# Patient Record
Sex: FEMALE | Race: WHITE | NOT HISPANIC OR LATINO | Employment: OTHER | URBAN - METROPOLITAN AREA
[De-identification: names, ages, dates, MRNs, and addresses within clinical notes are randomized per-mention and may not be internally consistent; named-entity substitution may affect disease eponyms.]

---

## 2017-01-10 ENCOUNTER — DOCUMENTATION ONLY (OUTPATIENT)
Dept: FAMILY MEDICINE | Facility: CLINIC | Age: 72
End: 2017-01-10

## 2017-01-10 NOTE — PROGRESS NOTES
Pre-Visit Chart Review  For Appointment Scheduled on 1-11-17    [facility] Maintenance Due   Topic Date Due    Colonoscopy  06/19/1994    Zoster Vaccine  06/19/2004    Pneumococcal (65+) (1 of 2 - PCV13) 06/19/2009    Influenza Vaccine  08/01/2016

## 2017-01-11 ENCOUNTER — OFFICE VISIT (OUTPATIENT)
Dept: FAMILY MEDICINE | Facility: CLINIC | Age: 72
End: 2017-01-11
Payer: MEDICARE

## 2017-01-11 ENCOUNTER — HOSPITAL ENCOUNTER (OUTPATIENT)
Dept: RADIOLOGY | Facility: CLINIC | Age: 72
Discharge: HOME | End: 2017-01-11
Attending: FAMILY MEDICINE
Payer: MEDICARE

## 2017-01-11 VITALS
DIASTOLIC BLOOD PRESSURE: 70 MMHG | HEART RATE: 58 BPM | HEIGHT: 64 IN | BODY MASS INDEX: 38.12 KG/M2 | SYSTOLIC BLOOD PRESSURE: 152 MMHG | TEMPERATURE: 98 F | WEIGHT: 223.31 LBS

## 2017-01-11 DIAGNOSIS — Z12.31 ENCOUNTER FOR SCREENING MAMMOGRAM FOR MALIGNANT NEOPLASM OF BREAST: ICD-10-CM

## 2017-01-11 DIAGNOSIS — R26.9 ABNORMALITY OF GAIT AND MOBILITY: ICD-10-CM

## 2017-01-11 DIAGNOSIS — R53.83 FATIGUE, UNSPECIFIED TYPE: ICD-10-CM

## 2017-01-11 DIAGNOSIS — I10 ESSENTIAL HYPERTENSION: ICD-10-CM

## 2017-01-11 DIAGNOSIS — R42 DIZZINESS: Primary | ICD-10-CM

## 2017-01-11 DIAGNOSIS — E66.9 OBESITY (BMI 30-39.9): ICD-10-CM

## 2017-01-11 DIAGNOSIS — E03.9 HYPOTHYROIDISM, UNSPECIFIED TYPE: ICD-10-CM

## 2017-01-11 PROCEDURE — 77063 BREAST TOMOSYNTHESIS BI: CPT | Mod: 26,,, | Performed by: RADIOLOGY

## 2017-01-11 PROCEDURE — 1157F ADVNC CARE PLAN IN RCRD: CPT | Mod: S$GLB,,, | Performed by: FAMILY MEDICINE

## 2017-01-11 PROCEDURE — 1159F MED LIST DOCD IN RCRD: CPT | Mod: S$GLB,,, | Performed by: FAMILY MEDICINE

## 2017-01-11 PROCEDURE — 77067 SCR MAMMO BI INCL CAD: CPT | Mod: TC

## 2017-01-11 PROCEDURE — 77067 SCR MAMMO BI INCL CAD: CPT | Mod: 26,,, | Performed by: RADIOLOGY

## 2017-01-11 PROCEDURE — 99999 PR PBB SHADOW E&M-EST. PATIENT-LVL III: CPT | Mod: PBBFAC,,, | Performed by: FAMILY MEDICINE

## 2017-01-11 PROCEDURE — 3078F DIAST BP <80 MM HG: CPT | Mod: S$GLB,,, | Performed by: FAMILY MEDICINE

## 2017-01-11 PROCEDURE — 3077F SYST BP >= 140 MM HG: CPT | Mod: S$GLB,,, | Performed by: FAMILY MEDICINE

## 2017-01-11 PROCEDURE — 99214 OFFICE O/P EST MOD 30 MIN: CPT | Mod: S$GLB,,, | Performed by: FAMILY MEDICINE

## 2017-01-11 PROCEDURE — 82270 OCCULT BLOOD FECES: CPT | Mod: S$GLB,,, | Performed by: FAMILY MEDICINE

## 2017-01-11 PROCEDURE — 1160F RVW MEDS BY RX/DR IN RCRD: CPT | Mod: S$GLB,,, | Performed by: FAMILY MEDICINE

## 2017-01-11 PROCEDURE — 1125F AMNT PAIN NOTED PAIN PRSNT: CPT | Mod: S$GLB,,, | Performed by: FAMILY MEDICINE

## 2017-01-11 RX ORDER — FOSINOPRIL SODIUM AND HYDROCHLOROTHIAZIDE 20; 12.5 MG/1; MG/1
1 TABLET ORAL DAILY
Qty: 90 TABLET | Refills: 3 | Status: SHIPPED | OUTPATIENT
Start: 2017-01-11 | End: 2017-12-31 | Stop reason: SDUPTHER

## 2017-01-11 NOTE — MR AVS SNAPSHOT
[facility]  [address]  [place] [address]  Phone: [phone]  Fax: [phone]                  Tali Hopper   2017 3:00 PM   Office Visit    Description:  Female : 1945   Provider:  Elizabeth Rodríguez MD   Department:  [facility]           Reason for Visit     Follow-up           Diagnoses this Visit        Comments    Dizziness    -  Primary     Essential hypertension         Hypothyroidism, unspecified type         Abnormality of gait and mobility         Fatigue, unspecified type         Encounter for screening mammogram for malignant neoplasm of breast                To Do List           Future Appointments        Provider Department Dept Phone    2017 3:45 PM [facility] MAMMO1 [facility]- Mammography [phone]    2017 4:00 PM LAB, [place] [facility] - Lab [phone]    2017 2:40 PM Elizabeth Rodríguez MD [facility] [phone]      Goals (5 Years of Data)     None      Follow-Up and Disposition     Return in about 4 months (around 2017).       These Medications        Disp Refills Start End    fosinopril-hydrochlorothiazide (MONOPRIL-HCT) 20-12.5 mg per tablet 90 tablet 3 2017    Take 1 tablet by mouth once daily. - Oral    Pharmacy: [facility]/pharmacy #[id number] - [place]  [address]  Ph #: [phone]         [facility]     [facility] Nurse Care Line -  Assistance  Registered nurses in the [facility] provide clinical advisement, health education, appointment booking, and other advisory services.  Call for this free service at [phone].             Medications           Message regarding Medications     Verify the changes and/or additions to your medication regime listed below are the same as discussed with your clinician today.  If any of these changes or additions are incorrect, please notify your healthcare provider.        START taking these  "NEW medications        Refills    fosinopril-hydrochlorothiazide (MONOPRIL-HCT) 20-12.5 mg per tablet 3    Sig: Take 1 tablet by mouth once daily.    Class: Normal    Route: Oral      STOP taking these medications     fosinopril-hydrochlorothiazide (MONOPRIL-HCT) 10-12.5 mg per tablet TAKE 1 TABLET ONE TIME DAILY           Verify that the below list of medications is an accurate representation of the medications you are currently taking.  If none reported, the list may be blank. If incorrect, please contact your healthcare provider. Carry this list with you in case of emergency.           Current Medications     ascorbic acid (VITAMIN C) 100 MG tablet Take 100 mg by mouth once daily.    aspirin (ECOTRIN) 81 MG EC tablet Take 81 mg by mouth once daily.    b complex vitamins tablet Take 1 tablet by mouth once daily.    CALCIUM CARBONATE/VITAMIN D3 (CALTRATE 600 + D ORAL) Take by mouth once daily.     escitalopram oxalate (LEXAPRO) 20 MG tablet TAKE 1 TABLET ONE TIME DAILY    latanoprost 0.005 % ophthalmic solution 1 drop every evening.    levothyroxine (SYNTHROID) 112 MCG tablet TAKE 1 TABLET BY MOUTH ONCE DAILY.    kr-SI-U1-K-lycop-lut-herb#220 (ESTROBLEND) 500 mcg-1,000 unit-20 mcg Tab Take 1 tablet by mouth once daily.    raloxifene (EVISTA) 60 mg tablet TAKE 1 TABLET ONE TIME DAILY    timolol maleate 0.5% (TIMOPTIC-XE) 0.5 % SolG Place 1 drop into both eyes 2 (two) times daily.     fosinopril-hydrochlorothiazide (MONOPRIL-HCT) 20-12.5 mg per tablet Take 1 tablet by mouth once daily.           Clinical Reference Information           Vital Signs - Last Recorded  Most recent update: 1/11/2017  3:08 PM by Johnna Parsons MA    BP Pulse Temp Ht Wt BMI    (!) 152/70 (BP Location: Right arm, Patient Position: Sitting, BP Method: Manual) (!) 58 98 °F (36.7 °C) (Oral) 5' 3.5" (1.613 m) 101.3 kg (223 lb 5.2 oz) 38.94 kg/m2      Blood Pressure          Most Recent Value    BP  (!)  152/70      Allergies as of 1/11/2017     " Benadryl Decongestant    Penicillins      Immunizations Administered on Date of Encounter - 1/11/2017     None      Orders Placed During Today's Visit     Future Labs/Procedures Expected by Expires    CBC auto differential  1/11/2017 (Approximate) 4/11/2017    Comprehensive metabolic panel  1/11/2017 (Approximate) 4/11/2017    Mammo Digital Screening Bilateral With CAD  1/11/2017 3/11/2018    Occult blood x 1, stool  1/11/2017 1/11/2018    Occult blood x 1, stool  1/11/2017 1/11/2018    Occult blood x 1, stool  1/11/2017 1/11/2018    T4, free  1/11/2017 (Approximate) 4/11/2017    TSH  1/11/2017 (Approximate) 4/11/2017    Vitamin B12  1/11/2017 (Approximate) 4/11/2017      [facility] Sign-Up     Activating your [facility] account is as easy as 1-2-3!     1) Visit [email or web address], select Sign Up Now, enter this activation code and your date of birth, then select Next.  Activation code not generated  Current Patient Portal Status: Account disabled      2) Create a username and password to use when you visit [facility] in the future and select a security question in case you lose your password and select Next.    3) Enter your e-mail address and click Sign Up!    Additional Information  If you have questions, please e-mail [email or web address] or call [phone] to talk to our [facility] staff. Remember, [facility] is NOT to be used for urgent needs. For medical emergencies, dial 911.         Instructions      Walking for Fitness  Fitness walking has something for everyone, even people who are already fit. Walking is one of the safest ways to condition your body aerobically. It can boost energy, help you lose weight, and reduce stress.    Physical benefits  · Walking strengthens your heart and lungs, and tones your muscles.  · When walking, your feet land with less impact than in other sports. This reduces chances of muscle, bone, and joint injury.  · Regular walking improves your cholesterol levels and lowers your risk of  heart disease. And it helps you control your blood sugar if you have diabetes.  · Walking is a weight-bearing activity, which helps maintain bone density. This can help prevent osteoporosis.  Personal rewards  · Taking walks can help you relax and manage stress. And fitness walking may make you feel better about yourself.  · Walking can help you sleep better at night and make you less likely to be depressed.  · Regular walking may help maintain your memory as you get older.  · Walking is a great way to spend extra time with friends and family members. Be sure to invite your dog along!  Q&A about fitness walking  Q: Will walking keep me fit?  A: Yes. Regular walking at the right pace gives you all the benefits of other aerobic activities, such as jogging and swimming.  Q: Will walking help me lose weight and keep it off?  A: Yes. Per mile, walking can burn as many calories as jogging. Your health care provider can help work walking into your weight-loss plan.  Q: Is walking safe for my health?  A: Yes. Walking is safe if you have high blood pressure, diabetes, heart disease, or other conditions. Talk to your health care provider before you start.  © 4653-2760 [employer]. [address] [place] [address], [place] [address]. All rights reserved. This information is not intended as a substitute for professional medical care. Always follow your healthcare professional's instructions.        Weight Management: Getting Started  Healthy bodies come in all shapes and sizes. Not all bodies are made to be thin. For some people, a healthy weight is higher than the average weight listed on weight charts. Your healthcare provider can help you decide on a healthy weight for you.    Reasons to lose weight  Losing weight can help with some health problems, such as high blood pressure, heart disease, diabetes, sleep apnea, and arthritis. You may also feel more energy.  Set your long-term goal  Your goal doesn't even have to be a  specific weight. You may decide on a fitness goal (such as being able to walk 10 miles a week), or a health goal (such as lowering your blood pressure). Choose a goal that is measurable and reasonable, so you know when you've reached it. A goal of reaching a BMI of less than 25 is not always reasonable (or possible).   Make an action plan  Habits dont change overnight. Setting your goals too high can leave you feeling discouraged if you cant reach them. Be realistic. Choose one or two small changes you can make now. Set an action plan for how you are going to make these changes. When you can stick to this plan, keep making a few more small changes. Taking small steps will help you stay on the path to success.  Track your progress  Write down your goals. Then, keep a daily record of your progress. Write down what you eat and how active you are. This record lets you look back on how much youve done. It may also help when youre feeling frustrated. Reward yourself for success. Even if you dont reach every goal, give yourself credit for what you do get done.  Get support  Encouragement from others can help make losing weight easier. Ask your family members and friends for support. They may even want to join you. Also look to your healthcare provider, registered dietitian, and  for help. Your local hospital can give you more information about nutrition, exercise, and weight loss.  © 2268-7836 [facility], [employer]. [address] [place] [address], [place] [address]. All rights reserved. This information is not intended as a substitute for professional medical care. Always follow your healthcare professional's instructions.

## 2017-01-11 NOTE — PATIENT INSTRUCTIONS

## 2017-01-12 NOTE — PROGRESS NOTES
Subjective:       Patient ID: Tali Hopper is a 71 y.o. female.    Chief Complaint: Follow-up    HPIPatient is here to f/u chronic conditions  Patient Active Problem List   Diagnosis    Hypothyroid    Osteoporosis, post-menopausal    Depression    Hyperlipemia    Hypocalcemia    Loss of equilibrium    Obesity (BMI 30-39.9)    Decreased pulse    Faintness    Bilateral low back pain without sciatica    Cataract of left eye    Essential hypertension   C/o unsteady gait that causes her to fall gradually worsening > 1 year.  Has seen ENT Dr. Holt and eye specialist that say it has nothing to do with eyes or ears.  Sometimes gets lightheaded if she bends over or stands too quickly.  No headache    Review of Systems   Constitutional: Negative for fatigue and unexpected weight change.   Eyes: Negative for visual disturbance.   Respiratory: Negative for chest tightness and shortness of breath.    Cardiovascular: Negative for chest pain, palpitations and leg swelling.   Gastrointestinal: Negative for abdominal pain.   Endocrine: Negative for polydipsia, polyphagia and polyuria.   Musculoskeletal: Negative for arthralgias.   Neurological: Positive for dizziness and light-headedness. Negative for syncope, speech difficulty, weakness and headaches.       Objective:      Physical Exam   Constitutional: She is oriented to person, place, and time. She appears well-developed and well-nourished.   Cardiovascular: Normal rate, regular rhythm and normal heart sounds.    Pulmonary/Chest: Effort normal and breath sounds normal.   Musculoskeletal: She exhibits no edema.   Neurological: She is alert and oriented to person, place, and time. Gait abnormal.   Neg rhomberg, wide based gait and somewhat unsteady even with wide base.     Skin: Skin is warm and dry.   Psychiatric: She has a normal mood and affect.   Nursing note and vitals reviewed.      Assessment:       1. Dizziness    2. Essential hypertension    3.  "Hypothyroidism, unspecified type    4. Abnormality of gait and mobility     5. Fatigue, unspecified type    6. Encounter for screening mammogram for malignant neoplasm of breast    7. Obesity (BMI 30-39.9)        Plan:       1. Essential hypertension  Uncontrolled.  Increase to:  - fosinopril-hydrochlorothiazide (MONOPRIL-HCT) 20-12.5 mg per tablet; Take 1 tablet by mouth once daily.  Dispense: 90 tablet; Refill: 3  I counseled the patient on HTN education, management and recommendations.  I recommended weight loss toward a BMI < 25, avoidance of salt and the DASH diet, regular cardio exercise a minimum of 150 minutes per week and medications if indicated.  Printed materials were given.    2. Dizziness  Screen and treat as indicated:    - Vitamin B12; Future    3. Hypothyroidism, unspecified type  Stable condition.  Continue current medications.  Will adjust based on lab findings or if condition changes.    - Comprehensive metabolic panel; Future  - CBC auto differential; Future  - TSH; Future  - T4, free; Future    4. Abnormality of gait and mobility   Screen and treat as indicated:    - Vitamin B12; Future    5. Fatigue, unspecified type  Stable condition.  Continue current medications.  Will adjust based on lab findings or if condition changes.'  Refuses colonoscopy  - Occult blood x 1, stool; Future  - Occult blood x 1, stool; Future  - Occult blood x 1, stool; Future    6. Encounter for screening mammogram for malignant neoplasm of breast  Screen and treat as indicated:    7. Obesity (BMI 30-39.9)  Counseled patient on his ideal body weight, health consequences of being obese and current recommendations including weekly exercise and a heart healthy diet.  Current BMI is:Estimated body mass index is 38.94 kg/(m^2) as calculated from the following:    Height as of this encounter: 5' 3.5" (1.613 m).    Weight as of this encounter: 101.3 kg (223 lb 5.2 oz)..  Patient is aware that ideal BMI < 25 or Weight in (lb) " to have BMI = 25: 143.1.    [facility] goal documentation:  Patient readiness: acceptance and barriers:readiness  During the course of the visit the patient was educated and counseled about the following: Hypertension:   Dietary sodium restriction.  Regular aerobic exercise.  Obesity:   Informal exercise measures discussed, e.g. taking stairs instead of elevator.  Regular aerobic exercise program discussed.  Goals: Hypertension: Reduce Blood Pressure and Obesity: Reduce calorie intake and BMI  Goal/Outcomes met:none  The following self management tools provided:none  Patient Instructions (the written plan) was given to the patient/family: Yes  Time spent with patient: 20 minutes    Patient with be reevaluated in 4 months or sooner prn.  Nurse BP check 2 weeks    Greater than 50% of this visit was spent counseling as described in above documentation:Yes

## 2017-01-18 LAB
CTP QC/QA: YES
FECAL OCCULT BLOOD, POC: NEGATIVE

## 2017-05-14 ENCOUNTER — DOCUMENTATION ONLY (OUTPATIENT)
Dept: FAMILY MEDICINE | Facility: CLINIC | Age: 72
End: 2017-05-14

## 2017-05-14 NOTE — PATIENT INSTRUCTIONS
Controlling High Blood Pressure  High blood pressure (hypertension) is often called the silent killer. This is because many people who have it dont know it. High blood pressure is defined as 140/90 mm Hg or higher. Know your blood pressure and remember to check it regularly. Doing so can save your life. Here are some things you can do to help control your blood pressure.    Choose heart-healthy foods  · Select low-salt, low-fat foods. Limit sodium intake to 2,400 mg per day or the amount suggested by your healthcare provider.  · Limit canned, dried, cured, packaged, and fast foods. These can contain a lot of salt.  · Eat 8 to 10 servings of fruits and vegetables every day.  · Choose lean meats, fish, or chicken.  · Eat whole-grain pasta, brown rice, and beans.  · Eat 2 to 3 servings of low-fat or fat-free dairy products.  · Ask your doctor about the DASH eating plan. This plan helps reduce blood pressure.  · When you go to a restaurant, ask that your meal be prepared with no added salt.  Maintain a healthy weight  · Ask your healthcare provider how many calories to eat a day. Then stick to that number.  · Ask your healthcare provider what weight range is healthiest for you. If you are overweight, a weight loss of only 3% to 5% of your body weight can help lower blood pressure. Generally, a good weight loss goal is to lose 10% of your body weight in a year.  · Limit snacks and sweets.  · Get regular exercise.  Get up and get active  · Choose activities you enjoy. Find ones you can do with friends or family. This includes bicycling, dancing, walking, and jogging.  · Park farther away from building entrances.  · Use stairs instead of the elevator.  · When you can, walk or bike instead of driving.  · [place] leaves, garden, or do household repairs.  · Be active at a moderate to vigorous level of physical activity for at least 40 minutes for a minimum of 3 to 4 days a week.   Manage stress  · Make time to relax and enjoy  life. Find time to laugh.  · Communicate your concerns with your loved ones and your healthcare provider.  · Visit with family and friends, and keep up with hobbies.  Limit alcohol and quit smoking  · Men should have no more than 2 drinks per day.  · Women should have no more than 1 drink per day.  · Talk with your healthcare provider about quitting smoking. Smoking significantly increases your risk for heart disease and stroke. Ask your healthcare provider about community smoking cessation programs and other options.  Medicines  If lifestyle changes arent enough, your healthcare provider may prescribe high blood pressure medicine. Take all medicines as prescribed. If you have any questions about your medicines, ask your healthcare provider before stopping or changing them.   Date Last Reviewed: 4/27/2016 © 2000-2016 [employer]. [address] [place] [address], [place] [address]. All rights reserved. This information is not intended as a substitute for professional medical care. Always follow your healthcare professional's instructions.        Weight Management: Getting Started  Healthy bodies come in all shapes and sizes. Not all bodies are made to be thin. For some people, a healthy weight is higher than the average weight listed on weight charts. Your healthcare provider can help you decide on a healthy weight for you.    Reasons to lose weight  Losing weight can help with some health problems, such as high blood pressure, heart disease, diabetes, sleep apnea, and arthritis. You may also feel more energy.  Set your long-term goal  Your goal doesn't even have to be a specific weight. You may decide on a fitness goal (such as being able to walk 10 miles a week), or a health goal (such as lowering your blood pressure). Choose a goal that is measurable and reasonable, so you know when you've reached it. A goal of reaching a BMI of less than 25 is not always reasonable (or possible).   Make an action  plan  Habits dont change overnight. Setting your goals too high can leave you feeling discouraged if you cant reach them. Be realistic. Choose one or two small changes you can make now. Set an action plan for how you are going to make these changes. When you can stick to this plan, keep making a few more small changes. Taking small steps will help you stay on the path to success.  Track your progress  Write down your goals. Then, keep a daily record of your progress. Write down what you eat and how active you are. This record lets you look back on how much youve done. It may also help when youre feeling frustrated. Reward yourself for success. Even if you dont reach every goal, give yourself credit for what you do get done.  Get support  Encouragement from others can help make losing weight easier. Ask your family members and friends for support. They may even want to join you. Also look to your healthcare provider, registered dietitian, and  for help. Your local hospital can give you more information about nutrition, exercise, and weight loss.  Date Last Reviewed: 1/31/2016 © 2000-2016 [employer]. [address] [place] [address]. All rights reserved. This information is not intended as a substitute for professional medical care. Always follow your healthcare professional's instructions.        Walking for Fitness  Fitness walking has something for everyone, even people who are already fit. Walking is one of the safest ways to condition your body aerobically. It can boost energy, help you lose weight, and reduce stress.    Physical benefits  · Walking strengthens your heart and lungs, and tones your muscles.  · When walking, your feet land with less impact than in other sports. This reduces chances of muscle, bone, and joint injury.  · Regular walking improves your cholesterol levels and lowers your risk of heart disease. And it helps you control your blood sugar if  you have diabetes.  · Walking is a weight-bearing activity, which helps maintain bone density. This can help prevent osteoporosis.  Personal rewards  · Taking walks can help you relax and manage stress. And fitness walking may make you feel better about yourself.  · Walking can help you sleep better at night and make you less likely to be depressed.  · Regular walking may help maintain your memory as you get older.  · Walking is a great way to spend extra time with friends and family members. Be sure to invite your dog along!  Q&A about fitness walking  Q: Will walking keep me fit?  A: Yes. Regular walking at the right pace gives you all the benefits of other aerobic activities, such as jogging and swimming.  Q: Will walking help me lose weight and keep it off?  A: Yes. Per mile, walking can burn as many calories as jogging. Your health care provider can help work walking into your weight-loss plan.  Q: Is walking safe for my health?  A: Yes. Walking is safe if you have high blood pressure, diabetes, heart disease, or other conditions. Talk to your health care provider before you start.  Date Last Reviewed: 5/9/2015  © 7327-4294 [employer]. [address] [place] [address], [place] [address]. All rights reserved. This information is not intended as a substitute for professional medical care. Always follow your healthcare professional's instructions.

## 2017-05-14 NOTE — PROGRESS NOTES
Pre-Visit Chart Review  For Appointment Scheduled on 05/17/2017    Health Maintenance Due   Topic Date Due    DEXA SCAN  04/21/2017

## 2017-05-17 ENCOUNTER — LAB VISIT (OUTPATIENT)
Dept: LAB | Facility: HOSPITAL | Age: 72
End: 2017-05-17
Attending: FAMILY MEDICINE
Payer: MEDICARE

## 2017-05-17 ENCOUNTER — OFFICE VISIT (OUTPATIENT)
Dept: FAMILY MEDICINE | Facility: CLINIC | Age: 72
End: 2017-05-17
Payer: MEDICARE

## 2017-05-17 VITALS
SYSTOLIC BLOOD PRESSURE: 140 MMHG | HEART RATE: 64 BPM | BODY MASS INDEX: 38.27 KG/M2 | TEMPERATURE: 98 F | DIASTOLIC BLOOD PRESSURE: 72 MMHG | WEIGHT: 224.19 LBS | HEIGHT: 64 IN

## 2017-05-17 DIAGNOSIS — R90.89 ABNORMAL BRAIN MRI: ICD-10-CM

## 2017-05-17 DIAGNOSIS — E66.9 OBESITY (BMI 30-39.9): ICD-10-CM

## 2017-05-17 DIAGNOSIS — F41.8 DEPRESSION WITH ANXIETY: ICD-10-CM

## 2017-05-17 DIAGNOSIS — E78.5 HYPERLIPIDEMIA, UNSPECIFIED HYPERLIPIDEMIA TYPE: ICD-10-CM

## 2017-05-17 DIAGNOSIS — E03.9 HYPOTHYROIDISM, UNSPECIFIED TYPE: ICD-10-CM

## 2017-05-17 DIAGNOSIS — G31.9 CEREBRAL ATROPHY: Primary | ICD-10-CM

## 2017-05-17 DIAGNOSIS — R29.3 POSTURAL IMBALANCE: ICD-10-CM

## 2017-05-17 DIAGNOSIS — I10 ESSENTIAL HYPERTENSION: ICD-10-CM

## 2017-05-17 DIAGNOSIS — F32.A DEPRESSION, UNSPECIFIED DEPRESSION TYPE: ICD-10-CM

## 2017-05-17 DIAGNOSIS — M81.0 OSTEOPOROSIS, POST-MENOPAUSAL: ICD-10-CM

## 2017-05-17 LAB
ALBUMIN SERPL BCP-MCNC: 3.6 G/DL
ALP SERPL-CCNC: 62 U/L
ALT SERPL W/O P-5'-P-CCNC: 16 U/L
ANION GAP SERPL CALC-SCNC: 9 MMOL/L
AST SERPL-CCNC: 20 U/L
BASOPHILS # BLD AUTO: 0.04 K/UL
BASOPHILS NFR BLD: 0.4 %
BILIRUB SERPL-MCNC: 0.6 MG/DL
BUN SERPL-MCNC: 19 MG/DL
CALCIUM SERPL-MCNC: 9.9 MG/DL
CHLORIDE SERPL-SCNC: 102 MMOL/L
CO2 SERPL-SCNC: 28 MMOL/L
CREAT SERPL-MCNC: 0.9 MG/DL
DIFFERENTIAL METHOD: ABNORMAL
EOSINOPHIL # BLD AUTO: 0.3 K/UL
EOSINOPHIL NFR BLD: 3.4 %
ERYTHROCYTE [DISTWIDTH] IN BLOOD BY AUTOMATED COUNT: 13.3 %
EST. GFR  (AFRICAN AMERICAN): >60 ML/MIN/1.73 M^2
EST. GFR  (NON AFRICAN AMERICAN): >60 ML/MIN/1.73 M^2
GLUCOSE SERPL-MCNC: 105 MG/DL
HCT VFR BLD AUTO: 44.5 %
HGB BLD-MCNC: 14.2 G/DL
LYMPHOCYTES # BLD AUTO: 2.5 K/UL
LYMPHOCYTES NFR BLD: 28.5 %
MCH RBC QN AUTO: 28.7 PG
MCHC RBC AUTO-ENTMCNC: 31.9 %
MCV RBC AUTO: 90 FL
MONOCYTES # BLD AUTO: 0.8 K/UL
MONOCYTES NFR BLD: 8.8 %
NEUTROPHILS # BLD AUTO: 5.2 K/UL
NEUTROPHILS NFR BLD: 58.6 %
PLATELET # BLD AUTO: 287 K/UL
PMV BLD AUTO: 11.6 FL
POTASSIUM SERPL-SCNC: 4.4 MMOL/L
PROT SERPL-MCNC: 7.3 G/DL
RBC # BLD AUTO: 4.95 M/UL
SODIUM SERPL-SCNC: 139 MMOL/L
T4 FREE SERPL-MCNC: 1.3 NG/DL
TSH SERPL DL<=0.005 MIU/L-ACNC: 1.11 UIU/ML
WBC # BLD AUTO: 8.89 K/UL

## 2017-05-17 PROCEDURE — 36415 COLL VENOUS BLD VENIPUNCTURE: CPT | Mod: PO

## 2017-05-17 PROCEDURE — 99214 OFFICE O/P EST MOD 30 MIN: CPT | Mod: S$GLB,,, | Performed by: FAMILY MEDICINE

## 2017-05-17 PROCEDURE — 80053 COMPREHEN METABOLIC PANEL: CPT

## 2017-05-17 PROCEDURE — 1159F MED LIST DOCD IN RCRD: CPT | Mod: S$GLB,,, | Performed by: FAMILY MEDICINE

## 2017-05-17 PROCEDURE — 85025 COMPLETE CBC W/AUTO DIFF WBC: CPT

## 2017-05-17 PROCEDURE — 1126F AMNT PAIN NOTED NONE PRSNT: CPT | Mod: S$GLB,,, | Performed by: FAMILY MEDICINE

## 2017-05-17 PROCEDURE — 84439 ASSAY OF FREE THYROXINE: CPT

## 2017-05-17 PROCEDURE — 84443 ASSAY THYROID STIM HORMONE: CPT

## 2017-05-17 PROCEDURE — 99999 PR PBB SHADOW E&M-EST. PATIENT-LVL III: CPT | Mod: PBBFAC,,, | Performed by: FAMILY MEDICINE

## 2017-05-17 RX ORDER — BUPROPION HYDROCHLORIDE 150 MG/1
150 TABLET ORAL DAILY
Qty: 30 TABLET | Refills: 5 | Status: SHIPPED | OUTPATIENT
Start: 2017-05-17 | End: 2017-09-08 | Stop reason: SDUPTHER

## 2017-05-17 RX ORDER — LEVOMEFOLATE CALCIUM 7.5 MG
7.5 TABLET ORAL DAILY
Qty: 30 TABLET | Refills: 5 | Status: SHIPPED | OUTPATIENT
Start: 2017-05-17 | End: 2019-07-10

## 2017-05-17 RX ORDER — AMLODIPINE BESYLATE 5 MG/1
5 TABLET ORAL DAILY
Qty: 30 TABLET | Refills: 5 | Status: SHIPPED | OUTPATIENT
Start: 2017-05-17 | End: 2017-09-08 | Stop reason: SDUPTHER

## 2017-05-17 RX ORDER — RALOXIFENE HYDROCHLORIDE 60 MG/1
TABLET, FILM COATED ORAL
Qty: 90 TABLET | Refills: 3 | Status: SHIPPED | OUTPATIENT
Start: 2017-05-17 | End: 2018-07-30 | Stop reason: SDUPTHER

## 2017-05-17 RX ORDER — ESCITALOPRAM OXALATE 20 MG/1
TABLET ORAL
Qty: 90 TABLET | Refills: 3 | Status: SHIPPED | OUTPATIENT
Start: 2017-05-17 | End: 2017-08-10 | Stop reason: SDUPTHER

## 2017-05-17 NOTE — MR AVS SNAPSHOT
[facility]  [address]  [place] [address]  Phone: [phone]  Fax: [phone]                  Tali Hopper   2017 2:40 PM   Office Visit    Description:  Female : 1945   Provider:  Elizabeth Rodríguez MD   Department:  [facility]           Reason for Visit     Follow-up           Diagnoses this Visit        Comments    Cerebral atrophy    -  Primary     Abnormal brain MRI         Postural imbalance         Hypothyroidism, unspecified type         Hyperlipidemia, unspecified hyperlipidemia type         Obesity (BMI 30-39.9)         Depression with anxiety         Depression, unspecified depression type         Osteoporosis, post-menopausal         Essential hypertension                To Do List           Future Appointments        Provider Department Dept Phone    2017 3:30 PM [facility] MRI1 300 LB LIMIT [facility] [phone]    2017 2:40 PM RAUL Samson-YONI [facility] [phone]    2017 3:40 PM Elizabeth Rodríguez MD [facility] [phone]      Goals (5 Years of Data)     None      Follow-Up and Disposition     Return in about 3 months (around 2017).       These Medications        Disp Refills Start End    buPROPion (WELLBUTRIN XL) 150 MG TB24 tablet 30 tablet 5 2017    Take 1 tablet (150 mg total) by mouth once daily. - Oral    Pharmacy: [facility]/pharmacy #[id number] - [place] - [address] Ph #: [phone]       levomefolate calcium (DEPLIN) 7.5 mg Tab tablet 30 tablet 5 2017    Take 1 tablet (7.5 mg total) by mouth once daily. - Oral    Pharmacy: [facility]/pharmacy #[id number] - [place] - [address] Ph #: [phone]       escitalopram oxalate (LEXAPRO) 20 MG tablet 90 tablet 3 2017     TAKE 1 TABLET ONE TIME DAILY    Pharmacy: [facility] - [place] [address] Ph #: [phone]       raloxifene  (EVISTA) 60 mg tablet 90 tablet 3 5/17/2017     TAKE 1 TABLET ONE TIME DAILY    Pharmacy: [facility] - [facility] [address] Ph #: [phone]       amlodipine (NORVASC) 5 MG tablet 30 tablet 5 5/17/2017 5/17/2018    Take 1 tablet (5 mg total) by mouth once daily. - Oral    Pharmacy: [facility]/pharmacy #[id number] - [place] - [address] Ph #: [phone]         [facility]     [facility] On Call Nurse Care Line - 24/7 Assistance  Unless otherwise directed by your provider, please contact [facility], our nurse care line that is available for 24/7 assistance.     Registered nurses in the [facility] provide: appointment scheduling, clinical advisement, health education, and other advisory services.  Call: [phone] (toll free)               Medications           Message regarding Medications     Verify the changes and/or additions to your medication regime listed below are the same as discussed with your clinician today.  If any of these changes or additions are incorrect, please notify your healthcare provider.        START taking these NEW medications        Refills    buPROPion (WELLBUTRIN XL) 150 MG TB24 tablet 5    Sig: Take 1 tablet (150 mg total) by mouth once daily.    Class: Normal    Route: Oral    levomefolate calcium (DEPLIN) 7.5 mg Tab tablet 5    Sig: Take 1 tablet (7.5 mg total) by mouth once daily.    Class: Normal    Route: Oral    amlodipine (NORVASC) 5 MG tablet 5    Sig: Take 1 tablet (5 mg total) by mouth once daily.    Class: Normal    Route: Oral           Verify that the below list of medications is an accurate representation of the medications you are currently taking.  If none reported, the list may be blank. If incorrect, please contact your healthcare provider. Carry this list with you in case of emergency.           Current Medications     ascorbic acid (VITAMIN C) 100 MG tablet Take 100 mg by mouth once daily.    aspirin (ECOTRIN)  "81 MG EC tablet Take 81 mg by mouth once daily.    b complex vitamins tablet Take 1 tablet by mouth once daily.    CALCIUM CARBONATE/VITAMIN D3 (CALTRATE 600 + D ORAL) Take by mouth once daily.     escitalopram oxalate (LEXAPRO) 20 MG tablet TAKE 1 TABLET ONE TIME DAILY    fosinopril-hydrochlorothiazide (MONOPRIL-HCT) 20-12.5 mg per tablet Take 1 tablet by mouth once daily.    latanoprost 0.005 % ophthalmic solution 1 drop every evening.    levothyroxine (SYNTHROID) 112 MCG tablet TAKE 1 TABLET BY MOUTH ONCE DAILY.    dv-[id number]-K-lycop-lut-herb#220 (ESTROBLEND) 500 mcg-1,000 unit-20 mcg Tab Take 1 tablet by mouth once daily.    raloxifene (EVISTA) 60 mg tablet TAKE 1 TABLET ONE TIME DAILY    timolol maleate 0.5% (TIMOPTIC-XE) 0.5 % SolG Place 1 drop into both eyes 2 (two) times daily.     amlodipine (NORVASC) 5 MG tablet Take 1 tablet (5 mg total) by mouth once daily.    buPROPion (WELLBUTRIN XL) 150 MG TB24 tablet Take 1 tablet (150 mg total) by mouth once daily.    levomefolate calcium (DEPLIN) 7.5 mg Tab tablet Take 1 tablet (7.5 mg total) by mouth once daily.           Clinical Reference Information           Your Vitals Were     BP Pulse Temp Height Weight BMI    140/72 (BP Location: Right arm, Patient Position: Sitting, BP Method: Manual) 64 98 °F (36.7 °C) (Oral) 5' 3.5" (1.613 m) 101.7 kg (224 lb 3.3 oz) 39.09 kg/m2      Blood Pressure          Most Recent Value    BP  (!)  140/72      Allergies as of 5/17/2017     Benadryl Decongestant    Penicillins      Immunizations Administered on Date of Encounter - 5/17/2017     None      Orders Placed During Today's Visit     Future Labs/Procedures Expected by Expires    CBC auto differential  5/17/2017 7/16/2018    Comprehensive metabolic panel  5/17/2017 7/16/2018    MRI Brain W WO Contrast  5/17/2017 5/17/2018    T4, free  5/17/2017 7/16/2018    TSH  5/17/2017 7/16/2018      [facility] Sign-Up     Activating your [facility] account is as easy as 1-2-3!     1) Visit " [email or web address], select Sign Up Now, enter this activation code and your date of birth, then select Next.  Activation code not generated  Current Patient Portal Status: Account disabled      2) Create a username and password to use when you visit [facility] in the future and select a security question in case you lose your password and select Next.    3) Enter your e-mail address and click Sign Up!    Additional Information  If you have questions, please e-mail [email or web address] or call [phone] to talk to our [facility] staff. Remember, [facility] is NOT to be used for urgent needs. For medical emergencies, dial 911.         Instructions      Controlling High Blood Pressure  High blood pressure (hypertension) is often called the silent killer. This is because many people who have it dont know it. High blood pressure is defined as 140/90 mm Hg or higher. Know your blood pressure and remember to check it regularly. Doing so can save your life. Here are some things you can do to help control your blood pressure.    Choose heart-healthy foods  · Select low-salt, low-fat foods. Limit sodium intake to 2,400 mg per day or the amount suggested by your healthcare provider.  · Limit canned, dried, cured, packaged, and fast foods. These can contain a lot of salt.  · Eat 8 to 10 servings of fruits and vegetables every day.  · Choose lean meats, fish, or chicken.  · Eat whole-grain pasta, brown rice, and beans.  · Eat 2 to 3 servings of low-fat or fat-free dairy products.  · Ask your doctor about the DASH eating plan. This plan helps reduce blood pressure.  · When you go to a restaurant, ask that your meal be prepared with no added salt.  Maintain a healthy weight  · Ask your healthcare provider how many calories to eat a day. Then stick to that number.  · Ask your healthcare provider what weight range is healthiest for you. If you are overweight, a weight loss of only 3% to 5% of your body weight can help lower blood  pressure. Generally, a good weight loss goal is to lose 10% of your body weight in a year.  · Limit snacks and sweets.  · Get regular exercise.  Get up and get active  · Choose activities you enjoy. Find ones you can do with friends or family. This includes bicycling, dancing, walking, and jogging.  · Park farther away from building entrances.  · Use stairs instead of the elevator.  · When you can, walk or bike instead of driving.  · Remlap leaves, garden, or do household repairs.  · Be active at a moderate to vigorous level of physical activity for at least 40 minutes for a minimum of 3 to 4 days a week.   Manage stress  · Make time to relax and enjoy life. Find time to laugh.  · Communicate your concerns with your loved ones and your healthcare provider.  · Visit with family and friends, and keep up with hobbies.  Limit alcohol and quit smoking  · Men should have no more than 2 drinks per day.  · Women should have no more than 1 drink per day.  · Talk with your healthcare provider about quitting smoking. Smoking significantly increases your risk for heart disease and stroke. Ask your healthcare provider about community smoking cessation programs and other options.  Medicines  If lifestyle changes arent enough, your healthcare provider may prescribe high blood pressure medicine. Take all medicines as prescribed. If you have any questions about your medicines, ask your healthcare provider before stopping or changing them.   Date Last Reviewed: 4/27/2016  © 9325-1383 [employer]. [address] [place] [address], [place] [address]. All rights reserved. This information is not intended as a substitute for professional medical care. Always follow your healthcare professional's instructions.        Weight Management: Getting Started  Healthy bodies come in all shapes and sizes. Not all bodies are made to be thin. For some people, a healthy weight is higher than the average weight listed on weight charts. Your  healthcare provider can help you decide on a healthy weight for you.    Reasons to lose weight  Losing weight can help with some health problems, such as high blood pressure, heart disease, diabetes, sleep apnea, and arthritis. You may also feel more energy.  Set your long-term goal  Your goal doesn't even have to be a specific weight. You may decide on a fitness goal (such as being able to walk 10 miles a week), or a health goal (such as lowering your blood pressure). Choose a goal that is measurable and reasonable, so you know when you've reached it. A goal of reaching a BMI of less than 25 is not always reasonable (or possible).   Make an action plan  Habits dont change overnight. Setting your goals too high can leave you feeling discouraged if you cant reach them. Be realistic. Choose one or two small changes you can make now. Set an action plan for how you are going to make these changes. When you can stick to this plan, keep making a few more small changes. Taking small steps will help you stay on the path to success.  Track your progress  Write down your goals. Then, keep a daily record of your progress. Write down what you eat and how active you are. This record lets you look back on how much youve done. It may also help when youre feeling frustrated. Reward yourself for success. Even if you dont reach every goal, give yourself credit for what you do get done.  Get support  Encouragement from others can help make losing weight easier. Ask your family members and friends for support. They may even want to join you. Also look to your healthcare provider, registered dietitian, and  for help. Your local hospital can give you more information about nutrition, exercise, and weight loss.  Date Last Reviewed: 1/31/2016  © 9760-8117 [employer]. [address] [place] [address], [place] [address]. All rights reserved. This information is not intended as a substitute for professional  medical care. Always follow your healthcare professional's instructions.        Walking for Fitness  Fitness walking has something for everyone, even people who are already fit. Walking is one of the safest ways to condition your body aerobically. It can boost energy, help you lose weight, and reduce stress.    Physical benefits  · Walking strengthens your heart and lungs, and tones your muscles.  · When walking, your feet land with less impact than in other sports. This reduces chances of muscle, bone, and joint injury.  · Regular walking improves your cholesterol levels and lowers your risk of heart disease. And it helps you control your blood sugar if you have diabetes.  · Walking is a weight-bearing activity, which helps maintain bone density. This can help prevent osteoporosis.  Personal rewards  · Taking walks can help you relax and manage stress. And fitness walking may make you feel better about yourself.  · Walking can help you sleep better at night and make you less likely to be depressed.  · Regular walking may help maintain your memory as you get older.  · Walking is a great way to spend extra time with friends and family members. Be sure to invite your dog along!  Q&A about fitness walking  Q: Will walking keep me fit?  A: Yes. Regular walking at the right pace gives you all the benefits of other aerobic activities, such as jogging and swimming.  Q: Will walking help me lose weight and keep it off?  A: Yes. Per mile, walking can burn as many calories as jogging. Your health care provider can help work walking into your weight-loss plan.  Q: Is walking safe for my health?  A: Yes. Walking is safe if you have high blood pressure, diabetes, heart disease, or other conditions. Talk to your health care provider before you start.  Date Last Reviewed: 5/9/2015  © 5484-4702 [employer]. [address] [place] [address], [place] [address]. All rights reserved. This information is not intended as a substitute  for professional medical care. Always follow your healthcare professional's instructions.             Language Assistance Services     ATTENTION: Language assistance services are available, free of charge. Please call [phone].      ATENCIÓN: Si habjona spencer, tiene a garcia disposición servicios gratuitos de asistencia lingüística. Llame al [phone].     CHÚ Ý: N?u b?n nói Ti?ng Vi?t, có các d?ch v? h? tr? ngôn ng? mi?n phí dành cho b?n. G?i s? [phone].         [facility] complies with applicable Federal civil rights laws and does not discriminate on the basis of race, color, national origin, age, disability, or sex.

## 2017-05-22 ENCOUNTER — HOSPITAL ENCOUNTER (OUTPATIENT)
Dept: RADIOLOGY | Facility: HOSPITAL | Age: 72
Discharge: HOME | End: 2017-05-22
Attending: FAMILY MEDICINE
Payer: MEDICARE

## 2017-05-22 DIAGNOSIS — R29.3 POSTURAL IMBALANCE: ICD-10-CM

## 2017-05-22 DIAGNOSIS — G31.9 CEREBRAL ATROPHY: ICD-10-CM

## 2017-05-22 DIAGNOSIS — R90.89 ABNORMAL BRAIN MRI: ICD-10-CM

## 2017-05-22 PROCEDURE — A9585 GADOBUTROL INJECTION: HCPCS | Performed by: FAMILY MEDICINE

## 2017-05-22 PROCEDURE — 25500020 PHARM REV CODE 255: Performed by: FAMILY MEDICINE

## 2017-05-22 PROCEDURE — 70553 MRI BRAIN STEM W/O & W/DYE: CPT | Mod: TC

## 2017-05-22 PROCEDURE — 70553 MRI BRAIN STEM W/O & W/DYE: CPT | Mod: 26,,, | Performed by: RADIOLOGY

## 2017-05-22 RX ORDER — GADOBUTROL 604.72 MG/ML
INJECTION INTRAVENOUS
Status: DISPENSED
Start: 2017-05-22 | End: 2017-05-23

## 2017-05-22 RX ORDER — GADOBUTROL 604.72 MG/ML
10 INJECTION INTRAVENOUS
Status: COMPLETED | OUTPATIENT
Start: 2017-05-22 | End: 2017-05-22

## 2017-05-22 RX ADMIN — GADOBUTROL 10 ML: 604.72 INJECTION INTRAVENOUS at 03:05

## 2017-05-29 NOTE — PROGRESS NOTES
Subjective:       Patient ID: Tali Hopper is a 71 y.o. female.    Chief Complaint: Follow-up    Patient Active Problem List   Diagnosis    Hypothyroid    Osteoporosis, post-menopausal    Depression    Hyperlipemia    Hypocalcemia    Loss of equilibrium    Obesity (BMI 30-39.9)    Decreased pulse    Faintness    Bilateral low back pain without sciatica    Cataract of left eye    Essential hypertension   8/15 mri brain-Probable mild central brain atrophy with deep white matter ischemic changes.  Hydrocephalus however cannot be ruled out on the basis of this scan and further evaluation may be indicated.    C/o worsening depression, no motivation, no energy, anhedonia.  lexapro 20mg no longer working.  No SI/HI    HPI  Review of Systems   Constitutional: Negative for fatigue and unexpected weight change.   Respiratory: Negative for chest tightness and shortness of breath.    Cardiovascular: Negative for chest pain, palpitations and leg swelling.   Gastrointestinal: Negative for abdominal pain.   Musculoskeletal: Negative for arthralgias.   Neurological: Negative for dizziness, syncope, light-headedness and headaches.       Objective:      Physical Exam   Constitutional: She is oriented to person, place, and time. She appears well-developed and well-nourished.   Cardiovascular: Normal rate, regular rhythm and normal heart sounds.    Pulmonary/Chest: Effort normal and breath sounds normal.   Musculoskeletal: She exhibits no edema.   Neurological: She is alert and oriented to person, place, and time.   Skin: Skin is warm and dry.   Psychiatric: She has a normal mood and affect.   Nursing note and vitals reviewed.      Assessment:       1. Cerebral atrophy    2. Abnormal brain MRI    3. Postural imbalance    4. Hypothyroidism, unspecified type    5. Hyperlipidemia, unspecified hyperlipidemia type    6. Obesity (BMI 30-39.9)    7. Depression with anxiety    8. Depression, unspecified depression type    9.  "Osteoporosis, post-menopausal    10. Essential hypertension        Plan:       1. Cerebral atrophy  F/u abnl mri  - MRI Brain W WO Contrast; Future    2. Abnormal brain MRI  F/u abnl mri  - MRI Brain W WO Contrast; Future    3. Postural imbalance  F/u abnl mri  - MRI Brain W WO Contrast; Future    4. Hypothyroidism, unspecified type  Stable condition.  Continue current medications.  Will adjust based on lab findings or if condition changes.'  - CBC auto differential; Future  - Comprehensive metabolic panel; Future  - TSH; Future  - T4, free; Future    5. Hyperlipidemia, unspecified hyperlipidemia type  Stable condition.  Continue current medications.  Will adjust based on lab findings or if condition changes.      6. Obesity (BMI 30-39.9)  Counseled patient on his ideal body weight, health consequences of being obese and current recommendations including weekly exercise and a heart healthy diet.  Current BMI is:Estimated body mass index is 39.09 kg/m² as calculated from the following:    Height as of this encounter: 5' 3.5" (1.613 m).    Weight as of this encounter: 101.7 kg (224 lb 3.3 oz)..  Patient is aware that ideal BMI < 25 or Weight in (lb) to have BMI = 25: 143.1.        7. Depression with anxiety  uncontrolled on current medications.  Continue current medications.  Add  - buPROPion (WELLBUTRIN XL) 150 MG TB24 tablet; Take 1 tablet (150 mg total) by mouth once daily.  Dispense: 30 tablet; Refill: 5  - levomefolate calcium (DEPLIN) 7.5 mg Tab tablet; Take 1 tablet (7.5 mg total) by mouth once daily.  Dispense: 30 tablet; Refill: 5    8. Depression, unspecified depression type  Controlled on current medications.  Continue current medications.    - escitalopram oxalate (LEXAPRO) 20 MG tablet; TAKE 1 TABLET ONE TIME DAILY  Dispense: 90 tablet; Refill: 3    9. Osteoporosis, post-menopausal  Stable condition.  Continue current medications.  Will adjust based on lab findings or if condition changes.    - raloxifene " (EVISTA) 60 mg tablet; TAKE 1 TABLET ONE TIME DAILY  Dispense: 90 tablet; Refill: 3    10. Essential hypertension  uncontrolled on current medications.  Continue current medications.  Add  - amlodipine (NORVASC) 5 MG tablet; Take 1 tablet (5 mg total) by mouth once daily.  Dispense: 30 tablet; Refill: 5      [facility] goal documentation:  Patient readiness: acceptance and barriers:readiness  During the course of the visit the patient was educated and counseled about the following: Hypertension:   Dietary sodium restriction.  Regular aerobic exercise.  Obesity:   General weight loss/lifestyle modification strategies discussed (elicit support from others; identify saboteurs; non-food rewards, etc).  Goals: Hypertension: Reduce Blood Pressure and Obesity: Reduce calorie intake and BMI  Goal/Outcomes met:Hypertension  The following self management tools provided:none  Patient Instructions (the written plan) was given to the patient/family: Yes  Time spent with patient: 20 minutes    Patient with be reevaluated in 3 months or sooner prn. 4 weeks with JOSE to f/u htn and mood    Greater than 50% of this visit was spent counseling as described in above documentation:Yes

## 2017-05-30 ENCOUNTER — TELEPHONE (OUTPATIENT)
Dept: FAMILY MEDICINE | Facility: CLINIC | Age: 72
End: 2017-05-30

## 2017-05-30 DIAGNOSIS — R29.3 POSTURAL IMBALANCE: Primary | ICD-10-CM

## 2017-05-30 DIAGNOSIS — R42 DIZZINESS: ICD-10-CM

## 2017-05-30 NOTE — TELEPHONE ENCOUNTER
----- Message from Nina Grady sent at 5/30/2017 12:56 PM CDT -----  Patient requesting to speak with nurse (Johnna)concerning scheduling MRI/please call back at [phone] to advise.

## 2017-05-30 NOTE — TELEPHONE ENCOUNTER
Patient asking that since her MRI was unchanges since the last one she had done should she be on some sort of medication for the symptoms she is having, stumbling, feeling of pressure in her head when she bends over. Please advise.

## 2017-06-01 ENCOUNTER — TELEPHONE (OUTPATIENT)
Dept: FAMILY MEDICINE | Facility: CLINIC | Age: 72
End: 2017-06-01

## 2017-06-01 NOTE — TELEPHONE ENCOUNTER
Please call patient to schedule appointment for stumbling, feeling of pressure in her head when she bends over. Per . Thanks

## 2017-06-01 NOTE — TELEPHONE ENCOUNTER
Patient notified that  recommended that she see a neurologist. Advised patient that I'm not able to schedule this appointment and will send neurology staff a message. Patient verbalized understanding.

## 2017-06-14 ENCOUNTER — OFFICE VISIT (OUTPATIENT)
Dept: FAMILY MEDICINE | Facility: CLINIC | Age: 72
End: 2017-06-14
Payer: MEDICARE

## 2017-06-14 VITALS
DIASTOLIC BLOOD PRESSURE: 66 MMHG | SYSTOLIC BLOOD PRESSURE: 133 MMHG | WEIGHT: 224.19 LBS | HEIGHT: 64 IN | HEART RATE: 61 BPM | BODY MASS INDEX: 38.27 KG/M2 | TEMPERATURE: 98 F

## 2017-06-14 DIAGNOSIS — R42 DIZZINESS: Primary | ICD-10-CM

## 2017-06-14 DIAGNOSIS — F32.A DEPRESSION, UNSPECIFIED DEPRESSION TYPE: ICD-10-CM

## 2017-06-14 DIAGNOSIS — I10 ESSENTIAL HYPERTENSION: ICD-10-CM

## 2017-06-14 PROCEDURE — 99213 OFFICE O/P EST LOW 20 MIN: CPT | Mod: S$GLB,,, | Performed by: NURSE PRACTITIONER

## 2017-06-14 PROCEDURE — 99999 PR PBB SHADOW E&M-EST. PATIENT-LVL IV: CPT | Mod: PBBFAC,,, | Performed by: NURSE PRACTITIONER

## 2017-06-14 PROCEDURE — 1159F MED LIST DOCD IN RCRD: CPT | Mod: S$GLB,,, | Performed by: NURSE PRACTITIONER

## 2017-06-14 PROCEDURE — 1126F AMNT PAIN NOTED NONE PRSNT: CPT | Mod: S$GLB,,, | Performed by: NURSE PRACTITIONER

## 2017-06-14 NOTE — PATIENT INSTRUCTIONS

## 2017-06-15 NOTE — PROGRESS NOTES
Subjective:       Patient ID: Tali Hopper is a 71 y.o. female.    Chief Complaint: Hypertension and Mood    Ms. Hopper presents to the clinic today for follow up for hypertension and depression.  She saw Dr. Rodríguez on 5/17 and bupropion and Deplin were added which she feels are working well.  She states her mood is better.  Hypertension is well controlled on current medication.  She has a complaint of dizziness on and off for years and requests a Neurology consult outside of [facility] since she was unable to get Neurology appointment until September.      Hypertension   This is a chronic problem. The current episode started more than 1 year ago. The problem is controlled. Pertinent negatives include no anxiety, blurred vision, chest pain, headaches, palpitations, peripheral edema or shortness of breath. There are no associated agents to hypertension. Past treatments include diuretics, ACE inhibitors and calcium channel blockers. The current treatment provides significant improvement. Compliance problems include exercise.  There is no history of angina or kidney disease.     Review of Systems   Constitutional: Negative for chills and fever.   HENT: Negative for congestion, ear pain and sinus pressure.    Eyes: Negative for blurred vision and visual disturbance.   Respiratory: Negative for cough and shortness of breath.    Cardiovascular: Negative for chest pain, palpitations and leg swelling.   Gastrointestinal: Negative for abdominal pain, constipation and diarrhea.   Neurological: Negative for dizziness and headaches.   Psychiatric/Behavioral: Negative for dysphoric mood and suicidal ideas. The patient is not nervous/anxious.        Objective:      Physical Exam   Constitutional: She is oriented to person, place, and time. She appears well-nourished. No distress.   HENT:   Head: Normocephalic and atraumatic.   Right Ear: External ear normal.   Left Ear: External ear normal.   Mouth/Throat: Oropharynx is  clear and moist. No oropharyngeal exudate.   Eyes: Pupils are equal, round, and reactive to light. Right eye exhibits no discharge. Left eye exhibits no discharge.   Cardiovascular: Normal rate and regular rhythm.  Exam reveals no gallop and no friction rub.    No murmur heard.  Pulmonary/Chest: Effort normal and breath sounds normal. No respiratory distress. She has no wheezes. She has no rales.   Neurological: She is alert and oriented to person, place, and time. Coordination normal.   Skin: Skin is warm and dry.   Psychiatric: She has a normal mood and affect. Her behavior is normal. Thought content normal.   Vitals reviewed.          Current Outpatient Prescriptions:     amlodipine (NORVASC) 5 MG tablet, Take 1 tablet (5 mg total) by mouth once daily., Disp: 30 tablet, Rfl: 5    ascorbic acid (VITAMIN C) 100 MG tablet, Take 100 mg by mouth once daily., Disp: , Rfl:     aspirin (ECOTRIN) 81 MG EC tablet, Take 81 mg by mouth once daily., Disp: , Rfl:     b complex vitamins tablet, Take 1 tablet by mouth once daily., Disp: , Rfl:     buPROPion (WELLBUTRIN XL) 150 MG TB24 tablet, Take 1 tablet (150 mg total) by mouth once daily., Disp: 30 tablet, Rfl: 5    CALCIUM CARBONATE/VITAMIN D3 (CALTRATE 600 + D ORAL), Take by mouth once daily. , Disp: , Rfl:     escitalopram oxalate (LEXAPRO) 20 MG tablet, TAKE 1 TABLET ONE TIME DAILY, Disp: 90 tablet, Rfl: 3    fosinopril-hydrochlorothiazide (MONOPRIL-HCT) 20-12.5 mg per tablet, Take 1 tablet by mouth once daily., Disp: 90 tablet, Rfl: 3    latanoprost 0.005 % ophthalmic solution, 1 drop every evening., Disp: , Rfl:     levomefolate calcium (DEPLIN) 7.5 mg Tab tablet, Take 1 tablet (7.5 mg total) by mouth once daily., Disp: 30 tablet, Rfl: 5    levothyroxine (SYNTHROID) 112 MCG tablet, TAKE 1 TABLET BY MOUTH ONCE DAILY., Disp: 90 tablet, Rfl: 3    nf-UR-J0-K-lycop-lut-herb#220 (ESTROBLEND) 500 mcg-1,000 unit-20 mcg Tab, Take 1 tablet by mouth once daily., Disp:  , Rfl:     raloxifene (EVISTA) 60 mg tablet, TAKE 1 TABLET ONE TIME DAILY, Disp: 90 tablet, Rfl: 3    timolol maleate 0.5% (TIMOPTIC-XE) 0.5 % SolG, Place 1 drop into both eyes 2 (two) times daily. , Disp: , Rfl: 2  Assessment:       1. Dizziness    2. Essential hypertension    3. Depression, unspecified depression type        Plan:     Dizziness  -     Ambulatory referral to Neurology    Essential hypertension  Controlled on current medication.    Depression, unspecified depression type  Controlled, continue current medication.    Patient readiness: acceptance and barriers:none    During the course of the visit the patient was educated and counseled about the following:     Hypertension:   Medication: no change.    Goals: Hypertension: Reduce Blood Pressure    Did patient meet goals/outcomes: Yes    The following self management tools provided: declined    Patient Instructions (the written plan) was given to the patient/family.     Time spent with patient: 15 minutes

## 2017-06-20 ENCOUNTER — NURSE TRIAGE (OUTPATIENT)
Dept: ADMINISTRATIVE | Facility: CLINIC | Age: 72
End: 2017-06-20

## 2017-06-20 NOTE — TELEPHONE ENCOUNTER
Reason for Disposition   Age > 50 years    Protocols used: ST URINATION PAIN - FEMALE-A-AH    Pt states she is having frequent urination and when she does go it is only a few drops. Symptoms relieved by AZO. appt made. Care advice given.

## 2017-06-22 ENCOUNTER — OFFICE VISIT (OUTPATIENT)
Dept: FAMILY MEDICINE | Facility: CLINIC | Age: 72
End: 2017-06-22
Payer: MEDICARE

## 2017-06-22 ENCOUNTER — DOCUMENTATION ONLY (OUTPATIENT)
Dept: FAMILY MEDICINE | Facility: CLINIC | Age: 72
End: 2017-06-22

## 2017-06-22 VITALS
SYSTOLIC BLOOD PRESSURE: 112 MMHG | DIASTOLIC BLOOD PRESSURE: 60 MMHG | HEIGHT: 64 IN | TEMPERATURE: 99 F | HEART RATE: 65 BPM | BODY MASS INDEX: 38.12 KG/M2 | WEIGHT: 223.31 LBS | OXYGEN SATURATION: 94 %

## 2017-06-22 DIAGNOSIS — J32.0 CHRONIC MAXILLARY SINUSITIS: ICD-10-CM

## 2017-06-22 DIAGNOSIS — N30.00 ACUTE CYSTITIS WITHOUT HEMATURIA: Primary | ICD-10-CM

## 2017-06-22 LAB
BILIRUB SERPL-MCNC: NEGATIVE MG/DL
BLOOD URINE, POC: NEGATIVE
COLOR, POC UA: NORMAL
GLUCOSE UR QL STRIP: NORMAL
KETONES UR QL STRIP: NEGATIVE
LEUKOCYTE ESTERASE URINE, POC: NEGATIVE
NITRITE, POC UA: NEGATIVE
PH, POC UA: 5
PROTEIN, POC: NEGATIVE
SPECIFIC GRAVITY, POC UA: 1.02
UROBILINOGEN, POC UA: NORMAL

## 2017-06-22 PROCEDURE — 87086 URINE CULTURE/COLONY COUNT: CPT

## 2017-06-22 PROCEDURE — 81002 URINALYSIS NONAUTO W/O SCOPE: CPT | Mod: S$GLB,,, | Performed by: NURSE PRACTITIONER

## 2017-06-22 PROCEDURE — 99213 OFFICE O/P EST LOW 20 MIN: CPT | Mod: 25,S$GLB,, | Performed by: NURSE PRACTITIONER

## 2017-06-22 PROCEDURE — 1125F AMNT PAIN NOTED PAIN PRSNT: CPT | Mod: S$GLB,,, | Performed by: NURSE PRACTITIONER

## 2017-06-22 PROCEDURE — 1159F MED LIST DOCD IN RCRD: CPT | Mod: S$GLB,,, | Performed by: NURSE PRACTITIONER

## 2017-06-22 RX ORDER — SULFAMETHOXAZOLE AND TRIMETHOPRIM 800; 160 MG/1; MG/1
1 TABLET ORAL 2 TIMES DAILY
Qty: 10 TABLET | Refills: 0 | Status: SHIPPED | OUTPATIENT
Start: 2017-06-22 | End: 2017-08-17

## 2017-06-22 RX ORDER — FLUTICASONE PROPIONATE 50 MCG
2 SPRAY, SUSPENSION (ML) NASAL DAILY
Qty: 16 G | Refills: 11 | Status: SHIPPED | OUTPATIENT
Start: 2017-06-22 | End: 2022-02-11 | Stop reason: SDUPTHER

## 2017-06-22 NOTE — PROGRESS NOTES
Medical/JOSE Student  Encounter Date: 6/22/2017  Crys Augusto      []Hide copied text  Subjective:       Patient ID: Tali Hopper is a 72 y.o. female.     Chief Complaint: Dysuria  Dysuria    This is a new problem. The current episode started in the past 7 days. The problem occurs every urination. The problem has been gradually worsening. The quality of the pain is described as burning. The pain is at a severity of 9/10 (at worst). The pain is severe. There has been no fever. She is not sexually active. There is no history of pyelonephritis. Treatments tried: Azo. The treatment provided significant relief.   Sinusitis   This is a chronic problem. The problem is unchanged. There has been no fever. She is experiencing no pain. Associated symptoms include congestion and sneezing. Pertinent negatives include no hoarse voice or sinus pressure. Past treatments include nothing.      Review of Systems   Constitutional: Positive for fatigue.   HENT: Positive for congestion and sneezing. Negative for facial swelling, hoarse voice, sinus pressure and trouble swallowing.    Eyes: Negative.    Cardiovascular: Negative for palpitations and leg swelling.   Endocrine: Positive for polyuria.   Musculoskeletal: Negative.    Skin: Negative.    Neurological: Negative.    Psychiatric/Behavioral: Negative.        Objective:    U/A is unable to be read due to the presence of AZO.   Physical Exam   Constitutional: She is oriented to person, place, and time. She appears well-developed and well-nourished.   HENT:   Head: Normocephalic.   Right Ear: External ear normal.   Left Ear: External ear normal.   Nose: Nose normal.   Mouth/Throat: Oropharynx is clear and moist.   Eyes: Pupils are equal, round, and reactive to light.   Neck: Normal range of motion. Neck supple. No thyromegaly present.   Cardiovascular: Normal rate, regular rhythm, normal heart sounds and intact distal pulses.    Pulmonary/Chest: Effort normal and breath sounds  normal.   Abdominal: Soft. Bowel sounds are normal.   Musculoskeletal: Normal range of motion.   Neurological: She is alert and oriented to person, place, and time. She has normal reflexes.   Skin: Skin is warm and dry. Capillary refill takes less than 2 seconds.   Psychiatric: She has a normal mood and affect. Her behavior is normal. Judgment and thought content normal.       Assessment:    This office visit cannot be billed incident to as it does not meet the needed criteria.     Sinusitis  Dysuria  UTI  Plan:     Acute cystitis without hematuria  -     POCT urine dipstick without microscope  -     Urine culture  -     sulfamethoxazole-trimethoprim 800-160mg (BACTRIM DS) 800-160 mg Tab; Take 1 tablet by mouth 2 (two) times daily.  Dispense: 10 tablet; Refill: 0    Chronic maxillary sinusitis  -     fluticasone (FLONASE) 50 mcg/actuation nasal spray; 2 sprays by Each Nare route once daily.  Dispense: 16 g; Refill: 11      I also saw patient face to face and agree with medical student's H&P , I have diagnosed and written treatment plan.   Allergic sinusitis:    Bactrim (TMP-SMX) one [160/800 mg] twice daily for 3 days  Continue Azo for dysuria      Electronically signed by Crys Casas at 6/22/2017  4:14 PM

## 2017-06-22 NOTE — PROGRESS NOTES
Health Maintenance Due   Topic Date Due    Pneumococcal (65+) (1 of 2 - PCV13) 06/19/2009    DEXA SCAN  04/21/2017

## 2017-06-22 NOTE — MEDICAL/APP STUDENT
Subjective:       Patient ID: Tali Hopper is a 72 y.o. female.    Chief Complaint: Dysuria  Dysuria    This is a new problem. The current episode started in the past 7 days. The problem occurs every urination. The problem has been gradually worsening. The quality of the pain is described as burning. The pain is at a severity of 9/10 (at worst). The pain is severe. There has been no fever. She is not sexually active. There is no history of pyelonephritis. Treatments tried: Azo. The treatment provided significant relief.   Sinusitis   This is a chronic problem. The problem is unchanged. There has been no fever. She is experiencing no pain. Associated symptoms include congestion and sneezing. Pertinent negatives include no hoarse voice or sinus pressure. Past treatments include nothing.     Review of Systems   Constitutional: Positive for fatigue.   HENT: Positive for congestion and sneezing. Negative for facial swelling, hoarse voice, sinus pressure and trouble swallowing.    Eyes: Negative.    Cardiovascular: Negative for palpitations and leg swelling.   Endocrine: Positive for polyuria.   Musculoskeletal: Negative.    Skin: Negative.    Neurological: Negative.    Psychiatric/Behavioral: Negative.        Objective:      Physical Exam   Constitutional: She is oriented to person, place, and time. She appears well-developed and well-nourished.   HENT:   Head: Normocephalic.   Right Ear: External ear normal.   Left Ear: External ear normal.   Nose: Nose normal.   Mouth/Throat: Oropharynx is clear and moist.   Eyes: Pupils are equal, round, and reactive to light.   Neck: Normal range of motion. Neck supple. No thyromegaly present.   Cardiovascular: Normal rate, regular rhythm, normal heart sounds and intact distal pulses.    Pulmonary/Chest: Effort normal and breath sounds normal.   Abdominal: Soft. Bowel sounds are normal.   Musculoskeletal: Normal range of motion.   Neurological: She is alert and oriented to  person, place, and time. She has normal reflexes.   Skin: Skin is warm and dry. Capillary refill takes less than 2 seconds.   Psychiatric: She has a normal mood and affect. Her behavior is normal. Judgment and thought content normal.       Assessment:    Sinusitis  dysuria  Plan:       Allergic sinusitis: OTC claritan or allegra daily (pt instructed not to use medication that is compounded with a decongestant)   Bactrim (TMP-SMX) one [160/800 mg] twice daily for 3 days  Continue Azo for dysuria

## 2017-06-24 LAB
BACTERIA UR CULT: NORMAL
BACTERIA UR CULT: NORMAL

## 2017-06-27 ENCOUNTER — TELEPHONE (OUTPATIENT)
Dept: FAMILY MEDICINE | Facility: CLINIC | Age: 72
End: 2017-06-27

## 2017-06-27 NOTE — TELEPHONE ENCOUNTER
----- Message from Marie Handy sent at 6/26/2017  4:13 PM CDT -----  Contact: Patient  Patient is returning your call for her results. Please call her again at [phone].

## 2017-07-21 ENCOUNTER — TELEPHONE (OUTPATIENT)
Dept: FAMILY MEDICINE | Facility: CLINIC | Age: 72
End: 2017-07-21

## 2017-07-21 NOTE — TELEPHONE ENCOUNTER
PAtient needs a copy the MRI of the brain she had done in May.  Requested CD from radiology. Patient instructed to  this afternoon.

## 2017-07-21 NOTE — TELEPHONE ENCOUNTER
----- Message from Jackeline Welsh sent at 7/21/2017  8:11 AM CDT -----  Contact: patient   Patient calling in regards to requesting a copy of her last MRI results. She would like to come pick it up today by this afternoon, if possible. Please advise.  Call back .  Thanks!

## 2017-08-10 DIAGNOSIS — F32.A DEPRESSION, UNSPECIFIED DEPRESSION TYPE: ICD-10-CM

## 2017-08-11 RX ORDER — ESCITALOPRAM OXALATE 20 MG/1
TABLET ORAL
Qty: 90 TABLET | Refills: 3 | Status: SHIPPED | OUTPATIENT
Start: 2017-08-11 | End: 2018-11-06 | Stop reason: SDUPTHER

## 2017-08-17 ENCOUNTER — HOSPITAL ENCOUNTER (EMERGENCY)
Facility: HOSPITAL | Age: 72
Discharge: HOME | End: 2017-08-17
Attending: EMERGENCY MEDICINE
Payer: MEDICARE

## 2017-08-17 VITALS
OXYGEN SATURATION: 97 % | HEIGHT: 63 IN | WEIGHT: 210 LBS | TEMPERATURE: 98 F | HEART RATE: 68 BPM | SYSTOLIC BLOOD PRESSURE: 132 MMHG | RESPIRATION RATE: 18 BRPM | DIASTOLIC BLOOD PRESSURE: 67 MMHG | BODY MASS INDEX: 37.21 KG/M2

## 2017-08-17 DIAGNOSIS — S61.412A LACERATION OF LEFT HAND WITHOUT FOREIGN BODY, INITIAL ENCOUNTER: Primary | ICD-10-CM

## 2017-08-17 PROCEDURE — 90715 TDAP VACCINE 7 YRS/> IM: CPT | Performed by: PHYSICIAN ASSISTANT

## 2017-08-17 PROCEDURE — 99283 EMERGENCY DEPT VISIT LOW MDM: CPT | Mod: 25

## 2017-08-17 PROCEDURE — 12042 INTMD RPR N-HF/GENIT2.6-7.5: CPT | Mod: LT

## 2017-08-17 PROCEDURE — 25000003 PHARM REV CODE 250: Performed by: PHYSICIAN ASSISTANT

## 2017-08-17 PROCEDURE — 90471 IMMUNIZATION ADMIN: CPT | Performed by: PHYSICIAN ASSISTANT

## 2017-08-17 PROCEDURE — 63600175 PHARM REV CODE 636 W HCPCS: Performed by: PHYSICIAN ASSISTANT

## 2017-08-17 RX ORDER — LIDOCAINE HYDROCHLORIDE 10 MG/ML
10 INJECTION, SOLUTION EPIDURAL; INFILTRATION; INTRACAUDAL; PERINEURAL
Status: COMPLETED | OUTPATIENT
Start: 2017-08-17 | End: 2017-08-17

## 2017-08-17 RX ADMIN — LIDOCAINE HYDROCHLORIDE 100 MG: 10 INJECTION, SOLUTION EPIDURAL; INFILTRATION; INTRACAUDAL; PERINEURAL at 06:08

## 2017-08-17 RX ADMIN — CLOSTRIDIUM TETANI TOXOID ANTIGEN (FORMALDEHYDE INACTIVATED), CORYNEBACTERIUM DIPHTHERIAE TOXOID ANTIGEN (FORMALDEHYDE INACTIVATED), BORDETELLA PERTUSSIS TOXOID ANTIGEN (GLUTARALDEHYDE INACTIVATED), BORDETELLA PERTUSSIS FILAMENTOUS HEMAGGLUTININ ANTIGEN (FORMALDEHYDE INACTIVATED), BORDETELLA PERTUSSIS PERTACTIN ANTIGEN, AND BORDETELLA PERTUSSIS FIMBRIAE 2/3 ANTIGEN 0.5 ML: 5; 2; 2.5; 5; 3; 5 INJECTION, SUSPENSION INTRAMUSCULAR at 06:08

## 2017-08-18 NOTE — ED PROVIDER NOTES
Encounter Date: 8/17/2017       History     Chief Complaint   Patient presents with    Laceration     left hand  /  fall this am / pain swelling eccymosis    Hand Injury     Tali Hopper is a 72 y.o. Female presenting for evaluation of left hand laceration that occurred this morning, while taking out the trash.  She states that she tripped an fell in the street, subsequently cutting her hand.  She didn't see what she had cut the hand on.  She doesn't remember when her last tetanus shot was given.  She has noticed increased swelling and bruising to the top of the hand.  No numbness, tingling or weakness.  No history of diabetes.       The history is provided by the patient.     Review of patient's allergies indicates:   Allergen Reactions    Benadryl decongestant Shortness Of Breath     Respiration problems    Penicillins Hives     Past Medical History:   Diagnosis Date    Cataract of left eye     Depression     Glaucoma     Hypertension     Loss of equilibrium     Osteoporosis     Thyroid disease      Past Surgical History:   Procedure Laterality Date    CHOLECYSTECTOMY      EYE SURGERY      right cataract    HYSTERECTOMY      TONSILLECTOMY       Family History   Problem Relation Age of Onset    Heart disease Maternal Grandmother     Heart disease Paternal Grandmother      Social History   Substance Use Topics    Smoking status: Never Smoker    Smokeless tobacco: Never Used    Alcohol use No     Review of Systems   Constitutional: Negative for chills and fever.   Respiratory: Negative for cough, chest tightness, shortness of breath and wheezing.    Cardiovascular: Negative for chest pain and palpitations.   Musculoskeletal: Positive for arthralgias and myalgias. Negative for back pain, joint swelling, neck pain and neck stiffness.   Skin: Positive for wound. Negative for color change, pallor and rash.   Neurological: Negative for weakness and numbness.   Hematological: Does not  bruise/bleed easily.       Physical Exam     Initial Vitals [08/17/17 1742]   BP Pulse Resp Temp SpO2   (!) 145/68 75 18 98.1 °F (36.7 °C) 96 %      MAP       93.67         Physical Exam    Nursing note and vitals reviewed.  Constitutional: She appears well-developed and well-nourished. She is not diaphoretic. No distress.   HENT:   Head: Normocephalic and atraumatic.   Neck: Normal range of motion. Neck supple.   Cardiovascular: Normal rate, regular rhythm, normal heart sounds and intact distal pulses.   Pulmonary/Chest: Breath sounds normal. No respiratory distress. She has no wheezes. She has no rhonchi. She has no rales.   Musculoskeletal: Normal range of motion. She exhibits tenderness. She exhibits no edema.        Left hand: She exhibits tenderness, laceration and swelling. She exhibits normal range of motion and no bony tenderness. Normal sensation noted. Normal strength noted.        Hands:  Large, 4 cm laceration extending across the palmar aspect of left hand.  No visible foreign bodies noted.  No visible tendon involvement.  Swelling and ecchymosis noted to dorsal left hand.  No decreased ROM, decreased strength or loss of sensation to LUE.  Palpable 2+ radial pulse.    Neurological: She is alert and oriented to person, place, and time. She has normal strength. No sensory deficit.   Skin: Skin is warm and dry. No rash and no abscess noted. No erythema.         ED Course   Lac Repair  Date/Time: 8/17/2017 9:37 PM  Performed by: ROXANNE FIGUEREDO  Authorized by: NICOLE HYLTON   Body area: upper extremity  Location details: left hand  Laceration length: 4 cm  Foreign bodies: no foreign bodies  Tendon involvement: none  Nerve involvement: none  Vascular damage: no  Anesthesia: local infiltration    Anesthesia:  Local Anesthetic: lidocaine 1% without epinephrine  Anesthetic total: 8 mL  Patient sedated: no  Preparation: Patient was prepped and draped in the usual sterile fashion.  Irrigation solution:  saline  Irrigation method: syringe  Amount of cleaning: extensive  Debridement: none  Degree of undermining: none  Skin closure: 4-0 nylon (10)  Subcutaneous closure: 5-0 Vicryl (3)  Technique: simple  Approximation: close  Approximation difficulty: complex  Dressing: non-stick sterile dressing and antibiotic ointment  Patient tolerance: Patient tolerated the procedure well with no immediate complications        Labs Reviewed - No data to display       X-Rays:   Independently Interpreted Readings:   Other Readings:  Hand No visible fracture or dislocation. No soft tissue swelling.  No FB.    Medical Decision Making:   Differential Diagnosis:   Fracture  Dislocation  Sprain  Laceration    Clinical Tests:   Radiological Study: Ordered and Reviewed       APC / Resident Notes:   No evidence for acute abnormality, fracture or dislocation on xray.  She tolerated the laceration repair well.  No visible tendon involvement or foreign body.  We don't feel any further imaging or testing is indicated.  No antibiotics necessary.  She will be discharged home to follow-up with her PCP for re-evaluation early next week and suture removal in 7 days.  She voices understanding and is agreeable to the plan.  She is given specific return precautions.               ED Course   Comment By Time   Patient presents with a hand laceration after a fall, no foreign bodies visible in the wound no sensation of retained foreign body, x-rays unremarkable.  Laceration repaired in the emergency room, follow-up primary care for wound check early next week.  Return to the ER symptoms worsen or change.  Normal flexion and extension of the right hand normal sensation to light touch distal to the injury. Warner Bishop MD 08/17 2053     Clinical Impression:   The encounter diagnosis was Laceration of left hand without foreign body, initial encounter.                           Lucrecia Arceo PA-C  08/17/17 2141                   Warner Bishop  MD  08/17/17 2646

## 2017-08-25 ENCOUNTER — DOCUMENTATION ONLY (OUTPATIENT)
Dept: FAMILY MEDICINE | Facility: CLINIC | Age: 72
End: 2017-08-25

## 2017-08-25 NOTE — PROGRESS NOTES
Pre-Visit Chart Review  For Appointment Scheduled on 8-28-17    Health Maintenance Due   Topic Date Due    Zoster Vaccine  06/19/2004    Pneumococcal (65+) (1 of 2 - PCV13) 06/19/2009    DEXA SCAN  04/21/2017    Influenza Vaccine  08/01/2017

## 2017-08-28 ENCOUNTER — OFFICE VISIT (OUTPATIENT)
Dept: FAMILY MEDICINE | Facility: CLINIC | Age: 72
End: 2017-08-28
Payer: MEDICARE

## 2017-08-28 VITALS
HEART RATE: 75 BPM | HEIGHT: 63 IN | SYSTOLIC BLOOD PRESSURE: 132 MMHG | WEIGHT: 225.06 LBS | TEMPERATURE: 98 F | BODY MASS INDEX: 39.88 KG/M2 | DIASTOLIC BLOOD PRESSURE: 65 MMHG

## 2017-08-28 DIAGNOSIS — N95.9 MENOPAUSAL AND PERIMENOPAUSAL DISORDER: ICD-10-CM

## 2017-08-28 DIAGNOSIS — I10 ESSENTIAL HYPERTENSION: Primary | ICD-10-CM

## 2017-08-28 DIAGNOSIS — S61.412D LACERATION OF LEFT HAND WITHOUT FOREIGN BODY, SUBSEQUENT ENCOUNTER: ICD-10-CM

## 2017-08-28 DIAGNOSIS — E66.9 OBESITY (BMI 30-39.9): ICD-10-CM

## 2017-08-28 PROCEDURE — 1125F AMNT PAIN NOTED PAIN PRSNT: CPT | Mod: S$GLB,,, | Performed by: FAMILY MEDICINE

## 2017-08-28 PROCEDURE — 1159F MED LIST DOCD IN RCRD: CPT | Mod: S$GLB,,, | Performed by: FAMILY MEDICINE

## 2017-08-28 PROCEDURE — 99214 OFFICE O/P EST MOD 30 MIN: CPT | Mod: S$GLB,,, | Performed by: FAMILY MEDICINE

## 2017-08-28 PROCEDURE — 3075F SYST BP GE 130 - 139MM HG: CPT | Mod: S$GLB,,, | Performed by: FAMILY MEDICINE

## 2017-08-28 PROCEDURE — 3008F BODY MASS INDEX DOCD: CPT | Mod: S$GLB,,, | Performed by: FAMILY MEDICINE

## 2017-08-28 PROCEDURE — 3078F DIAST BP <80 MM HG: CPT | Mod: S$GLB,,, | Performed by: FAMILY MEDICINE

## 2017-08-28 PROCEDURE — 99999 PR PBB SHADOW E&M-EST. PATIENT-LVL III: CPT | Mod: PBBFAC,,, | Performed by: FAMILY MEDICINE

## 2017-08-28 NOTE — PROGRESS NOTES
Subjective:       Patient ID: Tali Hopper is a 72 y.o. female.    Chief Complaint: Suture / Staple Removal    Patient Active Problem List   Diagnosis    Hypothyroid    Osteoporosis, post-menopausal    Depression    Hyperlipemia    Hypocalcemia    Loss of equilibrium    Obesity (BMI 30-39.9)    Decreased pulse    Faintness    Bilateral low back pain without sciatica    Cataract of left eye    Essential hypertension   left hand laceration f/u  that occurred the morning 8/17/17, while taking out the trash.  She states that she tripped an fell in the street, subsequently cutting her hand.  She didn't see what she had cut the hand on.  She doesn't remember when her last tetanus shot was given.  She has noticed increased swelling and bruising to the top of the hand.  No numbness, tingling or weakness.  No history of diabetes.  tdap given in ed. X ray showed no foreign body but some bone loss  HPI  Review of Systems   Constitutional: Negative for fatigue and unexpected weight change.   Respiratory: Negative for chest tightness and shortness of breath.    Cardiovascular: Negative for chest pain, palpitations and leg swelling.   Gastrointestinal: Negative for abdominal pain.   Musculoskeletal: Negative for arthralgias.   Neurological: Negative for dizziness, syncope, light-headedness and headaches.       Objective:      Physical Exam   Constitutional: She is oriented to person, place, and time. She appears well-developed and well-nourished.   Cardiovascular: Normal rate, regular rhythm and normal heart sounds.    Pulmonary/Chest: Effort normal and breath sounds normal.   Musculoskeletal: She exhibits no edema.   Neurological: She is alert and oriented to person, place, and time.   Skin: Skin is warm and dry.        Psychiatric: She has a normal mood and affect.   Nursing note and vitals reviewed.      Assessment:       1. Essential hypertension    2. Menopausal and perimenopausal disorder    3. Obesity  "(BMI 30-39.9)    4. Laceration of left hand without foreign body, subsequent encounter        Plan:       1. Essential hypertension  Controlled on current medications.  Continue current medications.      2. Menopausal and perimenopausal disorder  Screen and treat as indicated:    - DXA Bone Density Spine And Hip; Future    3. Obesity (BMI 30-39.9)  Counseled patient on his ideal body weight, health consequences of being obese and current recommendations including weekly exercise and a heart healthy diet.  Current BMI is:Estimated body mass index is 39.87 kg/m² as calculated from the following:    Height as of this encounter: 5' 3" (1.6 m).    Weight as of this encounter: 102.1 kg (225 lb 1.4 oz)..  Patient is aware that ideal BMI < 25 or Weight in (lb) to have BMI = 25: 140.8.        4. Laceration of left hand without foreign body, subsequent encounter  Healing well.  Avoid gripping and strenuous use of hands or soaking until well healed in 1-2 weeks. Cleanse wound twice daily.  Apply antibiotic ointment and sterile bandage twice daily and as needed.  Monitor for increased pain, pus, redness and swelling.  If occurs then return to clinic or go to the emergency room if clinic is closed for a recheck.        [facility] goal documentation:  Patient readiness: acceptance and barriers:readiness  During the course of the visit the patient was educated and counseled about the following: Hypertension:   Dietary sodium restriction.  Regular aerobic exercise.  Obesity:   General weight loss/lifestyle modification strategies discussed (elicit support from others; identify saboteurs; non-food rewards, etc).  Goals: Hypertension: Reduce Blood Pressure and Obesity: Reduce calorie intake and BMI  Goal/Outcomes met:Hypertension, type 2 DM, obesity and low BMI  The following self management tools provided:none  Patient Instructions (the written plan) was given to the patient/family: Yes  Time spent with patient: 20 minutes    Patient with " be reevaluated in 3 months or sooner prn    Greater than 50% of this visit was spent counseling as described in above documentation:Yes

## 2017-08-28 NOTE — PATIENT INSTRUCTIONS

## 2017-09-08 DIAGNOSIS — F41.8 DEPRESSION WITH ANXIETY: ICD-10-CM

## 2017-09-08 DIAGNOSIS — I10 ESSENTIAL HYPERTENSION: ICD-10-CM

## 2017-09-08 RX ORDER — BUPROPION HYDROCHLORIDE 150 MG/1
150 TABLET ORAL DAILY
Qty: 30 TABLET | Refills: 5 | Status: SHIPPED | OUTPATIENT
Start: 2017-09-08 | End: 2017-09-13 | Stop reason: SDUPTHER

## 2017-09-08 RX ORDER — AMLODIPINE BESYLATE 5 MG/1
5 TABLET ORAL DAILY
Qty: 30 TABLET | Refills: 5 | Status: SHIPPED | OUTPATIENT
Start: 2017-09-08 | End: 2017-09-13 | Stop reason: SDUPTHER

## 2017-09-10 DIAGNOSIS — E03.4 HYPOTHYROIDISM DUE TO ACQUIRED ATROPHY OF THYROID: ICD-10-CM

## 2017-09-11 RX ORDER — LEVOTHYROXINE SODIUM 112 UG/1
TABLET ORAL
Qty: 90 TABLET | Refills: 3 | Status: SHIPPED | OUTPATIENT
Start: 2017-09-11 | End: 2018-09-14 | Stop reason: SDUPTHER

## 2017-09-13 DIAGNOSIS — F41.8 DEPRESSION WITH ANXIETY: ICD-10-CM

## 2017-09-13 DIAGNOSIS — I10 ESSENTIAL HYPERTENSION: ICD-10-CM

## 2017-09-14 RX ORDER — AMLODIPINE BESYLATE 5 MG/1
5 TABLET ORAL DAILY
Qty: 90 TABLET | Refills: 3 | Status: SHIPPED | OUTPATIENT
Start: 2017-09-14 | End: 2018-09-25 | Stop reason: SDUPTHER

## 2017-09-14 RX ORDER — BUPROPION HYDROCHLORIDE 150 MG/1
150 TABLET ORAL DAILY
Qty: 90 TABLET | Refills: 3 | Status: SHIPPED | OUTPATIENT
Start: 2017-09-14 | End: 2018-09-25 | Stop reason: SDUPTHER

## 2017-11-21 ENCOUNTER — DOCUMENTATION ONLY (OUTPATIENT)
Dept: FAMILY MEDICINE | Facility: CLINIC | Age: 72
End: 2017-11-21

## 2017-11-21 NOTE — PROGRESS NOTES
Pre-Visit Chart Review  For Appointment Scheduled on 11-22-17    Health Maintenance Due   Topic Date Due    Zoster Vaccine  06/19/2004    Pneumococcal (65+) (1 of 2 - PCV13) 06/19/2009    DEXA SCAN  04/21/2017    Influenza Vaccine  08/01/2017

## 2017-11-30 ENCOUNTER — OFFICE VISIT (OUTPATIENT)
Dept: RHEUMATOLOGY | Facility: CLINIC | Age: 72
End: 2017-11-30
Payer: MEDICARE

## 2017-11-30 ENCOUNTER — DOCUMENTATION ONLY (OUTPATIENT)
Dept: FAMILY MEDICINE | Facility: CLINIC | Age: 72
End: 2017-11-30

## 2017-11-30 VITALS
BODY MASS INDEX: 39.38 KG/M2 | SYSTOLIC BLOOD PRESSURE: 118 MMHG | WEIGHT: 222.31 LBS | DIASTOLIC BLOOD PRESSURE: 64 MMHG

## 2017-11-30 DIAGNOSIS — G89.29 CHRONIC BILATERAL LOW BACK PAIN WITHOUT SCIATICA: Primary | ICD-10-CM

## 2017-11-30 DIAGNOSIS — M54.50 CHRONIC BILATERAL LOW BACK PAIN WITHOUT SCIATICA: Primary | ICD-10-CM

## 2017-11-30 DIAGNOSIS — M19.90 OSTEOARTHRITIS, UNSPECIFIED OSTEOARTHRITIS TYPE, UNSPECIFIED SITE: ICD-10-CM

## 2017-11-30 PROCEDURE — 99203 OFFICE O/P NEW LOW 30 MIN: CPT | Mod: ,,, | Performed by: INTERNAL MEDICINE

## 2017-11-30 NOTE — PROGRESS NOTES
[facility] RHEUMATOLOGY           New patient visit      Subjective:       Patient ID:   NAME: Tali Hopper : 1945     72 y.o. female    Referring Doc: No ref. provider found  Other Physicians:    Chief Complaint:  Initial Visit (low back pain)        HPI:    This lady was referred by her neurologist for the evaluation of low back pain. The patient had a negative neurologic workup including MRI and electrodiagnostic studies. She states that she has had progressive stiffness and pain in the lower back, though it is intermittent, that keeps her from performing her ADLs. She notes that she has a degree of unsteadiness that several months ago led to a fall. She does not describe vertigo. She works around her house for at most 15 minutes before she is in pain and too fatigued to continue. She does take over-the-counter medications such as ibuprofen for her pain but states that she only requires 2 tablets or so per week. On closer questioning of pain versus fatigue--bearing in mind she feels that she needs arthritis medication only twice weekly-- she admits that she suffers from more fatigue than pain.   She is treated for hypertension, hypothyroidism and depression.    ROS:   GEN:      no fever, night sweats or weight loss  SKIN:     no rashes , erythema, bruising, or swelling, no Raynauds, no photosensitivity  HEENT: no HAs, no changes in vision, no mouth ulcers, no sicca symptoms, no scalp tenderness, jaw claudication.  CV:        no CP, PND, + GOODWIN, no orthopnea, no palpitations  PULM:   no cough, hemoptysis, sputum or pleuritic pain  GI:         no abdominal pain, nausea, vomiting, constipation, diarrhea, melanotic stools, BRBPR, or hematemesis.no dysphagia  :        no hematuria, dysuria  NEURO:no paresthesias, headaches, visual disturbances, + muscle weakness  MUSCULOSKELETAL:  Back pain and stiffness without complaint of red, hot, and/or swollen joints  PSYCH: + insomnia, ++ significant depression  & anxiety    Medications:    Current Outpatient Prescriptions:     amlodipine (NORVASC) 5 MG tablet, Take 1 tablet (5 mg total) by mouth once daily., Disp: 90 tablet, Rfl: 3    ascorbic acid (VITAMIN C) 100 MG tablet, Take 100 mg by mouth once daily., Disp: , Rfl:     aspirin (ECOTRIN) 81 MG EC tablet, Take 81 mg by mouth once daily., Disp: , Rfl:     b complex vitamins tablet, Take 1 tablet by mouth once daily., Disp: , Rfl:     buPROPion (WELLBUTRIN XL) 150 MG TB24 tablet, Take 1 tablet (150 mg total) by mouth once daily., Disp: 90 tablet, Rfl: 3    CALCIUM CARBONATE/VITAMIN D3 (CALTRATE 600 + D ORAL), Take by mouth once daily. , Disp: , Rfl:     escitalopram oxalate (LEXAPRO) 20 MG tablet, TAKE 1 TABLET ONE TIME DAILY, Disp: 90 tablet, Rfl: 3    fluticasone (FLONASE) 50 mcg/actuation nasal spray, 2 sprays by Each Nare route once daily., Disp: 16 g, Rfl: 11    fosinopril-hydrochlorothiazide (MONOPRIL-HCT) 20-12.5 mg per tablet, Take 1 tablet by mouth once daily., Disp: 90 tablet, Rfl: 3    latanoprost 0.005 % ophthalmic solution, 1 drop every evening., Disp: , Rfl:     levomefolate calcium (DEPLIN) 7.5 mg Tab tablet, Take 1 tablet (7.5 mg total) by mouth once daily., Disp: 30 tablet, Rfl: 5    levothyroxine (SYNTHROID) 112 MCG tablet, TAKE 1 TABLET BY MOUTH ONCE DAILY., Disp: 90 tablet, Rfl: 3    kt-UY-S6-K-lycop-lut-herb#220 (ESTROBLEND) 500 mcg-1,000 unit-20 mcg Tab, Take 1 tablet by mouth once daily., Disp: , Rfl:     raloxifene (EVISTA) 60 mg tablet, TAKE 1 TABLET ONE TIME DAILY, Disp: 90 tablet, Rfl: 3    timolol maleate 0.5% (TIMOPTIC-XE) 0.5 % SolG, Place 1 drop into both eyes 2 (two) times daily. , Disp: , Rfl: 2    FAMILY HISTORY: negative for Connective Tissue Disease        Review of patient's allergies indicates:   Allergen Reactions    Benadryl decongestant Shortness Of Breath     Respiration problems    Penicillins Hives             Objective:     Vitals:  Blood pressure 118/64,  weight 100.8 kg (222 lb 4.8 oz).    Physical Examination:   GEN:    wn/wd female in no apparent distress  SKIN:    no rashes, no lesions, no sclerodactyly, no Raynaud's, no periungual erythema  HEAD:   no alopecia, no scalp tenderness, no temporal artery tenderness or induration.  EYES:   no pallor, no icterus, PERRLA  ENT:     no thrush, no mucosal dryness or ulcerations, adequate oral hygiene & dentition.  NECK:  supple x 6, no masses, no thyromegaly, no lymphadenopathy.  CV:        S1 and S2 regular, no murmurs, gallop or rubs  CHEST: Normal respiratory effort;  normal breath sounds/no adventitious sounds. No signs of consolidation.  ABD:      Obese, non-tender; soft; normal bowel sounds; no rebound/guarding or tenderness. No hepatosplenomegaly.  Musculoskeletal:  No evidence of inflammatory arthritis. Significant degenerative change noted of the hips and knees.  Range of motion of the back could not be tested. Strength is 4+/5×2 in the upper extremities and 3/5×2 in the lowers. Quadriceps atrophy is noted.  The gait is broad-based, forward-pitched and unsteady  EXTREM: no clubbing, cyanosis or edema. normal pulses.  NEURO: grossly intact; no clear evidence of neurologic deficit  PSYCH:  normal mood, affect and behavior            Labs:   Lab Results   Component Value Date    WBC 8.89 05/17/2017    HGB 14.2 05/17/2017    HCT 44.5 05/17/2017    MCV 90 05/17/2017     05/17/2017   CMP@  Sodium   Date Value Ref Range Status   05/17/2017 139 136 - 145 mmol/L Final     Potassium   Date Value Ref Range Status   05/17/2017 4.4 3.5 - 5.1 mmol/L Final     Chloride   Date Value Ref Range Status   05/17/2017 102 95 - 110 mmol/L Final     CO2   Date Value Ref Range Status   05/17/2017 28 23 - 29 mmol/L Final     Glucose   Date Value Ref Range Status   05/17/2017 105 70 - 110 mg/dL Final     BUN, Bld   Date Value Ref Range Status   05/17/2017 19 8 - 23 mg/dL Final     Creatinine   Date Value Ref Range Status    05/17/2017 0.9 0.5 - 1.4 mg/dL Final     Calcium   Date Value Ref Range Status   05/17/2017 9.9 8.7 - 10.5 mg/dL Final     Total Protein   Date Value Ref Range Status   05/17/2017 7.3 6.0 - 8.4 g/dL Final     Albumin   Date Value Ref Range Status   05/17/2017 3.6 3.5 - 5.2 g/dL Final     Total Bilirubin   Date Value Ref Range Status   05/17/2017 0.6 0.1 - 1.0 mg/dL Final     Comment:     For infants and newborns, interpretation of results should be based  on gestational age, weight and in agreement with clinical  observations.  Premature Infant recommended reference ranges:  Up to 24 hours.............<8.0 mg/dL  Up to 48 hours............<12.0 mg/dL  3-5 days..................<15.0 mg/dL  6-29 days.................<15.0 mg/dL       Alkaline Phosphatase   Date Value Ref Range Status   05/17/2017 62 55 - 135 U/L Final     AST   Date Value Ref Range Status   05/17/2017 20 10 - 40 U/L Final     ALT   Date Value Ref Range Status   05/17/2017 16 10 - 44 U/L Final         Radiology/Diagnostic Studies:    none    Assessment/Discussion/Plan:   72 y.o. female with degenerative arthritis with lumbago, severely exacerbated by obesity and deconditioning      PLAN:  I explained to the patient in detail the mechanics of her ever-increasing chassis and her-ever shrinking engine.  She was able to clearly grasp the concept. I discussed sending her to physical therapy and instituting some calorie restriction so as to achieve a degree of weight loss.  I do not think we are at a point yet where calorie counting is possible. I am however going to start with physical therapy. I have ordered back strengthening and gait training exercises to be done twice weekly for the next 2 months. I have stressed the need for a home exercise program.  There are no blood tests that I will require. I am not going to prescribe any medications.  I do not want to distract from the simple, empiric formula of weight loss and muscle strengthening.      RTC:   I will see her back in 3 months and if she has made progress and worked properly toward these goals, I will continue to guide her.      Electronically signed by Alok Vega MD

## 2017-11-30 NOTE — PROGRESS NOTES
Pre-Visit Chart Review  For Appointment Scheduled on 12-1-17    Health Maintenance Due   Topic Date Due    Zoster Vaccine  06/19/2004    Pneumococcal (65+) (1 of 2 - PCV13) 06/19/2009    DEXA SCAN  04/21/2017    Influenza Vaccine  08/01/2017

## 2017-12-01 ENCOUNTER — HOSPITAL ENCOUNTER (OUTPATIENT)
Dept: RADIOLOGY | Facility: CLINIC | Age: 72
Discharge: HOME | End: 2017-12-01
Attending: FAMILY MEDICINE
Payer: MEDICARE

## 2017-12-01 ENCOUNTER — OFFICE VISIT (OUTPATIENT)
Dept: FAMILY MEDICINE | Facility: CLINIC | Age: 72
End: 2017-12-01
Payer: MEDICARE

## 2017-12-01 VITALS
BODY MASS INDEX: 39.54 KG/M2 | TEMPERATURE: 98 F | HEIGHT: 63 IN | DIASTOLIC BLOOD PRESSURE: 66 MMHG | WEIGHT: 223.13 LBS | SYSTOLIC BLOOD PRESSURE: 134 MMHG | HEART RATE: 77 BPM

## 2017-12-01 DIAGNOSIS — E03.9 HYPOTHYROIDISM, UNSPECIFIED TYPE: ICD-10-CM

## 2017-12-01 DIAGNOSIS — E66.9 OBESITY (BMI 30-39.9): ICD-10-CM

## 2017-12-01 DIAGNOSIS — I10 ESSENTIAL HYPERTENSION: ICD-10-CM

## 2017-12-01 DIAGNOSIS — N95.9 MENOPAUSAL AND PERIMENOPAUSAL DISORDER: ICD-10-CM

## 2017-12-01 DIAGNOSIS — E83.51 HYPOCALCEMIA: ICD-10-CM

## 2017-12-01 DIAGNOSIS — N95.9 MENOPAUSAL AND PERIMENOPAUSAL DISORDER: Primary | ICD-10-CM

## 2017-12-01 DIAGNOSIS — E78.5 HYPERLIPIDEMIA, UNSPECIFIED HYPERLIPIDEMIA TYPE: ICD-10-CM

## 2017-12-01 PROCEDURE — 77080 DXA BONE DENSITY AXIAL: CPT | Mod: TC,PO

## 2017-12-01 PROCEDURE — 99214 OFFICE O/P EST MOD 30 MIN: CPT | Mod: S$GLB,,, | Performed by: FAMILY MEDICINE

## 2017-12-01 PROCEDURE — 77080 DXA BONE DENSITY AXIAL: CPT | Mod: 26,,, | Performed by: RADIOLOGY

## 2017-12-01 PROCEDURE — 99999 PR PBB SHADOW E&M-EST. PATIENT-LVL III: CPT | Mod: PBBFAC,,, | Performed by: FAMILY MEDICINE

## 2017-12-01 NOTE — PROGRESS NOTES
Subjective:       Patient ID: Tali Hopper is a 72 y.o. female.    Chief Complaint: Follow-up    Patient Active Problem List   Diagnosis    Hypothyroid    Osteoporosis, post-menopausal    Depression    Hyperlipemia    Hypocalcemia    Loss of equilibrium    Obesity (BMI 30-39.9)    Decreased pulse    Faintness    Bilateral low back pain without sciatica    Cataract of left eye    Essential hypertension   chronic low back pain- going to do PT and start home exercise .    HPI  Review of Systems   Constitutional: Negative for fatigue and unexpected weight change.   Respiratory: Negative for chest tightness and shortness of breath.    Cardiovascular: Negative for chest pain, palpitations and leg swelling.   Gastrointestinal: Negative for abdominal pain.   Musculoskeletal: Negative for arthralgias.   Neurological: Negative for dizziness, syncope, light-headedness and headaches.       Objective:      Physical Exam   Constitutional: She is oriented to person, place, and time. She appears well-developed and well-nourished.   Cardiovascular: Normal rate, regular rhythm and normal heart sounds.    Pulmonary/Chest: Effort normal and breath sounds normal.   Musculoskeletal: She exhibits no edema.   Neurological: She is alert and oriented to person, place, and time.   Skin: Skin is warm and dry.   Psychiatric: She has a normal mood and affect.   Nursing note and vitals reviewed.      Assessment:       1. Menopausal and perimenopausal disorder    2. Hyperlipidemia, unspecified hyperlipidemia type    3. Essential hypertension    4. Hypocalcemia    5. Hypothyroidism, unspecified type    6. Obesity (BMI 30-39.9)        Plan:       1. Menopausal and perimenopausal disorder  Screen and treat as indicated:    - DXA Bone Density Spine And Hip; Future    2. Hyperlipidemia, unspecified hyperlipidemia type  Stable condition.  Continue current medications.  Will adjust based on lab findings or if condition changes.    -  "Comprehensive metabolic panel; Future  - Lipid panel; Future    3. Essential hypertension  Controlled on current medications.  Continue current medications.      4. Hypocalcemia  Screen and treat as indicated:    - Vitamin D; Future    5. Hypothyroidism, unspecified type  Stable condition.  Continue current medications.  Will adjust based on lab findings or if condition changes.    - CBC auto differential; Future  - TSH; Future  - T3, free; Future  - T4, free; Future    6. Obesity (BMI 30-39.9)  Counseled patient on his ideal body weight, health consequences of being obese and current recommendations including weekly exercise and a heart healthy diet.  Current BMI is:Estimated body mass index is 39.52 kg/m² as calculated from the following:    Height as of this encounter: 5' 3" (1.6 m).    Weight as of this encounter: 101.2 kg (223 lb 1.7 oz)..  Patient is aware that ideal BMI < 25 or Weight in (lb) to have BMI = 25: 140.8.        [facility] goal documentation:  Patient readiness: acceptance and barriers:readiness  During the course of the visit the patient was educated and counseled about the following: Hypertension:   Dietary sodium restriction.  Regular aerobic exercise.  Obesity:   General weight loss/lifestyle modification strategies discussed (elicit support from others; identify saboteurs; non-food rewards, etc).  Goals: Hypertension: Reduce Blood Pressure and Obesity: Reduce calorie intake and BMI  Goal/Outcomes met:Hypertension  The following self management tools provided:none  Patient Instructions (the written plan) was given to the patient/family: Yes  Time spent with patient: 20 minutes    Patient with be reevaluated in 6 months or sooner prn    Greater than 50% of this visit was spent counseling as described in above documentation:Yes    "

## 2017-12-05 ENCOUNTER — PATIENT MESSAGE (OUTPATIENT)
Dept: RHEUMATOLOGY | Facility: CLINIC | Age: 72
End: 2017-12-05

## 2017-12-31 DIAGNOSIS — I10 ESSENTIAL HYPERTENSION: ICD-10-CM

## 2018-01-01 RX ORDER — FOSINOPRIL SODIUM AND HYDROCHLOROTHIAZIDE 20; 12.5 MG/1; MG/1
1 TABLET ORAL DAILY
Qty: 90 TABLET | Refills: 3 | Status: SHIPPED | OUTPATIENT
Start: 2018-01-01 | End: 2019-01-01 | Stop reason: SDUPTHER

## 2018-02-08 ENCOUNTER — TELEPHONE (OUTPATIENT)
Dept: FAMILY MEDICINE | Facility: CLINIC | Age: 73
End: 2018-02-08

## 2018-02-08 NOTE — TELEPHONE ENCOUNTER
----- Message from Tali Salguero sent at 2/6/2018 11:58 AM CST -----  Contact: Patient  Tali. patient [phone], Calling to get information on a diet. please advise. thanks.

## 2018-06-04 ENCOUNTER — LAB VISIT (OUTPATIENT)
Dept: LAB | Facility: HOSPITAL | Age: 73
End: 2018-06-04
Attending: FAMILY MEDICINE
Payer: MEDICARE

## 2018-06-04 DIAGNOSIS — E03.9 HYPOTHYROIDISM, UNSPECIFIED TYPE: ICD-10-CM

## 2018-06-04 DIAGNOSIS — E78.5 HYPERLIPIDEMIA, UNSPECIFIED HYPERLIPIDEMIA TYPE: ICD-10-CM

## 2018-06-04 DIAGNOSIS — E83.51 HYPOCALCEMIA: ICD-10-CM

## 2018-06-04 LAB
25(OH)D3+25(OH)D2 SERPL-MCNC: 30 NG/ML
ALBUMIN SERPL BCP-MCNC: 3.4 G/DL
ALP SERPL-CCNC: 63 U/L
ALT SERPL W/O P-5'-P-CCNC: 17 U/L
ANION GAP SERPL CALC-SCNC: 8 MMOL/L
AST SERPL-CCNC: 21 U/L
BASOPHILS # BLD AUTO: 0.09 K/UL
BASOPHILS NFR BLD: 1 %
BILIRUB SERPL-MCNC: 0.4 MG/DL
BUN SERPL-MCNC: 15 MG/DL
CALCIUM SERPL-MCNC: 9.3 MG/DL
CHLORIDE SERPL-SCNC: 106 MMOL/L
CHOLEST SERPL-MCNC: 184 MG/DL
CHOLEST/HDLC SERPL: 4.5 {RATIO}
CO2 SERPL-SCNC: 28 MMOL/L
CREAT SERPL-MCNC: 0.8 MG/DL
DIFFERENTIAL METHOD: ABNORMAL
EOSINOPHIL # BLD AUTO: 0.2 K/UL
EOSINOPHIL NFR BLD: 2.2 %
ERYTHROCYTE [DISTWIDTH] IN BLOOD BY AUTOMATED COUNT: 13 %
EST. GFR  (AFRICAN AMERICAN): >60 ML/MIN/1.73 M^2
EST. GFR  (NON AFRICAN AMERICAN): >60 ML/MIN/1.73 M^2
GLUCOSE SERPL-MCNC: 106 MG/DL
HCT VFR BLD AUTO: 42.6 %
HDLC SERPL-MCNC: 41 MG/DL
HDLC SERPL: 22.3 %
HGB BLD-MCNC: 13.3 G/DL
IMM GRANULOCYTES # BLD AUTO: 0.03 K/UL
IMM GRANULOCYTES NFR BLD AUTO: 0.3 %
LDLC SERPL CALC-MCNC: 109 MG/DL
LYMPHOCYTES # BLD AUTO: 2.2 K/UL
LYMPHOCYTES NFR BLD: 24.6 %
MCH RBC QN AUTO: 28.2 PG
MCHC RBC AUTO-ENTMCNC: 31.2 G/DL
MCV RBC AUTO: 90 FL
MONOCYTES # BLD AUTO: 0.6 K/UL
MONOCYTES NFR BLD: 6.8 %
NEUTROPHILS # BLD AUTO: 5.8 K/UL
NEUTROPHILS NFR BLD: 65.1 %
NONHDLC SERPL-MCNC: 143 MG/DL
NRBC BLD-RTO: 0 /100 WBC
PLATELET # BLD AUTO: 273 K/UL
PMV BLD AUTO: 11.5 FL
POTASSIUM SERPL-SCNC: 4.8 MMOL/L
PROT SERPL-MCNC: 7 G/DL
RBC # BLD AUTO: 4.71 M/UL
SODIUM SERPL-SCNC: 142 MMOL/L
T3FREE SERPL-MCNC: 2.5 PG/ML
T4 FREE SERPL-MCNC: 1.26 NG/DL
TRIGL SERPL-MCNC: 170 MG/DL
TSH SERPL DL<=0.005 MIU/L-ACNC: 1.38 UIU/ML
WBC # BLD AUTO: 8.96 K/UL

## 2018-06-04 PROCEDURE — 80061 LIPID PANEL: CPT

## 2018-06-04 PROCEDURE — 80053 COMPREHEN METABOLIC PANEL: CPT

## 2018-06-04 PROCEDURE — 84443 ASSAY THYROID STIM HORMONE: CPT

## 2018-06-04 PROCEDURE — 82306 VITAMIN D 25 HYDROXY: CPT

## 2018-06-04 PROCEDURE — 36415 COLL VENOUS BLD VENIPUNCTURE: CPT | Mod: PO

## 2018-06-04 PROCEDURE — 84481 FREE ASSAY (FT-3): CPT

## 2018-06-04 PROCEDURE — 84439 ASSAY OF FREE THYROXINE: CPT

## 2018-06-04 PROCEDURE — 85025 COMPLETE CBC W/AUTO DIFF WBC: CPT

## 2018-06-08 ENCOUNTER — OFFICE VISIT (OUTPATIENT)
Dept: FAMILY MEDICINE | Facility: CLINIC | Age: 73
End: 2018-06-08
Payer: MEDICARE

## 2018-06-08 VITALS
BODY MASS INDEX: 38.13 KG/M2 | HEART RATE: 71 BPM | DIASTOLIC BLOOD PRESSURE: 62 MMHG | SYSTOLIC BLOOD PRESSURE: 122 MMHG | HEIGHT: 63 IN | TEMPERATURE: 98 F | WEIGHT: 215.19 LBS

## 2018-06-08 DIAGNOSIS — Z12.31 ENCOUNTER FOR SCREENING MAMMOGRAM FOR MALIGNANT NEOPLASM OF BREAST: ICD-10-CM

## 2018-06-08 DIAGNOSIS — E03.9 HYPOTHYROIDISM, UNSPECIFIED TYPE: ICD-10-CM

## 2018-06-08 DIAGNOSIS — E83.51 HYPOCALCEMIA: ICD-10-CM

## 2018-06-08 DIAGNOSIS — E78.5 HYPERLIPIDEMIA, UNSPECIFIED HYPERLIPIDEMIA TYPE: Primary | ICD-10-CM

## 2018-06-08 DIAGNOSIS — E66.9 OBESITY (BMI 30-39.9): ICD-10-CM

## 2018-06-08 DIAGNOSIS — I10 ESSENTIAL HYPERTENSION: ICD-10-CM

## 2018-06-08 PROCEDURE — 99999 PR PBB SHADOW E&M-EST. PATIENT-LVL III: CPT | Mod: PBBFAC,,, | Performed by: FAMILY MEDICINE

## 2018-06-08 PROCEDURE — 3078F DIAST BP <80 MM HG: CPT | Mod: CPTII,S$GLB,, | Performed by: FAMILY MEDICINE

## 2018-06-08 PROCEDURE — 3074F SYST BP LT 130 MM HG: CPT | Mod: CPTII,S$GLB,, | Performed by: FAMILY MEDICINE

## 2018-06-08 PROCEDURE — 99214 OFFICE O/P EST MOD 30 MIN: CPT | Mod: S$GLB,,, | Performed by: FAMILY MEDICINE

## 2018-06-11 ENCOUNTER — HOSPITAL ENCOUNTER (OUTPATIENT)
Dept: RADIOLOGY | Facility: CLINIC | Age: 73
Discharge: HOME | End: 2018-06-11
Attending: FAMILY MEDICINE
Payer: MEDICARE

## 2018-06-11 DIAGNOSIS — Z12.31 ENCOUNTER FOR SCREENING MAMMOGRAM FOR MALIGNANT NEOPLASM OF BREAST: ICD-10-CM

## 2018-06-11 PROCEDURE — 77063 BREAST TOMOSYNTHESIS BI: CPT | Mod: 26,,, | Performed by: RADIOLOGY

## 2018-06-11 PROCEDURE — 77067 SCR MAMMO BI INCL CAD: CPT | Mod: 26,,, | Performed by: RADIOLOGY

## 2018-06-11 PROCEDURE — 77067 SCR MAMMO BI INCL CAD: CPT | Mod: TC,PO

## 2018-06-23 NOTE — PROGRESS NOTES
Subjective:       Patient ID: Tali Hopper is a 73 y.o. female.    Chief Complaint: Follow-up    HPI  Review of Systems   Constitutional: Negative for fatigue and unexpected weight change.   Respiratory: Negative for chest tightness and shortness of breath.    Cardiovascular: Negative for chest pain, palpitations and leg swelling.   Gastrointestinal: Negative for abdominal pain.   Musculoskeletal: Negative for arthralgias.   Neurological: Negative for dizziness, syncope, light-headedness and headaches.       Patient Active Problem List   Diagnosis    Hypothyroid    Osteoporosis, post-menopausal    Depression    Hyperlipemia    Hypocalcemia    Loss of equilibrium    Obesity (BMI 30-39.9)    Decreased pulse    Faintness    Bilateral low back pain without sciatica    Cataract of left eye    Essential hypertension     Patient is here for a chronic conditions follow up.    Lab Visit on 06/04/2018   Component Date Value Ref Range Status    WBC 06/04/2018 8.96  3.90 - 12.70 K/uL Final    RBC 06/04/2018 4.71  4.00 - 5.40 M/uL Final    Hemoglobin 06/04/2018 13.3  12.0 - 16.0 g/dL Final    Hematocrit 06/04/2018 42.6  37.0 - 48.5 % Final    MCV 06/04/2018 90  82 - 98 fL Final    MCH 06/04/2018 28.2  27.0 - 31.0 pg Final    MCHC 06/04/2018 31.2* 32.0 - 36.0 g/dL Final    RDW 06/04/2018 13.0  11.5 - 14.5 % Final    Platelets 06/04/2018 273  150 - 350 K/uL Final    MPV 06/04/2018 11.5  9.2 - 12.9 fL Final    Immature Granulocytes 06/04/2018 0.3  0.0 - 0.5 % Final    Gran # (ANC) 06/04/2018 5.8  1.8 - 7.7 K/uL Final    Immature Grans (Abs) 06/04/2018 0.03  0.00 - 0.04 K/uL Final    Lymph # 06/04/2018 2.2  1.0 - 4.8 K/uL Final    Mono # 06/04/2018 0.6  0.3 - 1.0 K/uL Final    Eos # 06/04/2018 0.2  0.0 - 0.5 K/uL Final    Baso # 06/04/2018 0.09  0.00 - 0.20 K/uL Final    nRBC 06/04/2018 0  0 /100 WBC Final    Gran% 06/04/2018 65.1  38.0 - 73.0 % Final    Lymph% 06/04/2018 24.6  18.0 - 48.0 %  Final    Mono% 06/04/2018 6.8  4.0 - 15.0 % Final    Eosinophil% 06/04/2018 2.2  0.0 - 8.0 % Final    Basophil% 06/04/2018 1.0  0.0 - 1.9 % Final    Differential Method 06/04/2018 Automated   Final    Sodium 06/04/2018 142  136 - 145 mmol/L Final    Potassium 06/04/2018 4.8  3.5 - 5.1 mmol/L Final    Chloride 06/04/2018 106  95 - 110 mmol/L Final    CO2 06/04/2018 28  23 - 29 mmol/L Final    Glucose 06/04/2018 106  70 - 110 mg/dL Final    BUN, Bld 06/04/2018 15  8 - 23 mg/dL Final    Creatinine 06/04/2018 0.8  0.5 - 1.4 mg/dL Final    Calcium 06/04/2018 9.3  8.7 - 10.5 mg/dL Final    Total Protein 06/04/2018 7.0  6.0 - 8.4 g/dL Final    Albumin 06/04/2018 3.4* 3.5 - 5.2 g/dL Final    Total Bilirubin 06/04/2018 0.4  0.1 - 1.0 mg/dL Final    Alkaline Phosphatase 06/04/2018 63  55 - 135 U/L Final    AST 06/04/2018 21  10 - 40 U/L Final    ALT 06/04/2018 17  10 - 44 U/L Final    Anion Gap 06/04/2018 8  8 - 16 mmol/L Final    eGFR if African American 06/04/2018 >60.0  >60 mL/min/1.73 m^2 Final    eGFR if non African American 06/04/2018 >60.0  >60 mL/min/1.73 m^2 Final    Cholesterol 06/04/2018 184  120 - 199 mg/dL Final    Triglycerides 06/04/2018 170* 30 - 150 mg/dL Final    HDL 06/04/2018 41  40 - 75 mg/dL Final    LDL Cholesterol 06/04/2018 109.0  63.0 - 159.0 mg/dL Final    HDL/Chol Ratio 06/04/2018 22.3  20.0 - 50.0 % Final    Total Cholesterol/HDL Ratio 06/04/2018 4.5  2.0 - 5.0 Final    Non-HDL Cholesterol 06/04/2018 143  mg/dL Final    TSH 06/04/2018 1.375  0.400 - 4.000 uIU/mL Final    T3, Free 06/04/2018 2.5  2.3 - 4.2 pg/mL Final    Free T4 06/04/2018 1.26  0.71 - 1.51 ng/dL Final    Vit D, 25-Hydroxy 06/04/2018 30  30 - 96 ng/mL Final   }  Objective:      Physical Exam   Constitutional: She is oriented to person, place, and time. She appears well-developed and well-nourished.   Cardiovascular: Normal rate, regular rhythm and normal heart sounds.    Pulmonary/Chest: Effort  "normal and breath sounds normal.   Musculoskeletal: She exhibits no edema.   Neurological: She is alert and oriented to person, place, and time.   Skin: Skin is warm and dry.   Psychiatric: She has a normal mood and affect.   Nursing note and vitals reviewed.      Assessment:       1. Hyperlipidemia, unspecified hyperlipidemia type    2. Essential hypertension    3. Hypothyroidism, unspecified type    4. Obesity (BMI 30-39.9)    5. Hypocalcemia    6. Encounter for screening mammogram for malignant neoplasm of breast        Plan:       1. Hyperlipidemia, unspecified hyperlipidemia type  Stable condition.  Continue current medications.  Will adjust based on lab findings or if condition changes.    - Comprehensive metabolic panel; Future  - Lipid panel; Future    2. Essential hypertension  Controlled on current medications.  Continue current medications.      3. Hypothyroidism, unspecified type  Stable condition.  Continue current medications.  Will adjust based on lab findings or if condition changes.    - CBC auto differential; Future  - TSH; Future  - T4, free; Future    4. Obesity (BMI 30-39.9)  Counseled patient on his ideal body weight, health consequences of being obese and current recommendations including weekly exercise and a heart healthy diet.  Current BMI is:Estimated body mass index is 38.12 kg/m² as calculated from the following:    Height as of this encounter: 5' 3" (1.6 m).    Weight as of this encounter: 97.6 kg (215 lb 2.7 oz)..  Patient is aware that ideal BMI < 25 or Weight in (lb) to have BMI = 25: 140.8.        5. Hypocalcemia  Screen and treat as indicated:    - Vitamin D; Future    6. Encounter for screening mammogram for malignant neoplasm of breast  Screen and treat as indicated:    Reeval 6 months or sooner prn        "

## 2018-07-11 ENCOUNTER — PATIENT OUTREACH (OUTPATIENT)
Dept: ADMINISTRATIVE | Facility: HOSPITAL | Age: 73
End: 2018-07-11

## 2018-07-30 DIAGNOSIS — M81.0 OSTEOPOROSIS, POST-MENOPAUSAL: ICD-10-CM

## 2018-07-31 RX ORDER — RALOXIFENE HYDROCHLORIDE 60 MG/1
TABLET, FILM COATED ORAL
Qty: 90 TABLET | Refills: 0 | Status: SHIPPED | OUTPATIENT
Start: 2018-07-31 | End: 2018-10-26 | Stop reason: SDUPTHER

## 2018-08-14 ENCOUNTER — OFFICE VISIT (OUTPATIENT)
Dept: OTOLARYNGOLOGY | Facility: CLINIC | Age: 73
End: 2018-08-14
Payer: MEDICARE

## 2018-08-14 ENCOUNTER — CLINICAL SUPPORT (OUTPATIENT)
Dept: AUDIOLOGY | Facility: CLINIC | Age: 73
End: 2018-08-14
Payer: MEDICARE

## 2018-08-14 VITALS — WEIGHT: 215.19 LBS | BODY MASS INDEX: 38.13 KG/M2 | HEIGHT: 63 IN

## 2018-08-14 DIAGNOSIS — H93.299 ABNORMAL AUDITORY PERCEPTION, UNSPECIFIED LATERALITY: Primary | ICD-10-CM

## 2018-08-14 DIAGNOSIS — H61.23 BILATERAL IMPACTED CERUMEN: Primary | ICD-10-CM

## 2018-08-14 DIAGNOSIS — H93.19 TINNITUS, UNSPECIFIED LATERALITY: Primary | ICD-10-CM

## 2018-08-14 DIAGNOSIS — H93.8X3 SENSATION OF FULLNESS IN BOTH EARS: ICD-10-CM

## 2018-08-14 PROCEDURE — 99999 PR PBB SHADOW E&M-EST. PATIENT-LVL IV: CPT | Mod: PBBFAC,,, | Performed by: OTOLARYNGOLOGY

## 2018-08-14 PROCEDURE — 99203 OFFICE O/P NEW LOW 30 MIN: CPT | Mod: S$GLB,,, | Performed by: OTOLARYNGOLOGY

## 2018-08-14 PROCEDURE — 99999 PR PBB SHADOW E&M-EST. PATIENT-LVL I: CPT | Mod: PBBFAC,,,

## 2018-08-14 PROCEDURE — 92567 TYMPANOMETRY: CPT | Mod: S$GLB,,, | Performed by: AUDIOLOGIST

## 2018-08-14 NOTE — PROGRESS NOTES
Subjective:       Patient ID: Tali Hopper is a 73 y.o. female.    Chief Complaint: Tinnitus; itchy ears; and Fluid in ears    Tali is here for ear issues. Symptoms have been present for several months. Left sided - ear itching is main issue. She also reports retained water which is bothersome to her. No right sided issues. No ear surgeries, no infections, no vertigo.   She has osteoporosis.     Social History     Tobacco Use   Smoking Status Never Smoker   Smokeless Tobacco Never Used     Social History     Substance and Sexual Activity   Alcohol Use No          Review of Systems   Constitutional: Negative for activity change and appetite change.   Eyes: Negative for discharge.   Respiratory: Negative for difficulty breathing and wheezing   Cardiovascular: Negative for chest pain.   Gastrointestinal: Negative for abdominal distention and abdominal pain.   Endocrine: Negative for cold intolerance and heat intolerance.   Genitourinary: Negative for dysuria.   Musculoskeletal: Negative for gait problem and joint swelling.   Skin: Negative for color change and pallor.   Neurological: Negative for syncope and weakness.   Psychiatric/Behavioral: Negative for agitation and confusion.     Objective:        Constitutional:   She is oriented to person, place, and time. She appears well-developed and well-nourished. She appears alert. She is active. Normal speech.      Head:  Normocephalic and atraumatic. Head is without TMJ tenderness. No scars. Salivary glands normal.  Facial strength is normal.      Ears:    Right Ear: No drainage or swelling. No middle ear effusion.   Left Ear: No drainage or swelling.  No middle ear effusion.   Medial cerumen adjacent to TM bilaterally L>R, cleaned.   Dry canal otherwise.     Nose:  No mucosal edema, rhinorrhea or sinus tenderness. No turbinate hypertrophy.      Mouth/Throat  Oropharynx clear and moist without lesions or asymmetry, normal uvula midline and mirror exam normal.  Normal dentition. No uvula swelling, lacerations or trismus. No oropharyngeal exudate. Tonsillar erythema, tonsillar exudate.      Neck:  Full range of motion with neck supple and no adenopathy. Thyroid tenderness is present. No tracheal deviation, no edema, no erythema, normal range of motion, no stridor, no crepitus and no neck rigidity present. No thyroid mass present.     Cardiovascular:   Intact distal pulses and normal pulses.      Pulmonary/Chest:   Effort normal and breath sounds normal. No stridor.     Psychiatric:   Her speech is normal and behavior is normal. Her mood appears not anxious. Her affect is not labile.     Neurological:   She is alert and oriented to person, place, and time. No sensory deficit.     Skin:   No abrasions, lacerations, lesions, or rashes. No abrasion and no bruising noted.         Tests / Results:  None    Assessment:       1. Bilateral impacted cerumen    2. Sensation of fullness in both ears          Plan:         Cerumen cleaned. Symptoms likely related to retained cerumen adjacent to TM.   Avoid q-tips  Discussed small amount of oil for lubrication and fu if ear symptoms worsen .  Fu prn

## 2018-08-14 NOTE — PATIENT INSTRUCTIONS
Stop the q-tips    Use blow dryer on cool setting to remove excess moisture in the ear     Use 1-2 drops of mineral / baby / or olive oil 1-2 times per week in the ear to lubricate    If persistent, we can consider other medications.

## 2018-08-14 NOTE — LETTER
August 14, 2018      Elizabeth Rodríguez MD  [address] [facility] [address]           [place] [facility]  [address] [facility] [address] [place] [address]  Phone: [phone]  Fax: [phone]          Patient: Tali Hopper   MR Number: [id number]   YOB: 1945   Date of Visit: 8/14/2018       Dear Dr. Elizabeth Rodríguez:    Thank you for referring Tali Hopper to me for evaluation. Attached you will find relevant portions of my assessment and plan of care.    If you have questions, please do not hesitate to call me. I look forward to following Tali Hopper along with you.    Sincerely,    Uri Rosas MD    Enclosure  CC:  No Recipients    If you would like to receive this communication electronically, please contact [email or web address] or [phone] to request more information on [facility] access.    For providers and/or their staff who would like to refer a patient to [facility], please contact us through our one-stop-shop provider referral line, [facility], at [phone].    If you feel you have received this communication in error or would no longer like to receive these types of communications, please e-mail [email or web address]

## 2018-08-14 NOTE — PROGRESS NOTES
Tympanometry completed per order from Dr. Uri Rosas.     Type As tympanogram obtained AU.    Patient referred back to Dr. Rosas for follow-up evaluation.

## 2018-09-14 DIAGNOSIS — E03.4 HYPOTHYROIDISM DUE TO ACQUIRED ATROPHY OF THYROID: ICD-10-CM

## 2018-09-17 RX ORDER — LEVOTHYROXINE SODIUM 112 UG/1
TABLET ORAL
Qty: 90 TABLET | Refills: 3 | Status: SHIPPED | OUTPATIENT
Start: 2018-09-17 | End: 2019-09-05 | Stop reason: SDUPTHER

## 2018-09-25 DIAGNOSIS — I10 ESSENTIAL HYPERTENSION: ICD-10-CM

## 2018-09-25 DIAGNOSIS — F41.8 DEPRESSION WITH ANXIETY: ICD-10-CM

## 2018-09-26 RX ORDER — BUPROPION HYDROCHLORIDE 150 MG/1
150 TABLET ORAL DAILY
Qty: 90 TABLET | Refills: 1 | Status: SHIPPED | OUTPATIENT
Start: 2018-09-26 | End: 2019-04-01 | Stop reason: SDUPTHER

## 2018-09-26 RX ORDER — AMLODIPINE BESYLATE 5 MG/1
5 TABLET ORAL DAILY
Qty: 90 TABLET | Refills: 1 | Status: SHIPPED | OUTPATIENT
Start: 2018-09-26 | End: 2019-04-01 | Stop reason: SDUPTHER

## 2018-10-26 DIAGNOSIS — M81.0 OSTEOPOROSIS, POST-MENOPAUSAL: ICD-10-CM

## 2018-10-29 RX ORDER — RALOXIFENE HYDROCHLORIDE 60 MG/1
60 TABLET, FILM COATED ORAL DAILY
Qty: 90 TABLET | Refills: 3 | Status: SHIPPED | OUTPATIENT
Start: 2018-10-29 | End: 2019-10-22 | Stop reason: SDUPTHER

## 2018-11-06 DIAGNOSIS — F32.A DEPRESSION, UNSPECIFIED DEPRESSION TYPE: ICD-10-CM

## 2018-11-09 RX ORDER — ESCITALOPRAM OXALATE 20 MG/1
TABLET ORAL
Qty: 90 TABLET | Refills: 3 | Status: SHIPPED | OUTPATIENT
Start: 2018-11-09 | End: 2019-05-10

## 2018-11-29 ENCOUNTER — LAB VISIT (OUTPATIENT)
Dept: LAB | Facility: HOSPITAL | Age: 73
End: 2018-11-29
Attending: FAMILY MEDICINE
Payer: MEDICARE

## 2018-11-29 DIAGNOSIS — E78.5 HYPERLIPIDEMIA, UNSPECIFIED HYPERLIPIDEMIA TYPE: ICD-10-CM

## 2018-11-29 DIAGNOSIS — E83.51 HYPOCALCEMIA: ICD-10-CM

## 2018-11-29 DIAGNOSIS — E03.9 HYPOTHYROIDISM, UNSPECIFIED TYPE: ICD-10-CM

## 2018-11-29 LAB
25(OH)D3+25(OH)D2 SERPL-MCNC: 30 NG/ML
ALBUMIN SERPL BCP-MCNC: 3.3 G/DL
ALP SERPL-CCNC: 60 U/L
ALT SERPL W/O P-5'-P-CCNC: 14 U/L
ANION GAP SERPL CALC-SCNC: 9 MMOL/L
AST SERPL-CCNC: 16 U/L
BASOPHILS # BLD AUTO: 0.11 K/UL
BASOPHILS NFR BLD: 1.1 %
BILIRUB SERPL-MCNC: 0.4 MG/DL
BUN SERPL-MCNC: 21 MG/DL
CALCIUM SERPL-MCNC: 8.8 MG/DL
CHLORIDE SERPL-SCNC: 103 MMOL/L
CHOLEST SERPL-MCNC: 185 MG/DL
CHOLEST/HDLC SERPL: 4.3 {RATIO}
CO2 SERPL-SCNC: 27 MMOL/L
CREAT SERPL-MCNC: 0.9 MG/DL
DIFFERENTIAL METHOD: ABNORMAL
EOSINOPHIL # BLD AUTO: 0.2 K/UL
EOSINOPHIL NFR BLD: 2.5 %
ERYTHROCYTE [DISTWIDTH] IN BLOOD BY AUTOMATED COUNT: 13.1 %
EST. GFR  (AFRICAN AMERICAN): >60 ML/MIN/1.73 M^2
EST. GFR  (NON AFRICAN AMERICAN): >60 ML/MIN/1.73 M^2
GLUCOSE SERPL-MCNC: 103 MG/DL
HCT VFR BLD AUTO: 43.1 %
HDLC SERPL-MCNC: 43 MG/DL
HDLC SERPL: 23.2 %
HGB BLD-MCNC: 13.5 G/DL
IMM GRANULOCYTES # BLD AUTO: 0.05 K/UL
IMM GRANULOCYTES NFR BLD AUTO: 0.5 %
LDLC SERPL CALC-MCNC: 100.8 MG/DL
LYMPHOCYTES # BLD AUTO: 2.8 K/UL
LYMPHOCYTES NFR BLD: 29.1 %
MCH RBC QN AUTO: 28.9 PG
MCHC RBC AUTO-ENTMCNC: 31.3 G/DL
MCV RBC AUTO: 92 FL
MONOCYTES # BLD AUTO: 0.7 K/UL
MONOCYTES NFR BLD: 7.4 %
NEUTROPHILS # BLD AUTO: 5.8 K/UL
NEUTROPHILS NFR BLD: 59.4 %
NONHDLC SERPL-MCNC: 142 MG/DL
NRBC BLD-RTO: 0 /100 WBC
PLATELET # BLD AUTO: 253 K/UL
PMV BLD AUTO: 12.1 FL
POTASSIUM SERPL-SCNC: 4 MMOL/L
PROT SERPL-MCNC: 6.9 G/DL
RBC # BLD AUTO: 4.67 M/UL
SODIUM SERPL-SCNC: 139 MMOL/L
T4 FREE SERPL-MCNC: 1.08 NG/DL
TRIGL SERPL-MCNC: 206 MG/DL
TSH SERPL DL<=0.005 MIU/L-ACNC: 2.34 UIU/ML
WBC # BLD AUTO: 9.76 K/UL

## 2018-11-29 PROCEDURE — 84443 ASSAY THYROID STIM HORMONE: CPT

## 2018-11-29 PROCEDURE — 82306 VITAMIN D 25 HYDROXY: CPT

## 2018-11-29 PROCEDURE — 80061 LIPID PANEL: CPT

## 2018-11-29 PROCEDURE — 84439 ASSAY OF FREE THYROXINE: CPT

## 2018-11-29 PROCEDURE — 85025 COMPLETE CBC W/AUTO DIFF WBC: CPT

## 2018-11-29 PROCEDURE — 80053 COMPREHEN METABOLIC PANEL: CPT

## 2018-11-29 PROCEDURE — 36415 COLL VENOUS BLD VENIPUNCTURE: CPT | Mod: PO

## 2018-12-04 ENCOUNTER — OFFICE VISIT (OUTPATIENT)
Dept: FAMILY MEDICINE | Facility: CLINIC | Age: 73
End: 2018-12-04
Payer: MEDICARE

## 2018-12-04 VITALS
OXYGEN SATURATION: 96 % | RESPIRATION RATE: 16 BRPM | DIASTOLIC BLOOD PRESSURE: 73 MMHG | WEIGHT: 220.25 LBS | HEART RATE: 64 BPM | SYSTOLIC BLOOD PRESSURE: 132 MMHG | HEIGHT: 63 IN | BODY MASS INDEX: 39.02 KG/M2 | TEMPERATURE: 98 F

## 2018-12-04 DIAGNOSIS — E03.9 HYPOTHYROIDISM, UNSPECIFIED TYPE: ICD-10-CM

## 2018-12-04 DIAGNOSIS — E66.9 OBESITY (BMI 30-39.9): ICD-10-CM

## 2018-12-04 DIAGNOSIS — I10 ESSENTIAL HYPERTENSION: Primary | ICD-10-CM

## 2018-12-04 DIAGNOSIS — E83.51 HYPOCALCEMIA: ICD-10-CM

## 2018-12-04 DIAGNOSIS — E78.5 HYPERLIPIDEMIA, UNSPECIFIED HYPERLIPIDEMIA TYPE: ICD-10-CM

## 2018-12-04 DIAGNOSIS — Z23 NEED FOR PNEUMOCOCCAL VACCINATION: ICD-10-CM

## 2018-12-04 DIAGNOSIS — Z23 FLU VACCINE NEED: ICD-10-CM

## 2018-12-04 PROCEDURE — 3075F SYST BP GE 130 - 139MM HG: CPT | Mod: CPTII,S$GLB,, | Performed by: FAMILY MEDICINE

## 2018-12-04 PROCEDURE — 99999 PR PBB SHADOW E&M-EST. PATIENT-LVL III: CPT | Mod: PBBFAC,,, | Performed by: FAMILY MEDICINE

## 2018-12-04 PROCEDURE — 1101F PT FALLS ASSESS-DOCD LE1/YR: CPT | Mod: CPTII,S$GLB,, | Performed by: FAMILY MEDICINE

## 2018-12-04 PROCEDURE — 99214 OFFICE O/P EST MOD 30 MIN: CPT | Mod: S$GLB,,, | Performed by: FAMILY MEDICINE

## 2018-12-04 PROCEDURE — 3078F DIAST BP <80 MM HG: CPT | Mod: CPTII,S$GLB,, | Performed by: FAMILY MEDICINE

## 2018-12-04 NOTE — PROGRESS NOTES
Subjective:       Patient ID: Tali Hopper is a 73 y.o. female.    Chief Complaint: No chief complaint on file.    HPI  Review of Systems   Constitutional: Negative for fatigue and unexpected weight change.   Respiratory: Negative for chest tightness and shortness of breath.    Cardiovascular: Negative for chest pain, palpitations and leg swelling.   Gastrointestinal: Negative for abdominal pain.   Musculoskeletal: Negative for arthralgias.   Neurological: Negative for dizziness, syncope, light-headedness and headaches.       Patient Active Problem List   Diagnosis    Hypothyroid    Osteoporosis, post-menopausal    Depression    Hyperlipemia    Hypocalcemia    Loss of equilibrium    Obesity (BMI 30-39.9)    Decreased pulse    Faintness    Bilateral low back pain without sciatica    Cataract of left eye    Essential hypertension     Patient is here for a chronic conditions follow up.      Objective:      Physical Exam   Constitutional: She is oriented to person, place, and time. She appears well-developed and well-nourished.   Cardiovascular: Normal rate, regular rhythm and normal heart sounds.   Pulmonary/Chest: Effort normal and breath sounds normal.   Musculoskeletal: She exhibits no edema.   Neurological: She is alert and oriented to person, place, and time.   Skin: Skin is warm and dry.   Psychiatric: She has a normal mood and affect.   Nursing note and vitals reviewed.      Assessment:       1. Essential hypertension    2. Hyperlipidemia, unspecified hyperlipidemia type    3. Hypothyroidism, unspecified type    4. Obesity (BMI 30-39.9)    5. Hypocalcemia    6. Flu vaccine need    7. Need for pneumococcal vaccination        Plan:       1. Essential hypertension  Controlled on current medications.  Continue current medications.      2. Hyperlipidemia, unspecified hyperlipidemia type  Stable condition.  Continue current medications.  Will adjust based on lab findings or if condition  "changes.    - CBC auto differential; Future  - Comprehensive metabolic panel; Future  - Lipid panel; Future    3. Hypothyroidism, unspecified type  Stable condition.  Continue current medications.  Will adjust based on lab findings or if condition changes.    - TSH; Future  - T4, free; Future    4. Obesity (BMI 30-39.9)  Counseled patient on his ideal body weight, health consequences of being obese and current recommendations including weekly exercise and a heart healthy diet.  Current BMI is:Estimated body mass index is 39.01 kg/m² as calculated from the following:    Height as of this encounter: 5' 3" (1.6 m).    Weight as of this encounter: 99.9 kg (220 lb 3.8 oz)..  Patient is aware that ideal BMI < 25 or Weight in (lb) to have BMI = 25: 140.8.        5. Hypocalcemia  Screen and treat as indicated:    - Vitamin D; Future    6. Flu vaccine need  declined    7. Need for pneumococcal vaccination  declined        Time spent with patient: 20 minutes    Patient with be reevaluated in 6 months or sooner prn    Greater than 50% of this visit was spent counseling as described in above documentation:Yes  "

## 2018-12-04 NOTE — PATIENT INSTRUCTIONS
Walking for Fitness  Fitness walking has something for everyone, even people who are already fit. Walking is one of the safest ways to condition your body aerobically. It can boost energy, help you lose weight, and reduce stress.    Physical benefits  · Walking strengthens your heart and lungs, and tones your muscles.  · When walking, your feet land with less impact than in other sports. This reduces chances of muscle, bone, and joint injury.  · Regular walking improves your cholesterol levels and lowers your risk of heart disease. And it helps you control your blood sugar if you have diabetes.  · Walking is a weight-bearing activity, which helps maintain bone density. This can help prevent osteoporosis.  Personal rewards  · Taking walks can help you relax and manage stress. And fitness walking may make you feel better about yourself.  · Walking can help you sleep better at night and make you less likely to be depressed.  · Regular walking may help maintain your memory as you get older.  · Walking is a great way to spend extra time with friends and family members. Be sure to invite your dog along!  Q&A about fitness walking  Q: Will walking keep me fit?  A: Yes. Regular walking at the right pace gives you all the benefits of other aerobic activities, such as jogging and swimming.  Q: Will walking help me lose weight and keep it off?  A: Yes. Per mile, walking can burn as many calories as jogging. Your health care provider can help work walking into your weight-loss plan.  Q: Is walking safe for my health?  A: Yes. Walking is safe if you have high blood pressure, diabetes, heart disease, or other conditions. Talk to your healthcare provider before you start.  Date Last Reviewed: 4/1/2017 © 2000-2017 [employer]. [address] [place] [address], [place] [address]. All rights reserved. This information is not intended as a substitute for professional medical care. Always follow your healthcare professional's  instructions.          Established High Blood Pressure    High blood pressure (hypertension) is a chronic disease. Often, healthcare providers dont know what causes it. But it can be caused by certain health conditions and medicines.  If you have high blood pressure, you may not have any symptoms. If you do have symptoms, they may include headache, dizziness, changes in your vision, chest pain, and shortness of breath. But even without symptoms, high blood pressure thats not treated raises your risk for heart attack and stroke. High blood pressure is a serious health risk and shouldnt be ignored.  A blood pressure reading is made up of two numbers: a higher number over a lower number. The top number is the systolic pressure. The bottom number is the diastolic pressure. A normal blood pressure is a systolic pressure of  less than 120 over a diastolic pressure of less than 80. You will see your blood pressure readings written together. For example, a person with a systolic pressure of 188 and a diastolic pressure of 78 will have 118/78 written in the medical record.  High blood pressure is when either the top number is 140 or higher, or the bottom number is 90 or higher. This must be the result when taking your blood pressure a number of times. The blood pressures between normal and high are called prehypertension.  Home care  If you have high blood pressure, you should do what is listed below to lower your blood pressure. If you are taking medicines for high blood pressure, these methods may reduce or end your need for medicines in the future.  · Begin a weight-loss program if you are overweight.  · Cut back on how much salt you get in your diet. Heres how to do this:  ¨ Dont eat foods that have a lot of salt. These include olives, pickles, smoked meats, and salted potato chips.  ¨ Dont add salt to your food at the table.  ¨ Use only small amounts of salt when cooking.  · Start an exercise program. Talk with  your healthcare provider about the type of exercise program that would be best for you. It doesn't have to be hard. Even brisk walking for 20 minutes 3 times a week is a good form of exercise.  · Dont take medicines that stimulate the heart. This includes many over-the-counter cold and sinus decongestant pills and sprays, as well as diet pills. Check the warnings about hypertension on the label. Before buying any over-the-counter medicines or supplements, always ask the pharmacist about the product's potential interaction with your high blood pressure and your high blood pressure medicines.  · Stimulants such as amphetamine or cocaine could be deadly for someone with high blood pressure. Never take these.  · Limit how much caffeine you get in your diet. Switch to caffeine-free products.  · Stop smoking. If you are a long-time smoker, this can be hard. Talk to your healthcare provider about medicines and nicotine replacement options to help you. Also, enroll in a stop-smoking program to make it more likely that you will quit for good.  · Learn how to handle stress. This is an important part of any program to lower blood pressure. Learn about relaxation methods like meditation, yoga, or biofeedback.  · If your provider prescribed medicines, take them exactly as directed. Missing doses may cause your blood pressure get out of control.  · If you miss a dose or doses, check with your healthcare provider or pharmacist about what to do.  · Consider buying an automatic blood pressure machine. Ask your provider for a recommendation. You can get one of these at most pharmacies.     The American Heart Association recommends the following guidelines for home blood pressure monitoring:  · Don't smoke or drink coffee for 30 minutes before taking your blood pressure.  · Go to the bathroom before the test.  · Relax for 5 minutes before taking the measurement.  · Sit with your back supported (don't sit on a couch or soft chair);  keep your feet on the floor uncrossed. Place your arm on a solid flat surface (like a table) with the upper part of the arm at heart level. Place the middle of the cuff directly above the eye of the elbow. Check the monitor's instruction manual for an illustration.  · Take multiple readings. When you measure, take 2 to 3 readings one minute apart and record all of the results.  · Take your blood pressure at the same time every day, or as your healthcare provider recommends.  · Record the date, time, and blood pressure reading.  · Take the record with you to your next medical appointment. If your blood pressure monitor has a built-in memory, simply take the monitor with you to your next appointment.  · Call your provider if you have several high readings. Don't be frightened by a single high blood pressure reading, but if you get several high readings, check in with your healthcare provider.  · Note: When blood pressure reaches a systolic (top number) of 180 or higher OR diastolic (bottom number) of 110 or higher, seek emergency medical treatment.  Follow-up care  You will need to see your healthcare provider regularly. This is to check your blood pressure and to make changes to your medicines. Make a follow-up appointment as directed. Bring the record of your home blood pressure readings to the appointment.  When to seek medical advice  Call your healthcare provider right away if any of these occur:  · Blood pressure reaches a systolic (upper number) of 180 or higher OR a diastolic (bottom number) of 110 or higher  · Chest pain or shortness of breath  · Severe headache  · Throbbing or rushing sound in the ears  · Nosebleed  · Sudden severe pain in your belly (abdomen)  · Extreme drowsiness, confusion, or fainting  · Dizziness or spinning sensation (vertigo)  · Weakness of an arm or leg or one side of the face  · You have problems speaking or seeing   Date Last Reviewed: 12/1/2016  © 7164-4792 [employer],  [employer]. [address] [place] [address]. All rights reserved. This information is not intended as a substitute for professional medical care. Always follow your healthcare professional's instructions.            Weight Management: Getting Started  Healthy bodies come in all shapes and sizes. Not all bodies are made to be thin. For some people, a healthy weight is higher than the average weight listed on weight charts. Your healthcare provider can help you decide on a healthy weight for you.    Reasons to lose weight  Losing weight can help with some health problems, such as high blood pressure, heart disease, diabetes, sleep apnea, and arthritis. You may also feel more energy.  Set your long-term goal  Your goal doesn't even have to be a specific weight. You may decide on a fitness goal (such as being able to walk 10 miles a week), or a health goal (such as lowering your blood pressure). Choose a goal that is measurable and reasonable, so you know when you've reached it. A goal of reaching a BMI of less than 25 is not always reasonable (or possible).   Make an action plan  Habits dont change overnight. Setting your goals too high can leave you feeling discouraged if you cant reach them. Be realistic. Choose one or two small changes you can make now. Set an action plan for how you are going to make these changes. When you can stick to this plan, keep making a few more small changes. Taking small steps will help you stay on the path to success.  Track your progress  Write down your goals. Then, keep a daily record of your progress. Write down what you eat and how active you are. This record lets you look back on how much youve done. It may also help when youre feeling frustrated. Reward yourself for success. Even if you dont reach every goal, give yourself credit for what you do get done.  Get support  Encouragement from others can help make losing weight easier. Ask your family members and friends for  support. They may even want to join you. Also look to your healthcare provider, registered dietitian, and  for help. Your local hospital can give you more information about nutrition, exercise, and weight loss.  Date Last Reviewed: 1/31/2016  © 0618-8928 [employer], [employer]. [address] [place] [address], [place] [address]. All rights reserved. This information is not intended as a substitute for professional medical care. Always follow your healthcare professional's instructions.

## 2018-12-26 ENCOUNTER — HOSPITAL ENCOUNTER (EMERGENCY)
Facility: HOSPITAL | Age: 73
Discharge: HOME | End: 2018-12-26
Attending: EMERGENCY MEDICINE
Payer: MEDICARE

## 2018-12-26 VITALS
SYSTOLIC BLOOD PRESSURE: 156 MMHG | TEMPERATURE: 98 F | OXYGEN SATURATION: 98 % | WEIGHT: 220 LBS | DIASTOLIC BLOOD PRESSURE: 84 MMHG | HEART RATE: 76 BPM | RESPIRATION RATE: 18 BRPM | BODY MASS INDEX: 38.98 KG/M2 | HEIGHT: 63 IN

## 2018-12-26 DIAGNOSIS — S16.1XXA NECK STRAIN, INITIAL ENCOUNTER: Primary | ICD-10-CM

## 2018-12-26 PROCEDURE — 99283 EMERGENCY DEPT VISIT LOW MDM: CPT

## 2018-12-26 PROCEDURE — 25000003 PHARM REV CODE 250: Performed by: EMERGENCY MEDICINE

## 2018-12-26 RX ORDER — ACETAMINOPHEN 325 MG/1
650 TABLET ORAL
Status: COMPLETED | OUTPATIENT
Start: 2018-12-26 | End: 2018-12-26

## 2018-12-26 RX ORDER — DICLOFENAC SODIUM 10 MG/G
2 GEL TOPICAL 2 TIMES DAILY PRN
Qty: 1 TUBE | Refills: 0 | Status: SHIPPED | OUTPATIENT
Start: 2018-12-26 | End: 2019-05-20

## 2018-12-26 RX ORDER — IBUPROFEN 400 MG/1
400 TABLET ORAL
Status: COMPLETED | OUTPATIENT
Start: 2018-12-26 | End: 2018-12-26

## 2018-12-26 RX ADMIN — ACETAMINOPHEN 650 MG: 325 TABLET, FILM COATED ORAL at 04:12

## 2018-12-26 RX ADMIN — IBUPROFEN 400 MG: 400 TABLET ORAL at 04:12

## 2018-12-26 NOTE — DISCHARGE INSTRUCTIONS
You may alternate ibuprofen and tylenol as needed for pain at home.  Also try heat and voltaren gel as needed.

## 2018-12-26 NOTE — ED PROVIDER NOTES
Chief complaint:  Neck Pain (Stabbing pain in neck right side -no injury noted. )      HPI:  Tali Hopper is a 73 y.o. female presenting with 3 days of right lateral neck pain with difficulty turning her head to the right.  She denies trauma.  The pain is primarily with leaning forward or turn her head to the right.  She denies other symptoms such as fever, numbness, weakness, radiation or migration of pain. Pain is minimal at rest.  She presents now because it is making it difficult to sleep.    ROS: As per HPI and below:  No chest pain, shortness of breath, diaphoresis, pleuritic pain, headache, numbness, weakness, confusion, vertigo.    Review of patient's allergies indicates:   Allergen Reactions    Benadryl decongestant Shortness Of Breath     Respiration problems    Penicillins Hives          Medication List      START taking these medications    diclofenac sodium 1 % Gel  Commonly known as:  VOLTAREN  Apply 2 g topically 2 (two) times daily as needed. To affected area of neck pain        ASK your doctor about these medications    amLODIPine 5 MG tablet  Commonly known as:  NORVASC  Take 1 tablet (5 mg total) by mouth once daily.     aspirin 81 MG EC tablet  Commonly known as:  ECOTRIN     b complex vitamins tablet     buPROPion 150 MG TB24 tablet  Commonly known as:  WELLBUTRIN XL  Take 1 tablet (150 mg total) by mouth once daily.     CALTRATE 600 + D ORAL     escitalopram oxalate 20 MG tablet  Commonly known as:  LEXAPRO  TAKE 1 TABLET EVERY DAY     ESTROBLEND 500 mcg-1,000 unit-20 mcg Tab  Generic drug:  [id number]-lycop-lut-herb#220     fluticasone 50 mcg/actuation nasal spray  Commonly known as:  FLONASE  2 sprays by Each Nare route once daily.     fosinopril-hydrochlorothiazide 20-12.5 mg per tablet  Commonly known as:  MONOPRIL-HCT  TAKE 1 TABLET BY MOUTH ONCE DAILY.     latanoprost 0.005 % ophthalmic solution     levomefolate calcium 7.5 mg Tab tablet  Commonly known as:  Deplin  Take 1 tablet  (7.5 mg total) by mouth once daily.     levothyroxine 112 MCG tablet  Commonly known as:  SYNTHROID  TAKE 1 TABLET BY MOUTH ONCE DAILY.     raloxifene 60 mg tablet  Commonly known as:  EVISTA  Take 1 tablet (60 mg total) by mouth once daily.     timolol maleate 0.5% 0.5 % Solg  Commonly known as:  TIMOPTIC-XE     VITAMIN C 100 MG tablet  Generic drug:  ascorbic acid (vitamin C)           Where to Get Your Medications      You can get these medications from any pharmacy    Bring a paper prescription for each of these medications  · diclofenac sodium 1 % Gel         PMH:  As per HPI and below:  Past Medical History:   Diagnosis Date    Cataract of left eye     Depression     Glaucoma     Hypertension     Loss of equilibrium     Osteoporosis     Thyroid disease      Past Surgical History:   Procedure Laterality Date    CHOLECYSTECTOMY      EXTRACTION-CATARACT-IOL Left 10/13/2015    [facility]    EXTRACTION-CATARACT-IOL Right 12/8/2014    [facility]    EYE SURGERY      right cataract    HYSTERECTOMY      TONSILLECTOMY         Social History     Socioeconomic History    Marital status:      Spouse name: Not on file    Number of children: Not on file    Years of education: Not on file    Highest education level: Not on file   Social Needs    Financial resource strain: Not on file    Food insecurity - worry: Not on file    Food insecurity - inability: Not on file    Transportation needs - medical: Not on file    Transportation needs - non-medical: Not on file   Occupational History    Not on file   Tobacco Use    Smoking status: Never Smoker    Smokeless tobacco: Never Used   Substance and Sexual Activity    Alcohol use: No    Drug use: No    Sexual activity: Not on file   Other Topics Concern    Not on file   Social History Narrative    Not on file       Family History   Problem Relation Age of Onset    Heart disease  Maternal Grandmother     Heart disease Paternal Grandmother        Physical Exam:    Vitals:    12/26/18 0405   BP: (!) 179/77   Pulse: 78   Resp: 20   Temp: 98.2 °F (36.8 °C)     GENERAL:  No apparent distress.  Alert.    HEENT:  Moist mucous membranes.  Normocephalic and atraumatic.    NECK:  No swelling.  Midline trachea.  Pain with attempted right head movement.  Tenderness over the posterior belly of the sternocleidomastoid to the right neck close to the insertion in the shoulder.  No posterior midline neck tenderness. No meningismus.  No carotid bruit.  CARDIOVASCULAR:  Regular rate and rhythm.  2+ radial pulses.  No murmurs auscultated.  PULMONARY:  Lungs clear to auscultation bilaterally.  No wheezes, rales, or rhonci.    EXTREMITIES:  Warm and well perfused.  Brisk capillary refill.    NEUROLOGICAL:  Normal mental status.  Appropriate and conversant.  5/5 strength and sensation.  CN III through XII intact.    SKIN:  No rashes or ecchymoses.        Labs Reviewed - No data to display    Current Discharge Medication List      START taking these medications    Details   diclofenac sodium (VOLTAREN) 1 % Gel Apply 2 g topically 2 (two) times daily as needed. To affected area of neck pain  Qty: 1 Tube, Refills: 0         CONTINUE these medications which have NOT CHANGED    Details   amLODIPine (NORVASC) 5 MG tablet Take 1 tablet (5 mg total) by mouth once daily.  Qty: 90 tablet, Refills: 1    Associated Diagnoses: Essential hypertension      ascorbic acid (VITAMIN C) 100 MG tablet Take 100 mg by mouth once daily.      aspirin (ECOTRIN) 81 MG EC tablet Take 81 mg by mouth once daily.      b complex vitamins tablet Take 1 tablet by mouth once daily.      buPROPion (WELLBUTRIN XL) 150 MG TB24 tablet Take 1 tablet (150 mg total) by mouth once daily.  Qty: 90 tablet, Refills: 1    Associated Diagnoses: Depression with anxiety      CALCIUM CARBONATE/VITAMIN D3 (CALTRATE 600 + D ORAL) Take by mouth once daily.        escitalopram oxalate (LEXAPRO) 20 MG tablet TAKE 1 TABLET EVERY DAY  Qty: 90 tablet, Refills: 3    Associated Diagnoses: Depression, unspecified depression type      fluticasone (FLONASE) 50 mcg/actuation nasal spray 2 sprays by Each Nare route once daily.  Qty: 16 g, Refills: 11    Associated Diagnoses: Chronic maxillary sinusitis      fosinopril-hydrochlorothiazide (MONOPRIL-HCT) 20-12.5 mg per tablet TAKE 1 TABLET BY MOUTH ONCE DAILY.  Qty: 90 tablet, Refills: 3    Associated Diagnoses: Essential hypertension      latanoprost 0.005 % ophthalmic solution 1 drop every evening.      levomefolate calcium (DEPLIN) 7.5 mg Tab tablet Take 1 tablet (7.5 mg total) by mouth once daily.  Qty: 30 tablet, Refills: 5    Associated Diagnoses: Depression with anxiety      levothyroxine (SYNTHROID) 112 MCG tablet TAKE 1 TABLET BY MOUTH ONCE DAILY.  Qty: 90 tablet, Refills: 3    Associated Diagnoses: Hypothyroidism due to acquired atrophy of thyroid      [id number]-[id number]-lycop-lut-herb#220 (ESTROBLEND) 500 mcg-1,000 unit-20 mcg Tab Take 1 tablet by mouth once daily.      raloxifene (EVISTA) 60 mg tablet Take 1 tablet (60 mg total) by mouth once daily.  Qty: 90 tablet, Refills: 3    Associated Diagnoses: Osteoporosis, post-menopausal      timolol maleate 0.5% (TIMOPTIC-XE) 0.5 % SolG Place 1 drop into both eyes 2 (two) times daily.   Refills: 2             No orders of the defined types were placed in this encounter.      Imaging Results    None              MDM:    73 y.o. female with right neck tenderness consistent with likely mild strain with muscle spasm.  I have very low suspicion for emergent, life-threatening process such as vessel dissection, ACS, malignancy, or infectious etiology.  Symptomatic treatment as necessary with ibuprofen and acetaminophen begun here with warm compresses at home and topical Voltaren gel as well as needed.  Follow up with PCP for reassessment.  Detailed return precautions reviewed.    Diagnoses:     1.  Neck strain     Luis Fernando Travis MD  12/26/18 0441

## 2018-12-26 NOTE — ED NOTES
Assumed care:  Patient awake, alert and oriented x 3, skin warm and dry, in NAD.    Patient identifiers for Tali Hopper checked and correct.  LOC:  Patient is awake, alert, and aware of environment with an appropriate affect. Patient is oriented x 3 and speaking appropriately.  APPEARANCE:  Patient resting comfortably and in no acute distress. Patient is clean and well groomed, patient's clothing is properly fastened.  SKIN:  The skin is warm and dry. Patient has normal skin turgor and moist mucus membranes. Skin is intact; no bruising or breakdown noted.  MUSCULOSKELETAL:  Patient is moving all extremities well, no obvious deformities noted. Pulses intact.  Neck pain.  RESPIRATORY:  Airway is open and patent. Respirations are spontaneous and non-labored with normal effort and rate.  CARDIAC:  Patient has a normal rate and rhythm. No peripheral edema noted. Capillary refill < 3 seconds.  ABDOMEN:  No distention noted.  Soft and non-tender upon palpation.  NEUROLOGICAL:  PERRL. Facial expression is symmetrical. Hand grasps are equal bilaterally. Normal sensation in all extremities when touched with finger.  Allergies reported:    Review of patient's allergies indicates:   Allergen Reactions    Benadryl decongestant Shortness Of Breath     Respiration problems    Penicillins Hives     OTHER NOTES:  Patient CO right sided neck pain radiating to right shoulder x 4 days.  Patient states that she has numbness and tingling to right arm from neuropathy.  Denies trauma.

## 2019-01-01 DIAGNOSIS — I10 ESSENTIAL HYPERTENSION: ICD-10-CM

## 2019-01-02 RX ORDER — FOSINOPRIL SODIUM AND HYDROCHLOROTHIAZIDE 20; 12.5 MG/1; MG/1
1 TABLET ORAL DAILY
Qty: 90 TABLET | Refills: 3 | Status: SHIPPED | OUTPATIENT
Start: 2019-01-02 | End: 2019-12-24

## 2019-04-01 DIAGNOSIS — F41.8 DEPRESSION WITH ANXIETY: ICD-10-CM

## 2019-04-01 DIAGNOSIS — I10 ESSENTIAL HYPERTENSION: ICD-10-CM

## 2019-04-01 RX ORDER — AMLODIPINE BESYLATE 5 MG/1
5 TABLET ORAL DAILY
Qty: 90 TABLET | Refills: 3 | Status: SHIPPED | OUTPATIENT
Start: 2019-04-01 | End: 2020-03-16

## 2019-04-01 RX ORDER — BUPROPION HYDROCHLORIDE 150 MG/1
150 TABLET ORAL DAILY
Qty: 90 TABLET | Refills: 3 | Status: SHIPPED | OUTPATIENT
Start: 2019-04-01 | End: 2020-01-28

## 2019-04-09 ENCOUNTER — OFFICE VISIT (OUTPATIENT)
Dept: FAMILY MEDICINE | Facility: CLINIC | Age: 74
End: 2019-04-09
Payer: MEDICARE

## 2019-04-09 VITALS
TEMPERATURE: 98 F | BODY MASS INDEX: 37.93 KG/M2 | DIASTOLIC BLOOD PRESSURE: 66 MMHG | WEIGHT: 214.06 LBS | HEART RATE: 63 BPM | SYSTOLIC BLOOD PRESSURE: 123 MMHG | HEIGHT: 63 IN | OXYGEN SATURATION: 95 % | RESPIRATION RATE: 14 BRPM
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
## 2019-04-09 PROCEDURE — 99213 PR OFFICE/OUTPT VISIT, EST, LEVL III, 20-29 MIN: ICD-10-PCS | Mod: S$GLB,,, | Performed by: NURSE PRACTITIONER

## 2019-04-09 PROCEDURE — 3078F DIAST BP <80 MM HG: CPT | Mod: CPTII,S$GLB,, | Performed by: NURSE PRACTITIONER

## 2019-04-09 PROCEDURE — 1101F PT FALLS ASSESS-DOCD LE1/YR: CPT | Mod: CPTII,S$GLB,, | Performed by: NURSE PRACTITIONER

## 2019-04-09 PROCEDURE — 99999 PR PBB SHADOW E&M-EST. PATIENT-LVL III: ICD-10-PCS | Mod: PBBFAC,,, | Performed by: NURSE PRACTITIONER

## 2019-04-09 PROCEDURE — 3078F PR MOST RECENT DIASTOLIC BLOOD PRESSURE < 80 MM HG: ICD-10-PCS | Mod: CPTII,S$GLB,, | Performed by: NURSE PRACTITIONER

## 2019-04-09 PROCEDURE — 1101F PR PT FALLS ASSESS DOC 0-1 FALLS W/OUT INJ PAST YR: ICD-10-PCS | Mod: CPTII,S$GLB,, | Performed by: NURSE PRACTITIONER

## 2019-04-09 PROCEDURE — 99213 OFFICE O/P EST LOW 20 MIN: CPT | Mod: S$GLB,,, | Performed by: NURSE PRACTITIONER

## 2019-04-09 PROCEDURE — 3074F PR MOST RECENT SYSTOLIC BLOOD PRESSURE < 130 MM HG: ICD-10-PCS | Mod: CPTII,S$GLB,, | Performed by: NURSE PRACTITIONER

## 2019-04-09 PROCEDURE — 99999 PR PBB SHADOW E&M-EST. PATIENT-LVL III: CPT | Mod: PBBFAC,,, | Performed by: NURSE PRACTITIONER

## 2019-04-09 PROCEDURE — 3074F SYST BP LT 130 MM HG: CPT | Mod: CPTII,S$GLB,, | Performed by: NURSE PRACTITIONER

## 2019-04-09 NOTE — PROGRESS NOTES
Subjective:       Patient ID: Tali Hopper is a 73 y.o. female.    Chief Complaint: Patient fell / hurt buttocks    Fell on Saturday, was putting air in her tire. Fell onto the concrete, hit her left buttocks and the back of her head. Her head feels fine, but is having significant left buttock pain. 8/10 today. Was taking tylenol without relief, NSAIDs work better. Pain is gradually improving. She ambulates with a cane, long standing unsteady gait.    Patient Active Problem List   Diagnosis    Hypothyroid    Osteoporosis, post-menopausal    Depression    Hyperlipemia    Hypocalcemia    Loss of equilibrium    Obesity (BMI 30-39.9)    Decreased pulse    Faintness    Bilateral low back pain without sciatica    Cataract of left eye    Essential hypertension     Review of Systems   Musculoskeletal: Positive for gait problem and myalgias.   Neurological: Negative for dizziness, weakness, light-headedness and headaches.       Objective:      Physical Exam   Constitutional: She is oriented to person, place, and time. She appears well-developed and well-nourished.   Cardiovascular: Normal rate, regular rhythm and normal heart sounds.   Pulmonary/Chest: Effort normal and breath sounds normal.   Musculoskeletal:   Unsteady gait, ambulating with cane.   No tenderness with palpation of lower back, tailbone or left buttock.    Neurological: She is alert and oriented to person, place, and time.   Skin: Skin is warm and dry.   No bruising  Back of head checked- no abrasions, edema or erythema    Psychiatric: She has a normal mood and affect.   Nursing note and vitals reviewed.      Assessment:       1. Fall, initial encounter        Plan:       Tali was seen today for patient fell / hurt buttocks.    Diagnoses and all orders for this visit:    Fall, initial encounter  Conservative management  May continue NSAIDs and tylenol. Recommend ice and rest as well  F/U 2 weeks or sooner if symptoms fail to improve

## 2019-04-16 LAB — HEMOCCULT STL QL IA: NEGATIVE

## 2019-04-23 ENCOUNTER — OFFICE VISIT (OUTPATIENT)
Dept: FAMILY MEDICINE | Facility: CLINIC | Age: 74
End: 2019-04-23
Payer: MEDICARE

## 2019-04-23 VITALS
SYSTOLIC BLOOD PRESSURE: 122 MMHG | DIASTOLIC BLOOD PRESSURE: 78 MMHG | TEMPERATURE: 98 F | WEIGHT: 218.25 LBS | BODY MASS INDEX: 38.67 KG/M2 | HEART RATE: 68 BPM | HEIGHT: 63 IN | OXYGEN SATURATION: 97 %

## 2019-04-23 DIAGNOSIS — F41.8 DEPRESSION WITH ANXIETY: ICD-10-CM
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
## 2019-04-23 PROCEDURE — 99213 PR OFFICE/OUTPT VISIT, EST, LEVL III, 20-29 MIN: ICD-10-PCS | Mod: S$GLB,,, | Performed by: NURSE PRACTITIONER

## 2019-04-23 PROCEDURE — 3078F PR MOST RECENT DIASTOLIC BLOOD PRESSURE < 80 MM HG: ICD-10-PCS | Mod: CPTII,S$GLB,, | Performed by: NURSE PRACTITIONER

## 2019-04-23 PROCEDURE — 99213 OFFICE O/P EST LOW 20 MIN: CPT | Mod: S$GLB,,, | Performed by: NURSE PRACTITIONER

## 2019-04-23 PROCEDURE — 1100F PTFALLS ASSESS-DOCD GE2>/YR: CPT | Mod: CPTII,S$GLB,, | Performed by: NURSE PRACTITIONER

## 2019-04-23 PROCEDURE — 1100F PR PT FALLS ASSESS DOC 2+ FALLS/FALL W/INJURY/YR: ICD-10-PCS | Mod: CPTII,S$GLB,, | Performed by: NURSE PRACTITIONER

## 2019-04-23 PROCEDURE — 99999 PR PBB SHADOW E&M-EST. PATIENT-LVL IV: CPT | Mod: PBBFAC,,, | Performed by: NURSE PRACTITIONER

## 2019-04-23 PROCEDURE — 3288F FALL RISK ASSESSMENT DOCD: CPT | Mod: CPTII,S$GLB,, | Performed by: NURSE PRACTITIONER

## 2019-04-23 PROCEDURE — 3078F DIAST BP <80 MM HG: CPT | Mod: CPTII,S$GLB,, | Performed by: NURSE PRACTITIONER

## 2019-04-23 PROCEDURE — 3074F PR MOST RECENT SYSTOLIC BLOOD PRESSURE < 130 MM HG: ICD-10-PCS | Mod: CPTII,S$GLB,, | Performed by: NURSE PRACTITIONER

## 2019-04-23 PROCEDURE — 99999 PR PBB SHADOW E&M-EST. PATIENT-LVL IV: ICD-10-PCS | Mod: PBBFAC,,, | Performed by: NURSE PRACTITIONER

## 2019-04-23 PROCEDURE — 3288F PR FALLS RISK ASSESSMENT DOCUMENTED: ICD-10-PCS | Mod: CPTII,S$GLB,, | Performed by: NURSE PRACTITIONER

## 2019-04-23 PROCEDURE — 3074F SYST BP LT 130 MM HG: CPT | Mod: CPTII,S$GLB,, | Performed by: NURSE PRACTITIONER

## 2019-04-23 RX ORDER — TIMOLOL MALEATE 5 MG/ML
1 SOLUTION OPHTHALMIC 2 TIMES DAILY
Refills: 2 | Status: CANCELLED | OUTPATIENT
Start: 2019-04-23

## 2019-04-23 NOTE — PROGRESS NOTES
"Subjective:       Patient ID: Tali Hopper is a 73 y.o. female.    Chief Complaint: Follow-up    Ms. Hopper presents today for a 2 week F/U for fall. Still having her pain in her left buttock but has improved, currently 4/10. Pain continues to improve. Taking NSAIDs with good relief. Not requiring cane for ambulation today. Able to participate in therapy at [facility] 3x a week. This actually improves her pain. She also mentions that she is feeling more depressed. Often feels like she has to force herself to get dressed and leave her home. Does not have the energy to clean her home. Has family in [place], but does not see them often. Also talks to her mother daily, but cannot "deal with the whining". Has had depression since the death of her  7 years ago. Is interested in someone to talk with, but would like to hold off on medication adjustments. BP improved with manual recheck.     Patient Active Problem List   Diagnosis    Hypothyroid    Osteoporosis, post-menopausal    Depression    Hyperlipemia    Hypocalcemia    Loss of equilibrium    Obesity (BMI 30-39.9)    Decreased pulse    Faintness    Bilateral low back pain without sciatica    Cataract of left eye    Essential hypertension     Review of Systems   Constitutional: Negative for activity change.   Musculoskeletal: Positive for myalgias. Negative for gait problem.   Neurological: Negative for dizziness, weakness, light-headedness and headaches.   Psychiatric/Behavioral: Positive for dysphoric mood. The patient is not nervous/anxious.        Objective:      Physical Exam   Constitutional: She is oriented to person, place, and time. She appears well-developed and well-nourished.   Cardiovascular: Normal rate, regular rhythm and normal heart sounds.   Pulmonary/Chest: Effort normal and breath sounds normal.   Musculoskeletal:   No tenderness with palpation of lower back, tailbone or left buttock.    Neurological: She is alert " and oriented to person, place, and time.   Skin: Skin is warm and dry.   Psychiatric: She has a normal mood and affect.   Nursing note and vitals reviewed.      Assessment:       1. Fall, subsequent encounter    2. Depression with anxiety        Plan:       Tali was seen today for follow-up.    Diagnoses and all orders for this visit:    Fall, subsequent encounter  Symptoms improving, continue conservative management  Notify clinic if symptoms fail to improve or become worse  Recommend donut or waffle pillow to decrease pressure when sitting    Depression with anxiety  -     Ambulatory referral to Psychology  Declined medication adjustments today

## 2019-04-29 ENCOUNTER — TELEPHONE (OUTPATIENT)
Dept: FAMILY MEDICINE | Facility: CLINIC | Age: 74
End: 2019-04-29

## 2019-04-29 NOTE — TELEPHONE ENCOUNTER
----- Message from Damaris Whitlock sent at 4/29/2019 12:12 PM CDT -----  Contact: self  Patient requesting to speak to nurse regarding she is in a lot of pain and need advice per patient       Patient states she received instructions but they are not working per patient   Patient decline to schedule appointment       Please call to advice [phone] (home)

## 2019-04-29 NOTE — TELEPHONE ENCOUNTER
Spoke with patient regarding her message, patient stated she is not feeling any better since the last visit, she can't sit without a pillow and the pain wakes her up at night.

## 2019-04-30 NOTE — TELEPHONE ENCOUNTER
Spoke with patient regarding her symptoms and offered her an appointment to be seen on tomorrow, patient said she didn't know how she was feeling because she woke up and she did not have any money to keep coming here, so she will just deal with it for now.

## 2019-05-10 ENCOUNTER — OFFICE VISIT (OUTPATIENT)
Dept: PSYCHIATRY | Facility: CLINIC | Age: 74
End: 2019-05-10
Payer: MEDICARE

## 2019-05-10 VITALS
SYSTOLIC BLOOD PRESSURE: 124 MMHG | RESPIRATION RATE: 16 BRPM | DIASTOLIC BLOOD PRESSURE: 65 MMHG | HEIGHT: 63 IN | BODY MASS INDEX: 38.16 KG/M2 | HEART RATE: 67 BPM | WEIGHT: 215.38 LBS

## 2019-05-10 DIAGNOSIS — F33.9 RECURRENT DEPRESSION: Primary | ICD-10-CM

## 2019-05-10 DIAGNOSIS — F41.9 ANXIETY: ICD-10-CM

## 2019-05-10 PROCEDURE — 99999 PR PBB SHADOW E&M-EST. PATIENT-LVL III: ICD-10-PCS | Mod: PBBFAC,,, | Performed by: PSYCHOLOGIST

## 2019-05-10 PROCEDURE — 90792 PR PSYCHIATRIC DIAGNOSTIC EVALUATION W/MEDICAL SERVICES: ICD-10-PCS | Mod: S$GLB,,, | Performed by: PSYCHOLOGIST

## 2019-05-10 PROCEDURE — 99999 PR PBB SHADOW E&M-EST. PATIENT-LVL III: CPT | Mod: PBBFAC,,, | Performed by: PSYCHOLOGIST

## 2019-05-10 PROCEDURE — 90792 PSYCH DIAG EVAL W/MED SRVCS: CPT | Mod: S$GLB,,, | Performed by: PSYCHOLOGIST

## 2019-05-10 RX ORDER — DULOXETIN HYDROCHLORIDE 30 MG/1
CAPSULE, DELAYED RELEASE ORAL
Qty: 30 CAPSULE | Refills: 1 | Status: SHIPPED | OUTPATIENT
Start: 2019-05-10 | End: 2019-07-02 | Stop reason: SDUPTHER

## 2019-05-10 NOTE — PATIENT INSTRUCTIONS
STOP Lexapro  STOP Wellbutrin  Start Cymbalta 30mg one tablet each night at bedtime  RTC 4 weeks and prn

## 2019-05-10 NOTE — PROGRESS NOTES
Client:  Tali Hopper  : 1945  [place], NP  [id number]    Presenting complaint:/Past psychiatric treatment:  Ms. Hopper sts she is depressed and has had depression since her  .  She sts she has no motivation to do anything and has to make herself do anything.  She has chronic pain, and sts her balance is poor (fell last week). She sts she lays down early watches TV but falls asleep in the recliner  She denied having any inpatient psychiatric care and has not been in therapy. She is taking Wellbutrin XL 150mg and Lexapro 20mg qam that she sts are not helping.  PHQ9 score today:  6; GAD4: 4. She denied SI/HI/delusions/hallucinations and none were supsected    Stressors:  Health issues, pain, relationships issues with her daughter    Family psychiatric history: none reported    Relevant lab reviewed  Relevant EKG reviewed    Review of patient's allergies indicates:   Allergen Reactions    Benadryl decongestant Shortness Of Breath     Respiration problems    Penicillins Hives       Current Outpatient Medications:     amLODIPine (NORVASC) 5 MG tablet, Take 1 tablet (5 mg total) by mouth once daily., Disp: 90 tablet, Rfl: 3    ascorbic acid (VITAMIN C) 100 MG tablet, Take 100 mg by mouth once daily., Disp: , Rfl:     aspirin (ECOTRIN) 81 MG EC tablet, Take 81 mg by mouth once daily., Disp: , Rfl:     b complex vitamins tablet, Take 1 tablet by mouth once daily., Disp: , Rfl:     buPROPion (WELLBUTRIN XL) 150 MG TB24 tablet, Take 1 tablet (150 mg total) by mouth once daily., Disp: 90 tablet, Rfl: 3    CALCIUM CARBONATE/VITAMIN D3 (CALTRATE 600 + D ORAL), Take by mouth once daily. , Disp: , Rfl:     escitalopram oxalate (LEXAPRO) 20 MG tablet, TAKE 1 TABLET EVERY DAY, Disp: 90 tablet, Rfl: 3    fluticasone (FLONASE) 50 mcg/actuation nasal spray, 2 sprays by Each Nare route once daily., Disp: 16 g, Rfl: 11    fosinopril-hydrochlorothiazide (MONOPRIL-HCT) 20-12.5 mg per tablet,  TAKE 1 TABLET BY MOUTH ONCE DAILY., Disp: 90 tablet, Rfl: 3    latanoprost 0.005 % ophthalmic solution, 1 drop every evening., Disp: , Rfl:     levothyroxine (SYNTHROID) 112 MCG tablet, TAKE 1 TABLET BY MOUTH ONCE DAILY., Disp: 90 tablet, Rfl: 3    vm-YC-M4-K-lycop-lut-herb#220 (ESTROBLEND) 500 mcg-1,000 unit-20 mcg Tab, Take 1 tablet by mouth once daily., Disp: , Rfl:     raloxifene (EVISTA) 60 mg tablet, Take 1 tablet (60 mg total) by mouth once daily., Disp: 90 tablet, Rfl: 3    timolol maleate 0.5% (TIMOPTIC-XE) 0.5 % SolG, Place 1 drop into both eyes 2 (two) times daily. , Disp: , Rfl: 2    diclofenac sodium (VOLTAREN) 1 % Gel, Apply 2 g topically 2 (two) times daily as needed. To affected area of neck pain, Disp: 1 Tube, Rfl: 0    levomefolate calcium (DEPLIN) 7.5 mg Tab tablet, Take 1 tablet (7.5 mg total) by mouth once daily., Disp: 30 tablet, Rfl: 5  Past Medical History:   Diagnosis Date    Cataract of left eye     Depression     Glaucoma     Hypertension     Loss of equilibrium     Osteoporosis     Thyroid disease        Clinical presentation:  Appearance:  The client presented in casual attire evidencing good grooming and hygiene    Neuro:  the client was alert, oriented x 4 and evidenced good judgement and insight.      Attention/concentration:  Was good in session    Memory:  The client had no subjective memory complaints and they were able to recall information discussed in session accurately    Judgement:  behavior is adequate to the situation    Fund of knowledge:  intact and appropriate to age and level of education    Gait/station:  was normal    Heart: the patients heartbeat was regular in rate and rhythm.  There were nor murmurs, gallops or rubs.    Lung: clear bilaterally    Skin warm and dry. Nailbeds were pink and refill was good    Extremities: were warm and did not evidence any edema    Mood:  was mildly dysthymic.  No SI/HI/delusions or hallucinations reported or suspected.      Affect: was normal range    Speech:  normal rate and tone     Sleep: the client had no reported history of sleep problems. She lays done at 10 watches TV and falls asleep at 1-2am gets up at 10am.  She also snores. She does not sleep in the day.  She sts she feels tired on days she does not sleep well.    Appetite: was reported as good but she does not eat healthy.  .    Pain complaints:  C/o chronic pain left buttocks.  Sts she has a balance problem and does not use her cane.     ETOH/Substance use history:  The client had no reported ETOH/or other substance use problems by their report.    Psychosocial history:  The client currently lives alone with her cat.  She has a grown daughter with 2 grad kids with whom she has occ contact.  She did not report having positive peer support and she is not in any structured leisure activities or hobbies.  She does participate in the [employer] exercise program and occasionally visits the [employer].      Education/session discussion:  Tali talked about her depression and health concerns.  She was engaged in the session will consider therapy later.  · Discussed general issues about treatment of depression/anxiety including utility of medication and therapy. Discussed the risks/benefits/alternatives of medication with client.  Reviewed typical side effects and potential adverse reactions of an antidepressant medication including but not limited to teratogenicity, orthostatic changes, increased risk of SI, risk of mood swings, risk of electrolyte disturbance and increased risk of bleeding.  The client was given a handout about her medication (Cymbalta)  for home reference. The client appeared to understand the information shared based on their questions and responses made in session.  Informed consent for medication was obtained.   · Discussed the need for regular, restful sleep and strategies that help promote good sleep.  Reviewed the negative impact of alcohol, smoking,  and caffeine on sleep with the client.  The client was given educations information about sleep/insomnia and sleep hygiene for home reference.  · Discussed chronic pain and coping strategies for coping with it better. Discussed engageing in stretching exercises, rolling on a Tennis ball for mid buttock pain etc.  Handout to pt for home reading on chronic pain    Impression: The client has moderate subjective depression and is expected to have reduced depression with medication.      Plan:  Streamline medications  DC Lexapro  DC Wellbutrin  Start Cymbalta 30mg one tablet each night at bedtime  RTC 4 weeks and prn    Session:  9839-3022

## 2019-05-20 ENCOUNTER — OFFICE VISIT (OUTPATIENT)
Dept: FAMILY MEDICINE | Facility: CLINIC | Age: 74
End: 2019-05-20
Payer: MEDICARE

## 2019-05-20 ENCOUNTER — TELEPHONE (OUTPATIENT)
Dept: ENDOCRINOLOGY | Facility: CLINIC | Age: 74
End: 2019-05-20

## 2019-05-20 VITALS
HEIGHT: 63 IN | BODY MASS INDEX: 37.77 KG/M2 | SYSTOLIC BLOOD PRESSURE: 140 MMHG | HEART RATE: 63 BPM | TEMPERATURE: 99 F | DIASTOLIC BLOOD PRESSURE: 62 MMHG | WEIGHT: 213.19 LBS

## 2019-05-20 DIAGNOSIS — E66.01 SEVERE OBESITY (BMI 35.0-39.9) WITH COMORBIDITY: ICD-10-CM

## 2019-05-20 DIAGNOSIS — E03.9 ACQUIRED HYPOTHYROIDISM: ICD-10-CM

## 2019-05-20 DIAGNOSIS — J01.00 ACUTE NON-RECURRENT MAXILLARY SINUSITIS: Primary | ICD-10-CM

## 2019-05-20 DIAGNOSIS — R05.9 COUGH: ICD-10-CM

## 2019-05-20 DIAGNOSIS — R09.81 NASAL CONGESTION: ICD-10-CM

## 2019-05-20 DIAGNOSIS — I10 ESSENTIAL HYPERTENSION: ICD-10-CM

## 2019-05-20 DIAGNOSIS — I49.9 IRREGULAR HEART RHYTHM: ICD-10-CM

## 2019-05-20 DIAGNOSIS — I49.8 ATRIAL BIGEMINY: ICD-10-CM

## 2019-05-20 PROCEDURE — 99214 PR OFFICE/OUTPT VISIT, EST, LEVL IV, 30-39 MIN: ICD-10-PCS | Mod: S$GLB,,, | Performed by: NURSE PRACTITIONER

## 2019-05-20 PROCEDURE — 3078F DIAST BP <80 MM HG: CPT | Mod: CPTII,S$GLB,, | Performed by: NURSE PRACTITIONER

## 2019-05-20 PROCEDURE — 93010 EKG 12-LEAD: ICD-10-PCS | Mod: S$GLB,,, | Performed by: INTERNAL MEDICINE

## 2019-05-20 PROCEDURE — 93010 ELECTROCARDIOGRAM REPORT: CPT | Mod: S$GLB,,, | Performed by: INTERNAL MEDICINE

## 2019-05-20 PROCEDURE — 99999 PR PBB SHADOW E&M-EST. PATIENT-LVL V: ICD-10-PCS | Mod: PBBFAC,,, | Performed by: NURSE PRACTITIONER

## 2019-05-20 PROCEDURE — 93005 EKG 12-LEAD: ICD-10-PCS | Mod: S$GLB,,, | Performed by: NURSE PRACTITIONER

## 2019-05-20 PROCEDURE — 99999 PR PBB SHADOW E&M-EST. PATIENT-LVL V: CPT | Mod: PBBFAC,,, | Performed by: NURSE PRACTITIONER

## 2019-05-20 PROCEDURE — 99214 OFFICE O/P EST MOD 30 MIN: CPT | Mod: S$GLB,,, | Performed by: NURSE PRACTITIONER

## 2019-05-20 PROCEDURE — 93005 ELECTROCARDIOGRAM TRACING: CPT | Mod: S$GLB,,, | Performed by: NURSE PRACTITIONER

## 2019-05-20 PROCEDURE — 3077F PR MOST RECENT SYSTOLIC BLOOD PRESSURE >= 140 MM HG: ICD-10-PCS | Mod: CPTII,S$GLB,, | Performed by: NURSE PRACTITIONER

## 2019-05-20 PROCEDURE — 1101F PT FALLS ASSESS-DOCD LE1/YR: CPT | Mod: CPTII,S$GLB,, | Performed by: NURSE PRACTITIONER

## 2019-05-20 PROCEDURE — 1101F PR PT FALLS ASSESS DOC 0-1 FALLS W/OUT INJ PAST YR: ICD-10-PCS | Mod: CPTII,S$GLB,, | Performed by: NURSE PRACTITIONER

## 2019-05-20 PROCEDURE — 3077F SYST BP >= 140 MM HG: CPT | Mod: CPTII,S$GLB,, | Performed by: NURSE PRACTITIONER

## 2019-05-20 PROCEDURE — 3078F PR MOST RECENT DIASTOLIC BLOOD PRESSURE < 80 MM HG: ICD-10-PCS | Mod: CPTII,S$GLB,, | Performed by: NURSE PRACTITIONER

## 2019-05-20 RX ORDER — DEXTROMETHORPHAN POLISTIREX 30 MG/5ML
60 SUSPENSION ORAL 2 TIMES DAILY
COMMUNITY
End: 2020-09-04 | Stop reason: ALTCHOICE

## 2019-05-20 RX ORDER — DOXYCYCLINE 100 MG/1
100 CAPSULE ORAL 2 TIMES DAILY
Qty: 14 CAPSULE | Refills: 0 | Status: SHIPPED | OUTPATIENT
Start: 2019-05-20 | End: 2019-07-10 | Stop reason: ALTCHOICE

## 2019-05-20 NOTE — PROGRESS NOTES
Subjective:       Patient ID: Tali Hopper is a 73 y.o. female.    Chief Complaint: Nasal Congestion (times 1 week)    Chief Complaint  Chief Complaint   Patient presents with    Nasal Congestion     times 1 week       HPI  Tali Hopper is a 73 y.o. female with medical diagnoses as listed in the medical history and problem list that presents with complaints of sinus problems for one week.  Symptoms include sore throat, ears feel blocked, stuffy and runny nose, post nasal drip, cough productive of clear secretions, and headache over forehead. Cough is disturbing sleep, has not been sleeping well. Patient has been taking coricidin HBP, delsym, and cough drops with some improvement. Symptoms are constant. Patient is regularly treated for hypertension, hyperlipidemia, depression, hypothyroidism, osteoporosis, and glaucoma.  Blood pressure today on the right arm was 153/72 with automatic cuff initially, recheck manual on the left arm 140/62.  BMI is 37.76 today and patient has lost 5 lb since her last visit.  Established patient with last clinic appointment 4/23/2019.        PAST MEDICAL HISTORY:  Past Medical History:   Diagnosis Date    Cataract of left eye     Depression     Glaucoma     Hypertension     Loss of equilibrium     Osteoporosis     Thyroid disease        PAST SURGICAL HISTORY:  Past Surgical History:   Procedure Laterality Date    CHOLECYSTECTOMY      EXTRACTION-CATARACT-IOL Left 10/13/2015    [facility]formed [facility] at [facility]    EXTRACTION-CATARACT-IOL Right 12/8/2014    [facility]    EYE SURGERY      right cataract    HYSTERECTOMY      TONSILLECTOMY         SOCIAL HISTORY:  Social History     Socioeconomic History    Marital status:      Spouse name: Not on file    Number of children: Not on file    Years of education: Not on file    Highest education level: Not on file   Occupational History    Not on file   Social  Needs    Financial resource strain: Not on file    Food insecurity:     Worry: Not on file     Inability: Not on file    Transportation needs:     Medical: Not on file     Non-medical: Not on file   Tobacco Use    Smoking status: Never Smoker    Smokeless tobacco: Never Used   Substance and Sexual Activity    Alcohol use: No    Drug use: No    Sexual activity: Not on file   Lifestyle    Physical activity:     Days per week: Not on file     Minutes per session: Not on file    Stress: Not on file   Relationships    Social connections:     Talks on phone: Not on file     Gets together: Not on file     Attends Confucianist service: Not on file     Active member of club or organization: Not on file     Attends meetings of clubs or organizations: Not on file     Relationship status: Not on file   Other Topics Concern    Not on file   Social History Narrative    Not on file       FAMILY HISTORY:  Family History   Problem Relation Age of Onset    Heart disease Maternal Grandmother     Heart disease Paternal Grandmother        ALLERGIES AND MEDICATIONS: updated and reviewed.  Review of patient's allergies indicates:   Allergen Reactions    Benadryl decongestant Shortness Of Breath     Respiration problems    Penicillins Hives     Current Outpatient Medications   Medication Sig Dispense Refill    amLODIPine (NORVASC) 5 MG tablet Take 1 tablet (5 mg total) by mouth once daily. 90 tablet 3    ascorbic acid (VITAMIN C) 100 MG tablet Take 100 mg by mouth once daily.      aspirin (ECOTRIN) 81 MG EC tablet Take 81 mg by mouth once daily.      b complex vitamins tablet Take 1 tablet by mouth once daily.      CALCIUM CARBONATE/VITAMIN D3 (CALTRATE 600 + D ORAL) Take by mouth once daily.       dextromethorphan (DELSYM) 30 mg/5 mL liquid Take 60 mg by mouth 2 (two) times daily.      DULoxetine (CYMBALTA) 30 MG capsule Take one tablet each night at bedtime 30 capsule 1    fluticasone (FLONASE) 50 mcg/actuation  nasal spray 2 sprays by Each Nare route once daily. 16 g 11    fosinopril-hydrochlorothiazide (MONOPRIL-HCT) 20-12.5 mg per tablet TAKE 1 TABLET BY MOUTH ONCE DAILY. 90 tablet 3    latanoprost 0.005 % ophthalmic solution 1 drop every evening.      levothyroxine (SYNTHROID) 112 MCG tablet TAKE 1 TABLET BY MOUTH ONCE DAILY. 90 tablet 3    ih-CB-Z3-K-lycop-lut-herb#220 (ESTROBLEND) 500 mcg-1,000 unit-20 mcg Tab Take 1 tablet by mouth once daily.      raloxifene (EVISTA) 60 mg tablet Take 1 tablet (60 mg total) by mouth once daily. 90 tablet 3    timolol maleate 0.5% (TIMOPTIC-XE) 0.5 % SolG Place 1 drop into both eyes 2 (two) times daily.   2    chlorpheniramine-acetaminophen (CORICIDIN HBP COLD AND FLU) 2-325 mg Tab Take 1 tablet by mouth every 6 (six) hours as needed. 20 tablet 0    doxycycline (MONODOX) 100 MG capsule Take 1 capsule (100 mg total) by mouth 2 (two) times daily. 14 capsule 0    levomefolate calcium (DEPLIN) 7.5 mg Tab tablet Take 1 tablet (7.5 mg total) by mouth once daily. 30 tablet 5     No current facility-administered medications for this visit.        I have reviewed the patient's medical, family, and social history in detail and updated the computerized patient record.    Review of Systems   Constitutional: Positive for fatigue. Negative for activity change, appetite change, chills and fever.        Fatigued due to not sleeping well. Has had some episodes of sweating and feeling warm, even in air conditioning.   HENT: Positive for congestion, postnasal drip, rhinorrhea, sinus pressure, sinus pain and sore throat. Negative for ear pain.         Nasal congestion and runny nose. Ears blocked. Throat sore.    Eyes: Negative for visual disturbance.        Vision corrected with glasses   Respiratory: Positive for cough and shortness of breath. Negative for wheezing.         Cough productive of clear secretions. Some shortness of breath with exertion, normal for patient.    Cardiovascular:  "Negative for chest pain, palpitations and leg swelling.   Gastrointestinal: Positive for nausea. Negative for abdominal pain, constipation, diarrhea and vomiting.        Has had nausea off and on with post nasal drip.   Genitourinary: Negative for difficulty urinating and dysuria.   Musculoskeletal: Positive for arthralgias. Negative for myalgias.        Has arthritis, some low back pain.   Skin: Negative for rash and wound.   Neurological: Positive for headaches. Negative for dizziness and numbness.        Headache to forehead.    Hematological: Does not bruise/bleed easily.   Psychiatric/Behavioral: Positive for dysphoric mood and sleep disturbance. The patient is not nervous/anxious.         Depression, seeing Dr. Staples. Has not been sleeping well due to illness, cough.          Objective:      Vitals:    05/20/19 1609 05/20/19 1618   BP: (!) 153/72 (!) 140/62   BP Location: Right arm Left arm   Patient Position: Sitting Sitting   BP Method: Small (Automatic) Small (Manual)   Pulse: 63    Temp: 98.5 °F (36.9 °C)    TempSrc: Oral    Weight: 96.7 kg (213 lb 3 oz)    Height: 5' 3" (1.6 m)      Physical Exam   Constitutional: She is oriented to person, place, and time. She appears well-developed. No distress.   Blood pressure slightly elevated with re-measure with manual cuff 140/62   HENT:   Head: Normocephalic and atraumatic.   Right Ear: Hearing, external ear and ear canal normal. A middle ear effusion is present.   Left Ear: Hearing, external ear and ear canal normal. A middle ear effusion is present.   Nose: Mucosal edema and rhinorrhea present. Right sinus exhibits no maxillary sinus tenderness and no frontal sinus tenderness. Left sinus exhibits maxillary sinus tenderness. Left sinus exhibits no frontal sinus tenderness.   Mouth/Throat: Oropharynx is clear and moist and mucous membranes are normal. No oropharyngeal exudate.   Bilateral middle ear effusions noted, opaque appearing TMs. Nasal mucosa to " bilateral nares is edematous and erythematous with thick yellow secretions noted to left near. Left turbinate edematous. Left maxillary tenderness with palpation.   Eyes: Pupils are equal, round, and reactive to light. Conjunctivae and lids are normal. Right eye exhibits no discharge. Left eye exhibits no discharge. Right conjunctiva is not injected. Left conjunctiva is not injected.   Neck: Normal range of motion. Neck supple. Normal carotid pulses present. Carotid bruit is not present. Normal range of motion present.   Cardiovascular: Intact distal pulses and normal pulses. An irregularly irregular rhythm present.   No murmur heard.  Pulses:       Carotid pulses are 2+ on the right side, and 2+ on the left side.       Radial pulses are 2+ on the right side, and 2+ on the left side.   Irregularly irregular heart rhythm with auscultation   Pulmonary/Chest: Effort normal and breath sounds normal. No respiratory distress. She has no decreased breath sounds. She has no wheezes. She has no rhonchi. She has no rales.   Musculoskeletal: Normal range of motion.   Lymphadenopathy:        Head (right side): No submental, no submandibular and no tonsillar adenopathy present.        Head (left side): No submental, no submandibular and no tonsillar adenopathy present.     She has no cervical adenopathy.        Right: No supraclavicular adenopathy present.        Left: No supraclavicular adenopathy present.   Neurological: She is alert and oriented to person, place, and time. She has normal strength. Gait abnormal.   Slightly unstable gait moving from chair to exam table.    Skin: Skin is warm, dry and intact. No rash noted. She is not diaphoretic. No erythema. No pallor.   Psychiatric: She has a normal mood and affect. Her speech is normal and behavior is normal. Judgment and thought content normal. Her mood appears not anxious. Cognition and memory are normal. She does not exhibit a depressed mood.   Nursing note and vitals  reviewed.        Assessment:       1. Acute non-recurrent maxillary sinusitis    2. Cough    3. Nasal congestion    4. Irregular heart rhythm    5. Atrial bigeminy    6. Essential hypertension    7. Acquired hypothyroidism    8. Severe obesity (BMI 35.0-39.9) with comorbidity          Plan:       Tali was seen today for nasal congestion.    Diagnoses and all orders for this visit:    Acute non-recurrent maxillary sinusitis  -     chlorpheniramine-acetaminophen (CORICIDIN HBP COLD AND FLU) 2-325 mg Tab; Take 1 tablet by mouth every 6 (six) hours as needed.  -     doxycycline (MONODOX) 100 MG capsule; Take 1 capsule (100 mg total) by mouth 2 (two) times daily.  - Educational handouts provided. Acute bacterial rhinosinusitis    Cough  -     chlorpheniramine-acetaminophen (CORICIDIN HBP COLD AND FLU) 2-325 mg Tab; Take 1 tablet by mouth every 6 (six) hours as needed.  - Continue delsym OTC as needed per label instruction, cough drops as needed    Nasal congestion  -     chlorpheniramine-acetaminophen (CORICIDIN HBP COLD AND FLU) 2-325 mg Tab; Take 1 tablet by mouth every 6 (six) hours as needed.    Irregular heart rhythm  -     IN OFFICE EKG 12-LEAD (to [facility]), office read reviewed with Dr. Handy, sinus rhythm with sinus arrhythmia, atrial bigeminy, rate 69  - Patient is asymptomatic, no treatment needed at this time  - Continue to monitor, discussed with patient that as long as she is asymptomatic, which she is, no treatment is needed.     Atrial bigeminy    IN OFFICE EKG 12-LEAD (to [facility]), office read reviewed with Dr. Handy, sinus rhythm with sinus arrhythmia, atrial bigeminy, rate 69   Transthoracic echo (TTE) 2D with color flow, future   Essential hypertension   Blood pressure slightly elevated with repeat manual measure, 140/62   Continue current medication, keep appointment with Dr. Rodríguez on 6/24/19    Acquired hypothyroidism     Lab Results   Component Value Date    TSH 2.339 11/29/2018    continue current  dose of levothyroxine 112 mcg daily  Severe obesity (BMI 35.0-39.9) with comorbidity   BMI today is 37.76 and the patient has lost 5 lb since her last visit on 4/9/2019   Congratulated on weight loss   Weight loss recommended with well balanced diet and portion controlled meals and increased activity.     Follow up if symptoms worsen or fail to improve.     Patient readiness: acceptance and barriers:none    During the course of the visit the patient was educated and counseled about the following:     Hypertension:   Medication: no change.  Dietary sodium restriction.  Regular aerobic exercise.  Follow up: 1 month and as needed.  Obesity:   General weight loss/lifestyle modification strategies discussed (elicit support from others; identify saboteurs; non-food rewards, etc).  Diet interventions: qualitative changes (increase low-fat,  high-fiber foods).  Informal exercise measures discussed, e.g. taking stairs instead of elevator.  Regular aerobic exercise program discussed.    Goals: Hypertension: Reduce Blood Pressure and Obesity: Reduce calorie intake and BMI    Did patient meet goals/outcomes: No for hypertension, yes for obesity    The following self management tools provided: declined    Patient Instructions (the written plan) was given to the patient/family.     Time spent with patient: 30 minutes    Barriers to medications present (no )    Adverse reactions to current medications (no)    Over the counter medications reviewed (Yes)

## 2019-05-20 NOTE — PATIENT INSTRUCTIONS

## 2019-05-21 NOTE — TELEPHONE ENCOUNTER
Please call the patient and let her know that I did a bit more research regarding her irregular heart rhythm after her visit. Although she is asymptomatic and this is not an alarming arrhythmia, it is recommended that an echocardiogram be completed to be sure that there are no structural changes in the heart causing the arrhythmia. I have ordered the echocardiogram. Please schedule.  Thanks.  Angelica

## 2019-05-22 ENCOUNTER — CLINICAL SUPPORT (OUTPATIENT)
Dept: CARDIOLOGY | Facility: CLINIC | Age: 74
End: 2019-05-22
Attending: NURSE PRACTITIONER
Payer: MEDICARE

## 2019-05-22 DIAGNOSIS — I49.8 ATRIAL BIGEMINY: ICD-10-CM

## 2019-05-22 DIAGNOSIS — I49.9 IRREGULAR HEART RHYTHM: ICD-10-CM

## 2019-05-22 LAB
ASCENDING AORTA: 2.11 CM
AV INDEX (PROSTH): 0.69
AV MEAN GRADIENT: 2.87 MMHG
AV PEAK GRADIENT: 4.93 MMHG
AV VALVE AREA: 2.19 CM2
AV VELOCITY RATIO: 0.73
CV ECHO LV RWT: 0.45 CM
DOP CALC AO PEAK VEL: 1.11 M/S
DOP CALC AO VTI: 26.57 CM
DOP CALC LVOT AREA: 3.17 CM2
DOP CALC LVOT DIAMETER: 2.01 CM
DOP CALC LVOT PEAK VEL: 0.81 M/S
DOP CALC LVOT STROKE VOLUME: 58.16 CM3
DOP CALCLVOT PEAK VEL VTI: 18.34 CM
E WAVE DECELERATION TIME: 238.73 MSEC
E/A RATIO: 0.71
ECHO LV POSTERIOR WALL: 0.87 CM (ref 0.6–1.1)
FRACTIONAL SHORTENING: 34 % (ref 28–44)
INTERVENTRICULAR SEPTUM: 0.69 CM (ref 0.6–1.1)
IVRT: 0.12 MSEC
LA MAJOR: 4.25 CM
LA WIDTH: 3.77 CM
LEFT ATRIUM SIZE: 3.22 CM
LEFT INTERNAL DIMENSION IN SYSTOLE: 2.6 CM (ref 2.1–4)
LEFT VENTRICLE DIASTOLIC VOLUME: 66.45 ML
LEFT VENTRICLE SYSTOLIC VOLUME: 24.67 ML
LEFT VENTRICULAR INTERNAL DIMENSION IN DIASTOLE: 3.91 CM (ref 3.5–6)
LEFT VENTRICULAR MASS: 87.04 G
MV PEAK A VEL: 0.94 M/S
MV PEAK E VEL: 0.67 M/S
PISA TR MAX VEL: 1.98 M/S
PULM VEIN S/D RATIO: 1.18
PV PEAK D VEL: 0.39 M/S
PV PEAK S VEL: 0.46 M/S
RA MAJOR: 4.36 CM
RA PRESSURE: 3 MMHG
RA WIDTH: 3.46 CM
SINUS: 2.26 CM
STJ: 2.32 CM
TR MAX PG: 15.68 MMHG
TRICUSPID ANNULAR PLANE SYSTOLIC EXCURSION: 2.38 CM
TV REST PULMONARY ARTERY PRESSURE: 19 MMHG

## 2019-05-22 PROCEDURE — 93306 TRANSTHORACIC ECHO (TTE) COMPLETE (CUPID ONLY): ICD-10-PCS | Mod: S$GLB,,, | Performed by: INTERNAL MEDICINE

## 2019-05-22 PROCEDURE — 93306 TTE W/DOPPLER COMPLETE: CPT | Mod: S$GLB,,, | Performed by: INTERNAL MEDICINE

## 2019-05-28 ENCOUNTER — PATIENT OUTREACH (OUTPATIENT)
Dept: ADMINISTRATIVE | Facility: HOSPITAL | Age: 74
End: 2019-05-28

## 2019-06-17 ENCOUNTER — PATIENT MESSAGE (OUTPATIENT)
Dept: FAMILY MEDICINE | Facility: CLINIC | Age: 74
End: 2019-06-17

## 2019-06-17 ENCOUNTER — TELEPHONE (OUTPATIENT)
Dept: FAMILY MEDICINE | Facility: CLINIC | Age: 74
End: 2019-06-17

## 2019-06-17 NOTE — TELEPHONE ENCOUNTER
Called patient regarding appointment on 7/24/19, no answer left detail voice message for patient to call back to rescheduled appointment. Message sent to patient portal as well.

## 2019-06-18 ENCOUNTER — TELEPHONE (OUTPATIENT)
Dept: FAMILY MEDICINE | Facility: CLINIC | Age: 74
End: 2019-06-18

## 2019-06-18 ENCOUNTER — LAB VISIT (OUTPATIENT)
Dept: LAB | Facility: HOSPITAL | Age: 74
End: 2019-06-18
Attending: FAMILY MEDICINE
Payer: MEDICARE

## 2019-06-18 ENCOUNTER — PATIENT MESSAGE (OUTPATIENT)
Dept: FAMILY MEDICINE | Facility: CLINIC | Age: 74
End: 2019-06-18

## 2019-06-18 DIAGNOSIS — E03.9 HYPOTHYROIDISM, UNSPECIFIED TYPE: ICD-10-CM

## 2019-06-18 DIAGNOSIS — E78.5 HYPERLIPIDEMIA, UNSPECIFIED HYPERLIPIDEMIA TYPE: ICD-10-CM

## 2019-06-18 DIAGNOSIS — E83.51 HYPOCALCEMIA: ICD-10-CM

## 2019-06-18 LAB
25(OH)D3+25(OH)D2 SERPL-MCNC: 35 NG/ML (ref 30–96)
ALBUMIN SERPL BCP-MCNC: 3.3 G/DL (ref 3.5–5.2)
ALP SERPL-CCNC: 69 U/L (ref 55–135)
ALT SERPL W/O P-5'-P-CCNC: 16 U/L (ref 10–44)
ANION GAP SERPL CALC-SCNC: 10 MMOL/L (ref 8–16)
AST SERPL-CCNC: 20 U/L (ref 10–40)
BASOPHILS # BLD AUTO: 0.09 K/UL (ref 0–0.2)
BASOPHILS NFR BLD: 0.9 % (ref 0–1.9)
BILIRUB SERPL-MCNC: 0.4 MG/DL (ref 0.1–1)
BUN SERPL-MCNC: 18 MG/DL (ref 8–23)
CALCIUM SERPL-MCNC: 8.9 MG/DL (ref 8.7–10.5)
CHLORIDE SERPL-SCNC: 105 MMOL/L (ref 95–110)
CHOLEST SERPL-MCNC: 171 MG/DL (ref 120–199)
CHOLEST/HDLC SERPL: 4.4 {RATIO} (ref 2–5)
CO2 SERPL-SCNC: 28 MMOL/L (ref 23–29)
CREAT SERPL-MCNC: 0.9 MG/DL (ref 0.5–1.4)
DIFFERENTIAL METHOD: NORMAL
EOSINOPHIL # BLD AUTO: 0.4 K/UL (ref 0–0.5)
EOSINOPHIL NFR BLD: 3.6 % (ref 0–8)
ERYTHROCYTE [DISTWIDTH] IN BLOOD BY AUTOMATED COUNT: 12.9 % (ref 11.5–14.5)
EST. GFR  (AFRICAN AMERICAN): >60 ML/MIN/1.73 M^2
EST. GFR  (NON AFRICAN AMERICAN): >60 ML/MIN/1.73 M^2
GLUCOSE SERPL-MCNC: 114 MG/DL (ref 70–110)
HCT VFR BLD AUTO: 42.4 % (ref 37–48.5)
HDLC SERPL-MCNC: 39 MG/DL (ref 40–75)
HDLC SERPL: 22.8 % (ref 20–50)
HGB BLD-MCNC: 13.6 G/DL (ref 12–16)
IMM GRANULOCYTES # BLD AUTO: 0.04 K/UL (ref 0–0.04)
IMM GRANULOCYTES NFR BLD AUTO: 0.4 % (ref 0–0.5)
LDLC SERPL CALC-MCNC: 92.2 MG/DL (ref 63–159)
LYMPHOCYTES # BLD AUTO: 2.7 K/UL (ref 1–4.8)
LYMPHOCYTES NFR BLD: 26.2 % (ref 18–48)
MCH RBC QN AUTO: 28.9 PG (ref 27–31)
MCHC RBC AUTO-ENTMCNC: 32.1 G/DL (ref 32–36)
MCV RBC AUTO: 90 FL (ref 82–98)
MONOCYTES # BLD AUTO: 0.8 K/UL (ref 0.3–1)
MONOCYTES NFR BLD: 8 % (ref 4–15)
NEUTROPHILS # BLD AUTO: 6.4 K/UL (ref 1.8–7.7)
NEUTROPHILS NFR BLD: 60.9 % (ref 38–73)
NONHDLC SERPL-MCNC: 132 MG/DL
NRBC BLD-RTO: 0 /100 WBC
PLATELET # BLD AUTO: 234 K/UL (ref 150–350)
PMV BLD AUTO: 11.6 FL (ref 9.2–12.9)
POTASSIUM SERPL-SCNC: 4.4 MMOL/L (ref 3.5–5.1)
PROT SERPL-MCNC: 6.9 G/DL (ref 6–8.4)
RBC # BLD AUTO: 4.7 M/UL (ref 4–5.4)
SODIUM SERPL-SCNC: 143 MMOL/L (ref 136–145)
T4 FREE SERPL-MCNC: 1.29 NG/DL (ref 0.71–1.51)
TRIGL SERPL-MCNC: 199 MG/DL (ref 30–150)
TSH SERPL DL<=0.005 MIU/L-ACNC: 1.14 UIU/ML (ref 0.4–4)
WBC # BLD AUTO: 10.4 K/UL (ref 3.9–12.7)

## 2019-06-18 PROCEDURE — 36415 COLL VENOUS BLD VENIPUNCTURE: CPT | Mod: PO

## 2019-06-18 PROCEDURE — 84443 ASSAY THYROID STIM HORMONE: CPT

## 2019-06-18 PROCEDURE — 80061 LIPID PANEL: CPT

## 2019-06-18 PROCEDURE — 84439 ASSAY OF FREE THYROXINE: CPT

## 2019-06-18 PROCEDURE — 85025 COMPLETE CBC W/AUTO DIFF WBC: CPT

## 2019-06-18 PROCEDURE — 82306 VITAMIN D 25 HYDROXY: CPT

## 2019-06-18 PROCEDURE — 80053 COMPREHEN METABOLIC PANEL: CPT

## 2019-06-18 NOTE — TELEPHONE ENCOUNTER
----- Message from Vick Maravilla sent at 6/18/2019  8:59 AM CDT -----  Contact: pt  Type:  Sooner Apoointment Request    Caller is requesting a sooner appointment.  Caller declined first available appointment listed below.  Caller will not accept being placed on the waitlist and is requesting a message be sent to doctor.    Name of Caller:  pt  When is the first available appointment?  2/17/2020    Best Call Back Number:  [phone] or (cell) [phone]  Additional Information:  Pt was called by the office to r/s her appointment on 6/24/19. First available is 2/17/2020. Pt would like to be seen before that. Please call to advise.

## 2019-06-18 NOTE — TELEPHONE ENCOUNTER
Try calling patient again regarding appointment and correct date that appointment on 6/24/19 needs to be rescheduled, no answer left detail voice message for patient to call back to rescheduled appointment. Message sent to patient portal as well.

## 2019-06-24 ENCOUNTER — TELEPHONE (OUTPATIENT)
Dept: FAMILY MEDICINE | Facility: CLINIC | Age: 74
End: 2019-06-24

## 2019-06-24 NOTE — TELEPHONE ENCOUNTER
----- Message from Nina Grady sent at 6/24/2019 12:13 PM CDT -----  Type: Needs Medical Advice    Who Called:  Patient  Best Call Back Number: [phone]  Additional Information: Patient returning call concerning rescheduling today's appointment/no appointment showing available until January 2020/patient needs to be seen sooner/please call patient back to reschedule or advise. (Patient previously had lab work done)

## 2019-07-02 RX ORDER — DULOXETIN HYDROCHLORIDE 30 MG/1
CAPSULE, DELAYED RELEASE ORAL
Qty: 30 CAPSULE | Refills: 1 | Status: SHIPPED | OUTPATIENT
Start: 2019-07-02 | End: 2019-08-05 | Stop reason: SDUPTHER

## 2019-07-10 ENCOUNTER — OFFICE VISIT (OUTPATIENT)
Dept: FAMILY MEDICINE | Facility: CLINIC | Age: 74
End: 2019-07-10
Payer: MEDICARE

## 2019-07-10 VITALS
HEART RATE: 83 BPM | HEIGHT: 63 IN | SYSTOLIC BLOOD PRESSURE: 128 MMHG | BODY MASS INDEX: 37.57 KG/M2 | DIASTOLIC BLOOD PRESSURE: 72 MMHG | TEMPERATURE: 98 F | OXYGEN SATURATION: 96 % | WEIGHT: 212.06 LBS

## 2019-07-10 DIAGNOSIS — E78.5 HYPERLIPIDEMIA, UNSPECIFIED HYPERLIPIDEMIA TYPE: ICD-10-CM

## 2019-07-10 DIAGNOSIS — E66.01 SEVERE OBESITY (BMI 35.0-39.9) WITH COMORBIDITY: ICD-10-CM

## 2019-07-10 DIAGNOSIS — E03.9 ACQUIRED HYPOTHYROIDISM: ICD-10-CM

## 2019-07-10 DIAGNOSIS — F32.A DEPRESSION, UNSPECIFIED DEPRESSION TYPE: ICD-10-CM

## 2019-07-10 DIAGNOSIS — M81.0 OSTEOPOROSIS, POST-MENOPAUSAL: ICD-10-CM

## 2019-07-10 DIAGNOSIS — R73.9 HYPERGLYCEMIA: ICD-10-CM

## 2019-07-10 DIAGNOSIS — I10 ESSENTIAL HYPERTENSION: Primary | ICD-10-CM

## 2019-07-10 PROCEDURE — 99214 PR OFFICE/OUTPT VISIT, EST, LEVL IV, 30-39 MIN: ICD-10-PCS | Mod: S$GLB,,, | Performed by: NURSE PRACTITIONER

## 2019-07-10 PROCEDURE — 1101F PT FALLS ASSESS-DOCD LE1/YR: CPT | Mod: CPTII,S$GLB,, | Performed by: NURSE PRACTITIONER

## 2019-07-10 PROCEDURE — 99999 PR PBB SHADOW E&M-EST. PATIENT-LVL III: ICD-10-PCS | Mod: PBBFAC,,, | Performed by: NURSE PRACTITIONER

## 2019-07-10 PROCEDURE — 99999 PR PBB SHADOW E&M-EST. PATIENT-LVL III: CPT | Mod: PBBFAC,,, | Performed by: NURSE PRACTITIONER

## 2019-07-10 PROCEDURE — 3078F PR MOST RECENT DIASTOLIC BLOOD PRESSURE < 80 MM HG: ICD-10-PCS | Mod: CPTII,S$GLB,, | Performed by: NURSE PRACTITIONER

## 2019-07-10 PROCEDURE — 3074F PR MOST RECENT SYSTOLIC BLOOD PRESSURE < 130 MM HG: ICD-10-PCS | Mod: CPTII,S$GLB,, | Performed by: NURSE PRACTITIONER

## 2019-07-10 PROCEDURE — 3078F DIAST BP <80 MM HG: CPT | Mod: CPTII,S$GLB,, | Performed by: NURSE PRACTITIONER

## 2019-07-10 PROCEDURE — 3074F SYST BP LT 130 MM HG: CPT | Mod: CPTII,S$GLB,, | Performed by: NURSE PRACTITIONER

## 2019-07-10 PROCEDURE — 99214 OFFICE O/P EST MOD 30 MIN: CPT | Mod: S$GLB,,, | Performed by: NURSE PRACTITIONER

## 2019-07-10 PROCEDURE — 1101F PR PT FALLS ASSESS DOC 0-1 FALLS W/OUT INJ PAST YR: ICD-10-PCS | Mod: CPTII,S$GLB,, | Performed by: NURSE PRACTITIONER

## 2019-07-10 NOTE — PATIENT INSTRUCTIONS
"  Triglycerides  Does this test have other names?  Lipid panel, fasting lipoprotein panel  What is this test?  This test measures the amount of triglycerides in your blood.  Triglycerides are one of several types of fats in your blood. Other kinds are LDL ("bad") cholesterol and HDL ("good") cholesterol.  Knowing your triglyceride level is important, especially if you have diabetes, are overweight or a smoker, or are mostly inactive. High triglyceride levels may put you at greater risk for a heart attack or stroke.    This test is part of a group of cholesterol and blood fat tests called a fasting lipoprotein panel, or lipid panel. This panel is recommended for all adults at least once every 5 years, or as recommended by your healthcare provider.  Knowing your triglyceride level helps your healthcare provider suggest healthy changes to your diet or lifestyle. If you have triglycerides that are high to very high, your provider is more likely to prescribe medicines to lower your triglycerides or your LDL cholesterol.  Why do I need this test?  You may have this test as part of a routine checkup. You may also need this test if you're overweight, drink too much alcohol, rarely exercise, or have other conditions like high blood pressure or diabetes.  If you are on cholesterol-lowering medicines, you may have this test to see how well your treatment is working.  What other tests might I have along with this test?  Your healthcare provider will order screening tests for LDL, HDL, and total cholesterol.  What do my test results mean?  Many things may affect your lab test results. These include the method each lab uses to do the test. Even if your test results are different from the normal value, you may not have a problem. To learn what the results mean for you, talk with your healthcare provider.  Results are given in milligrams per deciliter (mg/dL). Normal levels of triglycerides are less than 150 mg/dL.  Here are how " higher numbers are classified:  · 150 to 199 mg/dL: Borderline high  · 200 to 499 mg/dL: High  · 500 mg/dL and above: Very high  If you have a high triglyceride level, you have a greater risk for heart attack and stroke.  A triglyceride level above 150 mg/dL also means that you may have an increased risk for metabolic syndrome. This is a cluster of symptoms including high blood pressure, high blood sugar, and high body fat around the waist. These symptoms have been linked to increased risk for diabetes, heart disease, and stroke.  High triglycerides levels can also be caused by certain diseases or inherited conditions.  If your triglyceride level is above 200 mg/dL, your healthcare provider may recommend that you:  · Lose weight  · Limit high-fat foods containing saturated fats. These are animal fats found in meat, butter, and whole milk.  · Limit trans fats, which are found in many processed foods like chips and store-bought cookies  · Cut back on drinks with added sugars, such as soda  · Limit your alcohol intake  · Stop smoking  · Control your blood pressure  · Exercise for at least 30 minutes a day, 5 days a week  · Limit the calories from fat in your diet to 25% to 35% of your total intake  If your triglycerides are extremely high--above 500 mg/dL--you may have an added risk for problems with your pancreas. You will likely need medicine to lower your levels along with recommended changes in diet and lifestyle.  How is this test done?  The test requires a blood sample, which is drawn through a needle from a vein in your arm.  Does this test pose any risks?  Taking a blood sample with a needle carries risks that include bleeding, infection, bruising, or feeling dizzy. When the needle pricks your arm, you may feel a slight stinging sensation or pain. Afterward, the site may be slightly sore.  What might affect my test results?  Not fasting for the required length of time before the test can affect your results.  Certain medicines can affect your results, as can drinking alcohol.  How do I prepare for the test?  If you have this test as part of a cholesterol screening, you will need to not eat or drink anything but water for 9 to 14 hours before the test. In addition, be sure your healthcare provider knows about all medicines, herbs, vitamins, and supplements you are taking. This includes medicines that don't need a prescription and any illicit drugs you may use.     © 3740-4081 [employer] [address] [place] [address], [place] [address]. All rights reserved. This information is not intended as a substitute for professional medical care. Always follow your healthcare professional's instructions.

## 2019-07-12 NOTE — PROGRESS NOTES
Subjective:       Patient ID: Tali Hopper is a 74 y.o. female.    Chief Complaint: Follow-up    Ms. Hopper presents today for a chronic conditions follow up. She is feeling well. Vitals stable. Recent labs reviewed in detail.     Patient Active Problem List   Diagnosis    Hypothyroid    Osteoporosis, post-menopausal    Depression    Hyperlipemia    Hypocalcemia    Loss of equilibrium    Obesity (BMI 30-39.9)    Decreased pulse    Faintness    Bilateral low back pain without sciatica    Cataract of left eye    Essential hypertension    Severe obesity (BMI 35.0-39.9) with comorbidity    Atrial bigeminy     Review of Systems   Constitutional: Negative for activity change, appetite change, fatigue and fever.   Respiratory: Negative for shortness of breath.    Cardiovascular: Negative for chest pain and palpitations.   Gastrointestinal: Negative for abdominal pain.   Musculoskeletal: Negative for arthralgias and myalgias.   Neurological: Negative for dizziness, weakness and light-headedness.       Lab Results   Component Value Date    WBC 10.40 06/18/2019    HGB 13.6 06/18/2019    HCT 42.4 06/18/2019     06/18/2019    CHOL 171 06/18/2019    TRIG 199 (H) 06/18/2019    HDL 39 (L) 06/18/2019    ALT 16 06/18/2019    AST 20 06/18/2019     06/18/2019    K 4.4 06/18/2019     06/18/2019    CREATININE 0.9 06/18/2019    BUN 18 06/18/2019    CO2 28 06/18/2019    TSH 1.138 06/18/2019     Objective:      Physical Exam   Constitutional: She is oriented to person, place, and time. She appears well-developed and well-nourished.   Cardiovascular: Normal rate, regular rhythm and normal heart sounds.   Pulmonary/Chest: Effort normal and breath sounds normal.   Musculoskeletal: She exhibits no edema.   Neurological: She is alert and oriented to person, place, and time.   Skin: Skin is warm and dry.   Psychiatric: She has a normal mood and affect.   Nursing note and vitals reviewed.     "  Assessment:       1. Essential hypertension    2. Hyperlipidemia, unspecified hyperlipidemia type    3. Hyperglycemia    4. Acquired hypothyroidism    5. Depression, unspecified depression type    6. Severe obesity (BMI 35.0-39.9) with comorbidity    7. Osteoporosis, post-menopausal        Plan:       Tali was seen today for follow-up.    Diagnoses and all orders for this visit:    Essential hypertension  Controlled on current drug regimen     Hyperlipidemia, unspecified hyperlipidemia type  -     CBC auto differential; Future  -     Comprehensive metabolic panel; Future  -     Lipid panel; Future  Your triglycerides are borderline high. I recommend a heart healthy diet rich in fiber, fresh vegetables and fruit and low in saturated fats (fried foods, red meat, etc.).  I also recommend regular exercise including a minimum of 150 minutes of cardio exercise per week and 2-30 minute workouts of strength training like light weights, yoga, pilates, etc.  You should work toward a body mass index of < 25.      Hyperglycemia  -     Hemoglobin A1c; Future  Your blood sugar is borderline high.  This means you are at risk for developing type 2 diabetes mellitus.  To lessen your risk you should exercise regularly, avoid excess carbohydrates and work toward a body mass index of less than 25.      Acquired hypothyroidism  Controlled on current regimen    Depression, unspecified depression type  Controlled on current regimen   Continue under the care of psychiatry     Osteoporosis, post-menopausal  On calcium vitamin D supplement    Severe obesity (BMI 35.0-39.9) with comorbidity  Counseled patient on his ideal body weight, health consequences of being obese and current recommendations including weekly exercise and a heart healthy diet.  Current BMI is:Estimated body mass index is 37.57 kg/m² as calculated from the following:    Height as of this encounter: 5' 3" (1.6 m).    Weight as of this encounter: 96.2 kg (212 lb 1.3 oz).. "  Patient is aware that ideal BMI < 25 or Weight in (lb) to have BMI = 25: 140.8.    Patient readiness: acceptance and barriers:none    During the course of the visit the patient was educated and counseled about the following:     Obesity:   General weight loss/lifestyle modification strategies discussed (elicit support from others; identify saboteurs; non-food rewards, etc).    Goals: Obesity: Reduce calorie intake and BMI    Did patient meet goals/outcomes: No    The following self management tools provided: declined    Patient Instructions (the written plan) was given to the patient/family.     Time spent with patient: 30 minutes    Barriers to medications present (no )    Adverse reactions to current medications (no)    Over the counter medications reviewed (Yes)    F/U 6 months with Dr. Rodríguez, fasting labs prior

## 2019-07-24 ENCOUNTER — TELEPHONE (OUTPATIENT)
Dept: FAMILY MEDICINE | Facility: CLINIC | Age: 74
End: 2019-07-24

## 2019-07-24 NOTE — TELEPHONE ENCOUNTER
Called patient's home and mobile phones but no answer. Left message she was suppose to be taking Cymbalta (Duloxetine) and was to see me again in June-I will have staff call you tomorrow and schedule.

## 2019-07-24 NOTE — TELEPHONE ENCOUNTER
----- Message from Nicole Tilley sent at 7/24/2019  3:06 PM CDT -----  Type: Needs Medical Advice    Who Called:  self  Symptoms (please be specific):  Asking to verify if she should be taking Bupropion ?   How long has patient had these symptoms:     Pharmacy name and phone #:     Best Call Back Number: [phone] (home)     Additional Information: states it was on her med list/ but now It is not ?

## 2019-07-25 RX ORDER — DULOXETIN HYDROCHLORIDE 30 MG/1
CAPSULE, DELAYED RELEASE ORAL
Qty: 30 CAPSULE | Refills: 1 | OUTPATIENT
Start: 2019-07-25

## 2019-07-25 NOTE — TELEPHONE ENCOUNTER
Called pt to confirm which rx she is taking and to offer sooner appt to see Dr Staples, office number for pt to call back for scheduling.    Thanks,  Nelly Cleary MA  [facility]   [phone]

## 2019-08-05 ENCOUNTER — OFFICE VISIT (OUTPATIENT)
Dept: PSYCHIATRY | Facility: CLINIC | Age: 74
End: 2019-08-05
Payer: MEDICARE

## 2019-08-05 VITALS
DIASTOLIC BLOOD PRESSURE: 63 MMHG | SYSTOLIC BLOOD PRESSURE: 122 MMHG | RESPIRATION RATE: 17 BRPM | HEIGHT: 63 IN | BODY MASS INDEX: 37.19 KG/M2 | HEART RATE: 86 BPM | WEIGHT: 209.88 LBS

## 2019-08-05 DIAGNOSIS — R94.31 PROLONGED Q-T INTERVAL ON ECG: ICD-10-CM

## 2019-08-05 PROCEDURE — 99999 PR PBB SHADOW E&M-EST. PATIENT-LVL III: CPT | Mod: PBBFAC,,, | Performed by: PSYCHOLOGIST

## 2019-08-05 PROCEDURE — 3078F DIAST BP <80 MM HG: CPT | Mod: CPTII,S$GLB,, | Performed by: PSYCHOLOGIST

## 2019-08-05 PROCEDURE — 99214 PR OFFICE/OUTPT VISIT, EST, LEVL IV, 30-39 MIN: ICD-10-PCS | Mod: S$GLB,,, | Performed by: PSYCHOLOGIST

## 2019-08-05 PROCEDURE — 3078F PR MOST RECENT DIASTOLIC BLOOD PRESSURE < 80 MM HG: ICD-10-PCS | Mod: CPTII,S$GLB,, | Performed by: PSYCHOLOGIST

## 2019-08-05 PROCEDURE — 1101F PR PT FALLS ASSESS DOC 0-1 FALLS W/OUT INJ PAST YR: ICD-10-PCS | Mod: CPTII,S$GLB,, | Performed by: PSYCHOLOGIST

## 2019-08-05 PROCEDURE — 99999 PR PBB SHADOW E&M-EST. PATIENT-LVL III: ICD-10-PCS | Mod: PBBFAC,,, | Performed by: PSYCHOLOGIST

## 2019-08-05 PROCEDURE — 3074F PR MOST RECENT SYSTOLIC BLOOD PRESSURE < 130 MM HG: ICD-10-PCS | Mod: CPTII,S$GLB,, | Performed by: PSYCHOLOGIST

## 2019-08-05 PROCEDURE — 1101F PT FALLS ASSESS-DOCD LE1/YR: CPT | Mod: CPTII,S$GLB,, | Performed by: PSYCHOLOGIST

## 2019-08-05 PROCEDURE — 99214 OFFICE O/P EST MOD 30 MIN: CPT | Mod: S$GLB,,, | Performed by: PSYCHOLOGIST

## 2019-08-05 PROCEDURE — 3074F SYST BP LT 130 MM HG: CPT | Mod: CPTII,S$GLB,, | Performed by: PSYCHOLOGIST

## 2019-08-05 RX ORDER — DULOXETIN HYDROCHLORIDE 30 MG/1
CAPSULE, DELAYED RELEASE ORAL
Qty: 30 CAPSULE | Refills: 1 | Status: SHIPPED | OUTPATIENT
Start: 2019-08-05 | End: 2019-08-22

## 2019-08-05 NOTE — PATIENT INSTRUCTIONS
Increase Cymbalta (Duloxetine) to  60mg- Take 1 tablet at bedtime  RTC 4 weeks and prn  Go to bed at the same time each night and get up at the same time  Talk to your PCP about your pain  Improve sleep hygiene-same time to bed same time up

## 2019-08-05 NOTE — PROGRESS NOTES
Client:  Tali Hopper  : 1945  Lilli Staples, MP  [id number]    Presenting complaint:/Past psychiatric treatment:  Ms. Hopper sts she is depressed and has had depression since her  .  She sts she has no motivation to do anything and has to make herself do anything. She did say she a little bit better. She is still forcing herself to go to [employer] 3 times/week. She is babysitting her 10 and 12yo grand kids. She has chronic pain, and sts her balance is poor.  She sts she u    She sts she is going to bed at different times stil but watching less TV in bed. She is now sleeping in the bed more.  PHQ9 score today:  12 [6]; GAD4: 4. She denied having any subjective anxiety  She had no SI/HI/delusions/hallucinations and none were susected.  She has no history of mood swings or expansive mood periods.  Stressors:  Sts she was scammed-has reported she has lost money and is working with the [employer] office about this incident. Health issues, pain, relationship issues with her daughter    Family psychiatric history: none reported    Relevant lab reviewed has orders to be done  Relevant EKG reviewed   QTc 495  SR Sinus arrhythmia, atrial bigeminy    Review of patient's allergies indicates:   Allergen Reactions    Benadryl decongestant Shortness Of Breath     Respiration problems    Penicillins Hives       Current Outpatient Medications:     amLODIPine (NORVASC) 5 MG tablet, Take 1 tablet (5 mg total) by mouth once daily., Disp: 90 tablet, Rfl: 3    ascorbic acid (VITAMIN C) 100 MG tablet, Take 100 mg by mouth once daily., Disp: , Rfl:     aspirin (ECOTRIN) 81 MG EC tablet, Take 81 mg by mouth once daily., Disp: , Rfl:     b complex vitamins tablet, Take 1 tablet by mouth once daily., Disp: , Rfl:     CALCIUM CARBONATE/VITAMIN D3 (CALTRATE 600 + D ORAL), Take by mouth once daily. , Disp: , Rfl:     chlorpheniramine-acetaminophen (CORICIDIN HBP COLD AND FLU) 2-325 mg Tab, Take 1  tablet by mouth every 6 (six) hours as needed., Disp: 20 tablet, Rfl: 0    dextromethorphan (DELSYM) 30 mg/5 mL liquid, Take 60 mg by mouth 2 (two) times daily., Disp: , Rfl:     DULoxetine (CYMBALTA) 30 MG capsule, Take one tablet each night at bedtime, Disp: 30 capsule, Rfl: 1    fluticasone (FLONASE) 50 mcg/actuation nasal spray, 2 sprays by Each Nare route once daily., Disp: 16 g, Rfl: 11    fosinopril-hydrochlorothiazide (MONOPRIL-HCT) 20-12.5 mg per tablet, TAKE 1 TABLET BY MOUTH ONCE DAILY., Disp: 90 tablet, Rfl: 3    latanoprost 0.005 % ophthalmic solution, 1 drop every evening., Disp: , Rfl:     levothyroxine (SYNTHROID) 112 MCG tablet, TAKE 1 TABLET BY MOUTH ONCE DAILY., Disp: 90 tablet, Rfl: 3    gq-NY-H0-K-lycop-lut-herb#220 (ESTROBLEND) 500 mcg-1,000 unit-20 mcg Tab, Take 1 tablet by mouth once daily., Disp: , Rfl:     raloxifene (EVISTA) 60 mg tablet, Take 1 tablet (60 mg total) by mouth once daily., Disp: 90 tablet, Rfl: 3    timolol maleate 0.5% (TIMOPTIC-XE) 0.5 % SolG, Place 1 drop into both eyes 2 (two) times daily. , Disp: , Rfl: 2  Past Medical History:   Diagnosis Date    Cataract of left eye     Depression     Glaucoma     Hypertension     Loss of equilibrium     Osteoporosis     Thyroid disease        Clinical presentation:  Appearance:  The client presented in casual attire evidencing good grooming and hygiene    Neuro:  the client was alert, oriented x 4 and evidenced good judgement and insight.      Attention/concentration:  Was good in session    Memory:  The client had no subjective memory complaints and they were able to recall information discussed in session accurately    Judgement:  behavior is adequate to the situation    Fund of knowledge:  intact and appropriate to age and level of education    Gait/station:  was normal    Heart: the patients heartbeat was regular in rate and rhythm.  There were nor murmurs, gallops or rubs.    Lung: clear bilaterally    Skin warm  and dry. Nailbeds were pink and refill was good    Extremities: were warm and did not evidence any edema    Mood:  was mildly dysthymic.  No SI/HI/delusions or hallucinations reported or suspected.     Affect: was normal range    Speech:  normal rate and tone     Sleep: the client has irregular sleep habits-goes to bed at different times and gets up at different times.  Reviewed sleep hygiene and need to get sleep regulated.  Day time fatigue ongoing, sts she snores.    Appetite: was reported as good but she does not eat healthy.  .    Pain complaints:  C/o chronic pain left buttock pain left ankle today.  Sts she has a balance problem and does not use her cane consistently.     ETOH/Substance use history:  The client had no reported ETOH/or other substance use problems by their report.    Psychosocial history:  The client currently lives alone with her cat.  She has a grown daughter with 2 grad kids with whom she has occ contact.  She did not report having positive peer support.  She does participate in the [employer] exercise program and occasionally visits the [employer].      Education/session discussion:  Tali talked about her depression and health concerns.  She was engaged in the session will consider therapy later.  · Reviewed general issues about treatment of depression/anxiety including utility of medication and therapy. Discussed the risks/benefits/alternatives of medication with client.  Reviewed typical side effects and potential adverse reactions of an antidepressant medication including but not limited to teratogenicity, orthostatic changes, increased risk of SI, risk of mood swings, risk of electrolyte disturbance and increased risk of bleeding.  The client appeared to understand the information shared based on their questions and responses made in session.   · Discussed the need for regular, restful sleep and strategies that help promote good sleep.  Reviewed the negative impact of alcohol,  smoking, and caffeine on sleep with the client.  The client was given educations information about sleep/insomnia and sleep hygiene for home reference.  · Discussed need for increased socialization-she goes to [employer] 3 days/week stating she forces herself to go.  Benefit of this reviewed and she was encouraged to cont to engage in this activity. Discussed possibly volunteering to make phone call for [employer],l making flyers etc that she could do sitting    Impression: The client has moderate subjective depression and is expected to have reduced depression with medication.  She is forgetting what she has to do and is not using My Chart yet--reviewed need to understand all instructions and to get written instructions.  Prognosis is guarded    Plan:  Copy to pt  Increase Cymbalta (Duloxetine) to  60mg- Take 1 tablet at bedtime  RTC 4 weeks and prn  Go to bed at the same time each night and get up at the same time  Talk to your PCP about your pain  Improve sleep hygiene-same time to bed same time up    Session:  7148-4166

## 2019-08-22 ENCOUNTER — TELEPHONE (OUTPATIENT)
Dept: PSYCHIATRY | Facility: CLINIC | Age: 74
End: 2019-08-22

## 2019-08-22 RX ORDER — DULOXETIN HYDROCHLORIDE 60 MG/1
CAPSULE, DELAYED RELEASE ORAL
Qty: 30 CAPSULE | Refills: 1 | Status: SHIPPED | OUTPATIENT
Start: 2019-08-22 | End: 2019-10-14 | Stop reason: SDUPTHER

## 2019-08-22 NOTE — TELEPHONE ENCOUNTER
Called pt regarding below message. Pt states Dr. Staples had recommended increasing her dosage to 60 mg daily. Pt is now out of Cymbalta 30 mg and requesting new prescription to reflect increase. Advised pt I will send her request to Dr. Staples. Pt verbalized understanding with no further questions.     ----- Message from Sandi Handy sent at 8/22/2019 11:52 AM CDT -----  Contact: [phone]  Type:  RX Refill Request    Who Called:  self  Refill or New Rx: refill  RX Name and Strength: DULoxetine (CYMBALTA) 30 MG capsuleHow is the patient currently taking it? (ex. 1XDay): Take one tablet each night at bedtime  Is this a 30 day or 90 day RX: 30/1refill  Preferred Pharmacy with phone number: [facility] #[id number] - [place], [place] - [address]  Local or Mail Order: local  Ordering Provider:   Would the patient rather a call back or a response via [facility]?  call  Best Call Back Number:   Additional Information:

## 2019-08-29 RX ORDER — DULOXETIN HYDROCHLORIDE 30 MG/1
CAPSULE, DELAYED RELEASE ORAL
Qty: 30 CAPSULE | Refills: 1 | OUTPATIENT
Start: 2019-08-29

## 2019-09-05 DIAGNOSIS — E03.4 HYPOTHYROIDISM DUE TO ACQUIRED ATROPHY OF THYROID: ICD-10-CM

## 2019-09-05 RX ORDER — LEVOTHYROXINE SODIUM 112 UG/1
TABLET ORAL
Qty: 90 TABLET | Refills: 3 | Status: SHIPPED | OUTPATIENT
Start: 2019-09-05 | End: 2020-07-10 | Stop reason: SDUPTHER

## 2019-09-13 RX ORDER — DULOXETIN HYDROCHLORIDE 60 MG/1
CAPSULE, DELAYED RELEASE ORAL
Qty: 30 CAPSULE | Refills: 1 | OUTPATIENT
Start: 2019-09-13

## 2019-10-14 RX ORDER — DULOXETIN HYDROCHLORIDE 60 MG/1
CAPSULE, DELAYED RELEASE ORAL
Qty: 30 CAPSULE | Refills: 0 | Status: SHIPPED | OUTPATIENT
Start: 2019-10-14 | End: 2019-10-16 | Stop reason: SDUPTHER

## 2019-10-14 RX ORDER — DULOXETIN HYDROCHLORIDE 60 MG/1
CAPSULE, DELAYED RELEASE ORAL
Qty: 30 CAPSULE | Refills: 1 | OUTPATIENT
Start: 2019-10-14

## 2019-10-16 ENCOUNTER — OFFICE VISIT (OUTPATIENT)
Dept: PSYCHIATRY | Facility: CLINIC | Age: 74
End: 2019-10-16
Payer: MEDICARE

## 2019-10-16 VITALS
WEIGHT: 207.88 LBS | SYSTOLIC BLOOD PRESSURE: 126 MMHG | HEIGHT: 63 IN | DIASTOLIC BLOOD PRESSURE: 70 MMHG | BODY MASS INDEX: 36.83 KG/M2 | HEART RATE: 71 BPM

## 2019-10-16 DIAGNOSIS — F33.9 RECURRENT DEPRESSION: Primary | ICD-10-CM

## 2019-10-16 PROCEDURE — 99214 PR OFFICE/OUTPT VISIT, EST, LEVL IV, 30-39 MIN: ICD-10-PCS | Mod: S$GLB,,, | Performed by: PSYCHOLOGIST

## 2019-10-16 PROCEDURE — 3074F SYST BP LT 130 MM HG: CPT | Mod: CPTII,S$GLB,, | Performed by: PSYCHOLOGIST

## 2019-10-16 PROCEDURE — 99999 PR PBB SHADOW E&M-EST. PATIENT-LVL II: CPT | Mod: PBBFAC,,, | Performed by: PSYCHOLOGIST

## 2019-10-16 PROCEDURE — 1101F PT FALLS ASSESS-DOCD LE1/YR: CPT | Mod: CPTII,S$GLB,, | Performed by: PSYCHOLOGIST

## 2019-10-16 PROCEDURE — 1101F PR PT FALLS ASSESS DOC 0-1 FALLS W/OUT INJ PAST YR: ICD-10-PCS | Mod: CPTII,S$GLB,, | Performed by: PSYCHOLOGIST

## 2019-10-16 PROCEDURE — 3074F PR MOST RECENT SYSTOLIC BLOOD PRESSURE < 130 MM HG: ICD-10-PCS | Mod: CPTII,S$GLB,, | Performed by: PSYCHOLOGIST

## 2019-10-16 PROCEDURE — 99214 OFFICE O/P EST MOD 30 MIN: CPT | Mod: S$GLB,,, | Performed by: PSYCHOLOGIST

## 2019-10-16 PROCEDURE — 3078F PR MOST RECENT DIASTOLIC BLOOD PRESSURE < 80 MM HG: ICD-10-PCS | Mod: CPTII,S$GLB,, | Performed by: PSYCHOLOGIST

## 2019-10-16 PROCEDURE — 99999 PR PBB SHADOW E&M-EST. PATIENT-LVL II: ICD-10-PCS | Mod: PBBFAC,,, | Performed by: PSYCHOLOGIST

## 2019-10-16 PROCEDURE — 3078F DIAST BP <80 MM HG: CPT | Mod: CPTII,S$GLB,, | Performed by: PSYCHOLOGIST

## 2019-10-16 RX ORDER — DULOXETIN HYDROCHLORIDE 60 MG/1
CAPSULE, DELAYED RELEASE ORAL
Qty: 30 CAPSULE | Refills: 1 | Status: SHIPPED | OUTPATIENT
Start: 2019-10-16 | End: 2019-11-26 | Stop reason: SDUPTHER

## 2019-10-16 RX ORDER — MIRTAZAPINE 7.5 MG/1
TABLET, FILM COATED ORAL
Qty: 30 TABLET | Refills: 1 | Status: SHIPPED | OUTPATIENT
Start: 2019-10-16 | End: 2019-11-26 | Stop reason: SDUPTHER

## 2019-10-16 NOTE — PROGRESS NOTES
Client:  Tali Hopper  : 1945  Columba Adan NP    Reason for followup: the client was seen face to face to assess their response to medication prescribed, evaluate compliance, to  the client and to coordinate care.  Tali was initially seen for major depression that started after her  . Her Cymbalta dose was increased and today she sts her mood is 40% better. She denied feeling depressed but sts she still feels lonely. She is socializing again and exercising regularly again.  Tali also rated her pain at 2/10 today. Sleep remains a problems-still is not following sleep hygiene. Discussed positive sleep hygiene again and trial of low dose Remeron discussed and accepted.    She denied SI/HI/delusions/halluciantions/mood swings or expansive moods.  She had no subjective anxiety complaints.   Stressors:   No new stressors reported (past hx of being scammed, health issues, pain, relationship issues with her daughter)    Family psychiatric history: none reported    Relevant lab reviewed has orders to be done  Relevant EKG reviewed   QTc 495  SR Sinus arrhythmia, atrial bigeminy    Review of patient's allergies indicates:   Allergen Reactions    Benadryl decongestant Shortness Of Breath     Respiration problems    Penicillins Hives       Current Outpatient Medications:     amLODIPine (NORVASC) 5 MG tablet, Take 1 tablet (5 mg total) by mouth once daily., Disp: 90 tablet, Rfl: 3    ascorbic acid (VITAMIN C) 100 MG tablet, Take 100 mg by mouth once daily., Disp: , Rfl:     aspirin (ECOTRIN) 81 MG EC tablet, Take 81 mg by mouth once daily., Disp: , Rfl:     b complex vitamins tablet, Take 1 tablet by mouth once daily., Disp: , Rfl:     CALCIUM CARBONATE/VITAMIN D3 (CALTRATE 600 + D ORAL), Take by mouth once daily. , Disp: , Rfl:     chlorpheniramine-acetaminophen (CORICIDIN HBP COLD AND FLU) 2-325 mg Tab, Take 1 tablet by mouth every 6 (six) hours as needed., Disp: 20 tablet, Rfl:  0    dextromethorphan (DELSYM) 30 mg/5 mL liquid, Take 60 mg by mouth 2 (two) times daily., Disp: , Rfl:     DULoxetine (CYMBALTA) 60 MG capsule, Take 1 tablet at bedtime each night, Disp: 30 capsule, Rfl: 0    fluticasone (FLONASE) 50 mcg/actuation nasal spray, 2 sprays by Each Nare route once daily., Disp: 16 g, Rfl: 11    fosinopril-hydrochlorothiazide (MONOPRIL-HCT) 20-12.5 mg per tablet, TAKE 1 TABLET BY MOUTH ONCE DAILY., Disp: 90 tablet, Rfl: 3    latanoprost 0.005 % ophthalmic solution, 1 drop every evening., Disp: , Rfl:     levothyroxine (SYNTHROID) 112 MCG tablet, TAKE 1 TABLET BY MOUTH ONCE DAILY., Disp: 90 tablet, Rfl: 3    sr-OL-F8-K-lycop-lut-herb#220 (ESTROBLEND) 500 mcg-1,000 unit-20 mcg Tab, Take 1 tablet by mouth once daily., Disp: , Rfl:     raloxifene (EVISTA) 60 mg tablet, Take 1 tablet (60 mg total) by mouth once daily., Disp: 90 tablet, Rfl: 3    timolol maleate 0.5% (TIMOPTIC-XE) 0.5 % SolG, Place 1 drop into both eyes 2 (two) times daily. , Disp: , Rfl: 2  Past Medical History:   Diagnosis Date    Cataract of left eye     Depression     Glaucoma     Hypertension     Loss of equilibrium     Osteoporosis     Thyroid disease        Clinical presentation:  Appearance:  The client presented in casual attire evidencing good grooming and hygiene    Neuro:  the client was alert, oriented x 4 and evidenced good judgement and insight.      Attention/concentration:  Was good in session    Memory:  The client had no subjective memory complaints and they were able to recall information discussed in session accurately    Judgement:  behavior is adequate to the situation    Fund of knowledge:  intact and appropriate to age and level of education    Gait/station:  was normal    Heart: the patients heartbeat was regular in rate and rhythm.      Lung: clear bilaterally    Skin/Extremities: warm and dry and did not evidence any edema/bruises/swelling or rash/lesions    Mood:  was mildly  dysthymic.  No SI/HI/delusions or hallucinations reported or suspected.     Affect: was normal range    Speech:  normal rate and tone     Sleep: the client has irregular sleep habits-goes to bed at different times and gets up at different times.  Reviewed sleep hygiene and need to get sleep regulated.  Day time fatigue ongoing, sts she snores.    Appetite: was reported as good but she does not eat healthy.  .    Pain complaints: Hx of chronic pain 2/10 today     ETOH/Substance use history:  The client had no reported ETOH/or other substance use problems.    Psychosocial history:  The client currently lives alone with her cat.  She has a grown daughter with 2 grand kids with whom she has occ contact.  She has made friends and is socializing more and exercising regularly again.      Education/session discussion:  Tali talked about her depression and health concerns.  She was engaged in the session will consider therapy later.  · Reviewed general issues about treatment of depression/anxiety/sleep including utility of medication and therapy. Discussed the risks/benefits/alternatives of medication with client.  Reviewed typical side effects and potential adverse reactions of an antidepressant medication including but not limited to teratogenicity, orthostatic changes, increased risk of SI, risk of mood swings, risk of electrolyte disturbance and increased risk of bleeding.  The client appeared to understand the information shared based on their questions and responses made in session.   · Discussed the need for regular, restful sleep and strategies that help promote good sleep.  Reviewed the negative impact of alcohol, smoking, and caffeine on sleep with the client.  The client was given educations information about sleep/insomnia and sleep hygiene for home reference.    Impression: The client has improved mood and is more sociable and exercizing regularly again. Prognosis is good    Plan:    Cont Cymbalta (Duloxetine)  to  60mg- Take 1 tablet at bedtime  Take Remeron 7.5 mg at bedtime for sleep (1/2 to 1 tab)  Improve sleep hygiene-same time to bed same time up  Cont exercise/socializaton  RTC 4 weeks and prn    Session:  5840-0539

## 2019-10-16 NOTE — PATIENT INSTRUCTIONS
Cont Cymbalta (Duloxetine) to  60mg- Take 1 tablet at bedtime  Take Remeron 7.5 mg at bedtime for sleep (1/2 to 1 tab)  Improve sleep hygiene-same time to bed same time up  Cont exercise/socializaton  RTC 4 weeks and prn

## 2019-10-22 DIAGNOSIS — M81.0 OSTEOPOROSIS, POST-MENOPAUSAL: ICD-10-CM

## 2019-10-25 RX ORDER — RALOXIFENE HYDROCHLORIDE 60 MG/1
60 TABLET, FILM COATED ORAL DAILY
Qty: 90 TABLET | Refills: 3 | Status: SHIPPED | OUTPATIENT
Start: 2019-10-25 | End: 2020-10-25

## 2019-10-30 ENCOUNTER — TELEPHONE (OUTPATIENT)
Dept: FAMILY MEDICINE | Facility: CLINIC | Age: 74
End: 2019-10-30

## 2019-10-30 NOTE — TELEPHONE ENCOUNTER
Spoke with patient medication was e-scribed on 10/25/2019 #90 3 refills to [facility] pharmacy. Patient states she would check with them if not received in a couple of days and get back with us if it needs to be sent again

## 2019-10-30 NOTE — TELEPHONE ENCOUNTER
----- Message from Nina Grady sent at 10/29/2019  1:03 PM CDT -----  Type: Needs Medical Advice    Who Called:  Patient  Best Call Back Number: [phone]  Additional Information: Patient needs to confirm if medication: raloxifene (EVISTA) 60 mg tablet was ordered/was told by [employer] that they could not reach doctor/please call patient back to advise.

## 2019-11-14 ENCOUNTER — PATIENT OUTREACH (OUTPATIENT)
Dept: ADMINISTRATIVE | Facility: HOSPITAL | Age: 74
End: 2019-11-14

## 2019-11-14 RX ORDER — MIRTAZAPINE 7.5 MG/1
TABLET, FILM COATED ORAL
Qty: 30 TABLET | Refills: 1 | OUTPATIENT
Start: 2019-11-14

## 2019-11-26 ENCOUNTER — OFFICE VISIT (OUTPATIENT)
Dept: PSYCHIATRY | Facility: CLINIC | Age: 74
End: 2019-11-26
Payer: MEDICARE

## 2019-11-26 VITALS
DIASTOLIC BLOOD PRESSURE: 59 MMHG | WEIGHT: 209.88 LBS | SYSTOLIC BLOOD PRESSURE: 112 MMHG | BODY MASS INDEX: 37.19 KG/M2 | HEIGHT: 63 IN | HEART RATE: 75 BPM

## 2019-11-26 DIAGNOSIS — F33.9 RECURRENT DEPRESSION: Primary | ICD-10-CM

## 2019-11-26 PROCEDURE — 1101F PR PT FALLS ASSESS DOC 0-1 FALLS W/OUT INJ PAST YR: ICD-10-PCS | Mod: CPTII,S$GLB,, | Performed by: PSYCHOLOGIST

## 2019-11-26 PROCEDURE — 99999 PR PBB SHADOW E&M-EST. PATIENT-LVL III: CPT | Mod: PBBFAC,,, | Performed by: PSYCHOLOGIST

## 2019-11-26 PROCEDURE — 99213 PR OFFICE/OUTPT VISIT, EST, LEVL III, 20-29 MIN: ICD-10-PCS | Mod: S$GLB,,, | Performed by: PSYCHOLOGIST

## 2019-11-26 PROCEDURE — 1101F PT FALLS ASSESS-DOCD LE1/YR: CPT | Mod: CPTII,S$GLB,, | Performed by: PSYCHOLOGIST

## 2019-11-26 PROCEDURE — 1159F PR MEDICATION LIST DOCUMENTED IN MEDICAL RECORD: ICD-10-PCS | Mod: S$GLB,,, | Performed by: PSYCHOLOGIST

## 2019-11-26 PROCEDURE — 99213 OFFICE O/P EST LOW 20 MIN: CPT | Mod: S$GLB,,, | Performed by: PSYCHOLOGIST

## 2019-11-26 PROCEDURE — 3074F PR MOST RECENT SYSTOLIC BLOOD PRESSURE < 130 MM HG: ICD-10-PCS | Mod: CPTII,S$GLB,, | Performed by: PSYCHOLOGIST

## 2019-11-26 PROCEDURE — 3074F SYST BP LT 130 MM HG: CPT | Mod: CPTII,S$GLB,, | Performed by: PSYCHOLOGIST

## 2019-11-26 PROCEDURE — 1125F PR PAIN SEVERITY QUANTIFIED, PAIN PRESENT: ICD-10-PCS | Mod: S$GLB,,, | Performed by: PSYCHOLOGIST

## 2019-11-26 PROCEDURE — 3078F DIAST BP <80 MM HG: CPT | Mod: CPTII,S$GLB,, | Performed by: PSYCHOLOGIST

## 2019-11-26 PROCEDURE — 1159F MED LIST DOCD IN RCRD: CPT | Mod: S$GLB,,, | Performed by: PSYCHOLOGIST

## 2019-11-26 PROCEDURE — 99999 PR PBB SHADOW E&M-EST. PATIENT-LVL III: ICD-10-PCS | Mod: PBBFAC,,, | Performed by: PSYCHOLOGIST

## 2019-11-26 PROCEDURE — 3078F PR MOST RECENT DIASTOLIC BLOOD PRESSURE < 80 MM HG: ICD-10-PCS | Mod: CPTII,S$GLB,, | Performed by: PSYCHOLOGIST

## 2019-11-26 PROCEDURE — 1125F AMNT PAIN NOTED PAIN PRSNT: CPT | Mod: S$GLB,,, | Performed by: PSYCHOLOGIST

## 2019-11-26 RX ORDER — MIRTAZAPINE 7.5 MG/1
TABLET, FILM COATED ORAL
Qty: 30 TABLET | Refills: 1 | Status: SHIPPED | OUTPATIENT
Start: 2019-11-26 | End: 2020-01-20

## 2019-11-26 RX ORDER — DULOXETIN HYDROCHLORIDE 60 MG/1
CAPSULE, DELAYED RELEASE ORAL
Qty: 30 CAPSULE | Refills: 1 | Status: SHIPPED | OUTPATIENT
Start: 2019-11-26 | End: 2020-01-28 | Stop reason: SDUPTHER

## 2019-11-26 NOTE — PROGRESS NOTES
Client:  Tali Hopper  : 1945  Columba Adan NP    Reason for followup: the client was seen face to face to assess their response to medication prescribed, evaluate compliance, to  the client and to coordinate care.  Tali was initially seen for major depression that started after her   7 years ago. Cymbalta has helped with her depression. Today she reported she is cont to have a stable mood and she is still socializing and exercising regularly.  The holidays are a problems and strategies to help her begin new traditions were discussed. Clinically her mood was euthymic and she had a good range of emo expression.  Sleep was reported as improved and she has been sleeping better with Remeron.  She had complained of increased left knee pain last session but this is better today. Recommended she talk to her PCP about her pain.  On the PHQ9 she score was 8 and on the GAD7 her score was 2.      She denied SI/HI/delusions/halluciantions/mood swings or expansive moods.  She had no subjective mood or anxiety complaints.   Stressors:   No new stressors reported (past hx of being scammed, health issues, pain, relationship issues with her daughter)    Family psychiatric history: none reported  Relevant lab reviewed has orders to be done  Relevant EKG reviewed   QTc 495  SR Sinus arrhythmia, atrial bigeminy    Review of patient's allergies indicates:   Allergen Reactions    Benadryl decongestant Shortness Of Breath     Respiration problems    Penicillins Hives       Current Outpatient Medications:     amLODIPine (NORVASC) 5 MG tablet, Take 1 tablet (5 mg total) by mouth once daily., Disp: 90 tablet, Rfl: 3    ascorbic acid (VITAMIN C) 100 MG tablet, Take 100 mg by mouth once daily., Disp: , Rfl:     aspirin (ECOTRIN) 81 MG EC tablet, Take 81 mg by mouth once daily., Disp: , Rfl:     b complex vitamins tablet, Take 1 tablet by mouth once daily., Disp: , Rfl:     CALCIUM CARBONATE/VITAMIN D3  (CALTRATE 600 + D ORAL), Take by mouth once daily. , Disp: , Rfl:     chlorpheniramine-acetaminophen (CORICIDIN HBP COLD AND FLU) 2-325 mg Tab, Take 1 tablet by mouth every 6 (six) hours as needed., Disp: 20 tablet, Rfl: 0    dextromethorphan (DELSYM) 30 mg/5 mL liquid, Take 60 mg by mouth 2 (two) times daily., Disp: , Rfl:     DULoxetine (CYMBALTA) 60 MG capsule, Take 1 tablet at bedtime each night, Disp: 30 capsule, Rfl: 1    fluticasone (FLONASE) 50 mcg/actuation nasal spray, 2 sprays by Each Nare route once daily., Disp: 16 g, Rfl: 11    fosinopril-hydrochlorothiazide (MONOPRIL-HCT) 20-12.5 mg per tablet, TAKE 1 TABLET BY MOUTH ONCE DAILY., Disp: 90 tablet, Rfl: 3    latanoprost 0.005 % ophthalmic solution, 1 drop every evening., Disp: , Rfl:     levothyroxine (SYNTHROID) 112 MCG tablet, TAKE 1 TABLET BY MOUTH ONCE DAILY., Disp: 90 tablet, Rfl: 3    mirtazapine (REMERON) 7.5 MG Tab, Take 1/2 to 1 tablet at bedtime for sleep, Disp: 30 tablet, Rfl: 1    re-KK-B1-K-lycop-lut-herb#220 (ESTROBLEND) 500 mcg-1,000 unit-20 mcg Tab, Take 1 tablet by mouth once daily., Disp: , Rfl:     raloxifene (EVISTA) 60 mg tablet, TAKE 1 TABLET (60 MG TOTAL) BY MOUTH ONCE DAILY., Disp: 90 tablet, Rfl: 3    timolol maleate 0.5% (TIMOPTIC-XE) 0.5 % SolG, Place 1 drop into both eyes 2 (two) times daily. , Disp: , Rfl: 2  Past Medical History:   Diagnosis Date    Cataract of left eye     Depression     Glaucoma     Hypertension     Loss of equilibrium     Osteoporosis     Thyroid disease        Clinical presentation:  Appearance:  The client presented in casual attire evidencing good grooming and hygiene    Neuro:  the client was alert, oriented x 4 and evidenced good judgement and insight.      Attention/concentration:  Was good in session    Memory:  The client had no subjective memory complaints and they were able to recall information discussed in session accurately    Judgement:  behavior is adequate to the  situation    Fund of knowledge:  intact and appropriate to age and level of education    Gait/station:  was normal    Heart: the patients heartbeat was regular in rate and rhythm.      Lung: clear bilaterally    Skin/Extremities: warm and dry and did not evidence any edema/bruises/swelling or rash/lesions    Mood:  was mildly dysthymic.  No SI/HI/delusions or hallucinations reported or suspected. No anger or mood swings/expansive moods reported    Affect: was normal range    Speech:  normal rate and tone     Sleep: the client has irregular sleep habits-goes to bed at different times and gets up at different times-sts she is trying to improve this.  Reviewed sleep hygiene and need to get sleep regulated.  Day time fatigue ongoing, sts she snores.    Appetite: was reported as good but she does not eat healthy.  .    Pain complaints: Hx of chronic pain 0/10 today -left knee pain has been worse earlier this week    ETOH/Substance use history:  The client had no reported ETOH/or other substance use problems.    Psychosocial history:  The client currently lives alone with her cat.  She has a grown daughter with 2 grand kids with whom she has occ contact.  She has made friends and is socializing more and exercising regularly again.      Education/session discussion:   · Reviewed general issues about treatment of depression/anxiety/sleep including utility of medication and therapy. Discussed the long risks/benefits/alternatives of medication with client.  Reviewed typical side effects and potential adverse reactions of an antidepressant medication including but not limited to orthostatic changes, increased risk of SI, risk of mood swings, risk of electrolyte disturbance and increased risk of bleeding.  The client appeared to understand the information shared based on their questions and responses made in session.   · Reviewed sleep hygiene.    · Discussed positioning for knee and back pain, advised to f/u with her PCP  about her pain    Impression: The client has improved mood and is more sociable and exercizing regularly again. Prognosis is good    Plan:    Cont Cymbalta (Duloxetine) 60mg- Take 1 tablet at bedtime  Take Remeron 7.5 mg at bedtime for sleep (1/2 to 1 tab)  F/U with PCP about knee pain and daytime fatigue  RTC 2 months, call if any problems    Session: 0020-4044

## 2019-12-23 DIAGNOSIS — I10 ESSENTIAL HYPERTENSION: ICD-10-CM

## 2019-12-24 RX ORDER — FOSINOPRIL SODIUM AND HYDROCHLOROTHIAZIDE 20; 12.5 MG/1; MG/1
1 TABLET ORAL DAILY
Qty: 90 TABLET | Refills: 3 | Status: SHIPPED | OUTPATIENT
Start: 2019-12-24 | End: 2020-07-10 | Stop reason: SDUPTHER

## 2020-01-13 ENCOUNTER — LAB VISIT (OUTPATIENT)
Dept: LAB | Facility: HOSPITAL | Age: 75
End: 2020-01-13
Attending: NURSE PRACTITIONER
Payer: MEDICARE

## 2020-01-13 DIAGNOSIS — R73.9 HYPERGLYCEMIA: ICD-10-CM

## 2020-01-13 DIAGNOSIS — E78.5 HYPERLIPIDEMIA, UNSPECIFIED HYPERLIPIDEMIA TYPE: ICD-10-CM

## 2020-01-13 LAB
ALBUMIN SERPL BCP-MCNC: 3.5 G/DL (ref 3.5–5.2)
ALP SERPL-CCNC: 66 U/L (ref 55–135)
ALT SERPL W/O P-5'-P-CCNC: 15 U/L (ref 10–44)
ANION GAP SERPL CALC-SCNC: 10 MMOL/L (ref 8–16)
AST SERPL-CCNC: 19 U/L (ref 10–40)
BASOPHILS # BLD AUTO: 0.09 K/UL (ref 0–0.2)
BASOPHILS NFR BLD: 1 % (ref 0–1.9)
BILIRUB SERPL-MCNC: 0.5 MG/DL (ref 0.1–1)
BUN SERPL-MCNC: 28 MG/DL (ref 8–23)
CALCIUM SERPL-MCNC: 9.8 MG/DL (ref 8.7–10.5)
CHLORIDE SERPL-SCNC: 102 MMOL/L (ref 95–110)
CHOLEST SERPL-MCNC: 182 MG/DL (ref 120–199)
CHOLEST/HDLC SERPL: 4.4 {RATIO} (ref 2–5)
CO2 SERPL-SCNC: 29 MMOL/L (ref 23–29)
CREAT SERPL-MCNC: 0.9 MG/DL (ref 0.5–1.4)
DIFFERENTIAL METHOD: ABNORMAL
EOSINOPHIL # BLD AUTO: 0.2 K/UL (ref 0–0.5)
EOSINOPHIL NFR BLD: 2.7 % (ref 0–8)
ERYTHROCYTE [DISTWIDTH] IN BLOOD BY AUTOMATED COUNT: 12.6 % (ref 11.5–14.5)
EST. GFR  (AFRICAN AMERICAN): >60 ML/MIN/1.73 M^2
EST. GFR  (NON AFRICAN AMERICAN): >60 ML/MIN/1.73 M^2
ESTIMATED AVG GLUCOSE: 137 MG/DL (ref 68–131)
GLUCOSE SERPL-MCNC: 134 MG/DL (ref 70–110)
HBA1C MFR BLD HPLC: 6.4 % (ref 4–5.6)
HCT VFR BLD AUTO: 44.6 % (ref 37–48.5)
HDLC SERPL-MCNC: 41 MG/DL (ref 40–75)
HDLC SERPL: 22.5 % (ref 20–50)
HGB BLD-MCNC: 13.8 G/DL (ref 12–16)
IMM GRANULOCYTES # BLD AUTO: 0.04 K/UL (ref 0–0.04)
IMM GRANULOCYTES NFR BLD AUTO: 0.4 % (ref 0–0.5)
LDLC SERPL CALC-MCNC: 102.4 MG/DL (ref 63–159)
LYMPHOCYTES # BLD AUTO: 2.7 K/UL (ref 1–4.8)
LYMPHOCYTES NFR BLD: 30.4 % (ref 18–48)
MCH RBC QN AUTO: 28.4 PG (ref 27–31)
MCHC RBC AUTO-ENTMCNC: 30.9 G/DL (ref 32–36)
MCV RBC AUTO: 92 FL (ref 82–98)
MONOCYTES # BLD AUTO: 0.8 K/UL (ref 0.3–1)
MONOCYTES NFR BLD: 9.1 % (ref 4–15)
NEUTROPHILS # BLD AUTO: 5.1 K/UL (ref 1.8–7.7)
NEUTROPHILS NFR BLD: 56.4 % (ref 38–73)
NONHDLC SERPL-MCNC: 141 MG/DL
NRBC BLD-RTO: 0 /100 WBC
PLATELET # BLD AUTO: 212 K/UL (ref 150–350)
PMV BLD AUTO: 12.6 FL (ref 9.2–12.9)
POTASSIUM SERPL-SCNC: 3.9 MMOL/L (ref 3.5–5.1)
PROT SERPL-MCNC: 7.2 G/DL (ref 6–8.4)
RBC # BLD AUTO: 4.86 M/UL (ref 4–5.4)
SODIUM SERPL-SCNC: 141 MMOL/L (ref 136–145)
TRIGL SERPL-MCNC: 193 MG/DL (ref 30–150)
WBC # BLD AUTO: 9 K/UL (ref 3.9–12.7)

## 2020-01-13 PROCEDURE — 83036 HEMOGLOBIN GLYCOSYLATED A1C: CPT

## 2020-01-13 PROCEDURE — 85025 COMPLETE CBC W/AUTO DIFF WBC: CPT

## 2020-01-13 PROCEDURE — 36415 COLL VENOUS BLD VENIPUNCTURE: CPT | Mod: PO

## 2020-01-13 PROCEDURE — 80061 LIPID PANEL: CPT

## 2020-01-13 PROCEDURE — 80053 COMPREHEN METABOLIC PANEL: CPT

## 2020-01-20 ENCOUNTER — OFFICE VISIT (OUTPATIENT)
Dept: FAMILY MEDICINE | Facility: CLINIC | Age: 75
End: 2020-01-20
Payer: MEDICARE

## 2020-01-20 VITALS
HEIGHT: 63 IN | BODY MASS INDEX: 36.64 KG/M2 | OXYGEN SATURATION: 95 % | WEIGHT: 206.81 LBS | RESPIRATION RATE: 16 BRPM | DIASTOLIC BLOOD PRESSURE: 60 MMHG | TEMPERATURE: 98 F | HEART RATE: 82 BPM | SYSTOLIC BLOOD PRESSURE: 110 MMHG

## 2020-01-20 DIAGNOSIS — R73.9 HYPERGLYCEMIA: ICD-10-CM

## 2020-01-20 DIAGNOSIS — I10 ESSENTIAL HYPERTENSION: Primary | ICD-10-CM

## 2020-01-20 DIAGNOSIS — E66.9 OBESITY (BMI 30-39.9): ICD-10-CM

## 2020-01-20 DIAGNOSIS — E78.5 HYPERLIPIDEMIA, UNSPECIFIED HYPERLIPIDEMIA TYPE: ICD-10-CM

## 2020-01-20 DIAGNOSIS — E83.51 HYPOCALCEMIA: ICD-10-CM

## 2020-01-20 DIAGNOSIS — E66.01 SEVERE OBESITY (BMI 35.0-39.9) WITH COMORBIDITY: ICD-10-CM

## 2020-01-20 DIAGNOSIS — E03.9 ACQUIRED HYPOTHYROIDISM: ICD-10-CM

## 2020-01-20 PROCEDURE — 1159F PR MEDICATION LIST DOCUMENTED IN MEDICAL RECORD: ICD-10-PCS | Mod: S$GLB,,, | Performed by: FAMILY MEDICINE

## 2020-01-20 PROCEDURE — 99999 PR PBB SHADOW E&M-EST. PATIENT-LVL III: ICD-10-PCS | Mod: PBBFAC,,, | Performed by: FAMILY MEDICINE

## 2020-01-20 PROCEDURE — 1159F MED LIST DOCD IN RCRD: CPT | Mod: S$GLB,,, | Performed by: FAMILY MEDICINE

## 2020-01-20 PROCEDURE — 1101F PT FALLS ASSESS-DOCD LE1/YR: CPT | Mod: CPTII,S$GLB,, | Performed by: FAMILY MEDICINE

## 2020-01-20 PROCEDURE — 3078F DIAST BP <80 MM HG: CPT | Mod: CPTII,S$GLB,, | Performed by: FAMILY MEDICINE

## 2020-01-20 PROCEDURE — 1125F PR PAIN SEVERITY QUANTIFIED, PAIN PRESENT: ICD-10-PCS | Mod: S$GLB,,, | Performed by: FAMILY MEDICINE

## 2020-01-20 PROCEDURE — 3078F PR MOST RECENT DIASTOLIC BLOOD PRESSURE < 80 MM HG: ICD-10-PCS | Mod: CPTII,S$GLB,, | Performed by: FAMILY MEDICINE

## 2020-01-20 PROCEDURE — 99214 OFFICE O/P EST MOD 30 MIN: CPT | Mod: S$GLB,,, | Performed by: FAMILY MEDICINE

## 2020-01-20 PROCEDURE — 3074F SYST BP LT 130 MM HG: CPT | Mod: CPTII,S$GLB,, | Performed by: FAMILY MEDICINE

## 2020-01-20 PROCEDURE — 99214 PR OFFICE/OUTPT VISIT, EST, LEVL IV, 30-39 MIN: ICD-10-PCS | Mod: S$GLB,,, | Performed by: FAMILY MEDICINE

## 2020-01-20 PROCEDURE — 1101F PR PT FALLS ASSESS DOC 0-1 FALLS W/OUT INJ PAST YR: ICD-10-PCS | Mod: CPTII,S$GLB,, | Performed by: FAMILY MEDICINE

## 2020-01-20 PROCEDURE — 3074F PR MOST RECENT SYSTOLIC BLOOD PRESSURE < 130 MM HG: ICD-10-PCS | Mod: CPTII,S$GLB,, | Performed by: FAMILY MEDICINE

## 2020-01-20 PROCEDURE — 1125F AMNT PAIN NOTED PAIN PRSNT: CPT | Mod: S$GLB,,, | Performed by: FAMILY MEDICINE

## 2020-01-20 PROCEDURE — 99999 PR PBB SHADOW E&M-EST. PATIENT-LVL III: CPT | Mod: PBBFAC,,, | Performed by: FAMILY MEDICINE

## 2020-01-20 NOTE — PROGRESS NOTES
Subjective:       Patient ID: Tali Hopper is a 74 y.o. female.    Chief Complaint: Follow-up (6mth f/u hypertension)    HPI  Review of Systems   Constitutional: Negative for fatigue and unexpected weight change.   Respiratory: Negative for chest tightness and shortness of breath.    Cardiovascular: Negative for chest pain, palpitations and leg swelling.   Gastrointestinal: Negative for abdominal pain.   Musculoskeletal: Negative for arthralgias.   Neurological: Negative for dizziness, syncope, light-headedness and headaches.       Patient Active Problem List   Diagnosis    Hypothyroid    Osteoporosis, post-menopausal    Depression    Hyperlipemia    Hypocalcemia    Loss of equilibrium    Decreased pulse    Faintness    Bilateral low back pain without sciatica    Cataract of left eye    Essential hypertension    Severe obesity (BMI 35.0-39.9) with comorbidity    Atrial bigeminy    Prolonged Q-T interval on ECG     Patient is here for a chronic conditions follow up.     reviewed labs 1/20     Depression. Seeing Dr. Staples  Objective:      Physical Exam   Constitutional: She is oriented to person, place, and time. She appears well-developed and well-nourished.   Cardiovascular: Normal rate, regular rhythm and normal heart sounds.   Pulmonary/Chest: Effort normal and breath sounds normal.   Musculoskeletal: She exhibits no edema.   Neurological: She is alert and oriented to person, place, and time.   Skin: Skin is warm and dry.   Psychiatric: She has a normal mood and affect.   Nursing note and vitals reviewed.      Assessment:       1. Essential hypertension    2. Hyperlipidemia, unspecified hyperlipidemia type    3. Acquired hypothyroidism    4. Hypocalcemia    5. Obesity (BMI 30-39.9)    6. Severe obesity (BMI 35.0-39.9) with comorbidity    7. Hyperglycemia        Plan:         1. Essential hypertension  Controlled on current medications.  Continue current medications.      2. Hyperlipidemia,  "unspecified hyperlipidemia type  Controlled on current medications.  Continue current medications.  Your triglycerides are borderline high. I recommend a heart healthy diet rich in fiber, fresh vegetables and fruit and low in saturated fats (fried foods, red meat, etc.).  I also recommend regular exercise including a minimum of 150 minutes of cardio exercise per week and 2-30 minute workouts of strength training like light weights, yoga, pilates, etc.  You should work toward a body mass index of < 25.      - Lipid panel; Future    3. Acquired hypothyroidism  Controlled on current medications.  Continue current medications.    - CBC auto differential; Future  - TSH; Future  - T4, free; Future    4. Hypocalcemia  Normal. monitor    5. Obesity (BMI 30-39.9)  Counseled patient on his ideal body weight, health consequences of being obese and current recommendations including weekly exercise and a heart healthy diet.  Current BMI is:Estimated body mass index is 36.63 kg/m² as calculated from the following:    Height as of this encounter: 5' 3" (1.6 m).    Weight as of this encounter: 93.8 kg (206 lb 12.7 oz)..  Patient is aware that ideal BMI < 25 or Weight in (lb) to have BMI = 25: 140.8.        6. Severe obesity (BMI 35.0-39.9) with comorbidity  Counseled patient on his ideal body weight, health consequences of being obese and current recommendations including weekly exercise and a heart healthy diet.  Current BMI is:Estimated body mass index is 36.63 kg/m² as calculated from the following:    Height as of this encounter: 5' 3" (1.6 m).    Weight as of this encounter: 93.8 kg (206 lb 12.7 oz)..  Patient is aware that ideal BMI < 25 or Weight in (lb) to have BMI = 25: 140.8.        7. Hyperglycemia  Controlled.  Your blood sugar is borderline high.  This means you are at risk for developing type 2 diabetes mellitus.  To lessen your risk you should exercise regularly, avoid excess carbohydrates and work toward a body mass " index of less than 25.      - Comprehensive metabolic panel; Future  - Hemoglobin A1c; Future        Time spent with patient: 20 minutes    Patient with be reevaluated in 6 months or sooner prn    Greater than 50% of this visit was spent counseling as described in above documentation:Yes

## 2020-01-28 ENCOUNTER — OFFICE VISIT (OUTPATIENT)
Dept: PSYCHIATRY | Facility: CLINIC | Age: 75
End: 2020-01-28
Payer: MEDICARE

## 2020-01-28 VITALS
WEIGHT: 206.81 LBS | HEIGHT: 63 IN | BODY MASS INDEX: 36.64 KG/M2 | SYSTOLIC BLOOD PRESSURE: 145 MMHG | HEART RATE: 70 BPM | DIASTOLIC BLOOD PRESSURE: 69 MMHG

## 2020-01-28 DIAGNOSIS — F33.9 RECURRENT DEPRESSION: Primary | ICD-10-CM

## 2020-01-28 PROCEDURE — 99999 PR PBB SHADOW E&M-EST. PATIENT-LVL III: ICD-10-PCS | Mod: PBBFAC,,, | Performed by: PSYCHOLOGIST

## 2020-01-28 PROCEDURE — 1101F PR PT FALLS ASSESS DOC 0-1 FALLS W/OUT INJ PAST YR: ICD-10-PCS | Mod: CPTII,S$GLB,, | Performed by: PSYCHOLOGIST

## 2020-01-28 PROCEDURE — 3078F DIAST BP <80 MM HG: CPT | Mod: CPTII,S$GLB,, | Performed by: PSYCHOLOGIST

## 2020-01-28 PROCEDURE — 1159F PR MEDICATION LIST DOCUMENTED IN MEDICAL RECORD: ICD-10-PCS | Mod: S$GLB,,, | Performed by: PSYCHOLOGIST

## 2020-01-28 PROCEDURE — 1159F MED LIST DOCD IN RCRD: CPT | Mod: S$GLB,,, | Performed by: PSYCHOLOGIST

## 2020-01-28 PROCEDURE — 99213 PR OFFICE/OUTPT VISIT, EST, LEVL III, 20-29 MIN: ICD-10-PCS | Mod: S$GLB,,, | Performed by: PSYCHOLOGIST

## 2020-01-28 PROCEDURE — 1101F PT FALLS ASSESS-DOCD LE1/YR: CPT | Mod: CPTII,S$GLB,, | Performed by: PSYCHOLOGIST

## 2020-01-28 PROCEDURE — 3078F PR MOST RECENT DIASTOLIC BLOOD PRESSURE < 80 MM HG: ICD-10-PCS | Mod: CPTII,S$GLB,, | Performed by: PSYCHOLOGIST

## 2020-01-28 PROCEDURE — 1125F AMNT PAIN NOTED PAIN PRSNT: CPT | Mod: S$GLB,,, | Performed by: PSYCHOLOGIST

## 2020-01-28 PROCEDURE — 3077F PR MOST RECENT SYSTOLIC BLOOD PRESSURE >= 140 MM HG: ICD-10-PCS | Mod: CPTII,S$GLB,, | Performed by: PSYCHOLOGIST

## 2020-01-28 PROCEDURE — 99213 OFFICE O/P EST LOW 20 MIN: CPT | Mod: S$GLB,,, | Performed by: PSYCHOLOGIST

## 2020-01-28 PROCEDURE — 1125F PR PAIN SEVERITY QUANTIFIED, PAIN PRESENT: ICD-10-PCS | Mod: S$GLB,,, | Performed by: PSYCHOLOGIST

## 2020-01-28 PROCEDURE — 99999 PR PBB SHADOW E&M-EST. PATIENT-LVL III: CPT | Mod: PBBFAC,,, | Performed by: PSYCHOLOGIST

## 2020-01-28 PROCEDURE — 3077F SYST BP >= 140 MM HG: CPT | Mod: CPTII,S$GLB,, | Performed by: PSYCHOLOGIST

## 2020-01-28 RX ORDER — DULOXETIN HYDROCHLORIDE 60 MG/1
CAPSULE, DELAYED RELEASE ORAL
Qty: 30 CAPSULE | Refills: 3 | Status: SHIPPED | OUTPATIENT
Start: 2020-01-28 | End: 2020-04-29 | Stop reason: SDUPTHER

## 2020-01-28 NOTE — PROGRESS NOTES
Client:  Tali Hopper  : 1945  Columba Adan NP    Reason for follow up: the client was seen face to face to assess their response to medication prescribed, evaluate compliance, to  the client and to coordinate care.  Tali was initially seen for major depression that started after her   7 years ago. Cymbalta has helped with her depression and she is sleeping better -no sleep aides. No new stressors reported.  Clinically her mood was euthymic and she had a good range of emo expression.   Sleep and appetite reported as good.   was reported as improved and she has been sleeping better with Remeron.  She has thumb pain today- no known trauma. Recommended she talk to her PCP about her pain.  Reviewed labs with pt and encouraged to lose wt.  She does go to [employer] 3 times/week-skips meals, does not follow any diet.  On the PHQ9 she score was 14  [8] and on the GAD7 her score was 1  [2].      She denied SI/HI/delusions/hallucinations/mood swings or expansive moods.  She had no subjective mood or anxiety complaints.   Stressors:   No new stressors reported (past hx of being scammed, health issues, pain    Family psychiatric history: none reported  Relevant lab reviewed 2020 A1C 6.4; glu 134; MCHC 30.9; Triglycerides 193  Relevant EKG reviewed   QTc 495  SR Sinus arrhythmia, atrial bigeminy    Review of patient's allergies indicates:   Allergen Reactions    Benadryl decongestant Shortness Of Breath     Respiration problems    Penicillins Hives       Current Outpatient Medications:     amLODIPine (NORVASC) 5 MG tablet, Take 1 tablet (5 mg total) by mouth once daily., Disp: 90 tablet, Rfl: 3    ascorbic acid (VITAMIN C) 100 MG tablet, Take 100 mg by mouth once daily., Disp: , Rfl:     aspirin (ECOTRIN) 81 MG EC tablet, Take 81 mg by mouth once daily., Disp: , Rfl:     b complex vitamins tablet, Take 1 tablet by mouth once daily., Disp: , Rfl:     CALCIUM CARBONATE/VITAMIN D3  (CALTRATE 600 + D ORAL), Take by mouth once daily. , Disp: , Rfl:     chlorpheniramine-acetaminophen (CORICIDIN HBP COLD AND FLU) 2-325 mg Tab, Take 1 tablet by mouth every 6 (six) hours as needed., Disp: 20 tablet, Rfl: 0    dextromethorphan (DELSYM) 30 mg/5 mL liquid, Take 60 mg by mouth 2 (two) times daily., Disp: , Rfl:     DULoxetine (CYMBALTA) 60 MG capsule, Take 1 tablet at bedtime each night, Disp: 30 capsule, Rfl: 1    fluticasone (FLONASE) 50 mcg/actuation nasal spray, 2 sprays by Each Nare route once daily., Disp: 16 g, Rfl: 11    fosinopril-hydrochlorothiazide (MONOPRIL-HCT) 20-12.5 mg per tablet, TAKE 1 TABLET BY MOUTH ONCE DAILY., Disp: 90 tablet, Rfl: 3    latanoprost 0.005 % ophthalmic solution, 1 drop every evening., Disp: , Rfl:     levothyroxine (SYNTHROID) 112 MCG tablet, TAKE 1 TABLET BY MOUTH ONCE DAILY., Disp: 90 tablet, Rfl: 3    gz-CG-T8-K-lycop-lut-herb#220 (ESTROBLEND) 500 mcg-1,000 unit-20 mcg Tab, Take 1 tablet by mouth once daily., Disp: , Rfl:     raloxifene (EVISTA) 60 mg tablet, TAKE 1 TABLET (60 MG TOTAL) BY MOUTH ONCE DAILY., Disp: 90 tablet, Rfl: 3    timolol maleate 0.5% (TIMOPTIC-XE) 0.5 % SolG, Place 1 drop into both eyes 2 (two) times daily. , Disp: , Rfl: 2  Past Medical History:   Diagnosis Date    Cataract of left eye     Depression     Glaucoma     Hypertension     Loss of equilibrium     Osteoporosis     Thyroid disease        Clinical presentation:  Appearance:  The client presented in casual attire evidencing good grooming and hygiene    Neuro:  the client was alert, oriented x 4 and evidenced good judgement and insight.      Attention/concentration:  Was good in session    Memory:  The client had no subjective memory complaints and they were able to recall information discussed in session accurately    Judgement:  behavior is adequate to the situation    Fund of knowledge:  intact and appropriate to age and level of education    Gait/station:  was  normal    Heart: the patients heartbeat was regular in rate and rhythm.      Lung: clear bilaterally    Skin/Extremities: warm and dry and did not evidence any edema/bruises/swelling or rash/lesions    Mood:  was euthymic, no subjective complaints.  No SI/HI/delusions or hallucinations reported or suspected. No anger or mood swings/expansive moods reported    Affect: was normal range    Speech:  normal rate and tone     Sleep: the client has irregular sleep habits-goes to bed at 12MNB and is up at 8am  Day time fatigue ongoing, sts she snores.    Appetite: was reported as good but she does not eat healthy.  .    Pain complaints: Hx of chronic pain 0/10 today -left knee pain has been worse earlier this week    ETOH/Substance use history:  The client had no reported ETOH/or other substance use problems.    Psychosocial history:  The client currently lives alone with her cat.  She has a grown daughter with 2 grand kids with whom she has occ contact.  She has made friends and is socializing more and exercising regularly again.      Education/session discussion:   · Reviewed general issues about treatment of depression/anxiety/sleep including utility of medication and therapy. Discussed the long risks/benefits/alternatives of medication with client.  Reviewed typical side effects and potential adverse reactions of an antidepressant medication including but not limited to orthostatic changes, increased risk of SI, risk of mood swings, risk of electrolyte disturbance and increased risk of bleeding.  The client appeared to understand the information shared based on their questions and responses made in session.   · Reviewed sleep hygiene. recommended she f/u with her  PCP about her snoring and tingling in her fingers     Impression: Anxiety and depression   Stable on current meds   Prognosis is good    Plan:    Cont Cymbalta (Duloxetine) 60mg- Take 1 tablet at bedtime  RTC 3 months, call if any problems    Session:  [id number]

## 2020-02-04 ENCOUNTER — TELEPHONE (OUTPATIENT)
Dept: FAMILY MEDICINE | Facility: CLINIC | Age: 75
End: 2020-02-04

## 2020-02-04 NOTE — TELEPHONE ENCOUNTER
----- Message from Paty Baldwin sent at 2/4/2020  1:52 PM CST -----  Contact: pt  Pt states that she went to Urgent Care on Sunday said was a virus bout she feels weak,no appetite,unsure fever,couging,headache, coughing up a lot of mucus...[phone] (home)

## 2020-02-04 NOTE — TELEPHONE ENCOUNTER
[facility]. Pt states that she was given nothing. Pt states that her sxs are stable including fever, green mucus. Pt was told at appt she had a viral cold. Pt is requesting to be seen here. please advise.

## 2020-02-05 NOTE — TELEPHONE ENCOUNTER
Called and spoke to patient regarding symptoms. Patient was informed that she will need to come into the clinic to be reassess. Patient informed me that she is feeling much better, and that she was able to get out of bed today and move around. Patient decline appointment stating that she will call if symptoms returns or worsen.

## 2020-03-15 DIAGNOSIS — I10 ESSENTIAL HYPERTENSION: ICD-10-CM

## 2020-03-16 RX ORDER — AMLODIPINE BESYLATE 5 MG/1
TABLET ORAL
Qty: 90 TABLET | Refills: 3 | Status: SHIPPED | OUTPATIENT
Start: 2020-03-16 | End: 2020-07-10 | Stop reason: SDUPTHER

## 2020-04-13 ENCOUNTER — TELEPHONE (OUTPATIENT)
Dept: PSYCHIATRY | Facility: CLINIC | Age: 75
End: 2020-04-13

## 2020-04-13 RX ORDER — DULOXETIN HYDROCHLORIDE 60 MG/1
CAPSULE, DELAYED RELEASE ORAL
Qty: 30 CAPSULE | Refills: 3 | OUTPATIENT
Start: 2020-04-13

## 2020-04-13 NOTE — TELEPHONE ENCOUNTER
Spoke with patient & let her know everything was taken care of & that I contacted the pharmacy & they are currently working to refill it now--patient voiced understanding & then disconnected

## 2020-04-13 NOTE — TELEPHONE ENCOUNTER
Contacted pharmacy regarding Cymbalta being denied--spoke with pharmacist & she stated medication would be filled at visit but they were showing patient had 1 refill--stated that she would go ahead & fill the Cymbalta with 1 refill as patient has appt with Dr. Staples on 4/29--voiced understanding & stated I would call patient

## 2020-04-29 ENCOUNTER — OFFICE VISIT (OUTPATIENT)
Dept: PSYCHIATRY | Facility: CLINIC | Age: 75
End: 2020-04-29
Payer: MEDICARE

## 2020-04-29 DIAGNOSIS — F33.9 RECURRENT DEPRESSION: Primary | ICD-10-CM

## 2020-04-29 PROCEDURE — 1101F PR PT FALLS ASSESS DOC 0-1 FALLS W/OUT INJ PAST YR: ICD-10-PCS | Mod: CPTII,95,, | Performed by: PSYCHOLOGIST

## 2020-04-29 PROCEDURE — 1101F PT FALLS ASSESS-DOCD LE1/YR: CPT | Mod: CPTII,95,, | Performed by: PSYCHOLOGIST

## 2020-04-29 PROCEDURE — 98968 PH1 ASSMT&MGMT NQHP 21-30: CPT | Mod: 95,,, | Performed by: PSYCHOLOGIST

## 2020-04-29 PROCEDURE — 98968 PR NONPHYSICIAN TELEPHONE ASSESSMENT 21-30 MIN: ICD-10-PCS | Mod: 95,,, | Performed by: PSYCHOLOGIST

## 2020-04-29 PROCEDURE — 1159F PR MEDICATION LIST DOCUMENTED IN MEDICAL RECORD: ICD-10-PCS | Mod: 95,,, | Performed by: PSYCHOLOGIST

## 2020-04-29 PROCEDURE — 1159F MED LIST DOCD IN RCRD: CPT | Mod: 95,,, | Performed by: PSYCHOLOGIST

## 2020-04-29 RX ORDER — DULOXETIN HYDROCHLORIDE 60 MG/1
CAPSULE, DELAYED RELEASE ORAL
Qty: 30 CAPSULE | Refills: 3 | Status: SHIPPED | OUTPATIENT
Start: 2020-04-29 | End: 2020-08-19 | Stop reason: SDUPTHER

## 2020-04-29 NOTE — PROGRESS NOTES
Client:  Tali Hopper   :  1945  PCP:  Columba Adan NP    Established Patient - Audio Only Telehealth Visit     The [place] location is: [place]  The chief complaint leading to consultation is:   Visit type: Virtual visit with audio only (telephone)     The reason for the audio only service rather than synchronous audio and video virtual visit was related to technical difficulties or patient preference/necessity.      Reviewed with patient:  (1) informed of the relationship between the physician and patient and the respective role of any other health care provider with respect to management of the patient; and (2) notified that they may decline to receive medical services by telemedicine and may withdraw from such care at any time.     Reason for follow up: the client was initially seen at this clinic  and last seen 20 for recurrent depression.  She was called today to assess her response to medication prescribed, evaluate compliance, to  the client and to coordinate care.  She has had increased stress due to her 89yo falling and fracturing her pelvis and having been admitted this week (Her mother's sister just  too).  She is self isolating and is bored.   Discussed strategies to help her cope more effectively I.e exercising regularly, reading, video chatting if possible (has not able right now).  Cymbalta has helped with her depression and she is sleeping better -no sleep aides. Appetite reported as good. She sts her mood has been stable.  She did complain of recurrent back pain.  Due to see her provider (Dr. Feliciano in ). She also reported having numbness and tingling in hands and feet and was asked to f/u with PCP.   She denied SI/HI/delusions/hallucinations/mood swings or expansive moods.  She had no subjective mood or anxiety complaints.   Stressors:   No new stressors reported (past hx of being scammed, health issues, pain    Family psychiatric history: none  reported  Relevant lab reviewed 1/2020 A1C 6.4; glu 134; MCHC 30.9; Triglycerides 193  Relevant EKG reviewed  5/19 QTc 495  SR Sinus arrhythmia, atrial bigeminy    Review of patient's allergies indicates:   Allergen Reactions    Benadryl decongestant Shortness Of Breath     Respiration problems    Penicillins Hives       Current Outpatient Medications:     amLODIPine (NORVASC) 5 MG tablet, TAKE 1 TABLET BY MOUTH EVERY DAY, Disp: 90 tablet, Rfl: 3    ascorbic acid (VITAMIN C) 100 MG tablet, Take 100 mg by mouth once daily., Disp: , Rfl:     aspirin (ECOTRIN) 81 MG EC tablet, Take 81 mg by mouth once daily., Disp: , Rfl:     b complex vitamins tablet, Take 1 tablet by mouth once daily., Disp: , Rfl:     CALCIUM CARBONATE/VITAMIN D3 (CALTRATE 600 + D ORAL), Take by mouth once daily. , Disp: , Rfl:     chlorpheniramine-acetaminophen (CORICIDIN HBP COLD AND FLU) 2-325 mg Tab, Take 1 tablet by mouth every 6 (six) hours as needed. (Patient not taking: Reported on 1/28/2020), Disp: 20 tablet, Rfl: 0    dextromethorphan (DELSYM) 30 mg/5 mL liquid, Take 60 mg by mouth 2 (two) times daily., Disp: , Rfl:     DULoxetine (CYMBALTA) 60 MG capsule, Take 1 tablet at bedtime each night, Disp: 30 capsule, Rfl: 3    fluticasone (FLONASE) 50 mcg/actuation nasal spray, 2 sprays by Each Nare route once daily., Disp: 16 g, Rfl: 11    fosinopril-hydrochlorothiazide (MONOPRIL-HCT) 20-12.5 mg per tablet, TAKE 1 TABLET BY MOUTH ONCE DAILY., Disp: 90 tablet, Rfl: 3    latanoprost 0.005 % ophthalmic solution, 1 drop every evening., Disp: , Rfl:     levothyroxine (SYNTHROID) 112 MCG tablet, TAKE 1 TABLET BY MOUTH ONCE DAILY., Disp: 90 tablet, Rfl: 3    ef-FT-G5-K-lycop-lut-herb#220 (ESTROBLEND) 500 mcg-1,000 unit-20 mcg Tab, Take 1 tablet by mouth once daily., Disp: , Rfl:     raloxifene (EVISTA) 60 mg tablet, TAKE 1 TABLET (60 MG TOTAL) BY MOUTH ONCE DAILY., Disp: 90 tablet, Rfl: 3    timolol maleate 0.5% (TIMOPTIC-XE) 0.5 %  SolG, Place 1 drop into both eyes 2 (two) times daily. , Disp: , Rfl: 2  Past Medical History:   Diagnosis Date    Cataract of left eye     Depression     Glaucoma     Hypertension     Loss of equilibrium     Osteoporosis     Thyroid disease        Clinical presentation:  Appearance:  The client presented in casual attire evidencing good grooming and hygiene    Neuro:  the client was alert, oriented x 4 and evidenced good judgement and insight.      Attention/concentration:  Was good in session    Memory:  The client had no subjective memory complaints and they were able to recall information discussed in session accurately    Judgement:  behavior is adequate to the situation    Fund of knowledge:  intact and appropriate to age and level of education    Heart/lungs: no cardiac symptoms/no audible or reported respiratory distress    Skin/Extremities: no problems reported today    Mood:   no subjective complaints.  No SI/HI/delusions or hallucinations reported or suspected. No anger or mood swings/expansive moods reported    Affect: was normal range    Speech:  normal rate and tone     Sleep: the client has improved sleep habits better and feels it is helping     Appetite: was reported as good but she does not eat healthy.      Physical/Pain complaints: Hx of chronic pain 4/10 today  Pain varies and she spacing activity, She has numbness in her toes and fingers.      ETOH/Substance use history:  The client had no reported ETOH/or other substance use problems.    Psychosocial history:  The client currently lives alone with her cat.  She has a grown daughter with 2 grand kids with whom she has occ contact.  She has made friends and is socializing more and exercising regularly again.      Education/session discussion:   · Reviewed general issues about treatment of depression/anxiety/sleep including utility of medication and therapy. Discussed the long risks/benefits/alternatives of medication with client.   Reviewed typical side effects and potential adverse reactions of an antidepressant medication including but not limited to orthostatic changes, increased risk of SI, risk of mood swings, risk of electrolyte disturbance and increased risk of bleeding.  The client appeared to understand the information shared based on their questions and responses made in session.   · Reviewed sleep hygiene. recommended she f/u with her  PCP about her snoring and tingling in her fingers     Impression: Anxiety and depression   Stable on current meds   Prognosis is good    Plan:    Cont Cymbalta (Duloxetine) 60mg- Take 1 tablet at bedtime  RTC 3 months, call if any problems  Asked her to talk to PCP about pain and numbness complaints    Session: 4572-4511 the client was seen to assess their response to the medication prescribed, evaluate compliance, continue counseling and education and to coordinate care if needed. Greater than 50% of the time was spent counseling the client and coordinating care.

## 2020-04-30 ENCOUNTER — TELEPHONE (OUTPATIENT)
Dept: PSYCHIATRY | Facility: CLINIC | Age: 75
End: 2020-04-30

## 2020-05-04 DIAGNOSIS — Z12.11 COLON CANCER SCREENING: ICD-10-CM

## 2020-05-05 ENCOUNTER — PATIENT MESSAGE (OUTPATIENT)
Dept: ADMINISTRATIVE | Facility: HOSPITAL | Age: 75
End: 2020-05-05

## 2020-05-25 ENCOUNTER — PATIENT MESSAGE (OUTPATIENT)
Dept: PSYCHIATRY | Facility: CLINIC | Age: 75
End: 2020-05-25

## 2020-07-10 DIAGNOSIS — I10 ESSENTIAL HYPERTENSION: ICD-10-CM

## 2020-07-10 DIAGNOSIS — E03.4 HYPOTHYROIDISM DUE TO ACQUIRED ATROPHY OF THYROID: ICD-10-CM

## 2020-07-10 RX ORDER — LEVOTHYROXINE SODIUM 112 UG/1
112 TABLET ORAL DAILY
Qty: 90 TABLET | Refills: 3 | Status: SHIPPED | OUTPATIENT
Start: 2020-07-10 | End: 2021-08-26

## 2020-07-10 RX ORDER — FOSINOPRIL SODIUM AND HYDROCHLOROTHIAZIDE 20; 12.5 MG/1; MG/1
1 TABLET ORAL DAILY
Qty: 90 TABLET | Refills: 3 | Status: SHIPPED | OUTPATIENT
Start: 2020-07-10 | End: 2021-10-03

## 2020-07-10 RX ORDER — AMLODIPINE BESYLATE 5 MG/1
5 TABLET ORAL DAILY
Qty: 90 TABLET | Refills: 3 | Status: SHIPPED | OUTPATIENT
Start: 2020-07-10 | End: 2021-06-23

## 2020-07-10 NOTE — TELEPHONE ENCOUNTER
LOV: 1/20/2020  Next appt: 7/22/2020  Labs: 1/13/2020  Last refill:     Amlodipine 3/16/2020 (changing to mail order)   Fosinopril-hctz 12/24/19  Levothyroxine 9/5/19

## 2020-08-21 RX ORDER — DULOXETIN HYDROCHLORIDE 60 MG/1
CAPSULE, DELAYED RELEASE ORAL
Qty: 90 CAPSULE | Refills: 3 | Status: SHIPPED | OUTPATIENT
Start: 2020-08-21 | End: 2020-11-18

## 2020-09-03 ENCOUNTER — TELEPHONE (OUTPATIENT)
Dept: FAMILY MEDICINE | Facility: CLINIC | Age: 75
End: 2020-09-03

## 2020-09-03 NOTE — TELEPHONE ENCOUNTER
Patient reports experiencing right ear pain x's 3 days. States she believes she may have gotten some water in her ear. Patient states pain is unrelieved by use of Ibuprofen. Appointment scheduled for the date of 9-4-2020. Patient agreed to appointment date and time.          ----- Message from Josy Merchant sent at 9/3/2020  1:33 PM CDT -----  Contact: SELF [phone] call back to back  Patient would like to speak with you about being seen soon for ear pain Please advise

## 2020-09-04 ENCOUNTER — OFFICE VISIT (OUTPATIENT)
Dept: FAMILY MEDICINE | Facility: CLINIC | Age: 75
End: 2020-09-04
Payer: MEDICARE

## 2020-09-04 VITALS
HEART RATE: 75 BPM | BODY MASS INDEX: 36.91 KG/M2 | TEMPERATURE: 98 F | WEIGHT: 208.31 LBS | SYSTOLIC BLOOD PRESSURE: 122 MMHG | DIASTOLIC BLOOD PRESSURE: 62 MMHG | HEIGHT: 63 IN

## 2020-09-04 DIAGNOSIS — R05.9 COUGH: ICD-10-CM

## 2020-09-04 DIAGNOSIS — H61.23 BILATERAL IMPACTED CERUMEN: ICD-10-CM

## 2020-09-04 DIAGNOSIS — H66.91 RIGHT OTITIS MEDIA, UNSPECIFIED OTITIS MEDIA TYPE: ICD-10-CM

## 2020-09-04 DIAGNOSIS — H92.01 RIGHT EAR PAIN: Primary | ICD-10-CM

## 2020-09-04 DIAGNOSIS — J32.9 SINUSITIS, UNSPECIFIED CHRONICITY, UNSPECIFIED LOCATION: ICD-10-CM

## 2020-09-04 DIAGNOSIS — E66.01 SEVERE OBESITY (BMI 35.0-39.9) WITH COMORBIDITY: ICD-10-CM

## 2020-09-04 DIAGNOSIS — R09.82 POST-NASAL DRIP: ICD-10-CM

## 2020-09-04 DIAGNOSIS — R42 LOSS OF EQUILIBRIUM: ICD-10-CM

## 2020-09-04 DIAGNOSIS — I10 ESSENTIAL HYPERTENSION: ICD-10-CM

## 2020-09-04 PROCEDURE — 99214 PR OFFICE/OUTPT VISIT, EST, LEVL IV, 30-39 MIN: ICD-10-PCS | Mod: 25,S$GLB,, | Performed by: FAMILY MEDICINE

## 2020-09-04 PROCEDURE — 3074F PR MOST RECENT SYSTOLIC BLOOD PRESSURE < 130 MM HG: ICD-10-PCS | Mod: CPTII,S$GLB,, | Performed by: FAMILY MEDICINE

## 2020-09-04 PROCEDURE — 99214 OFFICE O/P EST MOD 30 MIN: CPT | Mod: 25,S$GLB,, | Performed by: FAMILY MEDICINE

## 2020-09-04 PROCEDURE — 99999 PR PBB SHADOW E&M-EST. PATIENT-LVL IV: ICD-10-PCS | Mod: PBBFAC,,, | Performed by: FAMILY MEDICINE

## 2020-09-04 PROCEDURE — 1159F MED LIST DOCD IN RCRD: CPT | Mod: S$GLB,,, | Performed by: FAMILY MEDICINE

## 2020-09-04 PROCEDURE — 1101F PT FALLS ASSESS-DOCD LE1/YR: CPT | Mod: CPTII,S$GLB,, | Performed by: FAMILY MEDICINE

## 2020-09-04 PROCEDURE — 69209 REMOVE IMPACTED EAR WAX UNI: CPT | Mod: 50,S$GLB,, | Performed by: FAMILY MEDICINE

## 2020-09-04 PROCEDURE — 3078F DIAST BP <80 MM HG: CPT | Mod: CPTII,S$GLB,, | Performed by: FAMILY MEDICINE

## 2020-09-04 PROCEDURE — 99999 PR PBB SHADOW E&M-EST. PATIENT-LVL IV: CPT | Mod: PBBFAC,,, | Performed by: FAMILY MEDICINE

## 2020-09-04 PROCEDURE — 1125F PR PAIN SEVERITY QUANTIFIED, PAIN PRESENT: ICD-10-PCS | Mod: S$GLB,,, | Performed by: FAMILY MEDICINE

## 2020-09-04 PROCEDURE — 3074F SYST BP LT 130 MM HG: CPT | Mod: CPTII,S$GLB,, | Performed by: FAMILY MEDICINE

## 2020-09-04 PROCEDURE — 3078F PR MOST RECENT DIASTOLIC BLOOD PRESSURE < 80 MM HG: ICD-10-PCS | Mod: CPTII,S$GLB,, | Performed by: FAMILY MEDICINE

## 2020-09-04 PROCEDURE — 69209 EAR CERUMEN REMOVAL: ICD-10-PCS | Mod: 50,S$GLB,, | Performed by: FAMILY MEDICINE

## 2020-09-04 PROCEDURE — 1101F PR PT FALLS ASSESS DOC 0-1 FALLS W/OUT INJ PAST YR: ICD-10-PCS | Mod: CPTII,S$GLB,, | Performed by: FAMILY MEDICINE

## 2020-09-04 PROCEDURE — 1159F PR MEDICATION LIST DOCUMENTED IN MEDICAL RECORD: ICD-10-PCS | Mod: S$GLB,,, | Performed by: FAMILY MEDICINE

## 2020-09-04 PROCEDURE — 1125F AMNT PAIN NOTED PAIN PRSNT: CPT | Mod: S$GLB,,, | Performed by: FAMILY MEDICINE

## 2020-09-04 RX ORDER — OFLOXACIN 3 MG/ML
5 SOLUTION AURICULAR (OTIC) DAILY
Qty: 5 ML | Refills: 0 | Status: SHIPPED | OUTPATIENT
Start: 2020-09-04 | End: 2020-09-08

## 2020-09-04 RX ORDER — AZITHROMYCIN 250 MG/1
TABLET, FILM COATED ORAL
Qty: 6 TABLET | Refills: 0 | Status: SHIPPED | OUTPATIENT
Start: 2020-09-04 | End: 2020-12-17 | Stop reason: ALTCHOICE

## 2020-09-04 NOTE — PROCEDURES
"Ear Cerumen Removal    Date/Time: 9/4/2020 9:20 AM  Performed by: Cass Bergman NP  Authorized by: Cass Bergman NP     Time out: Immediately prior to procedure a "time out" was called to verify the correct patient, procedure, equipment, support staff and site/side marked as required.    Consent Done?:  Yes (Verbal)    Local anesthetic:  None  Location details:  Both ears  Procedure type: irrigation    Cerumen  Removal Results:  Cerumen completely removed  Patient tolerance:  Patient tolerated the procedure well with no immediate complications      "

## 2020-09-04 NOTE — PROGRESS NOTES
Subjective:       Patient ID: Tali Hopper is a 75 y.o. female.    Chief Complaint: Otalgia    This patient is new to me.  Mrs Cesar presents to the clinic today with complaints of right ear pain and headache. Patient states this started in the last few days and has not improved. Patient states she believes this is an ear infection. Patient states she also has postnasal drip which does cause a cough sometimes. Patient states she does take OTC allergy medication intermittently but does not help much. Patient states the right ear pain is now spreading to headache on the right side and sinus pressure.  Patient educated on plan of care, verbalized understanding.     Otalgia   There is pain in the right ear. This is a new problem. The current episode started yesterday. The problem occurs constantly. The problem has been unchanged. There has been no fever. The pain is moderate. Associated symptoms include headaches. Pertinent negatives include no abdominal pain, coughing, diarrhea, ear discharge, hearing loss, neck pain, rash, rhinorrhea, sore throat or vomiting. She has tried nothing for the symptoms. There is no history of a chronic ear infection, hearing loss or a tympanostomy tube.     Review of Systems   Constitutional: Negative for activity change, appetite change, chills, diaphoresis and fever.   HENT: Positive for ear pain. Negative for congestion, ear discharge, hearing loss, postnasal drip, rhinorrhea, sinus pressure, sneezing and sore throat.    Eyes: Negative for pain, discharge, redness and itching.   Respiratory: Negative for apnea, cough, chest tightness, shortness of breath and wheezing.    Cardiovascular: Negative for chest pain and leg swelling.   Gastrointestinal: Negative for abdominal distention, abdominal pain, constipation, diarrhea, nausea and vomiting.   Genitourinary: Negative for difficulty urinating, dysuria, flank pain and frequency.   Musculoskeletal: Negative for neck pain.   Skin:  Negative for color change, rash and wound.   Neurological: Positive for headaches. Negative for dizziness.       Patient Active Problem List   Diagnosis    Hypothyroid    Osteoporosis, post-menopausal    Depression    Hyperlipemia    Hypocalcemia    Loss of equilibrium    Decreased pulse    Faintness    Bilateral low back pain without sciatica    Cataract of left eye    Essential hypertension    Severe obesity (BMI 35.0-39.9) with comorbidity    Atrial bigeminy    Prolonged Q-T interval on ECG       Objective:      Physical Exam  Vitals signs reviewed.   Constitutional:       General: She is not in acute distress.     Appearance: Normal appearance. She is well-developed.   HENT:      Head: Normocephalic.      Right Ear: Ear canal and external ear normal. A middle ear effusion is present. There is impacted cerumen. Tympanic membrane is injected and bulging.      Left Ear: Ear canal and external ear normal. A middle ear effusion is present. There is impacted cerumen. Tympanic membrane is bulging. Tympanic membrane is not injected.      Nose: Nose normal.   Eyes:      Conjunctiva/sclera: Conjunctivae normal.      Pupils: Pupils are equal, round, and reactive to light.   Neck:      Musculoskeletal: Normal range of motion and neck supple.   Cardiovascular:      Rate and Rhythm: Normal rate and regular rhythm.      Heart sounds: Normal heart sounds.   Pulmonary:      Effort: Pulmonary effort is normal. No respiratory distress.      Breath sounds: Normal breath sounds.   Abdominal:      General: There is no distension.      Palpations: Abdomen is soft.      Tenderness: There is no abdominal tenderness.   Skin:     General: Skin is warm and dry.      Findings: No rash.   Neurological:      General: No focal deficit present.      Mental Status: She is alert and oriented to person, place, and time.   Psychiatric:         Mood and Affect: Mood normal.         Behavior: Behavior normal.         Lab Results    Component Value Date    WBC 9.00 01/13/2020    HGB 13.8 01/13/2020    HCT 44.6 01/13/2020     01/13/2020    CHOL 182 01/13/2020    TRIG 193 (H) 01/13/2020    HDL 41 01/13/2020    ALT 15 01/13/2020    AST 19 01/13/2020     01/13/2020    K 3.9 01/13/2020     01/13/2020    CREATININE 0.9 01/13/2020    BUN 28 (H) 01/13/2020    CO2 29 01/13/2020    TSH 1.138 06/18/2019    HGBA1C 6.4 (H) 01/13/2020     The 10-year ASCVD risk score (Silvio JACKSON Jr., et al., 2013) is: 20.9%    Values used to calculate the score:      Age: 75 years      Sex: Female      Is Non- : No      Diabetic: No      Tobacco smoker: No      Systolic Blood Pressure: 122 mmHg      Is BP treated: Yes      HDL Cholesterol: 41 mg/dL      Total Cholesterol: 182 mg/dL    Assessment:       1. Right ear pain    2. Bilateral impacted cerumen    3. Post-nasal drip    4. Cough    5. Sinusitis, unspecified chronicity, unspecified location    6. Right otitis media, unspecified otitis media type    7. Loss of equilibrium    8. Essential hypertension    9. Severe obesity (BMI 35.0-39.9) with comorbidity        Plan:       Tali was seen today for otalgia.    Diagnoses and all orders for this visit:    Right ear pain / Right otitis media, unspecified otitis media type  -     azithromycin (Z-PHYLLIS) 250 MG tablet; Two tablets day one, then one tablet daily for 4 days  -     ofloxacin (FLOXIN) 0.3 % otic solution; Place 5 drops into both ears once daily. for 4 days    Bilateral impacted cerumen  -     Ear Cerumen Removal    Post-nasal drip / Cough / Sinusitis, unspecified chronicity, unspecified location  -     azithromycin (Z-PHYLLIS) 250 MG tablet; Two tablets day one, then one tablet daily for 4 days    Loss of equilibrium  Continue medications as prescribed.  Follow up with PCP    Essential hypertension  Stable  Continue medications as prescribed.  Follow up with PCP    Severe obesity (BMI 35.0-39.9) with comorbidity  Comments:  today  36.9  Continue healthy diet and increase exercise as tolerated.  Continue medications as prescribed.  Follow up with PCP        Follow up if symptoms worsen or fail to improve.    Dr Staples (psych)  Dr Rodríguez (PCP)  Dr Rosas (orolaryngology)  Dr Vega (rheumatology)  Dr Wright (ophthalmology)

## 2020-09-04 NOTE — PATIENT INSTRUCTIONS
Middle Ear Infection (Adult)  You have an infection of the middle ear, the space behind the eardrum. This is also called acute otitis media (AOM). Sometimes it is caused by the common cold. This is because congestion can block the internal passage (eustachian tube) that drains fluid from the middle ear. When the middle ear fills with fluid, bacteria can grow there and cause an infection. Oral antibiotics are used to treat this illness, not ear drops. Symptoms usually start to improve within 1 to 2 days of treatment.    Home care  The following are general care guidelines:  · Finish all of the antibiotic medicine given, even though you may feel better after the first few days.  · You may use over-the-counter medicine, such as acetaminophen or ibuprofen, to control pain and fever, unless something else was prescribed. If you have chronic liver or kidney disease or have ever had a stomach ulcer or gastrointestinal bleeding, talk with your healthcare provider before using these medicines. Do not give aspirin to anyone under 18 years of age who has a fever. It may cause severe illness or death.  Follow-up care  Follow up with your healthcare provider, or as advised, in 2 weeks if all symptoms have not gotten better, or if hearing doesn't go back to normal within 1 month.  When to seek medical advice  Call your healthcare provider right away if any of these occur:  · Ear pain gets worse or does not improve after 3 days of treatment  · Unusual drowsiness or confusion  · Neck pain, stiff neck, or headache  · Fluid or blood draining from the ear canal  · Fever of 100.4°F (38°C) or as advised   · Seizure  Date Last Reviewed: 6/1/2016 © 2000-2017 [employer]. [address] [place] [address], [place] [address]. All rights reserved. This information is not intended as a substitute for professional medical care. Always follow your healthcare professional's instructions.        Sinus Headache    The sinuses are air-filled  spaces within the bones of the face. They connect to the inside of the nose. Sinusitis is an inflammation of the tissue lining the sinus cavity. Sinus inflammation can occur during a cold or hay fever (allergies to pollens and other particles in the air) and cause symptoms of sinus congestion and fullness and perhaps a low-grade fever. An infection is usually present when there is also facial pain or headache and green or yellow drainage from the nose or into the back of the throat (postnasal drip). Antibiotics are often prescribed to treat this condition.  Sinus headache may cause pain in different places, depending on which sinuses are infected. There may be pain in the temples, forehead, top of the head, behind or around the eye, across the cheekbone, or into the upper teeth.  You may find that changing your position, sitting upright or lying down, will bring some relief.  Home care  The following guidelines will help you care for yourself at home:  · Drink plenty of water, hot tea, and other liquids to stay well hydrated. This thins the mucus and helps your sinuses drain.  · Apply heat to the painful areas of the face. Use a towel soaked in hot water. Or  the shower with the hot spray on your face. This is a good way to inhale warm water vapor and get heat on your face at the same time. Cover your mouth and nose with your hands so you can still breathe as you do this.  · Use a cool mist vaporizer at night. Suck on peppermint, menthol, or eucalyptus hard candies during the day.  · An expectorant containing guaifenesin helps thin the mucus. It also helps your sinuses drain.  · You may use over-the-counter decongestants unless a similar medicine was prescribed. Nasal sprays or drops work the fastest. Use one that contains phenylephrine or oxymetazoline. First blow your nose gently to remove mucus. Then apply the spray or drops. Don't use decongestant nasal sprays or drops more often than the label says or  for more than 3 days. This can make symptoms worse. Nasal sprays or drops prescribed by your doctor typically do not have these limits. Check with your doctor or pharmacist. You may also use oral tablets containing pseudoephedrine. Side effects from oral decongestants tend to be worse than with nasal sprays or drops, and may keep you from using them. Many sinus remedies combine ingredients, which may increase side effects. Also, if you are taking a combination medicine with another medicine, be sure you are not taking a double dose of anything by mistake. Read the labels or ask the pharmacist for help. Talk with your doctor before using decongestants if you have high blood pressure, heart disease, glaucoma, or prostate trouble.  · Antihistamines may help if allergies are causing your sinusitis. You can get chlorpheniramine and diphenhydramine over the counter, but these can cause drowsiness. Don't use these if you have glaucoma or if you are a man with trouble urinating due to an enlarged prostate. Over-the-counter antihistamines containing loratidine and cetirizine cause less drowsiness and may be a better choice for daytime use.  · When allergies cause your sinusitis, a saline nasal rinse may give relief. A saline nasal rinse reduces swelling and clears excess mucus. This allows sinuses to drain. Prepackaged kits are available at most drugstores. These contain premixed salt packets and an irrigation device. If antibiotics have been prescribed to treat an acute sinus infection, talk with your doctor before using a nasal rinse to be sure it is safe for you.  · You may use over-the-counter medicine to control pain and fever, unless another pain medicine was prescribed. Talk with your doctor before using acetaminophen or ibuprofen if you have chronic liver or kidney disease. Also talk with your doctor if you have ever had a stomach ulcer. Aspirin should never be used in anyone under 18 years of age who has a fever. It  may cause a life-threatening condition called Reye syndrome.  · If antibiotics were given, finish all of them, even if you are feeling better after a few days.  Follow-up care  Follow up with your healthcare provider, or as advised if your symptoms aren't better in 1 week.  Call 911  Call 911 if any of these occur:  · Unusual drowsiness or confusion  · Swelling of the forehead or eyelids  · Vision problems including blurred or double vision  · Seizure  When to seek medical advice  Call your healthcare provider right away if any of these occur:  · Facial pain or headache becomes more severe  · Stiff neck  · Fever over 100.4º F (38.0º C) for more than 3 days on antibiotics  · Bleeding from the nose or throat  Date Last Reviewed: 10/1/2016  © 8513-4674 [employer]. [address] [place] [address], [place] [address]. All rights reserved. This information is not intended as a substitute for professional medical care. Always follow your healthcare professional's instructions.        The Inner Ear: Understanding the Balance System    Balance is a group effort of your eyes, inner ear, joints, and muscles. They each send signals to the brain about body position and head movement. The brain uses this information to balance the body. When you have an inner ear problem, the brain may get conflicting signals. This can cause symptoms such as the feeling of spinning (vertigo).  The inner ear sends signals  Inside the inner ear are 3 semicircular canals. Each canal contains tiny hairs, crystals, and fluid. These structures help the canals sense up-and-down, forward and backward, and side-to-side motion. Nerves carry the signals from the canals to the brain.  The brain interprets signals  Signals from throughout your body travel to the brain. Once the signals arrive, the brain decides what they mean. Sometimes signals conflict. Have you ever sat on a stopped train and watched a moving train go by? When that happens, your eyes  signal that you're moving. But your inner ear and body signal that you're still. The brain weighs conflicting data such as this and decides what is true. The result is balance.  Date Last Reviewed: 10/1/2016  © 2620-9320 [employer]. [address] [place] [address], [place] [address]. All rights reserved. This information is not intended as a substitute for professional medical care. Always follow your healthcare professional's instructions.

## 2020-11-18 ENCOUNTER — TELEPHONE (OUTPATIENT)
Dept: FAMILY MEDICINE | Facility: CLINIC | Age: 75
End: 2020-11-18

## 2020-11-18 ENCOUNTER — OFFICE VISIT (OUTPATIENT)
Dept: FAMILY MEDICINE | Facility: CLINIC | Age: 75
End: 2020-11-18
Payer: MEDICARE

## 2020-11-18 VITALS
BODY MASS INDEX: 37.62 KG/M2 | RESPIRATION RATE: 18 BRPM | OXYGEN SATURATION: 97 % | DIASTOLIC BLOOD PRESSURE: 68 MMHG | SYSTOLIC BLOOD PRESSURE: 122 MMHG | HEIGHT: 63 IN | TEMPERATURE: 98 F | WEIGHT: 212.31 LBS | HEART RATE: 83 BPM

## 2020-11-18 DIAGNOSIS — E03.9 HYPOTHYROIDISM, UNSPECIFIED TYPE: ICD-10-CM

## 2020-11-18 DIAGNOSIS — E78.5 HYPERLIPIDEMIA, UNSPECIFIED HYPERLIPIDEMIA TYPE: ICD-10-CM

## 2020-11-18 DIAGNOSIS — E66.9 OBESITY (BMI 30-39.9): ICD-10-CM

## 2020-11-18 DIAGNOSIS — F32.A DEPRESSION, UNSPECIFIED DEPRESSION TYPE: Primary | ICD-10-CM

## 2020-11-18 DIAGNOSIS — I10 ESSENTIAL HYPERTENSION: ICD-10-CM

## 2020-11-18 PROCEDURE — 1159F PR MEDICATION LIST DOCUMENTED IN MEDICAL RECORD: ICD-10-PCS | Mod: S$GLB,,, | Performed by: NURSE PRACTITIONER

## 2020-11-18 PROCEDURE — 1101F PT FALLS ASSESS-DOCD LE1/YR: CPT | Mod: CPTII,S$GLB,, | Performed by: NURSE PRACTITIONER

## 2020-11-18 PROCEDURE — 99214 PR OFFICE/OUTPT VISIT, EST, LEVL IV, 30-39 MIN: ICD-10-PCS | Mod: S$GLB,,, | Performed by: NURSE PRACTITIONER

## 2020-11-18 PROCEDURE — 1101F PR PT FALLS ASSESS DOC 0-1 FALLS W/OUT INJ PAST YR: ICD-10-PCS | Mod: CPTII,S$GLB,, | Performed by: NURSE PRACTITIONER

## 2020-11-18 PROCEDURE — 99214 OFFICE O/P EST MOD 30 MIN: CPT | Mod: S$GLB,,, | Performed by: NURSE PRACTITIONER

## 2020-11-18 PROCEDURE — 99999 PR PBB SHADOW E&M-EST. PATIENT-LVL V: CPT | Mod: PBBFAC,,, | Performed by: NURSE PRACTITIONER

## 2020-11-18 PROCEDURE — 3288F FALL RISK ASSESSMENT DOCD: CPT | Mod: CPTII,S$GLB,, | Performed by: NURSE PRACTITIONER

## 2020-11-18 PROCEDURE — 1125F AMNT PAIN NOTED PAIN PRSNT: CPT | Mod: S$GLB,,, | Performed by: NURSE PRACTITIONER

## 2020-11-18 PROCEDURE — 3074F SYST BP LT 130 MM HG: CPT | Mod: CPTII,S$GLB,, | Performed by: NURSE PRACTITIONER

## 2020-11-18 PROCEDURE — 99999 PR PBB SHADOW E&M-EST. PATIENT-LVL V: ICD-10-PCS | Mod: PBBFAC,,, | Performed by: NURSE PRACTITIONER

## 2020-11-18 PROCEDURE — 1125F PR PAIN SEVERITY QUANTIFIED, PAIN PRESENT: ICD-10-PCS | Mod: S$GLB,,, | Performed by: NURSE PRACTITIONER

## 2020-11-18 PROCEDURE — 3078F PR MOST RECENT DIASTOLIC BLOOD PRESSURE < 80 MM HG: ICD-10-PCS | Mod: CPTII,S$GLB,, | Performed by: NURSE PRACTITIONER

## 2020-11-18 PROCEDURE — 3078F DIAST BP <80 MM HG: CPT | Mod: CPTII,S$GLB,, | Performed by: NURSE PRACTITIONER

## 2020-11-18 PROCEDURE — 3074F PR MOST RECENT SYSTOLIC BLOOD PRESSURE < 130 MM HG: ICD-10-PCS | Mod: CPTII,S$GLB,, | Performed by: NURSE PRACTITIONER

## 2020-11-18 PROCEDURE — 1159F MED LIST DOCD IN RCRD: CPT | Mod: S$GLB,,, | Performed by: NURSE PRACTITIONER

## 2020-11-18 PROCEDURE — 3288F PR FALLS RISK ASSESSMENT DOCUMENTED: ICD-10-PCS | Mod: CPTII,S$GLB,, | Performed by: NURSE PRACTITIONER

## 2020-11-18 RX ORDER — DULOXETIN HYDROCHLORIDE 60 MG/1
60 CAPSULE, DELAYED RELEASE ORAL 2 TIMES DAILY
Qty: 60 CAPSULE | Refills: 0 | Status: SHIPPED | OUTPATIENT
Start: 2020-11-18 | End: 2020-12-14 | Stop reason: SDUPTHER

## 2020-11-18 NOTE — PROGRESS NOTES
"Subjective:       Patient ID: Tali Hopper is a 75 y.o. female.    Chief Complaint: Follow-up    Depression  Visit Type: follow-up  Patient presents with the following symptoms: anhedonia, decreased concentration, depressed mood, excessive worry, feelings of hopelessness, feelings of worthlessness, insomnia, irritability and memory impairment.  Patient is not experiencing: nervousness/anxiety, palpitations, shortness of breath and suicidal ideas.  Severity: mild       Patient presents today with complaints of worsening depression. Patient is on Cymbalta 60mg at night. Patient reports medication is not working. She report "feeling low" at least one day a week. Patient was followed by Dr. Staples, who is no longer with [facility], last visit 04/29/20.  Past Medical History:   Diagnosis Date    Cataract of left eye     Depression     Glaucoma     Hypertension     Loss of equilibrium     Osteoporosis     Thyroid disease        Review of patient's allergies indicates:   Allergen Reactions    Benadryl decongestant Shortness Of Breath     Respiration problems    Penicillins Hives         Current Outpatient Medications:     amLODIPine (NORVASC) 5 MG tablet, Take 1 tablet (5 mg total) by mouth once daily., Disp: 90 tablet, Rfl: 3    ascorbic acid (VITAMIN C) 100 MG tablet, Take 100 mg by mouth once daily., Disp: , Rfl:     aspirin (ECOTRIN) 81 MG EC tablet, Take 81 mg by mouth once daily., Disp: , Rfl:     CALCIUM CARBONATE/VITAMIN D3 (CALTRATE 600 + D ORAL), Take by mouth once daily. , Disp: , Rfl:     fluticasone (FLONASE) 50 mcg/actuation nasal spray, 2 sprays by Each Nare route once daily., Disp: 16 g, Rfl: 11    fosinopriL-hydrochlorothiazide (MONOPRIL-HCT) 20-12.5 mg per tablet, Take 1 tablet by mouth once daily., Disp: 90 tablet, Rfl: 3    latanoprost 0.005 % ophthalmic solution, 1 drop every evening., Disp: , Rfl:     levothyroxine (SYNTHROID) 112 MCG tablet, Take 1 tablet (112 mcg total) by mouth " "once daily., Disp: 90 tablet, Rfl: 3    rz-ZX-J5-K-lycop-lut-herb#220 (ESTROBLEND) 500 mcg-1,000 unit-20 mcg Tab, Take 1 tablet by mouth once daily., Disp: , Rfl:     raloxifene (EVISTA) 60 mg tablet, TAKE 1 TABLET (60 MG TOTAL) BY MOUTH ONCE DAILY., Disp: 90 tablet, Rfl: 3    timolol maleate 0.5% (TIMOPTIC-XE) 0.5 % SolG, Place 1 drop into both eyes 2 (two) times daily. , Disp: , Rfl: 2    azithromycin (Z-PHYLLIS) 250 MG tablet, Two tablets day one, then one tablet daily for 4 days (Patient not taking: Reported on 11/18/2020), Disp: 6 tablet, Rfl: 0    b complex vitamins tablet, Take 1 tablet by mouth once daily., Disp: , Rfl:     DULoxetine (CYMBALTA) 60 MG capsule, Take 1 capsule (60 mg total) by mouth 2 (two) times daily., Disp: 60 capsule, Rfl: 0    Review of Systems   Constitutional: Positive for irritability. Negative for unexpected weight change.   HENT: Negative for trouble swallowing.    Eyes: Negative for visual disturbance.   Respiratory: Negative for shortness of breath.    Cardiovascular: Negative for chest pain, palpitations and leg swelling.   Gastrointestinal: Negative for blood in stool.   Genitourinary: Negative for hematuria.   Skin: Negative for rash.   Allergic/Immunologic: Negative for immunocompromised state.   Neurological: Negative for headaches.   Hematological: Does not bruise/bleed easily.   Psychiatric/Behavioral: Positive for decreased concentration and depression. Negative for agitation and suicidal ideas. The patient has insomnia. The patient is not nervous/anxious.        Objective:      /68 (BP Location: Right arm, Patient Position: Sitting, BP Method: Small (Manual))   Pulse 83   Temp 97.7 °F (36.5 °C) (Temporal)   Resp 18   Ht 5' 3" (1.6 m)   Wt 96.3 kg (212 lb 4.9 oz)   SpO2 97%   BMI 37.61 kg/m²   Physical Exam  Constitutional:       Appearance: She is well-developed.   Eyes:      Pupils: Pupils are equal, round, and reactive to light.   Neck:      " "Musculoskeletal: Normal range of motion.   Cardiovascular:      Rate and Rhythm: Normal rate and regular rhythm.      Heart sounds: Normal heart sounds.   Pulmonary:      Effort: Pulmonary effort is normal.      Breath sounds: Normal breath sounds.   Abdominal:      General: Bowel sounds are normal.      Palpations: Abdomen is soft.   Musculoskeletal: Normal range of motion.   Skin:     General: Skin is warm and dry.   Neurological:      Mental Status: She is alert and oriented to person, place, and time.   Psychiatric:         Behavior: Behavior normal.         Thought Content: Thought content normal.         Judgment: Judgment normal.         Assessment:       1. Depression, unspecified depression type    2. Essential hypertension    3. Hyperlipidemia, unspecified hyperlipidemia type    4. Hypothyroidism, unspecified type    5. Obesity (BMI 30-39.9)        Plan:       Depression, unspecified depression type  -     Ambulatory referral/consult to Psychology; Future; Expected date: 11/25/2020  -     DULoxetine (CYMBALTA) 60 MG capsule; Take 1 capsule (60 mg total) by mouth 2 (two) times daily.  Dispense: 60 capsule; Refill: 0  4 week follow up-patient is requesting to see   Essential hypertension  Stable, continue medicaiton  BP Readings from Last 3 Encounters:   11/18/20 122/68   09/04/20 122/62   01/28/20 (!) 145/69     Hyperlipidemia, unspecified hyperlipidemia type  Stable, diet and exercise controlled    Hypothyroidism, unspecified type  Stable, continue medication  Obesity (BMI 30-39.9)  Counseled patient on his ideal body weight, health consequences of being obese and current recommendations including weekly exercise and a heart healthy diet.  Current BMI is:Estimated body mass index is 37.61 kg/m² as calculated from the following:    Height as of this encounter: 5' 3" (1.6 m).    Weight as of this encounter: 96.3 kg (212 lb 4.9 oz)..  Patient is aware that ideal BMI < 25 or Weight in (lb) to have " BMI = 25: 140.8.            Patient readiness: acceptance and barriers:none    During the course of the visit the patient was educated and counseled about the following:     Hypertension:   Dietary sodium restriction.  Regular aerobic exercise.  Obesity:   General weight loss/lifestyle modification strategies discussed (elicit support from others; identify saboteurs; non-food rewards, etc).  Regular aerobic exercise program discussed.    Goals: Hypertension: Reduce Blood Pressure and Obesity: Reduce calorie intake and BMI    Did patient meet goals/outcomes: Yes    The following self management tools provided: declined    Patient Instructions (the written plan) was given to the patient/family.     Time spent with patient: 15 minutes    Barriers to medications present (no )    Adverse reactions to current medications (no)    Over the counter medications reviewed (Yes)

## 2020-11-18 NOTE — PATIENT INSTRUCTIONS
Depression  Depression is one of the most common mental health problems today. It is not just a state of unhappiness or sadness. It is a true disease. The cause seems to be related to a decrease in chemicals that transmit signals in the brain. Having a family history of depression, alcoholism, or suicide increases the risk. Chronic illness, chronic pain, migraine headaches and high emotional stress also increase the risk.  Depression is something we tend to recognize in others, but may have a hard time seeing in ourselves. It can show in many physical and emotional ways:  · Loss of appetite  · Over-eating  · Not being able to sleep  · Sleeping too much  · Tiredness not related to physical exertion  · Restlessness or irritability  · Slowness of movement or speech  · Feeling depressed or withdrawn  · Loss of interest in things you once enjoyed  · Trouble concentrating, poor memory, trouble making decisions  · Thoughts of harming or killing oneself, or thoughts that life is not worth living  · Low self-esteem  The treatment for depression may include both medicine and psychotherapy. Antidepressants can reduce suffering and can improve the ability to function during the depressed period. Therapy can offer emotional support and help you understand emotional factors that may be causing the depression.  Home care  · On-going care and support helps people manage this disease.  Find a healthcare provider and therapist who meet your needs. Seek help when you feel like you may be getting ill.  · Be kind to yourself. Make it a point to do things that you enjoy (gardening, walking in nature, going to a movie, etc.). Reward yourself for small successes.  · Take care of your physical body. Eat a balanced diet (low in saturated fat and high in fruits and vegetables). Exercise at least 3 times a week for 30 minutes. Even mild-moderate exercise (like brisk walking) can make you feel better.  · Avoid alcohol, which can make  depression worse.  · Take medicine as prescribed.  · Tell each of your healthcare providers about all of the prescription drugs, over-the-counter medicines, vitamins, and supplements you take. Certain supplements interact with medicines and can result in dangerous side effects. Ask your pharmacist when you have questions about drug interactions.  · Talk with your family and trusted friends about your feelings and thoughts. Ask them to help you recognize behavior changes early so you can get help and, if needed, medicine can be adjusted.  Follow-up care  Follow up with your healthcare provider, or as advised.  Call 911  Call 911 if you:  · Have suicidal thoughts, a suicide plan, and the means to carry out the plan  · Have trouble breathing  · Are very confused  · Feel very drowsy or have trouble awakening  · Faint or lose consciousness  · Have new chest pain that becomes more severe, lasts longer, or spreads into your shoulder, arm, neck, jaw or back  When to seek medical advice  Call your healthcare provider right away if any of these occur:  · Feeling extreme depression, fear, anxiety, or anger toward yourself or others  · Feeling out of control  · Feeling that you may try to harm yourself or another  · Hearing voices that others do not hear  · Seeing things that others do not see  · Cant sleep or eat for 3 days in a row  · Friends or family express concern over your behavior and ask you to seek help  Date Last Reviewed: 9/29/2015  © 7558-0302 [employer]. [address] [place] [address], [place] [address]. All rights reserved. This information is not intended as a substitute for professional medical care. Always follow your healthcare professional's instructions.         Satisfactory Improved

## 2020-11-21 ENCOUNTER — LAB VISIT (OUTPATIENT)
Dept: LAB | Facility: HOSPITAL | Age: 75
End: 2020-11-21
Attending: FAMILY MEDICINE
Payer: MEDICARE

## 2020-11-21 DIAGNOSIS — R73.9 HYPERGLYCEMIA: ICD-10-CM

## 2020-11-21 DIAGNOSIS — E78.5 HYPERLIPIDEMIA, UNSPECIFIED HYPERLIPIDEMIA TYPE: ICD-10-CM

## 2020-11-21 DIAGNOSIS — E03.9 ACQUIRED HYPOTHYROIDISM: ICD-10-CM

## 2020-11-21 LAB
ALBUMIN SERPL BCP-MCNC: 3.4 G/DL (ref 3.5–5.2)
ALP SERPL-CCNC: 74 U/L (ref 55–135)
ALT SERPL W/O P-5'-P-CCNC: 14 U/L (ref 10–44)
ANION GAP SERPL CALC-SCNC: 10 MMOL/L (ref 8–16)
AST SERPL-CCNC: 15 U/L (ref 10–40)
BASOPHILS # BLD AUTO: 0.09 K/UL (ref 0–0.2)
BASOPHILS NFR BLD: 0.9 % (ref 0–1.9)
BILIRUB SERPL-MCNC: 0.4 MG/DL (ref 0.1–1)
BUN SERPL-MCNC: 22 MG/DL (ref 8–23)
CALCIUM SERPL-MCNC: 8.9 MG/DL (ref 8.7–10.5)
CHLORIDE SERPL-SCNC: 103 MMOL/L (ref 95–110)
CHOLEST SERPL-MCNC: 195 MG/DL (ref 120–199)
CHOLEST/HDLC SERPL: 4.3 {RATIO} (ref 2–5)
CO2 SERPL-SCNC: 28 MMOL/L (ref 23–29)
CREAT SERPL-MCNC: 0.9 MG/DL (ref 0.5–1.4)
DIFFERENTIAL METHOD: ABNORMAL
EOSINOPHIL # BLD AUTO: 0.3 K/UL (ref 0–0.5)
EOSINOPHIL NFR BLD: 2.9 % (ref 0–8)
ERYTHROCYTE [DISTWIDTH] IN BLOOD BY AUTOMATED COUNT: 12.8 % (ref 11.5–14.5)
EST. GFR  (AFRICAN AMERICAN): >60 ML/MIN/1.73 M^2
EST. GFR  (NON AFRICAN AMERICAN): >60 ML/MIN/1.73 M^2
ESTIMATED AVG GLUCOSE: 140 MG/DL (ref 68–131)
GLUCOSE SERPL-MCNC: 133 MG/DL (ref 70–110)
HBA1C MFR BLD HPLC: 6.5 % (ref 4–5.6)
HCT VFR BLD AUTO: 44.6 % (ref 37–48.5)
HDLC SERPL-MCNC: 45 MG/DL (ref 40–75)
HDLC SERPL: 23.1 % (ref 20–50)
HGB BLD-MCNC: 13.8 G/DL (ref 12–16)
IMM GRANULOCYTES # BLD AUTO: 0.07 K/UL (ref 0–0.04)
IMM GRANULOCYTES NFR BLD AUTO: 0.7 % (ref 0–0.5)
LDLC SERPL CALC-MCNC: 113.2 MG/DL (ref 63–159)
LYMPHOCYTES # BLD AUTO: 2.6 K/UL (ref 1–4.8)
LYMPHOCYTES NFR BLD: 25.5 % (ref 18–48)
MCH RBC QN AUTO: 28.2 PG (ref 27–31)
MCHC RBC AUTO-ENTMCNC: 30.9 G/DL (ref 32–36)
MCV RBC AUTO: 91 FL (ref 82–98)
MONOCYTES # BLD AUTO: 0.8 K/UL (ref 0.3–1)
MONOCYTES NFR BLD: 7.7 % (ref 4–15)
NEUTROPHILS # BLD AUTO: 6.2 K/UL (ref 1.8–7.7)
NEUTROPHILS NFR BLD: 62.3 % (ref 38–73)
NONHDLC SERPL-MCNC: 150 MG/DL
NRBC BLD-RTO: 0 /100 WBC
PLATELET # BLD AUTO: 217 K/UL (ref 150–350)
PMV BLD AUTO: 11.6 FL (ref 9.2–12.9)
POTASSIUM SERPL-SCNC: 4.5 MMOL/L (ref 3.5–5.1)
PROT SERPL-MCNC: 7 G/DL (ref 6–8.4)
RBC # BLD AUTO: 4.89 M/UL (ref 4–5.4)
SODIUM SERPL-SCNC: 141 MMOL/L (ref 136–145)
T4 FREE SERPL-MCNC: 1.33 NG/DL (ref 0.71–1.51)
TRIGL SERPL-MCNC: 184 MG/DL (ref 30–150)
TSH SERPL DL<=0.005 MIU/L-ACNC: 3.43 UIU/ML (ref 0.4–4)
WBC # BLD AUTO: 10.02 K/UL (ref 3.9–12.7)

## 2020-11-21 PROCEDURE — 85025 COMPLETE CBC W/AUTO DIFF WBC: CPT

## 2020-11-21 PROCEDURE — 80061 LIPID PANEL: CPT

## 2020-11-21 PROCEDURE — 84439 ASSAY OF FREE THYROXINE: CPT

## 2020-11-21 PROCEDURE — 83036 HEMOGLOBIN GLYCOSYLATED A1C: CPT

## 2020-11-21 PROCEDURE — 36415 COLL VENOUS BLD VENIPUNCTURE: CPT | Mod: PO

## 2020-11-21 PROCEDURE — 84443 ASSAY THYROID STIM HORMONE: CPT

## 2020-11-21 PROCEDURE — 80053 COMPREHEN METABOLIC PANEL: CPT

## 2020-12-14 DIAGNOSIS — F32.A DEPRESSION, UNSPECIFIED DEPRESSION TYPE: ICD-10-CM

## 2020-12-14 NOTE — TELEPHONE ENCOUNTER
Received fax from [facility] requesting refill of Duloxetine 60 mg.  Please advise.     LR--11-  LOV--11-   FOV-- 12-

## 2020-12-16 RX ORDER — DULOXETIN HYDROCHLORIDE 60 MG/1
60 CAPSULE, DELAYED RELEASE ORAL 2 TIMES DAILY
Qty: 60 CAPSULE | Refills: 0 | Status: SHIPPED | OUTPATIENT
Start: 2020-12-16 | End: 2021-01-22 | Stop reason: SDUPTHER

## 2020-12-17 ENCOUNTER — OFFICE VISIT (OUTPATIENT)
Dept: FAMILY MEDICINE | Facility: CLINIC | Age: 75
End: 2020-12-17
Payer: MEDICARE

## 2020-12-17 VITALS
RESPIRATION RATE: 16 BRPM | HEIGHT: 63 IN | SYSTOLIC BLOOD PRESSURE: 117 MMHG | OXYGEN SATURATION: 97 % | WEIGHT: 211.44 LBS | DIASTOLIC BLOOD PRESSURE: 60 MMHG | BODY MASS INDEX: 37.46 KG/M2 | TEMPERATURE: 96 F | HEART RATE: 97 BPM

## 2020-12-17 DIAGNOSIS — E83.51 HYPOCALCEMIA: ICD-10-CM

## 2020-12-17 DIAGNOSIS — Z12.11 ENCOUNTER FOR FIT (FECAL IMMUNOCHEMICAL TEST) SCREENING: ICD-10-CM

## 2020-12-17 DIAGNOSIS — E03.9 ACQUIRED HYPOTHYROIDISM: ICD-10-CM

## 2020-12-17 DIAGNOSIS — Z11.59 NEED FOR HEPATITIS C SCREENING TEST: ICD-10-CM

## 2020-12-17 DIAGNOSIS — E66.01 SEVERE OBESITY (BMI 35.0-39.9) WITH COMORBIDITY: ICD-10-CM

## 2020-12-17 DIAGNOSIS — M81.0 OSTEOPOROSIS, POST-MENOPAUSAL: ICD-10-CM

## 2020-12-17 DIAGNOSIS — E78.5 HYPERLIPIDEMIA, UNSPECIFIED HYPERLIPIDEMIA TYPE: Primary | ICD-10-CM

## 2020-12-17 DIAGNOSIS — E11.9 TYPE 2 DIABETES MELLITUS WITHOUT COMPLICATION, WITHOUT LONG-TERM CURRENT USE OF INSULIN: ICD-10-CM

## 2020-12-17 DIAGNOSIS — Z71.89 GRIEF COUNSELING: ICD-10-CM

## 2020-12-17 DIAGNOSIS — I10 ESSENTIAL HYPERTENSION: ICD-10-CM

## 2020-12-17 PROCEDURE — 3078F PR MOST RECENT DIASTOLIC BLOOD PRESSURE < 80 MM HG: ICD-10-PCS | Mod: CPTII,S$GLB,, | Performed by: FAMILY MEDICINE

## 2020-12-17 PROCEDURE — 99999 PR PBB SHADOW E&M-EST. PATIENT-LVL IV: ICD-10-PCS | Mod: PBBFAC,,, | Performed by: FAMILY MEDICINE

## 2020-12-17 PROCEDURE — 99214 OFFICE O/P EST MOD 30 MIN: CPT | Mod: S$GLB,,, | Performed by: FAMILY MEDICINE

## 2020-12-17 PROCEDURE — 99214 PR OFFICE/OUTPT VISIT, EST, LEVL IV, 30-39 MIN: ICD-10-PCS | Mod: S$GLB,,, | Performed by: FAMILY MEDICINE

## 2020-12-17 PROCEDURE — 1126F PR PAIN SEVERITY QUANTIFIED, NO PAIN PRESENT: ICD-10-PCS | Mod: S$GLB,,, | Performed by: FAMILY MEDICINE

## 2020-12-17 PROCEDURE — 3078F DIAST BP <80 MM HG: CPT | Mod: CPTII,S$GLB,, | Performed by: FAMILY MEDICINE

## 2020-12-17 PROCEDURE — 1126F AMNT PAIN NOTED NONE PRSNT: CPT | Mod: S$GLB,,, | Performed by: FAMILY MEDICINE

## 2020-12-17 PROCEDURE — 99999 PR PBB SHADOW E&M-EST. PATIENT-LVL IV: CPT | Mod: PBBFAC,,, | Performed by: FAMILY MEDICINE

## 2020-12-17 PROCEDURE — 1159F MED LIST DOCD IN RCRD: CPT | Mod: S$GLB,,, | Performed by: FAMILY MEDICINE

## 2020-12-17 PROCEDURE — 1159F PR MEDICATION LIST DOCUMENTED IN MEDICAL RECORD: ICD-10-PCS | Mod: S$GLB,,, | Performed by: FAMILY MEDICINE

## 2020-12-17 PROCEDURE — 3074F PR MOST RECENT SYSTOLIC BLOOD PRESSURE < 130 MM HG: ICD-10-PCS | Mod: CPTII,S$GLB,, | Performed by: FAMILY MEDICINE

## 2020-12-17 PROCEDURE — 3074F SYST BP LT 130 MM HG: CPT | Mod: CPTII,S$GLB,, | Performed by: FAMILY MEDICINE

## 2020-12-17 RX ORDER — METFORMIN HYDROCHLORIDE 500 MG/1
1000 TABLET, EXTENDED RELEASE ORAL
Qty: 180 TABLET | Refills: 3 | Status: SHIPPED | OUTPATIENT
Start: 2020-12-17 | End: 2021-06-23 | Stop reason: SDUPTHER

## 2020-12-17 NOTE — PROGRESS NOTES
Subjective:       Patient ID: Tali Hopper is a 75 y.o. female.    Chief Complaint: Follow-up (1mth f/u depresion)    HPI  Review of Systems   Constitutional: Negative for fatigue and unexpected weight change.   Respiratory: Negative for chest tightness and shortness of breath.    Cardiovascular: Negative for chest pain, palpitations and leg swelling.   Gastrointestinal: Negative for abdominal pain.   Musculoskeletal: Negative for arthralgias.   Neurological: Negative for dizziness, syncope, light-headedness and headaches.       Patient Active Problem List   Diagnosis    Hypothyroid    Osteoporosis, post-menopausal    Depression    Hyperlipemia    Hypocalcemia    Loss of equilibrium    Decreased pulse    Faintness    Bilateral low back pain without sciatica    Cataract of left eye    Essential hypertension    Severe obesity (BMI 35.0-39.9) with comorbidity    Atrial bigeminy    Prolonged Q-T interval on ECG    Type 2 diabetes mellitus without complication, without long-term current use of insulin     Patient is here for a chronic conditions follow up.    Reviewed labs 11/20     Mood -grieving   Objective:      Physical Exam  Vitals signs and nursing note reviewed.   Constitutional:       Appearance: She is well-developed.   Cardiovascular:      Rate and Rhythm: Normal rate and regular rhythm.      Heart sounds: Normal heart sounds.   Pulmonary:      Effort: Pulmonary effort is normal.      Breath sounds: Normal breath sounds.   Skin:     General: Skin is warm and dry.   Neurological:      Mental Status: She is alert and oriented to person, place, and time.         Assessment:       1. Hyperlipidemia, unspecified hyperlipidemia type    2. Essential hypertension    3. Hypocalcemia    4. Severe obesity (BMI 35.0-39.9) with comorbidity    5. Acquired hypothyroidism    6. Osteoporosis, post-menopausal    7. Type 2 diabetes mellitus without complication, without long-term current use of insulin    8.  "Need for hepatitis C screening test    9. Encounter for FIT (fecal immunochemical test) screening    10. Grief counseling        Plan:         1. Hyperlipidemia, unspecified hyperlipidemia type  Chol at goal. Your triglycerides are borderline high. I recommend a heart healthy diet rich in fiber, fresh vegetables and fruit and low in saturated fats (fried foods, red meat, etc.).  I also recommend regular exercise including a minimum of 150 minutes of cardio exercise per week and 2-30 minute workouts of strength training like light weights, yoga, pilates, etc.  You should work toward a body mass index of < 25.      - Comprehensive Metabolic Panel; Future  - Lipid Panel; Future    2. Essential hypertension  Controlled on current medications.  Continue current medications.      3. Hypocalcemia  Screen and treat as indicated:    - Vitamin D; Future    4. Severe obesity (BMI 35.0-39.9) with comorbidity  Counseled patient on his ideal body weight, health consequences of being obese and current recommendations including weekly exercise and a heart healthy diet.  Current BMI is:Estimated body mass index is 37.45 kg/m² as calculated from the following:    Height as of this encounter: 5' 3" (1.6 m).    Weight as of this encounter: 95.9 kg (211 lb 6.7 oz)..  Patient is aware that ideal BMI < 25 or Weight in (lb) to have BMI = 25: 140.8.        5. Acquired hypothyroidism  Controlled on current medications.  Continue current medications.    - CBC Auto Differential; Future  - TSH; Future  - T4, Free; Future    6. Osteoporosis, post-menopausal  Cont current mgmt    7. Type 2 diabetes mellitus without complication, without long-term current use of insulin  Controlled on current medications.  Continue current medications.    - metFORMIN (GLUCOPHAGE-XR) 500 MG ER 24hr tablet; Take 2 tablets (1,000 mg total) by mouth daily with breakfast.  Dispense: 180 tablet; Refill: 3  - Ambulatory referral/consult to Diabetes Education; Future  - " Hemoglobin A1C; Future  - Microalbumin/Creatinine Ratio, Urine; Future    8. Need for hepatitis C screening test  Screen and treat as indicated:    - Hepatitis C Antibody; Future    9. Encounter for FIT (fecal immunochemical test) screening  Patient declines a colonoscopy after discussing benefits and risks. Patient is willing to do FOBT x3  or FIT test at home understanding it is 4 times less effective at detecting cancers/masses/polyps than a screening colonoscopy    - Fecal Immunochemical Test (iFOBT); Future    10. Grief counseling  refer  - Ambulatory referral/consult to Psychology; Future        Time spent with patient: 20 minutes    Patient with be reevaluated in 6 months or sooner prn    Greater than 50% of this visit was spent counseling as described in above documentation:Yes

## 2020-12-21 ENCOUNTER — CLINICAL SUPPORT (OUTPATIENT)
Dept: DIABETES | Facility: CLINIC | Age: 75
End: 2020-12-21
Payer: MEDICARE

## 2020-12-21 DIAGNOSIS — E11.9 TYPE 2 DIABETES MELLITUS WITHOUT COMPLICATION, WITHOUT LONG-TERM CURRENT USE OF INSULIN: ICD-10-CM

## 2020-12-21 PROCEDURE — G0108 PR DIAB MANAGE TRN  PER INDIV: ICD-10-PCS | Mod: S$GLB,,, | Performed by: NUTRITIONIST

## 2020-12-21 PROCEDURE — G0108 DIAB MANAGE TRN  PER INDIV: HCPCS | Mod: S$GLB,,, | Performed by: NUTRITIONIST

## 2020-12-21 PROCEDURE — 99999 PR PBB SHADOW E&M-EST. PATIENT-LVL III: CPT | Mod: PBBFAC,,, | Performed by: NUTRITIONIST

## 2020-12-21 PROCEDURE — 99999 PR PBB SHADOW E&M-EST. PATIENT-LVL III: ICD-10-PCS | Mod: PBBFAC,,, | Performed by: NUTRITIONIST

## 2020-12-21 NOTE — PROGRESS NOTES
Diabetes Care Specialist Progress Note  Author: Allyson Patiño RD  Date: 12/21/2020    Program Intake  Reason for Diabetes Program Visit:: Initial Diabetes Assessment  Current diabetes risk level:: low  In the last 12 months, have you:: none  Permission to speak with others about care:: no    Clinical    Patient Health Rating  Compared to other people your age, how would you rate your health?: Good    Problem Review  Reviewed Problem List with Patient: yes  Active comorbidities affecting diabetes self-care.: yes  Comorbidities: Hypertension  Reviewed health maintenance: yes    Clinical Assessment  Current Diabetes Treatment: Oral Medication(1000 mg Metformin XR at lunch)  Have you ever experienced hypoglycemia (low blood sugar)?: no  Have you ever experienced hyperglycemia (high blood sugar)?: no    Medication Information  How do you obtain your medications?: Patient drives  How many days a week do you miss your medications?: Never  Do you use a pill box or medication chart to help you manage your medications?: Pill box  Do you sometimes have difficulty refilling your medications?: No  Medication adherence impacting ability to self-manage diabetes?: No    Labs  Do you have regular lab work to monitor your medications?: Yes  Type of Regular Lab Work: A1c, CBC  [facility]  Lab Compliance Barriers: No    Nutritional Status  Diet: Regular  Change in appetite?: No  Dentation:: Intact  Recent Changes in Weight: No Recent Weight Change  Current nutritional status an area of need that is impacting patient's ability to self-manage diabetes?: No    Additional Social History    Support  Does anyone support you with your diabetes care?: no  Who takes you to your medical appointments?: patient  Does the current primary caregiver meet the patient's needs?: Yes  Is Caregiver an area of support impacting ability to self-manage diabetes?: No    Access to [employer]  Does the patient have  access to any of the following devices or technologies?: Smart phone  Media or technology needs impacting ability to self-manage diabetes?: No    Cognitive/Behavioral Health  Alert and Oriented: Yes  Difficulty Thinking: No  Requires Prompting: No  Requires assistance for routine expression?: No  Cognitive or behavioral barriers impacting ability to self-manage diabetes?: No    Culture/Taoism  Culture or Islam beliefs that may impact ability to access healthcare: No    Communication  Language preference: English  Hearing Problems: No  Vision Problems: No  Communication needs impacting ability to self-manage diabetes?: No    Health Literacy  Preferred Learning Method: Face to Face  How often do you need to have someone help you read instructions, pamphlets, or written material from your doctor or pharmacy?: Never  Health literacy needs impacting ability to self-manage diabetes?: No      Diabetes Self-Management Skills Assessment    Diabetes Disease Process/Treatment Options  Patient/caregiver able to state what happens when someone has diabetes.: yes  Patient/caregiver knows what type of diabetes they have.: yes  Diabetes Type : Type II  Patient/caregiver able to identify at least three signs and symptoms of diabetes.: yes  Identified signs and symptoms:: increased thirst, frequent urination, blurred vision  Patient able to identify at least three risk factors for diabetes.: yes  Identified risk factors:: age over 40, reduced activity, being overweight  Diabetes Disease Process/Treatment Options: Skills Assessment Completed: Yes  Assessment indicates:: Adequate understanding  Area of need?: No    Nutrition/Healthy Eating  Challenges to healthy eating:: eating when feeling depressed/stressed, portion control, lack of will power  Method of carbohydrate measurement:: no method  Patient can identify foods that impact blood sugar.: no (see comments)  Nutrition/Healthy Eating Skills Assessment Completed::  "Yes  Assessment indicates:: Knowledge deficit, Instruction Needed  Area of need?: Yes    24 Hour Dietary Recall  Breakfast:  Coffee with splenda and creamer  Lunch: (about 2 pm)  Mixed veggies (carrots, peas, corn, green beans), baked potato, thin steak meat, Diet Coke  Dinner:Maybe leftovers or skip  Snacks: cake, ice cream, chips, cheez-its  Comments:  Pt usually only eats one meal and then snacks on and off thru day.  Wakes at 11am-1 pm; goes to bed 11pm-2 am.  Has a lot of stress to deal with; on bad terms with daughter; states uncertainty about making changes in diet (I.e. more regular feedings; decreasing portions of carbs; choosing  healthier/non-sweet snacks).  Pt overall feeling depressed and very overwhelmed with life       Physical Activity/Exercise  Patient's daily activity level:: sedentary  Patient formally exercises outside of work.: no  Reasons for not exercising:: physically unable to exercise currently  Patient can identify forms of physical activity.: yes  Stated forms of physical activity:: seated/chair exercises, housework, yardwork  Patient can identify reasons why exercise/physical activity is important in diabetes management.: yes  Identified reasons:: strengthens heart, muscles, and bones  Physical Activity/Exercise Skills Assessment Completed: : Yes  Assessment indicates:: Adequate understanding  Area of need?: Yes--Pt used to do a "sit and be fit" TV exercise program but has not been doing lately; not able to walk for exercise    Medications  Patient is able to describe current diabetes management routine.: yes  Diabetes management routine:: oral medications(1000 mg metformin XR at lunch)  Patient is able to identify current diabetes medications, dosages, and appropriate timing of medications.: yes  Patient understands the purpose of the medications taken for diabetes.: yes  Patient reports problems or concerns with current medication regimen.: yes  Medication regimen problems/concerns:: " other (see comments)(diarrhea)  Medication Skills Assessment Completed:: Yes  Assessment indicates:: Adequate understanding  Area of need?: No    Home Blood Glucose Monitoring  Patient states that blood sugar is checked at home daily.: no  Reasons for not monitoring:: other (see comments)(not rec by MD at this time)  Home Blood Glucose Monitoring Skills Assessment Completed: : Yes  Area of need?: No    Acute Complications  Patient is able to identify types of acute complications: Yes  Patient Identified:: Hypoglycemia, Hyperglycemia  Patient is able to state the basic meaning of hypoglycemia?: Yes  Able to state the blood sugar range for hypoglycemia?: yes  Patient stated range:: (below 80)  Patient can identify general symptoms of hypoglycemia: yes  Patient identified:: fatigue, anxiety, sweating  Able to state proper treatment of hypoglycemia?: yes  Patient identified:: 1/2 can soda/fruit juice  Patient is able to state the basic meaning of hyperglycemia?: Yes  Able to state the blood sugar range for hyperglycemia?: yes  Patient stated range:: (high 200s)  Patient able to state proper treatment of hyperglycemia?: yes  Patient identified:: contact provider  Acute Complications Skills Assessment Completed: : Yes  Assessment indicates:: Adequate understanding  Area of need?: No    Chronic Complications  Patient can identify major chronic complications of diabetes.: yes  Stated chronic complications:: neuropathy/nerve damage  Patient can identify ways to prevent or delay diabetes complications.: no  Patient is aware that having diabetes increases risk of heart disease?: No  Patient is aware that heart disease is the leading cause of death and disability in people with diabetes?: No  Patient able to state risk factors for heart disease?: No  Patient is taking statin?: No  Has your doctor talked to you about Statin use?: No  Chronic Complications Skills Assessment Completed: : Yes  Assessment indicates:: Knowledge  deficit, Instruction Needed  Area of need?: Yes    Psychosocial/Coping  Patient can identify ways of coping with chronic disease.: yes  Patient-stated ways of coping with chronic disease:: counseling/actively seeing behavioral professional, support from loved ones  Psychosocial/Coping Skills Assessment Completed: : Yes  Assessment indicates:: Knowledge deficit, Instruction Needed  Area of need?: Yes      Diabetes Self Support Plan         Assessment Summary and Plan    Based on today's diabetes care assessment, the following areas of need were identified:      Social 12/21/2020   Support No   Access to [employer] No   Cognitive/Behavioral Health No   Culture/Restoration No   Communication No   Health Literacy No        Clinical 12/21/2020   Medication Adherence No   Lab Compliance No   Nutritional Status No        Diabetes Self-Management Skills 12/21/2020   Diabetes Disease Process/Treatment Options No   Nutrition/Healthy Eating  Yes  See Care Plan   Physical Activity/Exercise Yes  See Care Plan   Medication No   Home Blood Glucose Monitoring No   Acute Complications No   Chronic Complications Yes  Discussed and resolved today; educating about chronic conditions associated with DM   Psychosocial/Coping Yes  See Care Plan          Today's interventions were provided through individual discussion, instruction, and written materials were provided.      Patient verbalized understanding of instruction and written materials.  Pt was able to return back demonstration of instructions today. Patient understood key points, needs reinforcement and further instruction.     Diabetes Self-Management Care Plan:    Today's Diabetes Self-Management Care Plan was developed with Tali's input. Tali has agreed to work toward the following goal(s) to improve his/her overall diabetes control.      Care Plan: Diabetes Management   Updates made since 12/21/2020 12:00 AM      Problem: Healthy Eating       Goal: Eat 2 meals daily with  "30g/2 servings of Carbohydrate per meal.    Start Date: 12/21/2020   Expected End Date: 3/22/2021   Priority: High   Barriers: Lack of Motivation to Change      Task: Reviewed the role of Carbohydrate, Protein, and Fat in a health eating plan and how each nutritent impacts blood sugar. Completed 12/21/2020      Task: Reviewed sources of Carbohydrate: Starch, Milk, Fruit, Sugar, and nonstarchy vegetables Completed 12/21/2020      Task: Discussed the importance of balancing carbohydrates with each meal using portion control techniques to count carbohydrate grams or servings, label reading to identify servings size and amount of total carbohydrate per serving, the plate method, other    Used "fistful" as rough estimate; reviewed 15 gram portions and label reading. Completed 12/21/2020      Task: Explained how to count carbohydrates using the food label and the use of dry measuring cups for accurate carb counting. Completed 12/21/2020      Problem: Chronic Complications       Goal: Reviewed chronic conditions assoicated with Diabetes    Start Date: 12/21/2020   Priority: Level 1   Barriers: Knowledge deficit      Task: Discussed recommended care schedule for individuals with diabetes. Completed 12/21/2020      Task: Discussed current A1c, Blood Pressure, and Cholesterol results (ABCs of Diabetes) and reviewed targets of each. Completed 12/21/2020      Task: Discussed importance routine labs to monitor Kidney Function and Filtration. Completed 12/21/2020      Problem: Psychosocial/Healthy Coping       Goal: Patient agrees that she is depressed and lacking motivation to make changes even though she understands importance of managing DM.       Start Date: 12/21/2020   Priority: High   Barriers: Lack of Motivation to Change      Task: Discussed strategies for healthy coping with chronic disease. Completed 12/21/2020          Follow Up Plan     Follow up if symptoms worsen or fail to improve.    Today's care plan and follow " up schedule was discussed with patient.  Tali verbalized understanding of the care plan, goals, and agrees to follow up plan.        The patient was encouraged to communicate with his/her health care provider/physician and care team regarding his/her condition(s) and treatment.  I provided the patient with my contact information today and encouraged to contact me via phone or [facility] Patient Portal as needed.     Length of Visit   Total Time: 60 Minutes

## 2021-01-05 ENCOUNTER — LAB VISIT (OUTPATIENT)
Dept: LAB | Facility: HOSPITAL | Age: 76
End: 2021-01-05
Attending: FAMILY MEDICINE
Payer: MEDICARE

## 2021-01-05 DIAGNOSIS — Z12.11 ENCOUNTER FOR FIT (FECAL IMMUNOCHEMICAL TEST) SCREENING: ICD-10-CM

## 2021-01-05 PROCEDURE — 82274 ASSAY TEST FOR BLOOD FECAL: CPT

## 2021-01-15 LAB — HEMOCCULT STL QL IA: NEGATIVE

## 2021-01-22 DIAGNOSIS — F32.A DEPRESSION, UNSPECIFIED DEPRESSION TYPE: ICD-10-CM

## 2021-01-22 RX ORDER — DULOXETIN HYDROCHLORIDE 60 MG/1
60 CAPSULE, DELAYED RELEASE ORAL 2 TIMES DAILY
Qty: 60 CAPSULE | Refills: 5 | Status: SHIPPED | OUTPATIENT
Start: 2021-01-22 | End: 2021-10-14 | Stop reason: SDUPTHER

## 2021-02-26 ENCOUNTER — TELEPHONE (OUTPATIENT)
Dept: FAMILY MEDICINE | Facility: CLINIC | Age: 76
End: 2021-02-26

## 2021-06-11 ENCOUNTER — LAB VISIT (OUTPATIENT)
Dept: LAB | Facility: HOSPITAL | Age: 76
End: 2021-06-11
Attending: FAMILY MEDICINE
Payer: MEDICARE

## 2021-06-11 DIAGNOSIS — E03.9 ACQUIRED HYPOTHYROIDISM: ICD-10-CM

## 2021-06-11 DIAGNOSIS — E83.51 HYPOCALCEMIA: ICD-10-CM

## 2021-06-11 DIAGNOSIS — E78.5 HYPERLIPIDEMIA, UNSPECIFIED HYPERLIPIDEMIA TYPE: ICD-10-CM

## 2021-06-11 DIAGNOSIS — E11.9 TYPE 2 DIABETES MELLITUS WITHOUT COMPLICATION, WITHOUT LONG-TERM CURRENT USE OF INSULIN: ICD-10-CM

## 2021-06-11 DIAGNOSIS — Z11.59 NEED FOR HEPATITIS C SCREENING TEST: ICD-10-CM

## 2021-06-11 LAB
BASOPHILS # BLD AUTO: 0.13 K/UL (ref 0–0.2)
BASOPHILS NFR BLD: 1.6 % (ref 0–1.9)
DIFFERENTIAL METHOD: ABNORMAL
EOSINOPHIL # BLD AUTO: 0.2 K/UL (ref 0–0.5)
EOSINOPHIL NFR BLD: 3 % (ref 0–8)
ERYTHROCYTE [DISTWIDTH] IN BLOOD BY AUTOMATED COUNT: 13.3 % (ref 11.5–14.5)
ESTIMATED AVG GLUCOSE: 117 MG/DL (ref 68–131)
HBA1C MFR BLD: 5.7 % (ref 4–5.6)
HCT VFR BLD AUTO: 44 % (ref 37–48.5)
HGB BLD-MCNC: 13.5 G/DL (ref 12–16)
IMM GRANULOCYTES # BLD AUTO: 0.03 K/UL (ref 0–0.04)
IMM GRANULOCYTES NFR BLD AUTO: 0.4 % (ref 0–0.5)
LYMPHOCYTES # BLD AUTO: 2.1 K/UL (ref 1–4.8)
LYMPHOCYTES NFR BLD: 25.6 % (ref 18–48)
MCH RBC QN AUTO: 28 PG (ref 27–31)
MCHC RBC AUTO-ENTMCNC: 30.7 G/DL (ref 32–36)
MCV RBC AUTO: 91 FL (ref 82–98)
MONOCYTES # BLD AUTO: 0.7 K/UL (ref 0.3–1)
MONOCYTES NFR BLD: 8.1 % (ref 4–15)
NEUTROPHILS # BLD AUTO: 4.9 K/UL (ref 1.8–7.7)
NEUTROPHILS NFR BLD: 61.3 % (ref 38–73)
NRBC BLD-RTO: 0 /100 WBC
PLATELET # BLD AUTO: 298 K/UL (ref 150–450)
PMV BLD AUTO: 11.9 FL (ref 9.2–12.9)
RBC # BLD AUTO: 4.83 M/UL (ref 4–5.4)
WBC # BLD AUTO: 8.01 K/UL (ref 3.9–12.7)

## 2021-06-11 PROCEDURE — 86803 HEPATITIS C AB TEST: CPT | Performed by: FAMILY MEDICINE

## 2021-06-11 PROCEDURE — 84443 ASSAY THYROID STIM HORMONE: CPT | Performed by: FAMILY MEDICINE

## 2021-06-11 PROCEDURE — 80061 LIPID PANEL: CPT | Performed by: FAMILY MEDICINE

## 2021-06-11 PROCEDURE — 84439 ASSAY OF FREE THYROXINE: CPT | Performed by: FAMILY MEDICINE

## 2021-06-11 PROCEDURE — 85025 COMPLETE CBC W/AUTO DIFF WBC: CPT | Performed by: FAMILY MEDICINE

## 2021-06-11 PROCEDURE — 82306 VITAMIN D 25 HYDROXY: CPT | Performed by: FAMILY MEDICINE

## 2021-06-11 PROCEDURE — 36415 COLL VENOUS BLD VENIPUNCTURE: CPT | Mod: PO | Performed by: FAMILY MEDICINE

## 2021-06-11 PROCEDURE — 80053 COMPREHEN METABOLIC PANEL: CPT | Performed by: FAMILY MEDICINE

## 2021-06-11 PROCEDURE — 83036 HEMOGLOBIN GLYCOSYLATED A1C: CPT | Performed by: FAMILY MEDICINE

## 2021-06-12 LAB
25(OH)D3+25(OH)D2 SERPL-MCNC: 36 NG/ML (ref 30–96)
ALBUMIN SERPL BCP-MCNC: 3.5 G/DL (ref 3.5–5.2)
ALP SERPL-CCNC: 55 U/L (ref 55–135)
ALT SERPL W/O P-5'-P-CCNC: 11 U/L (ref 10–44)
ANION GAP SERPL CALC-SCNC: 12 MMOL/L (ref 8–16)
AST SERPL-CCNC: 16 U/L (ref 10–40)
BILIRUB SERPL-MCNC: 0.3 MG/DL (ref 0.1–1)
BUN SERPL-MCNC: 25 MG/DL (ref 8–23)
CALCIUM SERPL-MCNC: 9.3 MG/DL (ref 8.7–10.5)
CHLORIDE SERPL-SCNC: 107 MMOL/L (ref 95–110)
CHOLEST SERPL-MCNC: 176 MG/DL (ref 120–199)
CHOLEST/HDLC SERPL: 4.3 {RATIO} (ref 2–5)
CO2 SERPL-SCNC: 22 MMOL/L (ref 23–29)
CREAT SERPL-MCNC: 1.1 MG/DL (ref 0.5–1.4)
EST. GFR  (AFRICAN AMERICAN): 56.4 ML/MIN/1.73 M^2
EST. GFR  (NON AFRICAN AMERICAN): 48.9 ML/MIN/1.73 M^2
GLUCOSE SERPL-MCNC: 122 MG/DL (ref 70–110)
HDLC SERPL-MCNC: 41 MG/DL (ref 40–75)
HDLC SERPL: 23.3 % (ref 20–50)
LDLC SERPL CALC-MCNC: 111 MG/DL (ref 63–159)
NONHDLC SERPL-MCNC: 135 MG/DL
POTASSIUM SERPL-SCNC: 3.7 MMOL/L (ref 3.5–5.1)
PROT SERPL-MCNC: 7 G/DL (ref 6–8.4)
SODIUM SERPL-SCNC: 141 MMOL/L (ref 136–145)
T4 FREE SERPL-MCNC: 1.16 NG/DL (ref 0.71–1.51)
TRIGL SERPL-MCNC: 120 MG/DL (ref 30–150)
TSH SERPL DL<=0.005 MIU/L-ACNC: 0.71 UIU/ML (ref 0.4–4)

## 2021-06-14 LAB — HCV AB SERPL QL IA: NEGATIVE

## 2021-06-23 ENCOUNTER — OFFICE VISIT (OUTPATIENT)
Dept: FAMILY MEDICINE | Facility: CLINIC | Age: 76
End: 2021-06-23
Payer: MEDICARE

## 2021-06-23 ENCOUNTER — HOSPITAL ENCOUNTER (OUTPATIENT)
Dept: RADIOLOGY | Facility: CLINIC | Age: 76
Discharge: HOME | End: 2021-06-23
Attending: FAMILY MEDICINE
Payer: MEDICARE

## 2021-06-23 VITALS
HEIGHT: 63 IN | BODY MASS INDEX: 32.19 KG/M2 | OXYGEN SATURATION: 97 % | WEIGHT: 181.69 LBS | SYSTOLIC BLOOD PRESSURE: 110 MMHG | HEART RATE: 84 BPM | TEMPERATURE: 98 F | RESPIRATION RATE: 16 BRPM | DIASTOLIC BLOOD PRESSURE: 60 MMHG

## 2021-06-23 DIAGNOSIS — R42 LOSS OF EQUILIBRIUM: ICD-10-CM

## 2021-06-23 DIAGNOSIS — N95.9 MENOPAUSAL AND POSTMENOPAUSAL DISORDER: ICD-10-CM

## 2021-06-23 DIAGNOSIS — E11.9 TYPE 2 DIABETES MELLITUS WITHOUT COMPLICATION, WITHOUT LONG-TERM CURRENT USE OF INSULIN: ICD-10-CM

## 2021-06-23 DIAGNOSIS — Z12.31 ENCOUNTER FOR SCREENING MAMMOGRAM FOR MALIGNANT NEOPLASM OF BREAST: ICD-10-CM

## 2021-06-23 DIAGNOSIS — E78.5 HYPERLIPIDEMIA, UNSPECIFIED HYPERLIPIDEMIA TYPE: ICD-10-CM

## 2021-06-23 DIAGNOSIS — N17.9 AKI (ACUTE KIDNEY INJURY): Primary | ICD-10-CM

## 2021-06-23 DIAGNOSIS — E03.9 ACQUIRED HYPOTHYROIDISM: ICD-10-CM

## 2021-06-23 PROCEDURE — 3078F DIAST BP <80 MM HG: CPT | Mod: CPTII,S$GLB,, | Performed by: FAMILY MEDICINE

## 2021-06-23 PROCEDURE — 1101F PR PT FALLS ASSESS DOC 0-1 FALLS W/OUT INJ PAST YR: ICD-10-PCS | Mod: CPTII,S$GLB,, | Performed by: FAMILY MEDICINE

## 2021-06-23 PROCEDURE — 3288F FALL RISK ASSESSMENT DOCD: CPT | Mod: CPTII,S$GLB,, | Performed by: FAMILY MEDICINE

## 2021-06-23 PROCEDURE — 77063 MAMMO DIGITAL SCREENING BILAT WITH TOMO: ICD-10-PCS | Mod: 26,,, | Performed by: RADIOLOGY

## 2021-06-23 PROCEDURE — 99214 PR OFFICE/OUTPT VISIT, EST, LEVL IV, 30-39 MIN: ICD-10-PCS | Mod: S$GLB,,, | Performed by: FAMILY MEDICINE

## 2021-06-23 PROCEDURE — 77067 SCR MAMMO BI INCL CAD: CPT | Mod: TC,PO

## 2021-06-23 PROCEDURE — 99214 OFFICE O/P EST MOD 30 MIN: CPT | Mod: S$GLB,,, | Performed by: FAMILY MEDICINE

## 2021-06-23 PROCEDURE — 3288F PR FALLS RISK ASSESSMENT DOCUMENTED: ICD-10-PCS | Mod: CPTII,S$GLB,, | Performed by: FAMILY MEDICINE

## 2021-06-23 PROCEDURE — 77080 DEXA BONE DENSITY SPINE HIP: ICD-10-PCS | Mod: 26,,, | Performed by: RADIOLOGY

## 2021-06-23 PROCEDURE — 3078F PR MOST RECENT DIASTOLIC BLOOD PRESSURE < 80 MM HG: ICD-10-PCS | Mod: CPTII,S$GLB,, | Performed by: FAMILY MEDICINE

## 2021-06-23 PROCEDURE — 77080 DXA BONE DENSITY AXIAL: CPT | Mod: 26,,, | Performed by: RADIOLOGY

## 2021-06-23 PROCEDURE — 1159F PR MEDICATION LIST DOCUMENTED IN MEDICAL RECORD: ICD-10-PCS | Mod: S$GLB,,, | Performed by: FAMILY MEDICINE

## 2021-06-23 PROCEDURE — 77067 MAMMO DIGITAL SCREENING BILAT WITH TOMO: ICD-10-PCS | Mod: 26,,, | Performed by: RADIOLOGY

## 2021-06-23 PROCEDURE — 3074F PR MOST RECENT SYSTOLIC BLOOD PRESSURE < 130 MM HG: ICD-10-PCS | Mod: CPTII,S$GLB,, | Performed by: FAMILY MEDICINE

## 2021-06-23 PROCEDURE — 77063 BREAST TOMOSYNTHESIS BI: CPT | Mod: 26,,, | Performed by: RADIOLOGY

## 2021-06-23 PROCEDURE — 1159F MED LIST DOCD IN RCRD: CPT | Mod: S$GLB,,, | Performed by: FAMILY MEDICINE

## 2021-06-23 PROCEDURE — 99999 PR PBB SHADOW E&M-EST. PATIENT-LVL IV: CPT | Mod: PBBFAC,,, | Performed by: FAMILY MEDICINE

## 2021-06-23 PROCEDURE — 99999 PR PBB SHADOW E&M-EST. PATIENT-LVL IV: ICD-10-PCS | Mod: PBBFAC,,, | Performed by: FAMILY MEDICINE

## 2021-06-23 PROCEDURE — 77067 SCR MAMMO BI INCL CAD: CPT | Mod: 26,,, | Performed by: RADIOLOGY

## 2021-06-23 PROCEDURE — 1126F PR PAIN SEVERITY QUANTIFIED, NO PAIN PRESENT: ICD-10-PCS | Mod: S$GLB,,, | Performed by: FAMILY MEDICINE

## 2021-06-23 PROCEDURE — 3074F SYST BP LT 130 MM HG: CPT | Mod: CPTII,S$GLB,, | Performed by: FAMILY MEDICINE

## 2021-06-23 PROCEDURE — 1126F AMNT PAIN NOTED NONE PRSNT: CPT | Mod: S$GLB,,, | Performed by: FAMILY MEDICINE

## 2021-06-23 PROCEDURE — 77080 DXA BONE DENSITY AXIAL: CPT | Mod: TC,PO

## 2021-06-23 PROCEDURE — 1101F PT FALLS ASSESS-DOCD LE1/YR: CPT | Mod: CPTII,S$GLB,, | Performed by: FAMILY MEDICINE

## 2021-06-23 RX ORDER — METFORMIN HYDROCHLORIDE 500 MG/1
500 TABLET, EXTENDED RELEASE ORAL
Qty: 90 TABLET | Refills: 3 | Status: SHIPPED | OUTPATIENT
Start: 2021-06-23 | End: 2021-11-30

## 2021-09-05 ENCOUNTER — HOSPITAL ENCOUNTER (INPATIENT)
Facility: HOSPITAL | Age: 76
LOS: 1 days | Discharge: HOME HEALTH | DRG: 178 | End: 2021-09-07
Attending: EMERGENCY MEDICINE | Admitting: INTERNAL MEDICINE
Payer: MEDICARE

## 2021-09-05 DIAGNOSIS — U07.1 COVID-19 VIRUS INFECTION: Primary | ICD-10-CM

## 2021-09-05 DIAGNOSIS — R42 DIZZINESS: ICD-10-CM

## 2021-09-05 DIAGNOSIS — N30.00 ACUTE CYSTITIS WITHOUT HEMATURIA: ICD-10-CM

## 2021-09-05 LAB
ANION GAP SERPL CALC-SCNC: 14 MMOL/L (ref 8–16)
BACTERIA #/AREA URNS HPF: ABNORMAL /HPF
BASOPHILS # BLD AUTO: 0.04 K/UL (ref 0–0.2)
BASOPHILS NFR BLD: 0.5 % (ref 0–1.9)
BILIRUB UR QL STRIP: NEGATIVE
BUN SERPL-MCNC: 19 MG/DL (ref 8–23)
CALCIUM SERPL-MCNC: 9.2 MG/DL (ref 8.7–10.5)
CHLORIDE SERPL-SCNC: 99 MMOL/L (ref 95–110)
CLARITY UR: ABNORMAL
CO2 SERPL-SCNC: 17 MMOL/L (ref 23–29)
COLOR UR: YELLOW
CREAT SERPL-MCNC: 1.2 MG/DL (ref 0.5–1.4)
DIFFERENTIAL METHOD: ABNORMAL
EOSINOPHIL # BLD AUTO: 0 K/UL (ref 0–0.5)
EOSINOPHIL NFR BLD: 0 % (ref 0–8)
ERYTHROCYTE [DISTWIDTH] IN BLOOD BY AUTOMATED COUNT: 12.7 % (ref 11.5–14.5)
EST. GFR  (AFRICAN AMERICAN): 50 ML/MIN/1.73 M^2
EST. GFR  (NON AFRICAN AMERICAN): 44 ML/MIN/1.73 M^2
GLUCOSE SERPL-MCNC: 100 MG/DL (ref 70–110)
GLUCOSE UR QL STRIP: NEGATIVE
HCT VFR BLD AUTO: 46.7 % (ref 37–48.5)
HGB BLD-MCNC: 14.7 G/DL (ref 12–16)
HGB UR QL STRIP: NEGATIVE
HYALINE CASTS #/AREA URNS LPF: 0 /LPF
IMM GRANULOCYTES # BLD AUTO: 0.03 K/UL (ref 0–0.04)
IMM GRANULOCYTES NFR BLD AUTO: 0.4 % (ref 0–0.5)
KETONES UR QL STRIP: ABNORMAL
LEUKOCYTE ESTERASE UR QL STRIP: ABNORMAL
LYMPHOCYTES # BLD AUTO: 0.8 K/UL (ref 1–4.8)
LYMPHOCYTES NFR BLD: 10.4 % (ref 18–48)
MCH RBC QN AUTO: 28.6 PG (ref 27–31)
MCHC RBC AUTO-ENTMCNC: 31.5 G/DL (ref 32–36)
MCV RBC AUTO: 91 FL (ref 82–98)
MICROSCOPIC COMMENT: ABNORMAL
MONOCYTES # BLD AUTO: 0.9 K/UL (ref 0.3–1)
MONOCYTES NFR BLD: 12.1 % (ref 4–15)
NEUTROPHILS # BLD AUTO: 5.9 K/UL (ref 1.8–7.7)
NEUTROPHILS NFR BLD: 76.6 % (ref 38–73)
NITRITE UR QL STRIP: NEGATIVE
NRBC BLD-RTO: 0 /100 WBC
PH UR STRIP: 6 [PH] (ref 5–8)
PLATELET # BLD AUTO: 215 K/UL (ref 150–450)
PMV BLD AUTO: 11.1 FL (ref 9.2–12.9)
POTASSIUM SERPL-SCNC: 3.4 MMOL/L (ref 3.5–5.1)
PROT UR QL STRIP: ABNORMAL
RBC # BLD AUTO: 5.14 M/UL (ref 4–5.4)
RBC #/AREA URNS HPF: 6 /HPF (ref 0–4)
SODIUM SERPL-SCNC: 130 MMOL/L (ref 136–145)
SP GR UR STRIP: >=1.03 (ref 1–1.03)
SQUAMOUS #/AREA URNS HPF: 13 /HPF
URATE CRY URNS QL MICRO: ABNORMAL
URN SPEC COLLECT METH UR: ABNORMAL
UROBILINOGEN UR STRIP-ACNC: NEGATIVE EU/DL
WBC # BLD AUTO: 7.76 K/UL (ref 3.9–12.7)
WBC #/AREA URNS HPF: >100 /HPF (ref 0–5)

## 2021-09-05 PROCEDURE — 85025 COMPLETE CBC W/AUTO DIFF WBC: CPT | Performed by: EMERGENCY MEDICINE

## 2021-09-05 PROCEDURE — 96361 HYDRATE IV INFUSION ADD-ON: CPT

## 2021-09-05 PROCEDURE — 87088 URINE BACTERIA CULTURE: CPT | Performed by: EMERGENCY MEDICINE

## 2021-09-05 PROCEDURE — 36415 COLL VENOUS BLD VENIPUNCTURE: CPT | Performed by: EMERGENCY MEDICINE

## 2021-09-05 PROCEDURE — 80048 BASIC METABOLIC PNL TOTAL CA: CPT | Performed by: EMERGENCY MEDICINE

## 2021-09-05 PROCEDURE — 25000003 PHARM REV CODE 250: Performed by: EMERGENCY MEDICINE

## 2021-09-05 PROCEDURE — 81000 URINALYSIS NONAUTO W/SCOPE: CPT | Performed by: EMERGENCY MEDICINE

## 2021-09-05 PROCEDURE — 99285 EMERGENCY DEPT VISIT HI MDM: CPT | Mod: 25

## 2021-09-05 PROCEDURE — 87147 CULTURE TYPE IMMUNOLOGIC: CPT | Performed by: EMERGENCY MEDICINE

## 2021-09-05 PROCEDURE — 87086 URINE CULTURE/COLONY COUNT: CPT | Performed by: EMERGENCY MEDICINE

## 2021-09-05 PROCEDURE — 93005 ELECTROCARDIOGRAM TRACING: CPT

## 2021-09-05 RX ORDER — DOCUSATE SODIUM 50 MG/5ML
100 LIQUID ORAL
Status: COMPLETED | OUTPATIENT
Start: 2021-09-05 | End: 2021-09-05

## 2021-09-05 RX ADMIN — DOCUSATE SODIUM 100 MG: 50 LIQUID ORAL at 09:09

## 2021-09-05 RX ADMIN — SODIUM CHLORIDE 1000 ML: 0.9 INJECTION, SOLUTION INTRAVENOUS at 09:09

## 2021-09-06 PROBLEM — U07.1 COVID-19 VIRUS INFECTION: Status: ACTIVE | Noted: 2021-09-06

## 2021-09-06 LAB
25(OH)D3+25(OH)D2 SERPL-MCNC: 37 NG/ML (ref 30–96)
ALBUMIN SERPL BCP-MCNC: 3.1 G/DL (ref 3.5–5.2)
ALP SERPL-CCNC: 52 U/L (ref 55–135)
ALT SERPL W/O P-5'-P-CCNC: 15 U/L (ref 10–44)
ANION GAP SERPL CALC-SCNC: 12 MMOL/L (ref 8–16)
APTT BLDCRRT: 31 SEC (ref 21–32)
AST SERPL-CCNC: 24 U/L (ref 10–40)
BACTERIA UR CULT: ABNORMAL
BASOPHILS # BLD AUTO: 0.02 K/UL (ref 0–0.2)
BASOPHILS NFR BLD: 0.4 % (ref 0–1.9)
BILIRUB SERPL-MCNC: 0.3 MG/DL (ref 0.1–1)
BNP SERPL-MCNC: 14 PG/ML (ref 0–99)
BUN SERPL-MCNC: 19 MG/DL (ref 8–23)
CALCIUM SERPL-MCNC: 8.7 MG/DL (ref 8.7–10.5)
CHLORIDE SERPL-SCNC: 100 MMOL/L (ref 95–110)
CK SERPL-CCNC: 82 U/L (ref 20–180)
CO2 SERPL-SCNC: 21 MMOL/L (ref 23–29)
CREAT SERPL-MCNC: 1 MG/DL (ref 0.5–1.4)
CRP SERPL-MCNC: 34.7 MG/L (ref 0–8.2)
D DIMER PPP IA.FEU-MCNC: 0.61 MG/L FEU
DIFFERENTIAL METHOD: ABNORMAL
EOSINOPHIL # BLD AUTO: 0.1 K/UL (ref 0–0.5)
EOSINOPHIL NFR BLD: 2.3 % (ref 0–8)
ERYTHROCYTE [DISTWIDTH] IN BLOOD BY AUTOMATED COUNT: 12.6 % (ref 11.5–14.5)
ERYTHROCYTE [SEDIMENTATION RATE] IN BLOOD BY WESTERGREN METHOD: 14 MM/HR (ref 0–20)
EST. GFR  (AFRICAN AMERICAN): >60 ML/MIN/1.73 M^2
EST. GFR  (NON AFRICAN AMERICAN): 54 ML/MIN/1.73 M^2
FERRITIN SERPL-MCNC: 525 NG/ML (ref 20–300)
GLUCOSE SERPL-MCNC: 87 MG/DL (ref 70–110)
HCT VFR BLD AUTO: 41.5 % (ref 37–48.5)
HGB BLD-MCNC: 13.7 G/DL (ref 12–16)
IMM GRANULOCYTES # BLD AUTO: 0.03 K/UL (ref 0–0.04)
IMM GRANULOCYTES NFR BLD AUTO: 0.5 % (ref 0–0.5)
INR PPP: 1 (ref 0.8–1.2)
LACTATE SERPL-SCNC: 1.3 MMOL/L (ref 0.5–2.2)
LDH SERPL L TO P-CCNC: 197 U/L (ref 110–260)
LYMPHOCYTES # BLD AUTO: 1 K/UL (ref 1–4.8)
LYMPHOCYTES NFR BLD: 17.9 % (ref 18–48)
MAGNESIUM SERPL-MCNC: 1.6 MG/DL (ref 1.6–2.6)
MCH RBC QN AUTO: 29.1 PG (ref 27–31)
MCHC RBC AUTO-ENTMCNC: 33 G/DL (ref 32–36)
MCV RBC AUTO: 88 FL (ref 82–98)
MONOCYTES # BLD AUTO: 0.7 K/UL (ref 0.3–1)
MONOCYTES NFR BLD: 12 % (ref 4–15)
NEUTROPHILS # BLD AUTO: 3.8 K/UL (ref 1.8–7.7)
NEUTROPHILS NFR BLD: 66.9 % (ref 38–73)
NRBC BLD-RTO: 0 /100 WBC
PHOSPHATE SERPL-MCNC: 3 MG/DL (ref 2.7–4.5)
PLATELET # BLD AUTO: 205 K/UL (ref 150–450)
PMV BLD AUTO: 11.2 FL (ref 9.2–12.9)
POCT GLUCOSE: 115 MG/DL (ref 70–110)
POCT GLUCOSE: 175 MG/DL (ref 70–110)
POCT GLUCOSE: 225 MG/DL (ref 70–110)
POCT GLUCOSE: 72 MG/DL (ref 70–110)
POCT GLUCOSE: 81 MG/DL (ref 70–110)
POTASSIUM SERPL-SCNC: 3.4 MMOL/L (ref 3.5–5.1)
PROCALCITONIN SERPL IA-MCNC: 0.19 NG/ML
PROT SERPL-MCNC: 6.6 G/DL (ref 6–8.4)
PROTHROMBIN TIME: 10.4 SEC (ref 9–12.5)
RBC # BLD AUTO: 4.7 M/UL (ref 4–5.4)
SARS-COV-2 RDRP RESP QL NAA+PROBE: POSITIVE
SODIUM SERPL-SCNC: 133 MMOL/L (ref 136–145)
TROPONIN I SERPL DL<=0.01 NG/ML-MCNC: <0.006 NG/ML (ref 0–0.03)
WBC # BLD AUTO: 5.69 K/UL (ref 3.9–12.7)

## 2021-09-06 PROCEDURE — 27000207 HC ISOLATION

## 2021-09-06 PROCEDURE — 84145 PROCALCITONIN (PCT): CPT | Performed by: NURSE PRACTITIONER

## 2021-09-06 PROCEDURE — 94640 AIRWAY INHALATION TREATMENT: CPT

## 2021-09-06 PROCEDURE — 25000003 PHARM REV CODE 250: Performed by: NURSE PRACTITIONER

## 2021-09-06 PROCEDURE — 25000242 PHARM REV CODE 250 ALT 637 W/ HCPCS: Performed by: NURSE PRACTITIONER

## 2021-09-06 PROCEDURE — 63600175 PHARM REV CODE 636 W HCPCS: Performed by: EMERGENCY MEDICINE

## 2021-09-06 PROCEDURE — 12000002 HC ACUTE/MED SURGE SEMI-PRIVATE ROOM

## 2021-09-06 PROCEDURE — 85025 COMPLETE CBC W/AUTO DIFF WBC: CPT | Performed by: NURSE PRACTITIONER

## 2021-09-06 PROCEDURE — 94761 N-INVAS EAR/PLS OXIMETRY MLT: CPT

## 2021-09-06 PROCEDURE — 85610 PROTHROMBIN TIME: CPT | Performed by: NURSE PRACTITIONER

## 2021-09-06 PROCEDURE — 63600175 PHARM REV CODE 636 W HCPCS: Performed by: NURSE PRACTITIONER

## 2021-09-06 PROCEDURE — 85379 FIBRIN DEGRADATION QUANT: CPT | Performed by: NURSE PRACTITIONER

## 2021-09-06 PROCEDURE — 85651 RBC SED RATE NONAUTOMATED: CPT | Performed by: NURSE PRACTITIONER

## 2021-09-06 PROCEDURE — 83605 ASSAY OF LACTIC ACID: CPT | Performed by: NURSE PRACTITIONER

## 2021-09-06 PROCEDURE — 84484 ASSAY OF TROPONIN QUANT: CPT | Performed by: NURSE PRACTITIONER

## 2021-09-06 PROCEDURE — 86140 C-REACTIVE PROTEIN: CPT | Performed by: NURSE PRACTITIONER

## 2021-09-06 PROCEDURE — U0002 COVID-19 LAB TEST NON-CDC: HCPCS | Performed by: EMERGENCY MEDICINE

## 2021-09-06 PROCEDURE — 82728 ASSAY OF FERRITIN: CPT | Performed by: NURSE PRACTITIONER

## 2021-09-06 PROCEDURE — 80053 COMPREHEN METABOLIC PANEL: CPT | Performed by: NURSE PRACTITIONER

## 2021-09-06 PROCEDURE — 83735 ASSAY OF MAGNESIUM: CPT | Performed by: NURSE PRACTITIONER

## 2021-09-06 PROCEDURE — 85730 THROMBOPLASTIN TIME PARTIAL: CPT | Performed by: NURSE PRACTITIONER

## 2021-09-06 PROCEDURE — 96365 THER/PROPH/DIAG IV INF INIT: CPT

## 2021-09-06 PROCEDURE — 83880 ASSAY OF NATRIURETIC PEPTIDE: CPT | Performed by: NURSE PRACTITIONER

## 2021-09-06 PROCEDURE — 83615 LACTATE (LD) (LDH) ENZYME: CPT | Performed by: NURSE PRACTITIONER

## 2021-09-06 PROCEDURE — 84100 ASSAY OF PHOSPHORUS: CPT | Performed by: NURSE PRACTITIONER

## 2021-09-06 PROCEDURE — 36415 COLL VENOUS BLD VENIPUNCTURE: CPT | Performed by: NURSE PRACTITIONER

## 2021-09-06 PROCEDURE — 82306 VITAMIN D 25 HYDROXY: CPT | Performed by: NURSE PRACTITIONER

## 2021-09-06 PROCEDURE — 82550 ASSAY OF CK (CPK): CPT | Performed by: NURSE PRACTITIONER

## 2021-09-06 RX ORDER — HYDROCODONE BITARTRATE AND ACETAMINOPHEN 5; 325 MG/1; MG/1
1 TABLET ORAL EVERY 6 HOURS PRN
Status: DISCONTINUED | OUTPATIENT
Start: 2021-09-06 | End: 2021-09-07 | Stop reason: HOSPADM

## 2021-09-06 RX ORDER — DULOXETIN HYDROCHLORIDE 30 MG/1
60 CAPSULE, DELAYED RELEASE ORAL 2 TIMES DAILY
Status: DISCONTINUED | OUTPATIENT
Start: 2021-09-06 | End: 2021-09-07 | Stop reason: HOSPADM

## 2021-09-06 RX ORDER — TALC
6 POWDER (GRAM) TOPICAL NIGHTLY PRN
Status: DISCONTINUED | OUTPATIENT
Start: 2021-09-06 | End: 2021-09-07 | Stop reason: HOSPADM

## 2021-09-06 RX ORDER — FOSINOPRIL SODIUM AND HYDROCHLOROTHIAZIDE 20; 12.5 MG/1; MG/1
1 TABLET ORAL DAILY
Status: DISCONTINUED | OUTPATIENT
Start: 2021-09-07 | End: 2021-09-07

## 2021-09-06 RX ORDER — INSULIN ASPART 100 [IU]/ML
1-10 INJECTION, SOLUTION INTRAVENOUS; SUBCUTANEOUS
Status: DISCONTINUED | OUTPATIENT
Start: 2021-09-06 | End: 2021-09-07 | Stop reason: HOSPADM

## 2021-09-06 RX ORDER — ASPIRIN 81 MG/1
81 TABLET ORAL DAILY
Status: DISCONTINUED | OUTPATIENT
Start: 2021-09-06 | End: 2021-09-07 | Stop reason: HOSPADM

## 2021-09-06 RX ORDER — GLUCAGON 1 MG
1 KIT INJECTION
Status: DISCONTINUED | OUTPATIENT
Start: 2021-09-06 | End: 2021-09-07 | Stop reason: HOSPADM

## 2021-09-06 RX ORDER — AMLODIPINE BESYLATE 5 MG/1
5 TABLET ORAL DAILY
Status: DISCONTINUED | OUTPATIENT
Start: 2021-09-06 | End: 2021-09-07 | Stop reason: HOSPADM

## 2021-09-06 RX ORDER — LANOLIN ALCOHOL/MO/W.PET/CERES
800 CREAM (GRAM) TOPICAL
Status: DISCONTINUED | OUTPATIENT
Start: 2021-09-06 | End: 2021-09-07 | Stop reason: HOSPADM

## 2021-09-06 RX ORDER — SODIUM,POTASSIUM PHOSPHATES 280-250MG
2 POWDER IN PACKET (EA) ORAL
Status: DISCONTINUED | OUTPATIENT
Start: 2021-09-06 | End: 2021-09-07 | Stop reason: HOSPADM

## 2021-09-06 RX ORDER — ALBUTEROL SULFATE 90 UG/1
2 AEROSOL, METERED RESPIRATORY (INHALATION) EVERY 6 HOURS
Status: DISCONTINUED | OUTPATIENT
Start: 2021-09-06 | End: 2021-09-07 | Stop reason: HOSPADM

## 2021-09-06 RX ORDER — IBUPROFEN 200 MG
16 TABLET ORAL
Status: DISCONTINUED | OUTPATIENT
Start: 2021-09-06 | End: 2021-09-07 | Stop reason: HOSPADM

## 2021-09-06 RX ORDER — GUAIFENESIN/DEXTROMETHORPHAN 100-10MG/5
10 SYRUP ORAL EVERY 4 HOURS PRN
Status: DISCONTINUED | OUTPATIENT
Start: 2021-09-06 | End: 2021-09-07 | Stop reason: HOSPADM

## 2021-09-06 RX ORDER — SODIUM CHLORIDE 0.9 % (FLUSH) 0.9 %
10 SYRINGE (ML) INJECTION
Status: DISCONTINUED | OUTPATIENT
Start: 2021-09-06 | End: 2021-09-07 | Stop reason: HOSPADM

## 2021-09-06 RX ORDER — ENOXAPARIN SODIUM 100 MG/ML
1 INJECTION SUBCUTANEOUS 2 TIMES DAILY
Status: DISCONTINUED | OUTPATIENT
Start: 2021-09-06 | End: 2021-09-07 | Stop reason: HOSPADM

## 2021-09-06 RX ORDER — ACETAMINOPHEN 325 MG/1
650 TABLET ORAL EVERY 4 HOURS PRN
Status: DISCONTINUED | OUTPATIENT
Start: 2021-09-06 | End: 2021-09-07 | Stop reason: HOSPADM

## 2021-09-06 RX ORDER — LEVOTHYROXINE SODIUM 112 UG/1
112 TABLET ORAL
Status: DISCONTINUED | OUTPATIENT
Start: 2021-09-06 | End: 2021-09-07 | Stop reason: HOSPADM

## 2021-09-06 RX ORDER — ASCORBIC ACID 500 MG
500 TABLET ORAL 2 TIMES DAILY
Status: DISCONTINUED | OUTPATIENT
Start: 2021-09-06 | End: 2021-09-07 | Stop reason: HOSPADM

## 2021-09-06 RX ORDER — IBUPROFEN 200 MG
24 TABLET ORAL
Status: DISCONTINUED | OUTPATIENT
Start: 2021-09-06 | End: 2021-09-07 | Stop reason: HOSPADM

## 2021-09-06 RX ORDER — ONDANSETRON 2 MG/ML
4 INJECTION INTRAMUSCULAR; INTRAVENOUS EVERY 8 HOURS PRN
Status: DISCONTINUED | OUTPATIENT
Start: 2021-09-06 | End: 2021-09-07 | Stop reason: HOSPADM

## 2021-09-06 RX ORDER — ACETAMINOPHEN 325 MG/1
650 TABLET ORAL EVERY 8 HOURS PRN
Status: DISCONTINUED | OUTPATIENT
Start: 2021-09-06 | End: 2021-09-07 | Stop reason: HOSPADM

## 2021-09-06 RX ADMIN — ENOXAPARIN SODIUM 80 MG: 80 INJECTION SUBCUTANEOUS at 08:09

## 2021-09-06 RX ADMIN — ENOXAPARIN SODIUM 80 MG: 80 INJECTION SUBCUTANEOUS at 11:09

## 2021-09-06 RX ADMIN — CEFTRIAXONE 1 G: 1 INJECTION, SOLUTION INTRAVENOUS at 01:09

## 2021-09-06 RX ADMIN — ALBUTEROL SULFATE 2 PUFF: 90 AEROSOL, METERED RESPIRATORY (INHALATION) at 12:09

## 2021-09-06 RX ADMIN — THERA TABS 1 TABLET: TAB at 09:09

## 2021-09-06 RX ADMIN — REMDESIVIR 200 MG: 100 INJECTION, POWDER, LYOPHILIZED, FOR SOLUTION INTRAVENOUS at 01:09

## 2021-09-06 RX ADMIN — DULOXETINE HYDROCHLORIDE 60 MG: 30 CAPSULE, DELAYED RELEASE ORAL at 11:09

## 2021-09-06 RX ADMIN — AMLODIPINE BESYLATE 5 MG: 5 TABLET ORAL at 09:09

## 2021-09-06 RX ADMIN — CEFTRIAXONE 1 G: 1 INJECTION, SOLUTION INTRAVENOUS at 02:09

## 2021-09-06 RX ADMIN — INSULIN ASPART 4 UNITS: 100 INJECTION, SOLUTION INTRAVENOUS; SUBCUTANEOUS at 05:09

## 2021-09-06 RX ADMIN — ASPIRIN 81 MG: 81 TABLET, COATED ORAL at 09:09

## 2021-09-06 RX ADMIN — OXYCODONE HYDROCHLORIDE AND ACETAMINOPHEN 500 MG: 500 TABLET ORAL at 09:09

## 2021-09-06 RX ADMIN — DEXAMETHASONE 6 MG: 4 TABLET ORAL at 09:09

## 2021-09-06 RX ADMIN — LEVOTHYROXINE SODIUM 112 MCG: 0.11 TABLET ORAL at 06:09

## 2021-09-06 RX ADMIN — DULOXETINE HYDROCHLORIDE 60 MG: 30 CAPSULE, DELAYED RELEASE ORAL at 08:09

## 2021-09-06 RX ADMIN — ALBUTEROL SULFATE 2 PUFF: 90 AEROSOL, METERED RESPIRATORY (INHALATION) at 06:09

## 2021-09-06 RX ADMIN — OXYCODONE HYDROCHLORIDE AND ACETAMINOPHEN 500 MG: 500 TABLET ORAL at 08:09

## 2021-09-07 VITALS
HEART RATE: 99 BPM | DIASTOLIC BLOOD PRESSURE: 60 MMHG | SYSTOLIC BLOOD PRESSURE: 122 MMHG | TEMPERATURE: 99 F | OXYGEN SATURATION: 98 % | HEIGHT: 63 IN | WEIGHT: 179 LBS | RESPIRATION RATE: 18 BRPM | BODY MASS INDEX: 31.71 KG/M2

## 2021-09-07 LAB
ALBUMIN SERPL BCP-MCNC: 3.2 G/DL (ref 3.5–5.2)
ALBUMIN SERPL BCP-MCNC: 3.2 G/DL (ref 3.5–5.2)
ALP SERPL-CCNC: 53 U/L (ref 55–135)
ALP SERPL-CCNC: 53 U/L (ref 55–135)
ALT SERPL W/O P-5'-P-CCNC: 18 U/L (ref 10–44)
ALT SERPL W/O P-5'-P-CCNC: 18 U/L (ref 10–44)
ANION GAP SERPL CALC-SCNC: 13 MMOL/L (ref 8–16)
ANION GAP SERPL CALC-SCNC: 13 MMOL/L (ref 8–16)
AST SERPL-CCNC: 21 U/L (ref 10–40)
AST SERPL-CCNC: 21 U/L (ref 10–40)
BASOPHILS # BLD AUTO: 0 K/UL (ref 0–0.2)
BASOPHILS NFR BLD: 0 % (ref 0–1.9)
BILIRUB SERPL-MCNC: 0.2 MG/DL (ref 0.1–1)
BILIRUB SERPL-MCNC: 0.2 MG/DL (ref 0.1–1)
BUN SERPL-MCNC: 22 MG/DL (ref 8–23)
BUN SERPL-MCNC: 22 MG/DL (ref 8–23)
CALCIUM SERPL-MCNC: 8.8 MG/DL (ref 8.7–10.5)
CALCIUM SERPL-MCNC: 8.8 MG/DL (ref 8.7–10.5)
CHLORIDE SERPL-SCNC: 103 MMOL/L (ref 95–110)
CHLORIDE SERPL-SCNC: 103 MMOL/L (ref 95–110)
CO2 SERPL-SCNC: 22 MMOL/L (ref 23–29)
CO2 SERPL-SCNC: 22 MMOL/L (ref 23–29)
CREAT SERPL-MCNC: 0.9 MG/DL (ref 0.5–1.4)
CREAT SERPL-MCNC: 0.9 MG/DL (ref 0.5–1.4)
DIFFERENTIAL METHOD: ABNORMAL
EOSINOPHIL # BLD AUTO: 0 K/UL (ref 0–0.5)
EOSINOPHIL NFR BLD: 0 % (ref 0–8)
ERYTHROCYTE [DISTWIDTH] IN BLOOD BY AUTOMATED COUNT: 12.7 % (ref 11.5–14.5)
EST. GFR  (AFRICAN AMERICAN): >60 ML/MIN/1.73 M^2
EST. GFR  (AFRICAN AMERICAN): >60 ML/MIN/1.73 M^2
EST. GFR  (NON AFRICAN AMERICAN): >60 ML/MIN/1.73 M^2
EST. GFR  (NON AFRICAN AMERICAN): >60 ML/MIN/1.73 M^2
GLUCOSE SERPL-MCNC: 117 MG/DL (ref 70–110)
GLUCOSE SERPL-MCNC: 117 MG/DL (ref 70–110)
HCT VFR BLD AUTO: 44.2 % (ref 37–48.5)
HGB BLD-MCNC: 14.3 G/DL (ref 12–16)
IMM GRANULOCYTES # BLD AUTO: 0.02 K/UL (ref 0–0.04)
IMM GRANULOCYTES NFR BLD AUTO: 0.5 % (ref 0–0.5)
LYMPHOCYTES # BLD AUTO: 0.8 K/UL (ref 1–4.8)
LYMPHOCYTES NFR BLD: 19.2 % (ref 18–48)
MAGNESIUM SERPL-MCNC: 1.7 MG/DL (ref 1.6–2.6)
MCH RBC QN AUTO: 28.4 PG (ref 27–31)
MCHC RBC AUTO-ENTMCNC: 32.4 G/DL (ref 32–36)
MCV RBC AUTO: 88 FL (ref 82–98)
MONOCYTES # BLD AUTO: 0.6 K/UL (ref 0.3–1)
MONOCYTES NFR BLD: 13.7 % (ref 4–15)
NEUTROPHILS # BLD AUTO: 2.7 K/UL (ref 1.8–7.7)
NEUTROPHILS NFR BLD: 66.6 % (ref 38–73)
NRBC BLD-RTO: 0 /100 WBC
PHOSPHATE SERPL-MCNC: 3.1 MG/DL (ref 2.7–4.5)
PLATELET # BLD AUTO: 223 K/UL (ref 150–450)
PMV BLD AUTO: 11.1 FL (ref 9.2–12.9)
POCT GLUCOSE: 125 MG/DL (ref 70–110)
POCT GLUCOSE: 135 MG/DL (ref 70–110)
POTASSIUM SERPL-SCNC: 4.2 MMOL/L (ref 3.5–5.1)
POTASSIUM SERPL-SCNC: 4.2 MMOL/L (ref 3.5–5.1)
PROT SERPL-MCNC: 6.9 G/DL (ref 6–8.4)
PROT SERPL-MCNC: 6.9 G/DL (ref 6–8.4)
RBC # BLD AUTO: 5.04 M/UL (ref 4–5.4)
SODIUM SERPL-SCNC: 138 MMOL/L (ref 136–145)
SODIUM SERPL-SCNC: 138 MMOL/L (ref 136–145)
WBC # BLD AUTO: 4.02 K/UL (ref 3.9–12.7)

## 2021-09-07 PROCEDURE — 83735 ASSAY OF MAGNESIUM: CPT | Performed by: NURSE PRACTITIONER

## 2021-09-07 PROCEDURE — 25000003 PHARM REV CODE 250: Performed by: NURSE PRACTITIONER

## 2021-09-07 PROCEDURE — 85025 COMPLETE CBC W/AUTO DIFF WBC: CPT | Performed by: NURSE PRACTITIONER

## 2021-09-07 PROCEDURE — 80053 COMPREHEN METABOLIC PANEL: CPT | Performed by: NURSE PRACTITIONER

## 2021-09-07 PROCEDURE — 63600175 PHARM REV CODE 636 W HCPCS: Performed by: NURSE PRACTITIONER

## 2021-09-07 PROCEDURE — 97116 GAIT TRAINING THERAPY: CPT

## 2021-09-07 PROCEDURE — 97161 PT EVAL LOW COMPLEX 20 MIN: CPT

## 2021-09-07 PROCEDURE — 84100 ASSAY OF PHOSPHORUS: CPT | Performed by: NURSE PRACTITIONER

## 2021-09-07 PROCEDURE — 25000242 PHARM REV CODE 250 ALT 637 W/ HCPCS: Performed by: NURSE PRACTITIONER

## 2021-09-07 PROCEDURE — 94761 N-INVAS EAR/PLS OXIMETRY MLT: CPT

## 2021-09-07 PROCEDURE — 94640 AIRWAY INHALATION TREATMENT: CPT

## 2021-09-07 PROCEDURE — 36415 COLL VENOUS BLD VENIPUNCTURE: CPT | Performed by: NURSE PRACTITIONER

## 2021-09-07 RX ORDER — ALBUTEROL SULFATE 90 UG/1
2 AEROSOL, METERED RESPIRATORY (INHALATION) EVERY 6 HOURS
Qty: 8 G | Refills: 0 | Status: SHIPPED | OUTPATIENT
Start: 2021-09-07

## 2021-09-07 RX ORDER — LISINOPRIL 10 MG/1
20 TABLET ORAL DAILY
Status: DISCONTINUED | OUTPATIENT
Start: 2021-09-07 | End: 2021-09-07 | Stop reason: HOSPADM

## 2021-09-07 RX ORDER — CEPHALEXIN 500 MG/1
500 CAPSULE ORAL EVERY 12 HOURS
Qty: 14 CAPSULE | Refills: 0 | Status: SHIPPED | OUTPATIENT
Start: 2021-09-07 | End: 2021-09-14

## 2021-09-07 RX ORDER — HYDROCHLOROTHIAZIDE 12.5 MG/1
12.5 TABLET ORAL DAILY
Status: DISCONTINUED | OUTPATIENT
Start: 2021-09-07 | End: 2021-09-07 | Stop reason: HOSPADM

## 2021-09-07 RX ADMIN — AMLODIPINE BESYLATE 5 MG: 5 TABLET ORAL at 08:09

## 2021-09-07 RX ADMIN — HYDROCHLOROTHIAZIDE 12.5 MG: 12.5 TABLET ORAL at 08:09

## 2021-09-07 RX ADMIN — CEFTRIAXONE 1 G: 1 INJECTION, SOLUTION INTRAVENOUS at 01:09

## 2021-09-07 RX ADMIN — REMDESIVIR 100 MG: 100 INJECTION, POWDER, LYOPHILIZED, FOR SOLUTION INTRAVENOUS at 08:09

## 2021-09-07 RX ADMIN — DULOXETINE HYDROCHLORIDE 60 MG: 30 CAPSULE, DELAYED RELEASE ORAL at 08:09

## 2021-09-07 RX ADMIN — LISINOPRIL 20 MG: 10 TABLET ORAL at 08:09

## 2021-09-07 RX ADMIN — LEVOTHYROXINE SODIUM 112 MCG: 0.11 TABLET ORAL at 05:09

## 2021-09-07 RX ADMIN — OXYCODONE HYDROCHLORIDE AND ACETAMINOPHEN 500 MG: 500 TABLET ORAL at 08:09

## 2021-09-07 RX ADMIN — ALBUTEROL SULFATE 2 PUFF: 90 AEROSOL, METERED RESPIRATORY (INHALATION) at 06:09

## 2021-09-07 RX ADMIN — INSULIN ASPART 1 UNITS: 100 INJECTION, SOLUTION INTRAVENOUS; SUBCUTANEOUS at 01:09

## 2021-09-07 RX ADMIN — ALBUTEROL SULFATE 2 PUFF: 90 AEROSOL, METERED RESPIRATORY (INHALATION) at 12:09

## 2021-09-07 RX ADMIN — ALBUTEROL SULFATE 2 PUFF: 90 AEROSOL, METERED RESPIRATORY (INHALATION) at 01:09

## 2021-09-07 RX ADMIN — ASPIRIN 81 MG: 81 TABLET, COATED ORAL at 08:09

## 2021-09-07 RX ADMIN — ENOXAPARIN SODIUM 80 MG: 80 INJECTION SUBCUTANEOUS at 08:09

## 2021-09-07 RX ADMIN — THERA TABS 1 TABLET: TAB at 08:09

## 2021-09-07 RX ADMIN — DEXAMETHASONE 6 MG: 4 TABLET ORAL at 08:09

## 2021-09-08 ENCOUNTER — PATIENT OUTREACH (OUTPATIENT)
Dept: ADMINISTRATIVE | Facility: CLINIC | Age: 76
End: 2021-09-08

## 2021-09-08 ENCOUNTER — TELEPHONE (OUTPATIENT)
Dept: ADMINISTRATIVE | Facility: OTHER | Age: 76
End: 2021-09-08

## 2021-09-08 ENCOUNTER — TELEPHONE (OUTPATIENT)
Dept: MEDSURG UNIT | Facility: HOSPITAL | Age: 76
End: 2021-09-08

## 2021-09-08 ENCOUNTER — PATIENT OUTREACH (OUTPATIENT)
Dept: INFECTIOUS DISEASES | Facility: HOSPITAL | Age: 76
End: 2021-09-08

## 2021-09-08 PROCEDURE — G0180 PR HOME HEALTH MD CERTIFICATION: ICD-10-PCS | Mod: ,,, | Performed by: FAMILY MEDICINE

## 2021-09-08 PROCEDURE — G0180 MD CERTIFICATION HHA PATIENT: HCPCS | Mod: ,,, | Performed by: FAMILY MEDICINE

## 2021-09-09 ENCOUNTER — TELEPHONE (OUTPATIENT)
Dept: ADMINISTRATIVE | Facility: CLINIC | Age: 76
End: 2021-09-09

## 2021-09-09 ENCOUNTER — HOSPITAL ENCOUNTER (EMERGENCY)
Facility: HOSPITAL | Age: 76
Discharge: HOME | End: 2021-09-09
Attending: EMERGENCY MEDICINE
Payer: MEDICARE

## 2021-09-09 VITALS
WEIGHT: 175 LBS | HEART RATE: 77 BPM | OXYGEN SATURATION: 98 % | TEMPERATURE: 98 F | BODY MASS INDEX: 31.01 KG/M2 | DIASTOLIC BLOOD PRESSURE: 58 MMHG | SYSTOLIC BLOOD PRESSURE: 134 MMHG | RESPIRATION RATE: 20 BRPM | HEIGHT: 63 IN

## 2021-09-09 DIAGNOSIS — U07.1 COVID-19 VIRUS INFECTION: ICD-10-CM

## 2021-09-09 DIAGNOSIS — R53.83 FATIGUE, UNSPECIFIED TYPE: Primary | ICD-10-CM

## 2021-09-09 LAB
ALBUMIN SERPL BCP-MCNC: 2.8 G/DL (ref 3.5–5.2)
ALP SERPL-CCNC: 52 U/L (ref 55–135)
ALT SERPL W/O P-5'-P-CCNC: 35 U/L (ref 10–44)
ANION GAP SERPL CALC-SCNC: 14 MMOL/L (ref 8–16)
AST SERPL-CCNC: 50 U/L (ref 10–40)
BASOPHILS # BLD AUTO: 0.04 K/UL (ref 0–0.2)
BASOPHILS NFR BLD: 0.5 % (ref 0–1.9)
BILIRUB SERPL-MCNC: 0.3 MG/DL (ref 0.1–1)
BUN SERPL-MCNC: 28 MG/DL (ref 8–23)
CALCIUM SERPL-MCNC: 8.4 MG/DL (ref 8.7–10.5)
CHLORIDE SERPL-SCNC: 99 MMOL/L (ref 95–110)
CO2 SERPL-SCNC: 20 MMOL/L (ref 23–29)
CREAT SERPL-MCNC: 0.9 MG/DL (ref 0.5–1.4)
DIFFERENTIAL METHOD: ABNORMAL
EOSINOPHIL # BLD AUTO: 0 K/UL (ref 0–0.5)
EOSINOPHIL NFR BLD: 0.1 % (ref 0–8)
ERYTHROCYTE [DISTWIDTH] IN BLOOD BY AUTOMATED COUNT: 12.7 % (ref 11.5–14.5)
EST. GFR  (AFRICAN AMERICAN): >60 ML/MIN/1.73 M^2
EST. GFR  (NON AFRICAN AMERICAN): >60 ML/MIN/1.73 M^2
GLUCOSE SERPL-MCNC: 99 MG/DL (ref 70–110)
HCT VFR BLD AUTO: 43.1 % (ref 37–48.5)
HGB BLD-MCNC: 14.1 G/DL (ref 12–16)
IMM GRANULOCYTES # BLD AUTO: 0.08 K/UL (ref 0–0.04)
IMM GRANULOCYTES NFR BLD AUTO: 1 % (ref 0–0.5)
LYMPHOCYTES # BLD AUTO: 1.5 K/UL (ref 1–4.8)
LYMPHOCYTES NFR BLD: 18.5 % (ref 18–48)
MAGNESIUM SERPL-MCNC: 1.6 MG/DL (ref 1.6–2.6)
MCH RBC QN AUTO: 29 PG (ref 27–31)
MCHC RBC AUTO-ENTMCNC: 32.7 G/DL (ref 32–36)
MCV RBC AUTO: 89 FL (ref 82–98)
MONOCYTES # BLD AUTO: 1 K/UL (ref 0.3–1)
MONOCYTES NFR BLD: 12 % (ref 4–15)
NEUTROPHILS # BLD AUTO: 5.4 K/UL (ref 1.8–7.7)
NEUTROPHILS NFR BLD: 67.9 % (ref 38–73)
NRBC BLD-RTO: 0 /100 WBC
PLATELET # BLD AUTO: 231 K/UL (ref 150–450)
PMV BLD AUTO: 10.8 FL (ref 9.2–12.9)
POTASSIUM SERPL-SCNC: 3.7 MMOL/L (ref 3.5–5.1)
PROT SERPL-MCNC: 6.1 G/DL (ref 6–8.4)
RBC # BLD AUTO: 4.87 M/UL (ref 4–5.4)
SODIUM SERPL-SCNC: 133 MMOL/L (ref 136–145)
WBC # BLD AUTO: 7.94 K/UL (ref 3.9–12.7)

## 2021-09-09 PROCEDURE — 99284 EMERGENCY DEPT VISIT MOD MDM: CPT | Mod: 25

## 2021-09-09 PROCEDURE — 85025 COMPLETE CBC W/AUTO DIFF WBC: CPT | Performed by: EMERGENCY MEDICINE

## 2021-09-09 PROCEDURE — 96374 THER/PROPH/DIAG INJ IV PUSH: CPT

## 2021-09-09 PROCEDURE — 25000003 PHARM REV CODE 250: Performed by: EMERGENCY MEDICINE

## 2021-09-09 PROCEDURE — 83735 ASSAY OF MAGNESIUM: CPT | Performed by: EMERGENCY MEDICINE

## 2021-09-09 PROCEDURE — 96361 HYDRATE IV INFUSION ADD-ON: CPT

## 2021-09-09 PROCEDURE — 36415 COLL VENOUS BLD VENIPUNCTURE: CPT | Performed by: EMERGENCY MEDICINE

## 2021-09-09 PROCEDURE — 80053 COMPREHEN METABOLIC PANEL: CPT | Performed by: EMERGENCY MEDICINE

## 2021-09-09 PROCEDURE — 63600175 PHARM REV CODE 636 W HCPCS: Performed by: EMERGENCY MEDICINE

## 2021-09-09 RX ORDER — ONDANSETRON 2 MG/ML
4 INJECTION INTRAMUSCULAR; INTRAVENOUS
Status: COMPLETED | OUTPATIENT
Start: 2021-09-09 | End: 2021-09-09

## 2021-09-09 RX ADMIN — ONDANSETRON 4 MG: 2 INJECTION INTRAMUSCULAR; INTRAVENOUS at 04:09

## 2021-09-09 RX ADMIN — SODIUM CHLORIDE 1000 ML: 0.9 INJECTION, SOLUTION INTRAVENOUS at 04:09

## 2021-09-13 ENCOUNTER — CARE AT HOME (OUTPATIENT)
Dept: HOME HEALTH SERVICES | Facility: CLINIC | Age: 76
End: 2021-09-13
Payer: MEDICARE

## 2021-09-13 VITALS
HEIGHT: 63 IN | BODY MASS INDEX: 30.65 KG/M2 | DIASTOLIC BLOOD PRESSURE: 70 MMHG | TEMPERATURE: 98 F | WEIGHT: 173 LBS | RESPIRATION RATE: 16 BRPM | SYSTOLIC BLOOD PRESSURE: 128 MMHG | HEART RATE: 76 BPM | OXYGEN SATURATION: 98 %

## 2021-09-13 DIAGNOSIS — E11.9 TYPE 2 DIABETES MELLITUS WITHOUT COMPLICATION, WITHOUT LONG-TERM CURRENT USE OF INSULIN: ICD-10-CM

## 2021-09-13 DIAGNOSIS — U07.1 COVID-19 VIRUS INFECTION: ICD-10-CM

## 2021-09-13 DIAGNOSIS — Z09 HOSPITAL DISCHARGE FOLLOW-UP: ICD-10-CM

## 2021-09-13 DIAGNOSIS — I10 ESSENTIAL HYPERTENSION: Primary | ICD-10-CM

## 2021-09-13 DIAGNOSIS — Z71.89 ADVANCED CARE PLANNING/COUNSELING DISCUSSION: ICD-10-CM

## 2021-09-13 DIAGNOSIS — Z91.81 HISTORY OF RECENT FALL: ICD-10-CM

## 2021-09-13 PROCEDURE — 99496 TRANSITIONAL CARE MANAGE SERVICE 7 DAY DISCHARGE: ICD-10-PCS | Mod: S$GLB,,, | Performed by: NURSE PRACTITIONER

## 2021-09-13 PROCEDURE — 99497 PR ADVNCD CARE PLAN 30 MIN: ICD-10-PCS | Mod: S$GLB,,, | Performed by: NURSE PRACTITIONER

## 2021-09-13 PROCEDURE — 99496 TRANSJ CARE MGMT HIGH F2F 7D: CPT | Mod: S$GLB,,, | Performed by: NURSE PRACTITIONER

## 2021-09-13 PROCEDURE — 99497 ADVNCD CARE PLAN 30 MIN: CPT | Mod: S$GLB,,, | Performed by: NURSE PRACTITIONER

## 2021-09-14 ENCOUNTER — EXTERNAL HOME HEALTH (OUTPATIENT)
Dept: HOME HEALTH SERVICES | Facility: HOSPITAL | Age: 76
End: 2021-09-14
Payer: MEDICARE

## 2021-10-14 DIAGNOSIS — F32.A DEPRESSION, UNSPECIFIED DEPRESSION TYPE: ICD-10-CM

## 2021-10-15 RX ORDER — DULOXETIN HYDROCHLORIDE 60 MG/1
60 CAPSULE, DELAYED RELEASE ORAL 2 TIMES DAILY
Qty: 60 CAPSULE | Refills: 5 | Status: SHIPPED | OUTPATIENT
Start: 2021-10-15 | End: 2021-11-03

## 2021-11-09 ENCOUNTER — TELEPHONE (OUTPATIENT)
Dept: FAMILY MEDICINE | Facility: CLINIC | Age: 76
End: 2021-11-09
Payer: MEDICARE

## 2021-12-13 PROBLEM — Z09 HOSPITAL DISCHARGE FOLLOW-UP: Status: RESOLVED | Noted: 2021-09-13 | Resolved: 2021-12-13

## 2022-02-11 DIAGNOSIS — F32.A DEPRESSION, UNSPECIFIED DEPRESSION TYPE: ICD-10-CM

## 2022-02-11 DIAGNOSIS — M81.0 OSTEOPOROSIS, POST-MENOPAUSAL: ICD-10-CM

## 2022-02-11 DIAGNOSIS — E11.9 TYPE 2 DIABETES MELLITUS WITHOUT COMPLICATION, WITHOUT LONG-TERM CURRENT USE OF INSULIN: ICD-10-CM

## 2022-02-11 DIAGNOSIS — I10 ESSENTIAL HYPERTENSION: ICD-10-CM

## 2022-02-11 DIAGNOSIS — J32.0 CHRONIC MAXILLARY SINUSITIS: ICD-10-CM

## 2022-02-11 DIAGNOSIS — E03.4 HYPOTHYROIDISM DUE TO ACQUIRED ATROPHY OF THYROID: ICD-10-CM

## 2022-02-11 NOTE — TELEPHONE ENCOUNTER
----- Message from Jensen Paredes sent at 2/11/2022  1:13 PM CST -----  Contact: pt  Type: Needs Medical Advice    Who Called: pt   Best Call Back Number [phone]    Requesting a call back regarding - pt needs a call back please she's needing all of her medications send over to her new pharmacy. She wants a nurse call back to explain               Please Advise- Thank you

## 2022-02-11 NOTE — TELEPHONE ENCOUNTER
Care Due:                  Date            Visit Type   Department     Provider  --------------------------------------------------------------------------------                                EP -                              PRIMARY      [facility]  Last Visit: 06-      CARE ([facility])   MEDICINE       Elizabeth Rodríguez  Next Visit: None Scheduled  None         None Found                                                            Last  Test          Frequency    Reason                     Performed    Due Date  --------------------------------------------------------------------------------    HBA1C.......  6 months...  metFORMIN................  06- 12-    Powered by [employer]. Reference number: [id number].   2/11/2022 2:22:18 PM CST

## 2022-02-14 ENCOUNTER — TELEPHONE (OUTPATIENT)
Dept: FAMILY MEDICINE | Facility: CLINIC | Age: 77
End: 2022-02-14
Payer: MEDICARE

## 2022-02-14 RX ORDER — AMLODIPINE BESYLATE 5 MG/1
5 TABLET ORAL DAILY
Qty: 90 TABLET | Refills: 1 | Status: SHIPPED | OUTPATIENT
Start: 2022-02-14 | End: 2022-10-04

## 2022-02-14 RX ORDER — RALOXIFENE HYDROCHLORIDE 60 MG/1
60 TABLET, FILM COATED ORAL DAILY
Qty: 90 TABLET | Refills: 1 | Status: SHIPPED | OUTPATIENT
Start: 2022-02-14 | End: 2022-08-15

## 2022-02-14 RX ORDER — FOSINOPRIL SODIUM AND HYDROCHLOROTHIAZIDE 20; 12.5 MG/1; MG/1
1 TABLET ORAL DAILY
Qty: 90 TABLET | Refills: 1 | Status: SHIPPED | OUTPATIENT
Start: 2022-02-14 | End: 2022-04-10

## 2022-02-14 RX ORDER — METFORMIN HYDROCHLORIDE 500 MG/1
TABLET, EXTENDED RELEASE ORAL
Qty: 180 TABLET | Refills: 1 | Status: SHIPPED | OUTPATIENT
Start: 2022-02-14 | End: 2023-03-07

## 2022-02-14 RX ORDER — FLUTICASONE PROPIONATE 50 MCG
2 SPRAY, SUSPENSION (ML) NASAL DAILY
Qty: 16 G | Refills: 1 | Status: SHIPPED | OUTPATIENT
Start: 2022-02-14

## 2022-02-14 RX ORDER — LEVOTHYROXINE SODIUM 112 UG/1
112 TABLET ORAL DAILY
Qty: 90 TABLET | Refills: 1 | Status: SHIPPED | OUTPATIENT
Start: 2022-02-14 | End: 2022-08-26

## 2022-02-14 RX ORDER — DULOXETIN HYDROCHLORIDE 60 MG/1
CAPSULE, DELAYED RELEASE ORAL
Qty: 90 CAPSULE | Refills: 1 | Status: SHIPPED | OUTPATIENT
Start: 2022-02-14 | End: 2022-10-31 | Stop reason: SDUPTHER

## 2022-02-14 RX ORDER — NEOMYCIN/POLYMYXIN B/PRAMOXINE 3.5-10K-1
1 CREAM (GRAM) TOPICAL DAILY
Qty: 90 TABLET | Refills: 1 | Status: SHIPPED | OUTPATIENT
Start: 2022-02-14

## 2022-02-14 NOTE — TELEPHONE ENCOUNTER
Returned call and spoke to patient regarding medication refills. Patient was informed that her refilled request was forwarded to .

## 2022-02-14 NOTE — TELEPHONE ENCOUNTER
----- Message from Nicolasa Nolasco sent at 2/14/2022  9:21 AM CST -----  Type: Needs Medical Advice  Who Called:  Pt  Symptoms (please be specific):  Pt is asking to speak to nurse around 3pm to discuss medication     Best Call Back Number: [phone]  Additional Information: Please call and advise

## 2022-04-01 ENCOUNTER — TELEPHONE (OUTPATIENT)
Dept: FAMILY MEDICINE | Facility: CLINIC | Age: 77
End: 2022-04-01
Payer: MEDICARE

## 2022-04-01 DIAGNOSIS — I10 ESSENTIAL HYPERTENSION: Primary | ICD-10-CM

## 2022-04-07 ENCOUNTER — TELEPHONE (OUTPATIENT)
Dept: FAMILY MEDICINE | Facility: CLINIC | Age: 77
End: 2022-04-07
Payer: MEDICARE

## 2022-04-07 NOTE — TELEPHONE ENCOUNTER
Pt notified that medicare no longer covers rx; PCP to provide alternative pt voiced understanding.

## 2022-04-07 NOTE — TELEPHONE ENCOUNTER
----- Message from Ngozi Larkin sent at 4/7/2022  3:26 PM CDT -----  Contact: Patient  Type:  Needs Medical Advice    Who Called:  Patient      Would the patient rather a call back or a response via [facility]? Call    Best Call Back Number:  [phone] (home)     Additional Information:  Patient needs to speak to the nurse about medication hydrochlorothiazide (MONOPRIL-HCT) 20-12.5 mg per tablet      Please call to advise

## 2022-04-10 RX ORDER — LOSARTAN POTASSIUM AND HYDROCHLOROTHIAZIDE 12.5; 1 MG/1; MG/1
1 TABLET ORAL DAILY
Qty: 90 TABLET | Refills: 3 | Status: SHIPPED | OUTPATIENT
Start: 2022-04-10 | End: 2023-03-23

## 2022-04-10 NOTE — TELEPHONE ENCOUNTER
Change fosinopril hct to losartan 100/12.5 hct instead. RX sent . Notify patient of recommended change

## 2022-05-31 ENCOUNTER — TELEPHONE (OUTPATIENT)
Dept: FAMILY MEDICINE | Facility: CLINIC | Age: 77
End: 2022-05-31
Payer: MEDICARE

## 2022-05-31 DIAGNOSIS — G89.29 CHRONIC LOW BACK PAIN, UNSPECIFIED BACK PAIN LATERALITY, UNSPECIFIED WHETHER SCIATICA PRESENT: Primary | ICD-10-CM

## 2022-05-31 DIAGNOSIS — M54.50 CHRONIC LOW BACK PAIN, UNSPECIFIED BACK PAIN LATERALITY, UNSPECIFIED WHETHER SCIATICA PRESENT: Primary | ICD-10-CM

## 2022-05-31 NOTE — TELEPHONE ENCOUNTER
Last office visit was 6/23/2021; pt requesting a referral to pain management r/t arthritic back pain. MD to advise.

## 2022-05-31 NOTE — TELEPHONE ENCOUNTER
----- Message from Jensen Paredes sent at 5/31/2022  9:43 AM CDT -----  Contact: pt  Type: Needs Medical Advice    Who Called:pt  Best Call Back Number: [phone]    Requesting a call back regarding - pt is asking for a call back please she needs a referral to be seen for her back .  Please Advise- Thank you

## 2022-06-02 ENCOUNTER — TELEPHONE (OUTPATIENT)
Dept: FAMILY MEDICINE | Facility: CLINIC | Age: 77
End: 2022-06-02
Payer: MEDICARE

## 2022-06-02 NOTE — TELEPHONE ENCOUNTER
Pt notified that referral for back  Requested and she will be notified of status once PCP makes determination pt voiced understanding.

## 2022-06-02 NOTE — TELEPHONE ENCOUNTER
----- Message from Connie Park sent at 6/2/2022  3:20 PM CDT -----  Contact: patient  Type: Needs Medical Advice  Who Called:  patient  Best Call Back Number: [phone] (home)   Additional Information: patient is requesting a call back as soon as possible- she says the nurse was supposed to contact her yesterday regarding a back

## 2022-06-08 ENCOUNTER — OFFICE VISIT (OUTPATIENT)
Dept: SPINE | Facility: CLINIC | Age: 77
End: 2022-06-08
Payer: MEDICARE

## 2022-06-08 ENCOUNTER — HOSPITAL ENCOUNTER (OUTPATIENT)
Dept: RADIOLOGY | Facility: HOSPITAL | Age: 77
Discharge: HOME | End: 2022-06-08
Attending: PHYSICAL MEDICINE & REHABILITATION
Payer: MEDICARE

## 2022-06-08 ENCOUNTER — TELEPHONE (OUTPATIENT)
Dept: REHABILITATION | Facility: HOSPITAL | Age: 77
End: 2022-06-08
Payer: MEDICARE

## 2022-06-08 VITALS — HEIGHT: 63 IN | WEIGHT: 173 LBS | BODY MASS INDEX: 30.65 KG/M2

## 2022-06-08 DIAGNOSIS — G89.29 CHRONIC LOW BACK PAIN, UNSPECIFIED BACK PAIN LATERALITY, UNSPECIFIED WHETHER SCIATICA PRESENT: ICD-10-CM

## 2022-06-08 DIAGNOSIS — M54.50 CHRONIC LOW BACK PAIN, UNSPECIFIED BACK PAIN LATERALITY, UNSPECIFIED WHETHER SCIATICA PRESENT: ICD-10-CM

## 2022-06-08 PROCEDURE — 99204 PR OFFICE/OUTPT VISIT, NEW, LEVL IV, 45-59 MIN: ICD-10-PCS | Mod: S$GLB,,, | Performed by: PHYSICAL MEDICINE & REHABILITATION

## 2022-06-08 PROCEDURE — 99204 OFFICE O/P NEW MOD 45 MIN: CPT | Mod: S$GLB,,, | Performed by: PHYSICAL MEDICINE & REHABILITATION

## 2022-06-08 PROCEDURE — 72114 XR LUMBAR SPINE 5 VIEW WITH FLEX AND EXT: ICD-10-PCS | Mod: 26,,, | Performed by: RADIOLOGY

## 2022-06-08 PROCEDURE — 72114 X-RAY EXAM L-S SPINE BENDING: CPT | Mod: TC,FY

## 2022-06-08 PROCEDURE — 72114 X-RAY EXAM L-S SPINE BENDING: CPT | Mod: 26,,, | Performed by: RADIOLOGY

## 2022-06-08 NOTE — PROGRESS NOTES
"  SUBJECTIVE:    Patient ID: Tali Hopper is a 76 y.o. female.    Chief Complaint: Back Pain (Low back pain )    This is a 76-year-old woman who sees Dr. Rodríguez for her primary care.  History of diabetes hypothyroidism hypertension and osteoporosis otherwise denies any chronic major medical problems.  Has a long history of low back pain.  She has never had any treatment for her back.  She takes ibuprofen as needed for pain which does help some.  She presents with a primary complaint of left-sided low back pain at the lumbosacral junction.  She does not have any radicular leg pain.  No bowel or bladder dysfunction fever chills sweats or unexpected weight loss.  No recent falls although she does have a history of falls.  She has some balance impairment and walks with a cane.  Current pain level is 8/10.  I have no imaging to review        Past Medical History:   Diagnosis Date    Cataract of left eye     Depression     Glaucoma     Hypertension     Loss of equilibrium     Osteoporosis     Thyroid disease      Social History     Socioeconomic History    Marital status:    Tobacco Use    Smoking status: Never Smoker    Smokeless tobacco: Never Used   Substance and Sexual Activity    Alcohol use: No    Drug use: No    Sexual activity: Not Currently     Past Surgical History:   Procedure Laterality Date    CHOLECYSTECTOMY      EYE SURGERY      right cataract    HYSTERECTOMY      TONSILLECTOMY       Family History   Problem Relation Age of Onset    Heart disease Maternal Grandmother     Heart disease Paternal Grandmother      Vitals:    06/08/22 1012   Weight: 78.5 kg (173 lb)   Height: 5' 3" (1.6 m)       Review of Systems   Constitutional: Negative for chills, diaphoresis, fatigue, fever and unexpected weight change.   HENT: Negative for trouble swallowing.    Eyes: Negative for visual disturbance.   Respiratory: Negative for shortness of breath.    Cardiovascular: Negative for chest " pain.   Gastrointestinal: Negative for abdominal pain, constipation, nausea and vomiting.   Genitourinary: Negative for difficulty urinating.   Musculoskeletal: Negative for arthralgias, back pain, gait problem, joint swelling, myalgias, neck pain and neck stiffness.   Neurological: Negative for dizziness, speech difficulty, weakness, light-headedness, numbness and headaches.          Objective:      Physical Exam  Neurological:      Mental Status: She is alert and oriented to person, place, and time.      Comments: She is awake and in no acute distress  No point tenderness or palpable masses about the lumbar spine  Forward flexion is to about 45° before she complains of pain on the left at the lumbosacral junction.  Extension likewise causes pain on left at the lumbosacral junction  Heel and toe walking was deferred due to balance impairment  Reflexes- +1-+2 reflexes at the following:   C5-Biceps   C6-Brachioradialis   C7-Triceps   L3/4-Patellar   S1-Achilles  Karyna sign negative bilaterally  Strength testing- 5/5 strength in the following muscle groups:  C5-Elbow flexion  C6-Wrist extension  C7-Elbow extension  C8-Finger flexion  T1-Finger abduction  L2-Hip flexion  L3-Knee extension  L4-Ankle dorsiflexion  L5-Great toe extension  S1-Ankle plantar flexion    Straight leg raise negative bilaterally  OSCAR test bilaterally causes left-sided low back pain at the lumbosacral junction             Assessment:       1. Chronic low back pain, unspecified back pain laterality, unspecified whether sciatica present           Plan:     she has a nonfocal neurological examination and no historical red flags.  She has low back pain on basis of degenerative disc disease and facet arthropathy.  I am going to get some baseline x-rays and start her in physical therapy.  Continue ibuprofen as needed.  Follow-up in 6 weeks or as needed      Chronic low back pain, unspecified back pain laterality, unspecified whether sciatica  present  -     [facility]ulatory referral[facility]  -     X-Ray Lumbar Complete Including Flex And Ext; Future; Expected date: 06/08/2022  -     Ambulatory referral/consult to Physical/Occupational Therapy; Future; Expected date: 06/15/2022

## 2022-06-28 ENCOUNTER — CLINICAL SUPPORT (OUTPATIENT)
Dept: REHABILITATION | Facility: HOSPITAL | Age: 77
End: 2022-06-28
Attending: PHYSICAL MEDICINE & REHABILITATION
Payer: MEDICARE

## 2022-06-28 DIAGNOSIS — G89.29 CHRONIC LOW BACK PAIN, UNSPECIFIED BACK PAIN LATERALITY, UNSPECIFIED WHETHER SCIATICA PRESENT: ICD-10-CM

## 2022-06-28 DIAGNOSIS — M54.50 CHRONIC LOW BACK PAIN, UNSPECIFIED BACK PAIN LATERALITY, UNSPECIFIED WHETHER SCIATICA PRESENT: ICD-10-CM

## 2022-06-28 PROCEDURE — 97110 THERAPEUTIC EXERCISES: CPT | Mod: PN

## 2022-06-28 PROCEDURE — 97161 PT EVAL LOW COMPLEX 20 MIN: CPT | Mod: PN

## 2022-06-28 NOTE — PLAN OF CARE
[facility]   Physical Therapy Initial Evaluation     Date: 6/28/2022   [id number]    Therapy Diagnosis:   Encounter Diagnosis   Name Primary?    Chronic low back pain, unspecified back pain laterality, unspecified whether sciatica present      Physician: Hubert Aleman MD    Physician Orders: PT Eval and Treat   Medical Diagnosis from Referral: Chronic LBP,unspcified laterality,unspecified whether sciatica present.  Evaluation Date: 6/28/2022  Authorization Period Expiration: 12/31/22  Plan of Care Expiration: 9/2/22  Progress Note Due: 7/27/22  Visit # / Visits authorized: 1/ 20   FOTO: 47/100    Precautions: Standard     Time In: 1515  Time Out: 1555  Total Appointment Time (timed & untimed codes): 40 minutes      SUBJECTIVE     Date of onset: Insidious.    History of current condition - Tali reports: LBP started ~ 6 months ago without trauma,gradually getting worse.Pt reports difficulties with ADLs,functional activities,homemaking.    Falls: no    Imaging, x-ray.    There is also multilevel degenerative change in the included lower thoracic spine on the AP view with there is multilevel endplate osteophyte formation.  There are surgical clips in the right upper abdomen.     Impression:     1. There is degenerative disc and facet disease.  There is no fracture.  There is mild anterolisthesis of L4 on L5 and L5 on S1 without instability demonstrated.  Also, there is trace retrolisthesis of L2 on L3.     Prior Therapy: no  Social History: in [facility] house lives alone  Occupation: Retired.  Prior Level of Function: Independent  Current Level of Function: Modified independent.    Pain:  Current 7/10, worst 9/10, best 3/10   Location: bilateral back    Description: Aching, Dull, Sharp and Variable  Aggravating Factors: Standing, Bending, Walking, Extension, Flexing and Lifting  Easing Factors: relaxation, pain medication and rest    Patients  goals: pain to go away     Medical History:   Past Medical History:   Diagnosis Date    Cataract of left eye     Depression     Glaucoma     Hypertension     Loss of equilibrium     Osteoporosis     Thyroid disease        Surgical History:   Tali Hopper  has a past surgical history that includes Hysterectomy; Tonsillectomy; Cholecystectomy; and Eye surgery.    Medications:   Tali has a current medication list which includes the following prescription(s): albuterol, amlodipine, ascorbic acid (vitamin c), aspirin, b complex vitamins, calcium carbonate/vitamin d3, duloxetine, fluticasone propionate, latanoprost, levothyroxine, losartan-hydrochlorothiazide 100-12.5 mg, metformin, estroblend, raloxifene, timolol maleate 0.5%, [DISCONTINUED] bupropion, and [DISCONTINUED] escitalopram oxalate.    Allergies:   Review of patient's allergies indicates:   Allergen Reactions    Benadryl decongestant Shortness Of Breath     Respiration problems    Penicillins Hives          OBJECTIVE     Cervical/Thoracic/Lumbar AROM: Pain/Dysfunction with Movement:   Flexion  40    Extension  10    Right side bending  15    Left side bending  15    Right rotation  10    Left rotation  10      Strength of l/spine is grossly 3-/5 in all planes.  Posture;head forward,scoliosis,flexed hips.  Special tests.   Axial loading;neg.  Slump test;neg  SLR; SLR LT;75, RT;75 neg bilaterally.  OSCAR's;neg bilaterally.  FADDIR;neg bilaterally.    Palpation;L2-L5 paraspinals tender bilaterally LT>RT.     Limitation/Restriction for FOTO lumbar Survey    Therapist reviewed FOTO scores for Tali Hopper on 6/28/2022.   FOTO documents entered into EP[employer] - see Media section.    Limitation Score: 53%         TREATMENT     Total Treatment time (time-based codes) separate from Evaluation: 8 minutes      Tali received the treatments listed below:      therapeutic exercises to develop strength for 8 minutes including:  Bike 8'.    PATIENT  EDUCATION AND HOME EXERCISES     Education provided:   - Role of PT. POC.    ASSESSMENT     Tali is a 77 y.o. female referred to outpatient Physical Therapy with a medical diagnosis of chronic LBP. Patient presents with ROM and strength deficits,poor posture,impaired functional status due to pain and above listed deficits.    Patient prognosis is Good.   Patient will benefit from skilled outpatient Physical Therapy to address the deficits stated above and in the chart below, provide patient /family education, and to maximize patientt's level of independence.     Plan of care discussed with patient: Yes  Patient's spiritual, cultural and educational needs considered and patient is agreeable to the plan of care and goals as stated below:     Anticipated Barriers for therapy: No    Medical Necessity is demonstrated by the following  History  Co-morbidities and personal factors that may impact the plan of care Co-morbidities:   high BMI    Personal Factors:   no deficits     low   Examination  Body Structures and Functions, activity limitations and participation restrictions that may impact the plan of care Body Regions:   back    Body Systems:    gross symmetry  ROM  strength    Participation Restrictions:   no    Activity limitations:   Learning and applying knowledge  no deficits    General Tasks and Commands  no deficits    Communication  no deficits    Mobility  lifting and carrying objects    Self care  no deficits    Domestic Life  no deficits    Interactions/Relationships  no deficits    Life Areas  no deficits    Community and Social Life  no deficits         low   Clinical Presentation stable and uncomplicated low   Decision Making/ Complexity Score: low     Goals:  Short Term Goals (STG) # weeks Goal Review Date Reviewed Date Met   Pt will demonstrate independence with initial HEP to facilitate therex progression and improvements in functional mobility 4 Initial 6/28/2022    The patient will be able to  sit/stand for greater than 30 minutes without complaints of paraesthesia or pain in her bilateral lower extremities or bilateral SI joints 4 Initial 6/28/2022    The patient will increase bilateral lower extremity strength to greater than 1/3 of manual muscle grading for all deficient areas with pain reported less than or equal to 5/10 4 Initial 6/28/2022 6/28/2022 6/28/2022 6/28/2022 6/28/2022 6/28/2022 6/28/2022 6/28/2022      Long Term Goals (LTG) # weeks Goal Review Date Reviewed Date Met   The patient will improve the Modified Oswestry Score to less than 40% Disability 8 Initial 6/28/2022    The patient will demonstrate increased lumbar active range of motion to greater than 10 degrees for all planes in order to improve the patient's ability to perform ADLS for a full day. 8 Initial 6/28/2022    Pt will be able to ambulate 300' without pain. 8 Initial 6/28/2022    Tolerable pain level 0-4/10 to improve functional status.   6/28/2022    Restore previous LOF 8 Initial 6/28/2022 6/28/2022 6/28/2022 6/28/2022 6/28/2022 6/28/2022        PLAN   Plan of care Certification: 6/28/2022 to 9/2/22.    Outpatient Physical Therapy 2 times weekly for 8 weeks to include the following interventions: Electrical Stimulation PRN, Manual Therapy, Moist Heat/ Ice, Patient Education, Therapeutic Activities, Therapeutic Exercise and dry needling PRN.IMS PRN..     Jez Vaughan, PT      I CERTIFY THE NEED FOR THESE SERVICES FURNISHED UNDER THIS PLAN OF TREATMENT AND WHILE UNDER MY CARE   Physician's comments:     Physician's Signature: ___________________________________________________

## 2022-07-05 ENCOUNTER — TELEPHONE (OUTPATIENT)
Dept: FAMILY MEDICINE | Facility: CLINIC | Age: 77
End: 2022-07-05
Payer: MEDICARE

## 2022-07-05 ENCOUNTER — CLINICAL SUPPORT (OUTPATIENT)
Dept: REHABILITATION | Facility: HOSPITAL | Age: 77
End: 2022-07-05
Attending: PHYSICAL MEDICINE & REHABILITATION
Payer: MEDICARE

## 2022-07-05 DIAGNOSIS — M54.50 LOW BACK PAIN, UNSPECIFIED BACK PAIN LATERALITY, UNSPECIFIED CHRONICITY, UNSPECIFIED WHETHER SCIATICA PRESENT: Primary | ICD-10-CM

## 2022-07-05 PROCEDURE — 97110 THERAPEUTIC EXERCISES: CPT | Mod: PN

## 2022-07-05 NOTE — TELEPHONE ENCOUNTER
----- Message from Twan Munoz sent at 7/5/2022  1:36 PM CDT -----  Type:  Patient Requesting Referral    Who Called: Patient  Does the patient already have the specialty appointment scheduled?:  No  If yes, what is the date of that appointment?:   Referral to What Specialty: ENT  Reason for Referral:  Ringing in ears  Does the patient want the referral with a specific physician?:  ----  [facility]  Patient Requesting a Call Back?: Yes  Best Call Back Number:  [phone]  Additional Information:

## 2022-07-05 NOTE — PROGRESS NOTES
[facility]   Physical Therapy Treatment Note     [id number]    Therapy Diagnosis:   Encounter Diagnosis   Name Primary?    Low back pain, unspecified back pain laterality, unspecified chronicity, unspecified whether sciatica present Yes     Physician: Hubert Aleman MD    Visit Date: 7/5/2022      Physician Orders: PT Eval and Treat   Medical Diagnosis from Referral: Chronic LBP,unspcified laterality,unspecified whether sciatica present.  Evaluation Date: 6/28/2022  Authorization Period Expiration: 12/31/22  Plan of Care Expiration: 9/2/22  Progress Note Due: 7/27/22  Visit # / Visits authorized: 2/ 20   FOTO: 47/100     Precautions: Standard      PTA Visit #: 0/5     Time In: 0800  Time Out: 0840  Total Billable Time: 40 minutes    SUBJECTIVE     Pt reports: balance problems.  She was compliant with home exercise program.  Response to previous treatment: eval only  Functional change: reporting balance problems    Pain: 5-6/10  Location: bilateral back      OBJECTIVE     Objective Measures updated at progress report unless specified.     Treatment     Tali received the treatments listed below:      therapeutic exercises to develop strength, endurance, ROM and flexibility for 40 minutes including:  Bike 10'  HS stretch 3x30s  Piriformis stretch 3x30  DKTC 30 w/ball  LTR 30 w/ball  Gastroc stretch 3x30  HR/TR 30    Patient Education and Home Exercises     Home Exercises Provided and Patient Education Provided     Education provided:   - posture ed    ASSESSMENT     Performed well    Tali Is progressing well towards her goals.   Pt prognosis is Good.     Pt will continue to benefit from skilled outpatient physical therapy to address the deficits listed in the problem list box on initial evaluation, provide pt/family education and to maximize pt's level of independence in the home and community environment.     Pt's spiritual, cultural and  educational needs considered and pt agreeable to plan of care and goals.     Anticipated barriers to physical therapy: no    Goals:   Short Term Goals (STG) # weeks Goal Review Date Reviewed Date Met   Pt will demonstrate independence with initial HEP to facilitate therex progression and improvements in functional mobility 4 Initial 6/28/2022     The patient will be able to sit/stand for greater than 30 minutes without complaints of paraesthesia or pain in her bilateral lower extremities or bilateral SI joints 4 Initial 6/28/2022     The patient will increase bilateral lower extremity strength to greater than 1/3 of manual muscle grading for all deficient areas with pain reported less than or equal to 5/10 4 Initial 6/28/2022 6/28/2022 6/28/2022 6/28/2022 6/28/2022 6/28/2022 6/28/2022 6/28/2022        Long Term Goals (LTG) # weeks Goal Review Date Reviewed Date Met   The patient will improve the Modified Oswestry Score to less than 40% Disability 8 Initial 6/28/2022     The patient will demonstrate increased lumbar active range of motion to greater than 10 degrees for all planes in order to improve the patient's ability to perform ADLS for a full day. 8 Initial 6/28/2022     Pt will be able to ambulate 300' without pain. 8 Initial 6/28/2022     Tolerable pain level 0-4/10 to improve functional status.     6/28/2022     Restore previous LOF 8 Initial 6/28/2022                 PLAN     Schedule with Alan Phipps PT for vestibular rehab.    Jez Vaughan, PT

## 2022-07-06 ENCOUNTER — OFFICE VISIT (OUTPATIENT)
Dept: FAMILY MEDICINE | Facility: CLINIC | Age: 77
End: 2022-07-06
Payer: MEDICARE

## 2022-07-06 VITALS
HEART RATE: 90 BPM | TEMPERATURE: 98 F | SYSTOLIC BLOOD PRESSURE: 130 MMHG | DIASTOLIC BLOOD PRESSURE: 66 MMHG | HEIGHT: 63 IN | WEIGHT: 208.75 LBS | BODY MASS INDEX: 36.99 KG/M2 | OXYGEN SATURATION: 98 %

## 2022-07-06 DIAGNOSIS — H93.11 TINNITUS OF RIGHT EAR: ICD-10-CM

## 2022-07-06 DIAGNOSIS — H61.21 HEARING LOSS OF RIGHT EAR DUE TO CERUMEN IMPACTION: Primary | ICD-10-CM

## 2022-07-06 PROCEDURE — 99213 OFFICE O/P EST LOW 20 MIN: CPT | Mod: S$GLB,,, | Performed by: FAMILY MEDICINE

## 2022-07-06 PROCEDURE — 99999 PR PBB SHADOW E&M-EST. PATIENT-LVL IV: ICD-10-PCS | Mod: PBBFAC,,, | Performed by: FAMILY MEDICINE

## 2022-07-06 PROCEDURE — 99999 PR PBB SHADOW E&M-EST. PATIENT-LVL IV: CPT | Mod: PBBFAC,,, | Performed by: FAMILY MEDICINE

## 2022-07-06 PROCEDURE — 99213 PR OFFICE/OUTPT VISIT, EST, LEVL III, 20-29 MIN: ICD-10-PCS | Mod: S$GLB,,, | Performed by: FAMILY MEDICINE

## 2022-07-06 NOTE — PROGRESS NOTES
Subjective:       Patient ID: Tali Hopper is a 77 y.o. female.    Chief Complaint: No chief complaint on file.    New to me patient here for UC visit.  Right ear has a buzzing or ringing sensation; for months.  No pain.  If gets water in it, hard to get it out.      Review of Systems   Constitutional: Negative for fever.   Respiratory: Negative for shortness of breath.    Cardiovascular: Negative for chest pain.   Gastrointestinal: Negative for abdominal pain and nausea.   Skin: Negative for rash.   All other systems reviewed and are negative.      Objective:      Physical Exam  Constitutional:       General: She is not in acute distress.     Appearance: She is well-developed.   HENT:      Left Ear: Tympanic membrane and ear canal normal.      Ears:      Comments: Right EAC noted to have a deep wax impaction.  TM non-vis.  Cardiovascular:      Rate and Rhythm: Normal rate and regular rhythm.      Heart sounds: No murmur heard.  Pulmonary:      Effort: Pulmonary effort is normal.      Breath sounds: Normal breath sounds.         Assessment:       1. Hearing loss of right ear due to cerumen impaction    2. Tinnitus of right ear        Plan:       Hearing loss of right ear due to cerumen impaction  -     Ambulatory referral/consult to ENT; Future; Expected date: 07/13/2022    Tinnitus of right ear  -     Ambulatory referral/consult to ENT; Future; Expected date: 07/13/2022    Irrigation done per MA; re-check shows no improvement.  Will Refer to ENT

## 2022-07-07 ENCOUNTER — CLINICAL SUPPORT (OUTPATIENT)
Dept: REHABILITATION | Facility: HOSPITAL | Age: 77
End: 2022-07-07
Attending: PHYSICAL MEDICINE & REHABILITATION
Payer: MEDICARE

## 2022-07-07 DIAGNOSIS — R26.89 IMBALANCE: ICD-10-CM

## 2022-07-07 DIAGNOSIS — R29.898 LEG WEAKNESS, BILATERAL: ICD-10-CM

## 2022-07-07 DIAGNOSIS — M54.50 CHRONIC LOW BACK PAIN, UNSPECIFIED BACK PAIN LATERALITY, UNSPECIFIED WHETHER SCIATICA PRESENT: Primary | ICD-10-CM

## 2022-07-07 DIAGNOSIS — G89.29 CHRONIC LOW BACK PAIN, UNSPECIFIED BACK PAIN LATERALITY, UNSPECIFIED WHETHER SCIATICA PRESENT: Primary | ICD-10-CM

## 2022-07-07 PROCEDURE — 97110 THERAPEUTIC EXERCISES: CPT | Mod: PN

## 2022-07-07 PROCEDURE — 97112 NEUROMUSCULAR REEDUCATION: CPT | Mod: PN

## 2022-07-07 NOTE — PROGRESS NOTES
Patient would benefit from further physical therapy visits to begin addressing strength/balance/mobility deficits. Please see updated plan of care.

## 2022-07-07 NOTE — PROGRESS NOTES
[facility]  {OTW PT/OT:62048} Plan of Care Note    [id number]    Therapy Diagnosis:   Encounter Diagnoses   Name Primary?    Chronic low back pain, unspecified back pain laterality, unspecified whether sciatica present Yes    Imbalance     Leg weakness, bilateral      Physician: Hubert Aleman MD    Visit Date: 7/7/2022    Physician Orders: {AMB PT KNEE ORDERS:19521} ***  Medical Diagnosis from Referral: ***  Evaluation Date: 7/7/2022  Authorization Period Expiration: ***   Plan of Care Expiration: ***  Progress Note Due: ***   Visit # / Visits authorized: ***/ ***  FOTO: ***/***    Precautions: {IP WOUND PRECAUTIONS OHS:31097}  Functional Level Prior to Evaluation:  ***    SUBJECTIVE     Update: ***    OBJECTIVE     Update: ***    ASSESSMENT     Update: ***    Previous Short Term Goals Status:   ***  New Short Term Goals Status:   ***  Long Term Goal Status: {DESC LONG TERM GOAL:36338}  Reasons for Recertification of Therapy:   ***    GOALS  ***    PLAN     Updated Certification Period: *** to ***   Recommended Treatment Plan: *** times per week for *** weeks:  {TX PLAN:05833}  Other Recommendations: ***    Alan Phipps, PT    I CERTIFY THE NEED FOR THESE SERVICES FURNISHED UNDER THIS PLAN OF TREATMENT AND WHILE UNDER MY CARE  Physician's comments:      Physician's Signature: ___________________________________________________

## 2022-07-07 NOTE — PLAN OF CARE
[facility]   Physical Therapy Updated Plan of Care    [id number]    Therapy Diagnosis:   Encounter Diagnoses   Name Primary?    Chronic low back pain, unspecified back pain laterality, unspecified whether sciatica present Yes    Imbalance     Leg weakness, bilateral      Physician: Hubert Aleman MD    Visit Date: 7/7/2022     Physician Orders: PT Eval and Treat   Medical Diagnosis from Referral: Chronic LBP,unspcified laterality,unspecified whether sciatica present.  Evaluation Date: 6/28/2022  Authorization Period Expiration: 12/31/22  Plan of Care Expiration: 9/2/22  Visit # / Visits authorized: 1/ 20      Time In: 1505  Time Out: 1545  Total Appointment Time (timed & untimed codes): 40 minutes    Precautions: fall  Functional Level Prior to Evaluation: supervision needed      SUBJECTIVE     Pt reports: moderate gait instability.  She does not have a balance home exercise program.  Response to previous treatment: no changes  Functional change: none    Pain: 6/10  Location: bilateral low back        OBJECTIVE     Lower Extremity Strength:  Right LE   Left LE     Hip flexion:  4-/5 Hip flexion: 4-/5   Knee extension: 4/5 Knee extension: 4/5   Ankle dorsiflexion:  4/5 Ankle dorsiflexion: 4/5     Gait Assessment:(if indicated)  - AD used: Rolling-walker   - Assistance: stand by assist  - Distance: 120 feet        OTHER: Tinetti: 15/28 (high fall risk)      Treatment     Tali received the treatments listed below:      therapeutic exercises to develop strength and endurance for 15 minutes including:     X 15 seated bilateral lower extremity therapeutic exercise (2#) = marching, long arc quad, ball squeeze, hip abduction (red theraband)   X 5 sit to stand emphasizing proper technique   Functional ambulation 120 feet with small-base quad cane and stand by assist       neuromuscular re-education activities to improve: Balance and Proprioception  for 25 minutes. The following activities were included:     X 10 static standing holds    X 10 each standing mini squats and heel raises/ toe raises    X 15 feet balance training in parallel bars = side stepping and backwards gait   X 30 seconds stand on AirEx    X 20 seconds full Romberg stance and use of small-base quad cane       Patient Education and Home Exercises     Home Exercises Provided and Patient Education Provided     Education provided:   - proper therapeutic exercise technique    Written Home Exercises Provided: to be provided at future appointment.       ASSESSMENT     Intermittent contact guard assist needed while standing on AirEx and during Romberg training (due to imbalance).            Tali Is not progressing well towards her goals.   Pt prognosis is Fair.     Pt will continue to benefit from skilled outpatient physical therapy to address the deficits listed in the problem list box on initial evaluation, provide pt/family education and to maximize pt's level of independence in the home and community environment.     Pt's spiritual, cultural and educational needs considered and pt agreeable to plan of care and goals.     Anticipated barriers to physical therapy: severity of low back pain; level of imbalance    Goals:     Previous Short Term Goals (STG) # weeks Goal Review Date Reviewed Status   Pt will demonstrate independence with initial HEP to facilitate therex progression and improvements in functional mobility 4 Initial 6/28/2022 NOT MET    The patient will be able to sit/stand for greater than 30 minutes without complaints of paraesthesia or pain in her bilateral lower extremities or bilateral SI joints 4 Initial 6/28/2022 NOT MET    The patient will increase bilateral lower extremity strength to greater than 1/3 of manual muscle grading for all deficient areas with pain reported less than or equal to 5/10 4 Initial 6/28/2022  NOT MET        Previous Long Term Goals (LTG) # weeks Goal  "Review Date Reviewed Status   The patient will improve the Modified Oswestry Score to less than 40% Disability 8 Initial 6/28/2022 NOT MET    The patient will demonstrate increased lumbar active range of motion to greater than 10 degrees for all planes in order to improve the patient's ability to perform ADLS for a full day. 8 Initial 6/28/2022 NOT MET    Pt will be able to ambulate 300' without pain. 8 Initial 6/28/2022 NOT MET    Tolerable pain level 0-4/10 to improve functional status.  8  Initial 6/28/2022 NOT MET    Restore previous LOF 8 Initial 6/28/2022 NOT MET      New Short Term Goals (2 Weeks):   1. Decrease patient's c/o pain to 5/10 during performance of ADL's for independence of self care activities.  2. Patient to tolerate use of 3# weights during lower extremity therapeutic exercise to improve overall strength.  3. Patient to tolerate x 10 repetitions sit to stand so that she may rise without using her arms to help.  4. Patient to tolerate x 30 seconds full Romberg stance without assist device to improve upright tolerance.    New Long Term Goals (4 Weeks):   1. Patient to demo competence with home exercise program to maintain therapeutic gains.  2. Patient to improve bilateral hip MMT 1/2 grade to demo strength gains from therapeutic intervention.  3. Patient to ambulate 200 feet with small-base quad cane and stand by assist with improved manuel/symmetry.  4. Patient to demonstrate improvement in her Tinetti score from "high fall risk" to "moderate fall risk".    Reasons for Recertification of Therapy:   Patient would benefit from further physical therapy visits to begin addressing strength/balance/mobility deficits.      PLAN     Updated Certification Period: 07/07/22 to 09/03/22   Recommended Treatment Plan: 2 times per week for 8 weeks (starting week of 07/11/22):  Gait Training, Manual Therapy, Moist Heat/ Ice, Neuromuscular Re-ed, Patient Education, Self Care, Therapeutic Activities, " Therapeutic Exercise and home exercise program.  Other Recommendations: n/a    Alan Phipps, PT    I CERTIFY THE NEED FOR THESE SERVICES FURNISHED UNDER THIS PLAN OF TREATMENT AND WHILE UNDER MY CARE  Physician's comments:      Physician's Signature: ___________________________________________________

## 2022-07-12 ENCOUNTER — CLINICAL SUPPORT (OUTPATIENT)
Dept: REHABILITATION | Facility: HOSPITAL | Age: 77
End: 2022-07-12
Payer: MEDICARE

## 2022-07-12 DIAGNOSIS — R26.89 IMBALANCE: Primary | ICD-10-CM

## 2022-07-12 DIAGNOSIS — R29.898 LEG WEAKNESS, BILATERAL: ICD-10-CM

## 2022-07-12 PROCEDURE — 97112 NEUROMUSCULAR REEDUCATION: CPT | Mod: PN

## 2022-07-12 PROCEDURE — 97110 THERAPEUTIC EXERCISES: CPT | Mod: PN

## 2022-07-12 NOTE — PROGRESS NOTES
[facility]   Physical Therapy Treatment Note     [id number]    Therapy Diagnosis:   Encounter Diagnoses   Name Primary?    Imbalance Yes    Leg weakness, bilateral      Physician: Hubert Aleman MD    Visit Date: 7/12/2022    Physician Orders: PT Eval and Treat   Medical Diagnosis from Referral: chronic LBP,unspcified laterality, unspecified whether sciatica present.  Evaluation Date: 6/28/2022  Authorization Period Expiration: 12/31/22  Plan of Care Expiration: 9/2/22  Visit # / Visits authorized: 3/ 20      Time In: 1500  Time Out: 1545  Total Appointment Time (timed & untimed codes): 45 minutes     Precautions: fall      SUBJECTIVE     Pt reports: decreased pain levels in her low back.  She does not have a balance home exercise program.  Response to previous treatment: decreased pain  Functional change: none     Pain: 1-2/10  Location: bilateral low back         OBJECTIVE     Objective Measures updated at progress report unless specified.     Treatment     Tali received the treatments listed below:       therapeutic exercises to develop strength and endurance for 20 minutes including:                 X 20 seated bilateral lower extremity therapeutic exercise (2#) = marching, long arc quad, ball squeeze, hip abduction (red theraband)              X 7 sit to stand emphasizing proper technique              Functional ambulation 175 feet with small-base quad cane and stand by assist    Up/down 2 x 6 (4-inch) steps with stand by assist         neuromuscular re-education activities to improve: Balance and Proprioception for 25 minutes. The following activities were included:                    X 15 each standing mini squats and heel raises/ toe raises               X 15 feet balance training in parallel bars = side stepping and backwards gait              X 30 seconds stand on AirEx               X 20 seconds full Romberg stance (eyes closed)  and use of small-base quad cane     X 30 seconds full Romberg stance (eyes open) and use of small-base quad cane       Patient Education and Home Exercises     Home Exercises Provided and Patient Education Provided     Education provided:   - proper therapeutic exercise technique    Written Home Exercises Provided: to be provided at future appointment.       ASSESSMENT     Patient was able to demonstrate increased repetitions during sit to stand, balance therapeutic exercise, and seated lower extremity therapeutic exercise; loss of balance x 3 during Romberg training (eyes closed) and while standing on AirEx; needed one hand support during alternating toe taps; improved gait endurance; able to ascend/descend steps with reciprocating gait pattern.    Tali Is progressing fairly well towards her goals.   Pt prognosis is Fair.     Pt will continue to benefit from skilled outpatient physical therapy to address the deficits listed in the problem list box on initial evaluation, provide pt/family education and to maximize pt's level of independence in the home and community environment.     Pt's spiritual, cultural and educational needs considered and pt agreeable to plan of care and goals.     Anticipated barriers to physical therapy: severity of imbalance and low back pain     Goals:     New Short Term Goals (2 Weeks):   1. Decrease patient's c/o pain to 5/10 during performance of ADL's for independence of self care activities. (MET)  2. Patient to tolerate use of 3# weights during lower extremity therapeutic exercise to improve overall strength. (NOT MET)  3. Patient to tolerate x 10 repetitions sit to stand so that she may rise without using her arms to help. (NOT MET)  4. Patient to tolerate x 30 seconds full Romberg stance without assist device to improve upright tolerance. (NOT MET)     New Long Term Goals (4 Weeks):   1. Patient to demo competence with home exercise program to maintain therapeutic gains. (NOT  "MET)  2. Patient to improve bilateral hip MMT 1/2 grade to demo strength gains from therapeutic intervention. (NOT MET)  3. Patient to ambulate 200 feet with small-base quad cane and stand by assist with improved manuel/symmetry. (NOT MET)  4. Patient to demonstrate improvement in her Tinetti score from "high fall risk" to "moderate fall risk". (NOT MET)      PLAN     Continue to advance strength/balance/mobility training to patient's tolerance.      Alan Phipps, PT     "

## 2022-07-14 ENCOUNTER — CLINICAL SUPPORT (OUTPATIENT)
Dept: REHABILITATION | Facility: HOSPITAL | Age: 77
End: 2022-07-14
Payer: MEDICARE

## 2022-07-14 DIAGNOSIS — R26.89 IMBALANCE: Primary | ICD-10-CM

## 2022-07-14 DIAGNOSIS — R29.898 LEG WEAKNESS, BILATERAL: ICD-10-CM

## 2022-07-14 PROCEDURE — 97110 THERAPEUTIC EXERCISES: CPT | Mod: PN,CQ

## 2022-07-14 PROCEDURE — 97112 NEUROMUSCULAR REEDUCATION: CPT | Mod: PN,CQ

## 2022-07-14 NOTE — PROGRESS NOTES
"[facility]   Physical Therapy Treatment Note     [id number]    Therapy Diagnosis:   Encounter Diagnoses   Name Primary?    Imbalance Yes    Leg weakness, bilateral      Physician: Hubert Aleman MD    Visit Date: 7/14/2022    Physician Orders: PT Eval and Treat   Medical Diagnosis from Referral: chronic LBP,unspcified laterality, unspecified whether sciatica present.  Evaluation Date: 6/28/2022  Authorization Period Expiration: 12/31/22  Plan of Care Expiration: 9/2/22  Visit # / Visits authorized: 5/20   PTA visit # 1/5         Time In: 1450  Time Out: 1534  Total Appointment Time: 44 minutes     Precautions: fall      SUBJECTIVE     Pt reports: she is tired today because she has been running around all day.  Pt stated she got lost driving around and ended up in [place].  "Ever since I had Covid I have trouble with stuff. (remembering how to get somewhere)  Pt states she gets Brain Fog 2-3 times a week and it's really bothersome.  Pt also states she gets really tired around 5:00 in the afternoon.  She does not have a balance home exercise program.  Response to previous treatment: decreased pain  Functional change: none     Pain:  0/10  Location: bilateral low back         OBJECTIVE     Objective Measures updated at progress report unless specified.     Treatment     Tali received the treatments listed below:       therapeutic exercises to develop strength and endurance for 19 minutes including:     Scifit x 10' Level 2    Seated exercises:  bilateral lower extremity therapeutic exercise (2#) = marching, long arc quad, ball squeeze, hip abduction (red theraband)            neuromuscular re-education activities to improve: Balance and Proprioception for 25 minutes. The following activities were included:    Standing exercises:  mini squats x 20 reps   heel raises/ toe raises x 20 reps   Hip Abduction x 20 reps bilateral lower extremity " "  Toe taps 6" step x 20 reps   balance training in parallel bars = side stepping and backwards gait                  Patient Education and Home Exercises     Home Exercises Provided and Patient Education Provided     Education provided:   - proper therapeutic exercise technique    Written Home Exercises Provided: to be provided at future appointment.       ASSESSMENT     Tali provided good effort and participation toward therapeutic interventions today with focus on lower extremity strengthening and balance. Pt did well with exercises but did appear to get shortness of breath and fatigued.   PTA concerned about pt's memory loss/brain fog, especially with patient driving and forgetting where she is going or unable to find places that she has been too before.   Patient would benefit from Speech Therapy consult to address Brain Fog associated with Covid.    Tali Is progressing fairly well towards her goals.   Pt prognosis is Fair.     Pt will continue to benefit from skilled outpatient physical therapy to address the deficits listed in the problem list box on initial evaluation, provide pt/family education and to maximize pt's level of independence in the home and community environment.     Pt's spiritual, cultural and educational needs considered and pt agreeable to plan of care and goals.     Anticipated barriers to physical therapy: severity of imbalance and low back pain     Goals:     New Short Term Goals (2 Weeks):   1. Decrease patient's c/o pain to 5/10 during performance of ADL's for independence of self care activities. (MET)  2. Patient to tolerate use of 3# weights during lower extremity therapeutic exercise to improve overall strength. (NOT MET)  3. Patient to tolerate x 10 repetitions sit to stand so that she may rise without using her arms to help. (NOT MET)  4. Patient to tolerate x 30 seconds full Romberg stance without assist device to improve upright tolerance. (NOT MET)     New Long Term Goals (4 " "Weeks):   1. Patient to demo competence with home exercise program to maintain therapeutic gains. (NOT MET)  2. Patient to improve bilateral hip MMT 1/2 grade to demo strength gains from therapeutic intervention. (NOT MET)  3. Patient to ambulate 200 feet with small-base quad cane and stand by assist with improved manuel/symmetry. (NOT MET)  4. Patient to demonstrate improvement in her Tinetti score from "high fall risk" to "moderate fall risk". (NOT MET)      PLAN     Updated Certification Period: 07/07/22 to 09/03/22   Recommended Treatment Plan: 2 times per week for 8 weeks (starting week of 07/11/22):  Gait Training, Manual Therapy, Moist Heat/ Ice, Neuromuscular Re-ed, Patient Education, Self Care, Therapeutic Activities, Therapeutic Exercise and home exercise program.    Continue to advance strength/balance/mobility training to patient's tolerance.      Em East, PTA     "

## 2022-07-18 ENCOUNTER — TELEPHONE (OUTPATIENT)
Dept: FAMILY MEDICINE | Facility: CLINIC | Age: 77
End: 2022-07-18
Payer: MEDICARE

## 2022-07-18 ENCOUNTER — DOCUMENTATION ONLY (OUTPATIENT)
Dept: REHABILITATION | Facility: HOSPITAL | Age: 77
End: 2022-07-18
Payer: MEDICARE

## 2022-07-18 DIAGNOSIS — R47.9 DIFFICULTY WITH SPEECH: Primary | ICD-10-CM

## 2022-07-18 NOTE — PROGRESS NOTES
Requested orders from MD for speech therapy referral.    Rozina Win M.A. CCC-SLP, IS  Speech Language Pathologist  Certified Brain Injury Specialist  7/18/2022

## 2022-07-19 ENCOUNTER — CLINICAL SUPPORT (OUTPATIENT)
Dept: REHABILITATION | Facility: HOSPITAL | Age: 77
End: 2022-07-19
Payer: MEDICARE

## 2022-07-19 DIAGNOSIS — R26.89 IMBALANCE: Primary | ICD-10-CM

## 2022-07-19 DIAGNOSIS — R29.898 LEG WEAKNESS, BILATERAL: ICD-10-CM

## 2022-07-19 PROCEDURE — 97110 THERAPEUTIC EXERCISES: CPT | Mod: PN,CQ

## 2022-07-19 PROCEDURE — 97112 NEUROMUSCULAR REEDUCATION: CPT | Mod: PN,CQ

## 2022-07-19 NOTE — PROGRESS NOTES
"[facility]   Physical Therapy Treatment Note     Name: Tali Hopper  Clinic Number: [id number]    Therapy Diagnosis:   Encounter Diagnoses   Name Primary?    Imbalance Yes    Leg weakness, bilateral      Physician: Hubert Aleman MD    Visit Date: 7/19/2022    Physician Orders: PT Eval and Treat   Medical Diagnosis from Referral: chronic LBP, unspcified laterality, unspecified whether sciatica present.  Evaluation Date: 6/28/2022  Authorization Period Expiration: 12/31/22  Plan of Care Expiration: 9/2/22  Visit # / Visits authorized: 6/20   PTA visit # 2/5    Time In: 1415  Time Out: 1457  Total Appointment Time: 43 minutes     Precautions: fall      SUBJECTIVE     Pt reports: she "just feels blah today" Pt also c/o pain in her low back  She does not have a balance home exercise program.  Response to previous treatment:   Functional change: none     Pain:  5/10  Location: bilateral low back         OBJECTIVE     Objective Measures updated at progress report unless specified.     Treatment     Tali received the treatments listed below:       therapeutic exercises to develop strength and endurance for 23 minutes including:     Scifit x 10' Level 3    Seated exercises:  bilateral lower extremity therapeutic exercise (2#) = marching, long arc quad, ball squeeze, hip abduction (red theraband)  Sit to stand x 10 reps           neuromuscular re-education activities to improve: Balance and Proprioception for 20 minutes. The following activities were included:    Standing exercises:  mini squats x 20 reps   heel raises/ toe raises x 20 reps   Hip Abduction x 20 reps bilateral lower extremity   Marching x 20 reps bilateral lower extremity   Toe taps 6" step x 20 reps (using bilateral upper extremity)  balance training in parallel bars = side stepping and backwards gait                  Patient Education and Home Exercises     Home Exercises Provided and Patient Education Provided " "    Education provided:   - proper therapeutic exercise technique    Written Home Exercises Provided: to be provided at future appointment.       ASSESSMENT     Tali provided good effort and participation toward therapeutic interventions today with focus on lower extremity strengthening and balance.   Pt did well with exercises but did appear to get shortness of breath and fatigued.         Tali Is progressing fairly well towards her goals.   Pt prognosis is Fair.     Pt will continue to benefit from skilled outpatient physical therapy to address the deficits listed in the problem list box on initial evaluation, provide pt/family education and to maximize pt's level of independence in the home and community environment.     Pt's spiritual, cultural and educational needs considered and pt agreeable to plan of care and goals.     Anticipated barriers to physical therapy: severity of imbalance and low back pain     Goals:     New Short Term Goals (2 Weeks):   1. Decrease patient's c/o pain to 5/10 during performance of ADL's for independence of self care activities. (MET)  2. Patient to tolerate use of 3# weights during lower extremity therapeutic exercise to improve overall strength. (NOT MET)  3. Patient to tolerate x 10 repetitions sit to stand so that she may rise without using her arms to help. (NOT MET)  4. Patient to tolerate x 30 seconds full Romberg stance without assist device to improve upright tolerance. (NOT MET)     New Long Term Goals (4 Weeks):   1. Patient to demo competence with home exercise program to maintain therapeutic gains. (NOT MET)  2. Patient to improve bilateral hip MMT 1/2 grade to demo strength gains from therapeutic intervention. (NOT MET)  3. Patient to ambulate 200 feet with small-base quad cane and stand by assist with improved manuel/symmetry. (NOT MET)  4. Patient to demonstrate improvement in her Tinetti score from "high fall risk" to "moderate fall risk". (NOT MET)      PLAN "     Updated Certification Period: 07/07/22 to 09/03/22   Recommended Treatment Plan: 2 times per week for 8 weeks (starting week of 07/11/22):  Gait Training, Manual Therapy, Moist Heat/ Ice, Neuromuscular Re-ed, Patient Education, Self Care, Therapeutic Activities, Therapeutic Exercise and home exercise program.    Continue to advance strength/balance/mobility training to patient's tolerance.      Em East, PTA

## 2022-07-19 NOTE — TELEPHONE ENCOUNTER
----- Message from Rozina Win CCC-SLP sent at 7/18/2022 11:19 AM CDT -----  Regarding: Requesting Speech Therapy Evaluation  Good morning,    The attached patient is complaining of cognitive changes after COVID and would like to participate in a Speech Therapy evaluation. Please place an ambulatory referral to Speech Therapy if you agree.     Rozina Win M.A. CCC-SLP, CBIS  Speech Language Pathologist  Certified Brain Injury Specialist  7/18/2022

## 2022-07-21 ENCOUNTER — CLINICAL SUPPORT (OUTPATIENT)
Dept: REHABILITATION | Facility: HOSPITAL | Age: 77
End: 2022-07-21
Payer: MEDICARE

## 2022-07-21 DIAGNOSIS — R26.89 IMBALANCE: Primary | ICD-10-CM

## 2022-07-21 DIAGNOSIS — R29.898 LEG WEAKNESS, BILATERAL: ICD-10-CM

## 2022-07-21 PROCEDURE — 97110 THERAPEUTIC EXERCISES: CPT | Mod: PN,CQ

## 2022-07-21 PROCEDURE — 97112 NEUROMUSCULAR REEDUCATION: CPT | Mod: PN,CQ

## 2022-07-21 NOTE — PROGRESS NOTES
"[facility]   Physical Therapy Treatment Note     [facility]  [facility] Number[facility] [id number]    Therapy Diagnosis:   Encounter Diagnoses   Name Primary?    Imbalance Yes    Leg weakness, bilateral      Physician: Hubert Aleman MD    Visit Date: 7/21/2022    Physician Orders: PT Eval and Treat   Medical Diagnosis from Referral: chronic LBP, unspcified laterality, unspecified whether sciatica present.  Evaluation Date: 6/28/2022  Authorization Period Expiration: 12/31/22  Plan of Care Expiration: 9/2/22  Visit # / Visits authorized: 7/20   PTA visit # 3/5    Time In: 1545  Time Out: 1625  Total Appointment Time: 40 minutes     Precautions: fall      SUBJECTIVE     Pt reports: "I'm not using my cane today".  Pt states she is trying to get around without using her cane.  She does not have a balance home exercise program.  Response to previous treatment:   Functional change: none     Pain:  0/10  Location: bilateral low back         OBJECTIVE     Objective Measures updated at progress report unless specified.     Treatment     Tali received the treatments listed below:       therapeutic exercises to develop strength and endurance for 15 minutes including:     Scifit x 10' Level 3    Seated exercises:  bilateral lower extremity therapeutic exercise x 30 reps (2#) = marching, long arc quad, ball squeeze, hip abduction (red theraband)  Sit to stand x 10 reps        Lumbar extension 10 x 10 seconds  Trunk rotation 10 x 5 seconds bilateral     neuromuscular re-education activities to improve: Balance and Proprioception for 25 minutes. The following activities were included:    Standing exercises:  mini squats x 20 reps   heel raises/ toe raises x 20 reps   Hip Abduction x 20 reps bilateral lower extremity   Marching x 20 reps bilateral lower extremity   Toe taps 6" step x 20 reps (using bilateral upper extremity)  Step ups 6" step x 10 reps Right/ Left   balance training in " parallel bars = side stepping and backwards gait                  Patient Education and Home Exercises     Home Exercises Provided and Patient Education Provided     Education provided:   - proper therapeutic exercise technique    Written Home Exercises Provided: Pt given copy of seated and standing therapeutic exercises, as well as Green Theraband, to use at home for Home exercise program    ASSESSMENT     Tali provided good effort and participation toward therapeutic interventions today with focus on lower extremity strengthening and balance.   Pt did well with exercises but did appear to get shortness of breath and fatigued.       Tali Is progressing fairly well towards her goals.   Pt prognosis is Fair.     Pt will continue to benefit from skilled outpatient physical therapy to address the deficits listed in the problem list box on initial evaluation, provide pt/family education and to maximize pt's level of independence in the home and community environment.     Pt's spiritual, cultural and educational needs considered and pt agreeable to plan of care and goals.     Anticipated barriers to physical therapy: severity of imbalance and low back pain     Goals:     New Short Term Goals (2 Weeks):   1. Decrease patient's c/o pain to 5/10 during performance of ADL's for independence of self care activities. (MET)  2. Patient to tolerate use of 3# weights during lower extremity therapeutic exercise to improve overall strength. (NOT MET)  3. Patient to tolerate x 10 repetitions sit to stand so that she may rise without using her arms to help. (NOT MET)  4. Patient to tolerate x 30 seconds full Romberg stance without assist device to improve upright tolerance. (NOT MET)     New Long Term Goals (4 Weeks):   1. Patient to demo competence with home exercise program to maintain therapeutic gains. (NOT MET)  2. Patient to improve bilateral hip MMT 1/2 grade to demo strength gains from therapeutic intervention. (NOT  "MET)  3. Patient to ambulate 200 feet with small-base quad cane and stand by assist with improved manuel/symmetry. (NOT MET)  4. Patient to demonstrate improvement in her Tinetti score from "high fall risk" to "moderate fall risk". (NOT MET)      PLAN     Updated Certification Period: 07/07/22 to 09/03/22   Recommended Treatment Plan: 2 times per week for 8 weeks (starting week of 07/11/22):  Gait Training, Manual Therapy, Moist Heat/ Ice, Neuromuscular Re-ed, Patient Education, Self Care, Therapeutic Activities, Therapeutic Exercise and home exercise program.    Continue to advance strength/balance/mobility training to patient's tolerance.      Em East, PTA     "

## 2022-07-21 NOTE — PROGRESS NOTES
PT/PTA met face to face to discuss pt's treatment plan and progress towards established goals. Pt will be seen by a physical therapist minimally every 6th visit or every 30 days.      Em East PTA

## 2022-07-26 ENCOUNTER — CLINICAL SUPPORT (OUTPATIENT)
Dept: REHABILITATION | Facility: HOSPITAL | Age: 77
End: 2022-07-26
Payer: MEDICARE

## 2022-07-26 DIAGNOSIS — G89.29 CHRONIC LOW BACK PAIN, UNSPECIFIED BACK PAIN LATERALITY, UNSPECIFIED WHETHER SCIATICA PRESENT: ICD-10-CM

## 2022-07-26 DIAGNOSIS — R26.89 IMBALANCE: Primary | ICD-10-CM

## 2022-07-26 DIAGNOSIS — M54.50 CHRONIC LOW BACK PAIN, UNSPECIFIED BACK PAIN LATERALITY, UNSPECIFIED WHETHER SCIATICA PRESENT: ICD-10-CM

## 2022-07-26 DIAGNOSIS — R29.898 LEG WEAKNESS, BILATERAL: ICD-10-CM

## 2022-07-26 PROCEDURE — 97110 THERAPEUTIC EXERCISES: CPT | Mod: PN

## 2022-07-26 PROCEDURE — 97112 NEUROMUSCULAR REEDUCATION: CPT | Mod: PN

## 2022-07-26 NOTE — PROGRESS NOTES
[facility]   Physical Therapy Treatment Note     [id number]    Therapy Diagnosis:   No diagnosis found.  Physician: Hubert Aleman MD    Visit Date: 7/26/2022    Physician Orders: PT Eval and Treat   Medical Diagnosis from Referral: chronic LBP,unspcified laterality, unspecified whether sciatica present.  Evaluation Date: 6/28/2022  Authorization Period Expiration: 12/31/22  Plan of Care Expiration: 9/2/22  Visit # / Visits authorized: 7/ 20      Time In: 1425 (check in time of 1423 for 1415 appointment)  Time Out: 1510  Total Appointment Time (timed & untimed codes): 45 minutes     Precautions: fall      SUBJECTIVE     Pt reports: moderate pain levels in her low back.    She was compliant with home exercise program.  Response to previous treatment: increased pain  Functional change: none     Pain: 5/10  Location: bilateral low back         OBJECTIVE     Objective Measures updated at progress report unless specified.     Treatment     Tali received the treatments listed below:       therapeutic exercises to develop strength and endurance for 20 minutes including:                 X 20 seated bilateral lower extremity therapeutic exercise (2#) = marching, long arc quad, ball squeeze, hip abduction (red theraband)              X 10 sit to stand with hands on thighs         X 10 minutes SciFit (level 3) as an adjunct to strength/balance/mobility training            neuromuscular re-education activities to improve: Balance and Proprioception for 25 minutes. The following activities were included:                    X 15 standing mini squats               X 20 feet each balance training in parallel bars = side stepping and backwards gait              X 30 seconds  full Romberg stance    X 10 alternating toe taps on 3-inch stool                  Patient Education and Home Exercises     Home Exercises Provided and Patient Education Provided  "    Education provided:   - proper therapeutic exercise technique    Written Home Exercises Provided: Patient instructed to cont prior HEP.       ASSESSMENT     Patient was able to perform sit to stand with hands on thighs; improved distance performed during balance gait; loss of balance x 8 during Romberg training; minimal assist needed during alternating toe taps.    Tali Is progressing fairly well towards her goals.   Pt prognosis is Fair.     Pt will continue to benefit from skilled outpatient physical therapy to address the deficits listed in the problem list box on initial evaluation, provide pt/family education and to maximize pt's level of independence in the home and community environment.     Pt's spiritual, cultural and educational needs considered and pt agreeable to plan of care and goals.     Anticipated barriers to physical therapy: severity of imbalance and low back pain     Goals:     New Short Term Goals (2 Weeks):   1. Decrease patient's c/o pain to 5/10 during performance of ADL's for independence of self care activities. (MET)  2. Patient to tolerate use of 3# weights during lower extremity therapeutic exercise to improve overall strength. (NOT MET)  3. Patient to tolerate x 10 repetitions sit to stand so that she may rise without using her arms to help. (PART MET)  4. Patient to tolerate x 30 seconds full Romberg stance without assist device to improve upright tolerance. (NOT MET)     New Long Term Goals (4 Weeks):   1. Patient to demo competence with home exercise program to maintain therapeutic gains. (NOT MET)  2. Patient to improve bilateral hip MMT 1/2 grade to demo strength gains from therapeutic intervention. (NOT MET)  3. Patient to ambulate 200 feet with small-base quad cane and stand by assist with improved manuel/symmetry. (NOT MET)  4. Patient to demonstrate improvement in her Tinetti score from "high fall risk" to "moderate fall risk". (NOT MET)      PLAN     Continue to advance " strength/balance/mobility training to patient's tolerance.      Alan Phipps, PT

## 2022-07-26 NOTE — PROGRESS NOTES
"[facility]   Physical Therapy Treatment Note     [facility]  [facility] Number[facility] [id number]    Therapy Diagnosis: No diagnosis found.  Physician: Hubert Aleman MD    Visit Date: 7/26/2022    [copy and paste header from mini here]    PTA Visit #: ***/5     Time In: ***  Time Out: ***  Total Billable Time: *** minutes    SUBJECTIVE     Pt reports: ***.  She {Actions; was/was not:56914} compliant with home exercise program.  Response to previous treatment: ***  Functional change: ***    Pain: {0-10:98370::"0"}/10  Location: {RIGHT/LEFT/BILATERAL:94503} {LOCATION ON BODY:90230}     OBJECTIVE     Objective Measures updated at progress report unless specified.     Treatment     Tali received the treatments listed below:      therapeutic exercises to develop {AMB PT PROGRESS OBJECTIVE:41257} for *** minutes including:  ***    manual therapy techniques: {AMB PT PROGRESS MANUAL THERAPY:25306} were applied to the: *** for *** minutes, including:  ***    neuromuscular re-education activities to improve: {AMB PT PROGRESS NEURO RE-ED:75140} for *** minutes. The following activities were included:  ***    therapeutic activities to improve functional performance for ***  minutes, including:  ***    gait training to improve functional mobility and safety for ***  minutes, including:  ***    direct contact modalities after being cleared for contraindications: {AMB PT PROGRESS DIRECT CONTACT MODES:54336}    supervised modalities after being cleared for contradictions: {AMB PT SUPERVISED MODES:57893}    hot pack for *** minutes to ***.    cold pack for *** minutes to ***.        Patient Education and Home Exercises     Home Exercises Provided and Patient Education Provided     Education provided:   - ***    Written Home Exercises Provided: {Blank single:73324::"yes","Patient instructed to cont prior HEP"}. Exercises were reviewed and Tali was able to demonstrate them prior to the end of the " session.  Tali demonstrated {Desc; good/fair/poor:67850} understanding of the education provided. See EMR under Patient Instructions for exercises provided during therapy sessions    ASSESSMENT     ***    Tali {IS/IS NOT:11248} progressing well towards her goals.   Pt prognosis is {REHAB PROGNOSIS OHS:09031}.     Pt will continue to benefit from skilled outpatient physical therapy to address the deficits listed in the problem list box on initial evaluation, provide pt/family education and to maximize pt's level of independence in the home and community environment.     Pt's spiritual, cultural and educational needs considered and pt agreeable to plan of care and goals.     Anticipated barriers to physical therapy: ***    Goals: ***    PLAN     ***    Alan Phipps, PT

## 2022-07-28 ENCOUNTER — CLINICAL SUPPORT (OUTPATIENT)
Dept: REHABILITATION | Facility: HOSPITAL | Age: 77
End: 2022-07-28
Payer: MEDICARE

## 2022-07-28 DIAGNOSIS — R29.898 LEG WEAKNESS, BILATERAL: ICD-10-CM

## 2022-07-28 DIAGNOSIS — R26.89 IMBALANCE: Primary | ICD-10-CM

## 2022-07-28 PROCEDURE — 97110 THERAPEUTIC EXERCISES: CPT | Mod: PN,CQ

## 2022-07-28 PROCEDURE — 97112 NEUROMUSCULAR REEDUCATION: CPT | Mod: PN,CQ

## 2022-07-28 NOTE — PROGRESS NOTES
"[facility]   Physical Therapy Treatment Note     [id number]    Therapy Diagnosis:   Encounter Diagnoses   Name Primary?    Imbalance Yes    Leg weakness, bilateral      Physician: Hubert Aleman MD    Visit Date: 7/28/2022    Physician Orders: PT Eval and Treat   Medical Diagnosis from Referral: chronic LBP, unspcified laterality, unspecified whether sciatica present.  Evaluation Date: 6/28/2022  Authorization Period Expiration: 12/31/22  Plan of Care Expiration: 9/2/22  Visit # / Visits authorized: 9/20   PTA visit # 1/5    Time In: 1540  Time Out: 1625  Total Appointment Time: 45 minutes     Precautions: fall      SUBJECTIVE     Pt reports: she is having a little bit of pain today  She does not have a balance home exercise program.  Response to previous treatment:   Functional change: none     Pain:  2/10  Location: bilateral low back         OBJECTIVE     Objective Measures updated at progress report unless specified.     Treatment     Tali received the treatments listed below:       therapeutic exercises to develop strength and endurance for 15 minutes including:     Scifit x 10' Level 3    Seated exercises:  bilateral lower extremity therapeutic exercise x 30 reps (2#) = marching, long arc quad, ball squeeze, hip abduction (red theraband)      neuromuscular re-education activities to improve: Balance and Proprioception for 30  minutes. The following activities were included:    Standing exercises:  mini squats x 20 reps   heel raises/ toe raises x 20 reps   Hip Abduction x 20 reps bilateral lower extremity   Marching x 20 reps bilateral lower extremity   Toe taps 6" step x 10 reps (no hands)  Step ups 6" step x 10 reps Right/ Left   balance training in parallel bars = side stepping and backwards gait                  Patient Education and Home Exercises     Home Exercises Provided and Patient Education Provided     Education provided: "   - proper therapeutic exercise technique    Written Home Exercises Provided: Pt given copy of seated and standing therapeutic exercises, as well as Green Theraband, to use at home for Home exercise program    ASSESSMENT     Tali provided good effort and participation toward therapeutic interventions today with focus on lower extremity strengthening and balance.   Pt did well with exercises but did appear to get shortness of breath and fatigued.       Tali Is progressing fairly well towards her goals.   Pt prognosis is Fair.     Pt will continue to benefit from skilled outpatient physical therapy to address the deficits listed in the problem list box on initial evaluation, provide pt/family education and to maximize pt's level of independence in the home and community environment.     Pt's spiritual, cultural and educational needs considered and pt agreeable to plan of care and goals.     Anticipated barriers to physical therapy: severity of imbalance and low back pain     Goals:     New Short Term Goals (2 Weeks):   1. Decrease patient's c/o pain to 5/10 during performance of ADL's for independence of self care activities. (MET)  2. Patient to tolerate use of 3# weights during lower extremity therapeutic exercise to improve overall strength. (NOT MET)  3. Patient to tolerate x 10 repetitions sit to stand so that she may rise without using her arms to help. (NOT MET)  4. Patient to tolerate x 30 seconds full Romberg stance without assist device to improve upright tolerance. (NOT MET)     New Long Term Goals (4 Weeks):   1. Patient to demo competence with home exercise program to maintain therapeutic gains. (NOT MET)  2. Patient to improve bilateral hip MMT 1/2 grade to demo strength gains from therapeutic intervention. (NOT MET)  3. Patient to ambulate 200 feet with small-base quad cane and stand by assist with improved manuel/symmetry. (NOT MET)  4. Patient to demonstrate improvement in her Tinetti score from  ""high fall risk" to "moderate fall risk". (NOT MET)      PLAN     Updated Certification Period: 07/07/22 to 09/03/22   Recommended Treatment Plan: 2 times per week for 8 weeks (starting week of 07/11/22):  Gait Training, Manual Therapy, Moist Heat/ Ice, Neuromuscular Re-ed, Patient Education, Self Care, Therapeutic Activities, Therapeutic Exercise and home exercise program.    Continue to advance strength/balance/mobility training to patient's tolerance.    Em East, PTA     "

## 2022-07-29 ENCOUNTER — LAB VISIT (OUTPATIENT)
Dept: LAB | Facility: HOSPITAL | Age: 77
End: 2022-07-29
Attending: FAMILY MEDICINE
Payer: MEDICARE

## 2022-07-29 DIAGNOSIS — E78.5 HYPERLIPIDEMIA, UNSPECIFIED HYPERLIPIDEMIA TYPE: ICD-10-CM

## 2022-07-29 DIAGNOSIS — N17.9 AKI (ACUTE KIDNEY INJURY): ICD-10-CM

## 2022-07-29 DIAGNOSIS — E11.9 TYPE 2 DIABETES MELLITUS WITHOUT COMPLICATION, WITHOUT LONG-TERM CURRENT USE OF INSULIN: ICD-10-CM

## 2022-07-29 LAB
ALBUMIN SERPL BCP-MCNC: 3.5 G/DL (ref 3.5–5.2)
ALP SERPL-CCNC: 61 U/L (ref 55–135)
ALT SERPL W/O P-5'-P-CCNC: 10 U/L (ref 10–44)
ANION GAP SERPL CALC-SCNC: 13 MMOL/L (ref 8–16)
ANION GAP SERPL CALC-SCNC: 13 MMOL/L (ref 8–16)
AST SERPL-CCNC: 15 U/L (ref 10–40)
BASOPHILS # BLD AUTO: 0.1 K/UL (ref 0–0.2)
BASOPHILS NFR BLD: 1 % (ref 0–1.9)
BILIRUB SERPL-MCNC: 0.4 MG/DL (ref 0.1–1)
BUN SERPL-MCNC: 28 MG/DL (ref 8–23)
BUN SERPL-MCNC: 28 MG/DL (ref 8–23)
CALCIUM SERPL-MCNC: 9 MG/DL (ref 8.7–10.5)
CALCIUM SERPL-MCNC: 9 MG/DL (ref 8.7–10.5)
CHLORIDE SERPL-SCNC: 105 MMOL/L (ref 95–110)
CHLORIDE SERPL-SCNC: 105 MMOL/L (ref 95–110)
CHOLEST SERPL-MCNC: 185 MG/DL (ref 120–199)
CHOLEST/HDLC SERPL: 4.2 {RATIO} (ref 2–5)
CO2 SERPL-SCNC: 22 MMOL/L (ref 23–29)
CO2 SERPL-SCNC: 22 MMOL/L (ref 23–29)
CREAT SERPL-MCNC: 1 MG/DL (ref 0.5–1.4)
CREAT SERPL-MCNC: 1 MG/DL (ref 0.5–1.4)
DIFFERENTIAL METHOD: ABNORMAL
EOSINOPHIL # BLD AUTO: 0.2 K/UL (ref 0–0.5)
EOSINOPHIL NFR BLD: 2 % (ref 0–8)
ERYTHROCYTE [DISTWIDTH] IN BLOOD BY AUTOMATED COUNT: 13.1 % (ref 11.5–14.5)
EST. GFR  (AFRICAN AMERICAN): >60 ML/MIN/1.73 M^2
EST. GFR  (AFRICAN AMERICAN): >60 ML/MIN/1.73 M^2
EST. GFR  (NON AFRICAN AMERICAN): 54.1 ML/MIN/1.73 M^2
EST. GFR  (NON AFRICAN AMERICAN): 54.1 ML/MIN/1.73 M^2
ESTIMATED AVG GLUCOSE: 120 MG/DL (ref 68–131)
GLUCOSE SERPL-MCNC: 120 MG/DL (ref 70–110)
GLUCOSE SERPL-MCNC: 120 MG/DL (ref 70–110)
HBA1C MFR BLD: 5.8 % (ref 4–5.6)
HCT VFR BLD AUTO: 41.9 % (ref 37–48.5)
HDLC SERPL-MCNC: 44 MG/DL (ref 40–75)
HDLC SERPL: 23.8 % (ref 20–50)
HGB BLD-MCNC: 13.1 G/DL (ref 12–16)
IMM GRANULOCYTES # BLD AUTO: 0.08 K/UL (ref 0–0.04)
IMM GRANULOCYTES NFR BLD AUTO: 0.8 % (ref 0–0.5)
LDLC SERPL CALC-MCNC: 111.2 MG/DL (ref 63–159)
LYMPHOCYTES # BLD AUTO: 2.3 K/UL (ref 1–4.8)
LYMPHOCYTES NFR BLD: 22.4 % (ref 18–48)
MCH RBC QN AUTO: 28.2 PG (ref 27–31)
MCHC RBC AUTO-ENTMCNC: 31.3 G/DL (ref 32–36)
MCV RBC AUTO: 90 FL (ref 82–98)
MONOCYTES # BLD AUTO: 0.8 K/UL (ref 0.3–1)
MONOCYTES NFR BLD: 7.5 % (ref 4–15)
NEUTROPHILS # BLD AUTO: 6.8 K/UL (ref 1.8–7.7)
NEUTROPHILS NFR BLD: 66.3 % (ref 38–73)
NONHDLC SERPL-MCNC: 141 MG/DL
NRBC BLD-RTO: 0 /100 WBC
PLATELET # BLD AUTO: 340 K/UL (ref 150–450)
PMV BLD AUTO: 11.7 FL (ref 9.2–12.9)
POTASSIUM SERPL-SCNC: 4.7 MMOL/L (ref 3.5–5.1)
POTASSIUM SERPL-SCNC: 4.7 MMOL/L (ref 3.5–5.1)
PROT SERPL-MCNC: 7.2 G/DL (ref 6–8.4)
RBC # BLD AUTO: 4.64 M/UL (ref 4–5.4)
SODIUM SERPL-SCNC: 140 MMOL/L (ref 136–145)
SODIUM SERPL-SCNC: 140 MMOL/L (ref 136–145)
TRIGL SERPL-MCNC: 149 MG/DL (ref 30–150)
WBC # BLD AUTO: 10.28 K/UL (ref 3.9–12.7)

## 2022-07-29 PROCEDURE — 36415 COLL VENOUS BLD VENIPUNCTURE: CPT | Mod: PO | Performed by: FAMILY MEDICINE

## 2022-07-29 PROCEDURE — 85025 COMPLETE CBC W/AUTO DIFF WBC: CPT | Performed by: FAMILY MEDICINE

## 2022-07-29 PROCEDURE — 80053 COMPREHEN METABOLIC PANEL: CPT | Performed by: FAMILY MEDICINE

## 2022-07-29 PROCEDURE — 83036 HEMOGLOBIN GLYCOSYLATED A1C: CPT | Performed by: FAMILY MEDICINE

## 2022-07-29 PROCEDURE — 80061 LIPID PANEL: CPT | Performed by: FAMILY MEDICINE

## 2022-08-02 ENCOUNTER — CLINICAL SUPPORT (OUTPATIENT)
Dept: REHABILITATION | Facility: HOSPITAL | Age: 77
End: 2022-08-02
Payer: MEDICARE

## 2022-08-02 DIAGNOSIS — G89.29 CHRONIC LOW BACK PAIN, UNSPECIFIED BACK PAIN LATERALITY, UNSPECIFIED WHETHER SCIATICA PRESENT: ICD-10-CM

## 2022-08-02 DIAGNOSIS — M54.50 CHRONIC LOW BACK PAIN, UNSPECIFIED BACK PAIN LATERALITY, UNSPECIFIED WHETHER SCIATICA PRESENT: ICD-10-CM

## 2022-08-02 DIAGNOSIS — R26.89 IMBALANCE: Primary | ICD-10-CM

## 2022-08-02 DIAGNOSIS — R29.898 LEG WEAKNESS, BILATERAL: ICD-10-CM

## 2022-08-02 PROCEDURE — 97112 NEUROMUSCULAR REEDUCATION: CPT | Mod: PN

## 2022-08-02 PROCEDURE — 97110 THERAPEUTIC EXERCISES: CPT | Mod: PN

## 2022-08-02 NOTE — PROGRESS NOTES
[facility]   Physical Therapy Progress Note     [id number]    Therapy Diagnosis:   Encounter Diagnoses   Name Primary?    Imbalance Yes    Chronic low back pain, unspecified back pain laterality, unspecified whether sciatica present     Leg weakness, bilateral      Physician: Hubert Aleman MD    Visit Date: 8/2/2022    Physician Orders: PT Eval and Treat   Medical Diagnosis from Referral: chronic LBP,unspcified laterality, unspecified whether sciatica present.  Evaluation Date: 6/28/2022  Authorization Period Expiration: 12/31/22  Plan of Care Expiration: 9/2/22  Visit # / Visits authorized: 9/ 20      Time In: 1419  Time Out: 1500  Total Appointment Time (timed & untimed codes): 41 minutes     Precautions: fall      SUBJECTIVE     Pt reports: moderate pain levels in her low back.    She was compliant with home exercise program.  Response to previous treatment: increased pain  Functional change: none     Pain: 5/10  Location: bilateral low back         OBJECTIVE     Lower Extremity Strength  Right LE   Left LE     Hip flexion:  3+/5 Hip flexion: 3/5   Knee extension: 4-/5 Knee extension: 4-/5   Ankle dorsiflexion:  4-/5 Ankle dorsiflexion: 4-/5         Gait Assessment:(if indicated)  - AD used: none  - Assistance: stand by assist   - Distance: 2 x 120 feet       Treatment     Tali received the treatments listed below:       therapeutic exercises to develop strength and endurance for 17 minutes including:         X 10 minutes SciFit (level 3) to promote flexibility prior to strength/balance/mobility training            X 15 seated bilateral lower extremity therapeutic exercise (3#) = marching, long arc quad, ball squeeze              X 12 sit to stand with hands on thighs                  neuromuscular re-education activities to improve: Balance and Proprioception for 24 minutes. The following activities were included:                    X  15 each standing mini squats and heel raises/ toe raises               X 20 feet each balance training in parallel bars = side stepping and backwards gait   X 1 minute stand on AirEx              X 30 seconds each  full Romberg stance = eyes open, eyes closed   X 10 alternating toe taps on 3-inch stool                  Patient Education and Home Exercises     Home Exercises Provided and Patient Education Provided     Education provided:   - proper therapeutic exercise technique    Written Home Exercises Provided: Patient instructed to cont prior HEP.       ASSESSMENT     Patient was able to demonstrate increased repetitions during sit to stand and seated lower extremity therapeutic exercise; intermittent upper extremity assist needed during balance therapeutic exercise; loss of balance x 6 while standing on AirEx; loss of balance x 3 during full Romberg training (eyes open); loss of balance x 1 during full Romberg training (eyes closed); minimal/ moderate assist needed during alternating toe taps.    Tali Is progressing fairly well towards her goals.   Pt prognosis is Fair.     Pt will continue to benefit from skilled outpatient physical therapy to address the deficits listed in the problem list box on initial evaluation, provide pt/family education and to maximize pt's level of independence in the home and community environment.     Pt's spiritual, cultural and educational needs considered and pt agreeable to plan of care and goals.     Anticipated barriers to physical therapy: severity of imbalance and low back pain     Goals:     New Short Term Goals (2 Weeks):   1. Decrease patient's c/o pain to 5/10 during performance of ADL's for independence of self care activities. (MET)  2. Patient to tolerate use of 3# weights during lower extremity therapeutic exercise to improve overall strength. (MET)  3. Patient to tolerate x 10 repetitions sit to stand so that she may rise without using her arms to help.  "(MET)  4. Patient to tolerate x 30 seconds full Romberg stance without assist device to improve upright tolerance. (MET)     New Long Term Goals (4 Weeks):   1. Patient to demo competence with home exercise program to maintain therapeutic gains. (NOT MET)  2. Patient to improve bilateral hip MMT 1/2 grade to demo strength gains from therapeutic intervention. (NOT MET)  3. Patient to ambulate 200 feet with small-base quad cane and stand by assist with improved manuel/symmetry. (NOT MET)  4. Patient to demonstrate improvement in her Tinetti score from "high fall risk" to "moderate fall risk". (NOT MET)      PLAN     Continue to advance strength/balance/mobility training to patient's tolerance.      Alan Phipps, PT     "

## 2022-08-04 ENCOUNTER — OFFICE VISIT (OUTPATIENT)
Dept: FAMILY MEDICINE | Facility: CLINIC | Age: 77
End: 2022-08-04
Payer: MEDICARE

## 2022-08-04 ENCOUNTER — CLINICAL SUPPORT (OUTPATIENT)
Dept: REHABILITATION | Facility: HOSPITAL | Age: 77
End: 2022-08-04
Payer: MEDICARE

## 2022-08-04 VITALS
OXYGEN SATURATION: 96 % | DIASTOLIC BLOOD PRESSURE: 63 MMHG | HEIGHT: 63 IN | TEMPERATURE: 98 F | RESPIRATION RATE: 18 BRPM | HEART RATE: 63 BPM | BODY MASS INDEX: 36.8 KG/M2 | WEIGHT: 207.69 LBS | SYSTOLIC BLOOD PRESSURE: 110 MMHG

## 2022-08-04 DIAGNOSIS — E66.01 SEVERE OBESITY (BMI 35.0-39.9) WITH COMORBIDITY: ICD-10-CM

## 2022-08-04 DIAGNOSIS — E03.9 ACQUIRED HYPOTHYROIDISM: ICD-10-CM

## 2022-08-04 DIAGNOSIS — E78.5 HYPERLIPIDEMIA, UNSPECIFIED HYPERLIPIDEMIA TYPE: ICD-10-CM

## 2022-08-04 DIAGNOSIS — G89.29 CHRONIC LOW BACK PAIN WITHOUT SCIATICA, UNSPECIFIED BACK PAIN LATERALITY: ICD-10-CM

## 2022-08-04 DIAGNOSIS — R29.898 LEG WEAKNESS, BILATERAL: ICD-10-CM

## 2022-08-04 DIAGNOSIS — I10 ESSENTIAL HYPERTENSION: ICD-10-CM

## 2022-08-04 DIAGNOSIS — R26.89 IMBALANCE: Primary | ICD-10-CM

## 2022-08-04 DIAGNOSIS — E11.9 TYPE 2 DIABETES MELLITUS WITHOUT COMPLICATION, WITHOUT LONG-TERM CURRENT USE OF INSULIN: Primary | ICD-10-CM

## 2022-08-04 DIAGNOSIS — M54.50 CHRONIC LOW BACK PAIN WITHOUT SCIATICA, UNSPECIFIED BACK PAIN LATERALITY: ICD-10-CM

## 2022-08-04 PROCEDURE — 1125F PR PAIN SEVERITY QUANTIFIED, PAIN PRESENT: ICD-10-PCS | Mod: CPTII,S$GLB,, | Performed by: FAMILY MEDICINE

## 2022-08-04 PROCEDURE — 99999 PR PBB SHADOW E&M-EST. PATIENT-LVL III: ICD-10-PCS | Mod: PBBFAC,,, | Performed by: FAMILY MEDICINE

## 2022-08-04 PROCEDURE — 1101F PR PT FALLS ASSESS DOC 0-1 FALLS W/OUT INJ PAST YR: ICD-10-PCS | Mod: CPTII,S$GLB,, | Performed by: FAMILY MEDICINE

## 2022-08-04 PROCEDURE — 99999 PR PBB SHADOW E&M-EST. PATIENT-LVL III: CPT | Mod: PBBFAC,,, | Performed by: FAMILY MEDICINE

## 2022-08-04 PROCEDURE — 1160F PR REVIEW ALL MEDS BY PRESCRIBER/CLIN PHARMACIST DOCUMENTED: ICD-10-PCS | Mod: CPTII,S$GLB,, | Performed by: FAMILY MEDICINE

## 2022-08-04 PROCEDURE — 99214 OFFICE O/P EST MOD 30 MIN: CPT | Mod: S$GLB,,, | Performed by: FAMILY MEDICINE

## 2022-08-04 PROCEDURE — 1159F MED LIST DOCD IN RCRD: CPT | Mod: CPTII,S$GLB,, | Performed by: FAMILY MEDICINE

## 2022-08-04 PROCEDURE — 3078F DIAST BP <80 MM HG: CPT | Mod: CPTII,S$GLB,, | Performed by: FAMILY MEDICINE

## 2022-08-04 PROCEDURE — 3078F PR MOST RECENT DIASTOLIC BLOOD PRESSURE < 80 MM HG: ICD-10-PCS | Mod: CPTII,S$GLB,, | Performed by: FAMILY MEDICINE

## 2022-08-04 PROCEDURE — 97110 THERAPEUTIC EXERCISES: CPT | Mod: PN,CQ

## 2022-08-04 PROCEDURE — 3288F FALL RISK ASSESSMENT DOCD: CPT | Mod: CPTII,S$GLB,, | Performed by: FAMILY MEDICINE

## 2022-08-04 PROCEDURE — 99214 PR OFFICE/OUTPT VISIT, EST, LEVL IV, 30-39 MIN: ICD-10-PCS | Mod: S$GLB,,, | Performed by: FAMILY MEDICINE

## 2022-08-04 PROCEDURE — 3074F SYST BP LT 130 MM HG: CPT | Mod: CPTII,S$GLB,, | Performed by: FAMILY MEDICINE

## 2022-08-04 PROCEDURE — 1101F PT FALLS ASSESS-DOCD LE1/YR: CPT | Mod: CPTII,S$GLB,, | Performed by: FAMILY MEDICINE

## 2022-08-04 PROCEDURE — 1125F AMNT PAIN NOTED PAIN PRSNT: CPT | Mod: CPTII,S$GLB,, | Performed by: FAMILY MEDICINE

## 2022-08-04 PROCEDURE — 1160F RVW MEDS BY RX/DR IN RCRD: CPT | Mod: CPTII,S$GLB,, | Performed by: FAMILY MEDICINE

## 2022-08-04 PROCEDURE — 1159F PR MEDICATION LIST DOCUMENTED IN MEDICAL RECORD: ICD-10-PCS | Mod: CPTII,S$GLB,, | Performed by: FAMILY MEDICINE

## 2022-08-04 PROCEDURE — 3074F PR MOST RECENT SYSTOLIC BLOOD PRESSURE < 130 MM HG: ICD-10-PCS | Mod: CPTII,S$GLB,, | Performed by: FAMILY MEDICINE

## 2022-08-04 PROCEDURE — 3288F PR FALLS RISK ASSESSMENT DOCUMENTED: ICD-10-PCS | Mod: CPTII,S$GLB,, | Performed by: FAMILY MEDICINE

## 2022-08-04 NOTE — PROGRESS NOTES
"[facility]   Physical Therapy Treatment Note     Name: Tali Hopper  [facility] Number: [id number]    Therapy Diagnosis:   Encounter Diagnoses   Name Primary?    Imbalance Yes    Leg weakness, bilateral      Physician: Hubert Aleman MD    Visit Date: 8/4/2022    Physician Orders: PT Eval and Treat   Medical Diagnosis from Referral: chronic LBP, unspcified laterality, unspecified whether sciatica present.  Evaluation Date: 6/28/2022  Authorization Period Expiration: 12/31/22  Plan of Care Expiration: 9/2/22  Visit # / Visits authorized: 11/20   PTA visit # 1/5    Time In: 1620  Time Out: 1700  Total Appointment Time: 40 minutes     Precautions: fall      SUBJECTIVE     Pt reports: "With this weather, I have pain in my back"  She does not have a balance home exercise program.  Response to previous treatment:   Functional change: none     Pain:  1/10  Location: bilateral low back         OBJECTIVE     Objective Measures updated at progress report unless specified.     Treatment     Tali received the treatments listed below:       therapeutic exercises to develop strength and endurance for 10 minutes including:     Scifit x 10' Level 3 to increase bilateral lower extremity strength and mobility    neuromuscular re-education activities to improve: Balance and Proprioception for 30 minutes. The following activities were included:    Standing exercises:  mini squats x 20 reps   heel raises/ toe raises x 20 reps   Hip Abduction x 20 reps bilateral lower extremity   Marching x 20 reps bilateral lower extremity   Toe taps 6" step 2 x 10 reps (pt using hands multiple times)  Step ups 6" step x 10 reps Right/ Left   Rhomberg x 30 seconds eyes open, eyes closed(minimal Assist  eyes closed, pt very unsteady and loss of balance especially posteriorly)  Standing on airex 1'  balance training in parallel bars = side stepping and backwards gait                  Patient Education and Home " Exercises     Home Exercises Provided and Patient Education Provided     Education provided:   - proper therapeutic exercise technique    Written Home Exercises Provided: Pt given copy of seated and standing therapeutic exercises, as well as Green Theraband, to use at home for Home exercise program    ASSESSMENT     Tali provided good effort and participation toward therapeutic interventions today with focus on lower extremity strengthening and balance.  Pt continues to have increased balance deficits and had increased difficulty when performing rhomberg especially with eyes closed.  Pt experiencing loss of balance posteriorly and grabbing or leaning forearms onto parallel bars.  Pt still experiencing shortness of breath with exercises also.      Tali Is progressing fairly well towards her goals.   Pt prognosis is Fair.     Pt will continue to benefit from skilled outpatient physical therapy to address the deficits listed in the problem list box on initial evaluation, provide pt/family education and to maximize pt's level of independence in the home and community environment.     Pt's spiritual, cultural and educational needs considered and pt agreeable to plan of care and goals.     Anticipated barriers to physical therapy: severity of imbalance and low back pain     Goals:     New Short Term Goals (2 Weeks):   1. Decrease patient's c/o pain to 5/10 during performance of ADL's for independence of self care activities. (MET)  2. Patient to tolerate use of 3# weights during lower extremity therapeutic exercise to improve overall strength. (NOT MET)  3. Patient to tolerate x 10 repetitions sit to stand so that she may rise without using her arms to help. (NOT MET)  4. Patient to tolerate x 30 seconds full Romberg stance without assist device to improve upright tolerance. (NOT MET)     New Long Term Goals (4 Weeks):   1. Patient to demo competence with home exercise program to maintain therapeutic gains. (NOT  "MET)  2. Patient to improve bilateral hip MMT 1/2 grade to demo strength gains from therapeutic intervention. (NOT MET)  3. Patient to ambulate 200 feet with small-base quad cane and stand by assist with improved manuel/symmetry. (NOT MET)  4. Patient to demonstrate improvement in her Tinetti score from "high fall risk" to "moderate fall risk". (NOT MET)      PLAN     Updated Certification Period: 07/07/22 to 09/03/22   Recommended Treatment Plan: 2 times per week for 8 weeks (starting week of 07/11/22):  Gait Training, Manual Therapy, Moist Heat/ Ice, Neuromuscular Re-ed, Patient Education, Self Care, Therapeutic Activities, Therapeutic Exercise and home exercise program.    Continue to advance strength/balance/mobility training to patient's tolerance.    Em East, PTA     "
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
## 2022-08-05 ENCOUNTER — OFFICE VISIT (OUTPATIENT)
Dept: OTOLARYNGOLOGY | Facility: CLINIC | Age: 77
End: 2022-08-05
Payer: MEDICARE

## 2022-08-05 ENCOUNTER — CLINICAL SUPPORT (OUTPATIENT)
Dept: AUDIOLOGY | Facility: CLINIC | Age: 77
End: 2022-08-05
Payer: MEDICARE

## 2022-08-05 VITALS — TEMPERATURE: 99 F | BODY MASS INDEX: 36.72 KG/M2 | HEIGHT: 63 IN | WEIGHT: 207.25 LBS

## 2022-08-05 DIAGNOSIS — H61.23 BILATERAL IMPACTED CERUMEN: ICD-10-CM

## 2022-08-05 DIAGNOSIS — H61.21 HEARING LOSS OF RIGHT EAR DUE TO CERUMEN IMPACTION: ICD-10-CM

## 2022-08-05 DIAGNOSIS — H93.11 TINNITUS OF RIGHT EAR: ICD-10-CM

## 2022-08-05 DIAGNOSIS — H90.3 BILATERAL HIGH FREQUENCY SENSORINEURAL HEARING LOSS: Primary | ICD-10-CM

## 2022-08-05 DIAGNOSIS — H90.3 SENSORINEURAL HEARING LOSS (SNHL) OF BOTH EARS: Primary | ICD-10-CM

## 2022-08-05 PROCEDURE — 3288F PR FALLS RISK ASSESSMENT DOCUMENTED: ICD-10-PCS | Mod: CPTII,S$GLB,, | Performed by: NURSE PRACTITIONER

## 2022-08-05 PROCEDURE — 99203 PR OFFICE/OUTPT VISIT, NEW, LEVL III, 30-44 MIN: ICD-10-PCS | Mod: 25,S$GLB,, | Performed by: NURSE PRACTITIONER

## 2022-08-05 PROCEDURE — 92557 PR COMPREHENSIVE HEARING TEST: ICD-10-PCS | Mod: S$GLB,,, | Performed by: AUDIOLOGIST

## 2022-08-05 PROCEDURE — 92567 PR TYMPA2METRY: ICD-10-PCS | Mod: S$GLB,,, | Performed by: AUDIOLOGIST

## 2022-08-05 PROCEDURE — 92567 TYMPANOMETRY: CPT | Mod: S$GLB,,, | Performed by: AUDIOLOGIST

## 2022-08-05 PROCEDURE — 1159F PR MEDICATION LIST DOCUMENTED IN MEDICAL RECORD: ICD-10-PCS | Mod: CPTII,S$GLB,, | Performed by: NURSE PRACTITIONER

## 2022-08-05 PROCEDURE — 1101F PR PT FALLS ASSESS DOC 0-1 FALLS W/OUT INJ PAST YR: ICD-10-PCS | Mod: CPTII,S$GLB,, | Performed by: NURSE PRACTITIONER

## 2022-08-05 PROCEDURE — 1160F PR REVIEW ALL MEDS BY PRESCRIBER/CLIN PHARMACIST DOCUMENTED: ICD-10-PCS | Mod: CPTII,S$GLB,, | Performed by: NURSE PRACTITIONER

## 2022-08-05 PROCEDURE — 99999 PR PBB SHADOW E&M-EST. PATIENT-LVL III: ICD-10-PCS | Mod: PBBFAC,,, | Performed by: NURSE PRACTITIONER

## 2022-08-05 PROCEDURE — 92557 COMPREHENSIVE HEARING TEST: CPT | Mod: S$GLB,,, | Performed by: AUDIOLOGIST

## 2022-08-05 PROCEDURE — 1160F RVW MEDS BY RX/DR IN RCRD: CPT | Mod: CPTII,S$GLB,, | Performed by: NURSE PRACTITIONER

## 2022-08-05 PROCEDURE — G0268 PR REMOVAL OF IMPACTED WAX MD: ICD-10-PCS | Mod: S$GLB,,, | Performed by: NURSE PRACTITIONER

## 2022-08-05 PROCEDURE — 1159F MED LIST DOCD IN RCRD: CPT | Mod: CPTII,S$GLB,, | Performed by: NURSE PRACTITIONER

## 2022-08-05 PROCEDURE — 1101F PT FALLS ASSESS-DOCD LE1/YR: CPT | Mod: CPTII,S$GLB,, | Performed by: NURSE PRACTITIONER

## 2022-08-05 PROCEDURE — G0268 REMOVAL OF IMPACTED WAX MD: HCPCS | Mod: S$GLB,,, | Performed by: NURSE PRACTITIONER

## 2022-08-05 PROCEDURE — 99999 PR PBB SHADOW E&M-EST. PATIENT-LVL I: CPT | Mod: PBBFAC,,,

## 2022-08-05 PROCEDURE — 1126F AMNT PAIN NOTED NONE PRSNT: CPT | Mod: CPTII,S$GLB,, | Performed by: NURSE PRACTITIONER

## 2022-08-05 PROCEDURE — 3288F FALL RISK ASSESSMENT DOCD: CPT | Mod: CPTII,S$GLB,, | Performed by: NURSE PRACTITIONER

## 2022-08-05 PROCEDURE — 99999 PR PBB SHADOW E&M-EST. PATIENT-LVL III: CPT | Mod: PBBFAC,,, | Performed by: NURSE PRACTITIONER

## 2022-08-05 PROCEDURE — 1126F PR PAIN SEVERITY QUANTIFIED, NO PAIN PRESENT: ICD-10-PCS | Mod: CPTII,S$GLB,, | Performed by: NURSE PRACTITIONER

## 2022-08-05 PROCEDURE — 99999 PR PBB SHADOW E&M-EST. PATIENT-LVL I: ICD-10-PCS | Mod: PBBFAC,,,

## 2022-08-05 PROCEDURE — 99203 OFFICE O/P NEW LOW 30 MIN: CPT | Mod: 25,S$GLB,, | Performed by: NURSE PRACTITIONER

## 2022-08-05 NOTE — PROGRESS NOTES
Subjective:       Patient ID: Tali Hopper is a 77 y.o. female.    Chief Complaint: No chief complaint on file.    HPI   Patient last seen 4 years ago by Dr. Rosas for ear cleaning. Patient is referred back at this time by Dr. Foster for consultation for hearing loss. Dr. Fostre noted right EAC with deep wax impaction and non-visible TM.    Review of Systems   Constitutional: Negative.    HENT: Positive for hearing loss.    Eyes: Negative.    Respiratory: Negative.    Cardiovascular: Negative.    Gastrointestinal: Negative.    Musculoskeletal: Negative.    Integumentary:  Negative.   Neurological: Negative.    Hematological: Negative.    Psychiatric/Behavioral: Negative.          Objective:      Physical Exam  Vitals and nursing note reviewed.   Constitutional:       General: She is not in acute distress.     Appearance: She is well-developed. She is not ill-appearing or diaphoretic.   HENT:      Head: Normocephalic and atraumatic.      Right Ear: Hearing, tympanic membrane, ear canal and external ear normal. No middle ear effusion. Tympanic membrane is not erythematous.      Left Ear: Hearing, tympanic membrane, ear canal and external ear normal.  No middle ear effusion. Tympanic membrane is not erythematous.      Nose: Nose normal.   Eyes:      General: Lids are normal. No scleral icterus.        Right eye: No discharge.         Left eye: No discharge.   Neck:      Trachea: Trachea normal. No tracheal deviation.   Cardiovascular:      Rate and Rhythm: Normal rate.   Pulmonary:      Effort: Pulmonary effort is normal. No respiratory distress.      Breath sounds: No stridor. No wheezing.   Musculoskeletal:         General: Normal range of motion.      Cervical back: Normal range of motion and neck supple.   Skin:     General: Skin is warm and dry.      Coloration: Skin is not pale.   Neurological:      Mental Status: She is alert and oriented to person, place, and time.      Coordination: Coordination  normal.      Gait: Gait normal.   Psychiatric:         Speech: Speech normal.         Behavior: Behavior normal. Behavior is cooperative.         Thought Content: Thought content normal.         Judgment: Judgment normal.       SEPARATE PROCEDURE IN OFFICE:   Procedure: Removal of impacted cerumen, bilateral   Pre Procedure Diagnosis: Cerumen Impaction   Post Procedure Diagnosis: Cerumen Impaction   Verbal informed consent in regards to risk of trauma to ear canal, ear drum or hearing, discomfort during procedure and/or inability to remove cerumen impaction in one session or unforeseen events or complications.   No anesthesia.     Procedure in detail:   Ear canal visualized bilateral with appropriate size ear speculum utilizing Operating Head Binocular Otomicroscope   Utilizing the following:  Ring curet, delicate alligator forceps, and/or suction cannula was used. The impacted cerumen of the ear canals was removed atraumatically. The TM and EAC were then inspected and found to be clear of wax. See description of TMs/EACs in PE above.   Complications: No   Condition: Improved/Good     Assessment:       Problem List Items Addressed This Visit    None     Visit Diagnoses     Bilateral high frequency sensorineural hearing loss    -  Primary    Bilateral impacted cerumen              Plan:         PATIENT IS MEDICALLY CLEARED FOR HEARING AIDS. The patient's hearing loss is not due to temporarily, correctable physical condition. There are no contraindications to hearing aid candidacy. Patient's audiogram reveals the patient is a candidate for amplification. Audiogram is reviewed in detail with the patient. The audiologist's recommendation that the patient have amplification/hearing aids is discussed and questions answered. Patient has been given information by the audiologist on how to schedule a hearing aid consultation. Patient is encouraged to wear ear protection in loud noise and return annually for hearing test.  Return to clinic as needed for further ENT concerns.

## 2022-08-09 ENCOUNTER — CLINICAL SUPPORT (OUTPATIENT)
Dept: REHABILITATION | Facility: HOSPITAL | Age: 77
End: 2022-08-09
Payer: MEDICARE

## 2022-08-09 DIAGNOSIS — R29.898 LEG WEAKNESS, BILATERAL: ICD-10-CM

## 2022-08-09 DIAGNOSIS — M54.50 CHRONIC LOW BACK PAIN, UNSPECIFIED BACK PAIN LATERALITY, UNSPECIFIED WHETHER SCIATICA PRESENT: ICD-10-CM

## 2022-08-09 DIAGNOSIS — R26.89 IMBALANCE: Primary | ICD-10-CM

## 2022-08-09 DIAGNOSIS — G89.29 CHRONIC LOW BACK PAIN, UNSPECIFIED BACK PAIN LATERALITY, UNSPECIFIED WHETHER SCIATICA PRESENT: ICD-10-CM

## 2022-08-09 PROCEDURE — 97112 NEUROMUSCULAR REEDUCATION: CPT | Mod: PN

## 2022-08-09 PROCEDURE — 97110 THERAPEUTIC EXERCISES: CPT | Mod: PN

## 2022-08-09 NOTE — PROGRESS NOTES
[facility]   Physical Therapy Treatment Note     [id number]    Therapy Diagnosis:   Encounter Diagnoses   Name Primary?    Imbalance Yes    Chronic low back pain, unspecified back pain laterality, unspecified whether sciatica present     Leg weakness, bilateral      Physician: Hubert Aleman MD    Visit Date: 8/9/2022    Physician Orders: PT Eval and Treat   Medical Diagnosis from Referral: chronic LBP,unspcified laterality, unspecified whether sciatica present.  Evaluation Date: 6/28/2022  Authorization Period Expiration: 12/31/22  Plan of Care Expiration: 9/2/22  Visit # / Visits authorized: 11/ 20      Time In: 1418   Time Out: 1500  Total Appointment Time (timed & untimed codes): 42 minutes     Precautions: fall      SUBJECTIVE     Pt reports: no pain in her low back at rest.    She was compliant with home exercise program.  Response to previous treatment: decreased pain  Functional change: none     Pain: 0/10  Location: bilateral low back         OBJECTIVE     Objective Measures updated at progress report unless specified.     Treatment     Tali received the treatments listed below:       therapeutic exercises to develop strength and endurance for 17 minutes including:         X 10 minutes SciFit (level 3) to promote flexibility prior to strength/balance/mobility training            X 20 seated bilateral lower extremity therapeutic exercise (3#) = marching, long arc quad, ball squeeze, hip abduction (red theraband)              X 10 sit to stand on AirEx with hands on thighs             neuromuscular re-education activities to improve: Balance and Proprioception for 25 minutes. The following activities were included:                    X 15 each standing heel raises/ toe raises and mini squats               X 25 feet each balance training in parallel bars = side stepping and backwards gait              X 30 seconds  full  Romberg stance    X 15 alternating toe taps on 3-inch stool       X 1 minute stand on AirEx              Patient Education and Home Exercises     Home Exercises Provided and Patient Education Provided     Education provided:   - proper therapeutic exercise technique  - continue home exercise program     Written Home Exercises Provided: Patient instructed to cont prior HEP.       ASSESSMENT     Patient was able to tolerate increased repetitions during seated lower extremity therapeutic exercise; able to perform sit to stand on AirEx but frequent verbal cues needed to slow down her descent (tends to flop down); improved distance performed during balance gait; intermittent minimal assist needed during alternating toe taps; occasional loss of balance while standing on AirEx.    Tali Is progressing fairly well towards her goals.   Pt prognosis is Fair.     Pt will continue to benefit from skilled outpatient physical therapy to address the deficits listed in the problem list box on initial evaluation, provide pt/family education and to maximize pt's level of independence in the home and community environment.     Pt's spiritual, cultural and educational needs considered and pt agreeable to plan of care and goals.     Anticipated barriers to physical therapy: severity of imbalance and low back pain     Goals:     New Short Term Goals (2 Weeks):   1. Decrease patient's c/o pain to 5/10 during performance of ADL's for independence of self care activities. (MET)  2. Patient to tolerate use of 3# weights during lower extremity therapeutic exercise to improve overall strength. (NOT MET)  3. Patient to tolerate x 10 repetitions sit to stand so that she may rise without using her arms to help. (PART MET)  4. Patient to tolerate x 30 seconds full Romberg stance without assist device to improve upright tolerance. (NOT MET)     New Long Term Goals (4 Weeks):   1. Patient to demo competence with home exercise program to maintain  "therapeutic gains. (NOT MET)  2. Patient to improve bilateral hip MMT 1/2 grade to demo strength gains from therapeutic intervention. (NOT MET)  3. Patient to ambulate 200 feet with small-base quad cane and stand by assist with improved maunel/symmetry. (NOT MET)  4. Patient to demonstrate improvement in her Tinetti score from "high fall risk" to "moderate fall risk". (NOT MET)      PLAN     Continue to advance strength/balance/mobility training to patient's tolerance.      Alan Phipps, PT     "

## 2022-08-10 ENCOUNTER — TELEPHONE (OUTPATIENT)
Dept: FAMILY MEDICINE | Facility: CLINIC | Age: 77
End: 2022-08-10
Payer: MEDICARE

## 2022-08-10 NOTE — TELEPHONE ENCOUNTER
----- Message from Dagmar Muniz, Patient Care Assistant sent at 8/10/2022  4:13 PM CDT -----  Regarding: orders  Contact: pt  Type: Needs Medical Advice  Who Called:  pt   Best Call Back Number: [phone] (home)     Additional Information: pt states she would like a callback regarding lab orders. Thanks!

## 2022-08-11 ENCOUNTER — CLINICAL SUPPORT (OUTPATIENT)
Dept: REHABILITATION | Facility: HOSPITAL | Age: 77
End: 2022-08-11
Payer: MEDICARE

## 2022-08-11 DIAGNOSIS — R29.898 LEG WEAKNESS, BILATERAL: ICD-10-CM

## 2022-08-11 DIAGNOSIS — R26.89 IMBALANCE: Primary | ICD-10-CM

## 2022-08-11 PROCEDURE — 97112 NEUROMUSCULAR REEDUCATION: CPT | Mod: PN,CQ

## 2022-08-11 PROCEDURE — 97110 THERAPEUTIC EXERCISES: CPT | Mod: PN,CQ

## 2022-08-11 NOTE — PROGRESS NOTES
[facility]   Physical Therapy Treatment Note     [id number]    Therapy Diagnosis:   Encounter Diagnoses   Name Primary?    Imbalance Yes    Leg weakness, bilateral      Physician: Hubert Aleman MD    Visit Date: 8/11/2022    Physician Orders: PT Eval and Treat   Medical Diagnosis from Referral: chronic LBP,unspcified laterality, unspecified whether sciatica present.  Evaluation Date: 6/28/2022  Authorization Period Expiration: 12/31/22  Plan of Care Expiration: 9/2/22  Visit # / Visits authorized: 13/ 20      Time In: 1500  Time Out: 1545  Total Appointment Time 45 minutes     Precautions: fall      SUBJECTIVE     Pt reports: she is doing ok today and not having any pain   She was compliant with home exercise program.  Response to previous treatment: decreased pain  Functional change: none     Pain: 0/10  Location: bilateral low back         OBJECTIVE     Objective Measures updated at progress report unless specified.     Treatment     Tali received the treatments listed below:       therapeutic exercises to develop strength and endurance for 20 minutes including:         X 10 minutes SciFit (level 3) to promote flexibility prior to strength/balance/mobility training            X 30 seated bilateral lower extremity therapeutic exercise (3#) = marching, long arc quad, ball squeeze, hip abduction (red theraband)              X 10 sit to stand on airex              neuromuscular re-education activities to improve: Balance and Proprioception for 25 minutes. The following activities were included:                    X 20 reps standing heel raises/ toe raises and mini squats               X 25 feet each balance training in parallel bars = side stepping and backwards gait              X 30 seconds  full Romberg stance    2 X 10 reps alternating toe taps on 6-inch stool       X 1 minute stand on AirEx           Floor ladder forward/  sideways       Patient Education and Home Exercises     Home Exercises Provided and Patient Education Provided     Education provided:   - proper therapeutic exercise technique  - continue home exercise program     Written Home Exercises Provided: Patient instructed to cont prior HEP.       ASSESSMENT   Tali provided good effort and participation toward therapeutic interventions today with focus on lower extremity strengthening and balance deficits.  Pt requiring minimal/moderate Assist with balance exercises especially floor ladder.  Pt continues to amb with wide base of support and bilateral feet external rotation.  Pt will continue to benefit from PT to address her balance deficits.      Tali Is progressing fairly well towards her goals.   Pt prognosis is Fair.     Pt will continue to benefit from skilled outpatient physical therapy to address the deficits listed in the problem list box on initial evaluation, provide pt/family education and to maximize pt's level of independence in the home and community environment.     Pt's spiritual, cultural and educational needs considered and pt agreeable to plan of care and goals.     Anticipated barriers to physical therapy: severity of imbalance and low back pain     Goals:     New Short Term Goals (2 Weeks):   1. Decrease patient's c/o pain to 5/10 during performance of ADL's for independence of self care activities. (MET)  2. Patient to tolerate use of 3# weights during lower extremity therapeutic exercise to improve overall strength. (NOT MET)  3. Patient to tolerate x 10 repetitions sit to stand so that she may rise without using her arms to help. (PART MET)  4. Patient to tolerate x 30 seconds full Romberg stance without assist device to improve upright tolerance. (NOT MET)     New Long Term Goals (4 Weeks):   1. Patient to demo competence with home exercise program to maintain therapeutic gains. (NOT MET)  2. Patient to improve bilateral hip MMT 1/2 grade to demo  "strength gains from therapeutic intervention. (NOT MET)  3. Patient to ambulate 200 feet with small-base quad cane and stand by assist with improved manuel/symmetry. (NOT MET)  4. Patient to demonstrate improvement in her Tinetti score from "high fall risk" to "moderate fall risk". (NOT MET)      PLAN   Updated Certification Period: 07/07/22 to 09/03/22   Recommended Treatment Plan: 2 times per week for 8 weeks (starting week of 07/11/22):  Gait Training, Manual Therapy, Moist Heat/ Ice, Neuromuscular Re-ed, Patient Education, Self Care, Therapeutic Activities, Therapeutic Exercise and home exercise program.    Continue to advance strength/balance/mobility training to patient's tolerance.      Em East, PTA     "

## 2022-08-16 ENCOUNTER — CLINICAL SUPPORT (OUTPATIENT)
Dept: REHABILITATION | Facility: HOSPITAL | Age: 77
End: 2022-08-16
Payer: MEDICARE

## 2022-08-16 DIAGNOSIS — G89.29 CHRONIC LOW BACK PAIN, UNSPECIFIED BACK PAIN LATERALITY, UNSPECIFIED WHETHER SCIATICA PRESENT: ICD-10-CM

## 2022-08-16 DIAGNOSIS — R26.89 IMBALANCE: Primary | ICD-10-CM

## 2022-08-16 DIAGNOSIS — R29.898 LEG WEAKNESS, BILATERAL: ICD-10-CM

## 2022-08-16 DIAGNOSIS — M54.50 CHRONIC LOW BACK PAIN, UNSPECIFIED BACK PAIN LATERALITY, UNSPECIFIED WHETHER SCIATICA PRESENT: ICD-10-CM

## 2022-08-16 PROCEDURE — 97110 THERAPEUTIC EXERCISES: CPT | Mod: PN

## 2022-08-16 PROCEDURE — 97112 NEUROMUSCULAR REEDUCATION: CPT | Mod: PN

## 2022-08-16 NOTE — PROGRESS NOTES
[facility]   Physical Therapy Treatment Note     [id number]    Therapy Diagnosis:   Encounter Diagnoses   Name Primary?    Imbalance Yes    Chronic low back pain, unspecified back pain laterality, unspecified whether sciatica present     Leg weakness, bilateral      Physician: Hubert Aleman MD    Visit Date: 8/16/2022    Physician Orders: PT Eval and Treat   Medical Diagnosis from Referral: chronic LBP,unspcified laterality, unspecified whether sciatica present.  Evaluation Date: 6/28/2022  Authorization Period Expiration: 12/31/22  Plan of Care Expiration: 9/2/22  Visit # / Visits authorized: 13/ 20      Time In: 1420   Time Out: 1505  Total Appointment Time (timed & untimed codes): 45 minutes     Precautions: fall      SUBJECTIVE     Pt reports: minimal low back at rest; also reports intermittent dizziness     She was compliant with home exercise program.  Response to previous treatment: decreased pain  Functional change: none     Pain: 1/10  Location: bilateral low back         OBJECTIVE     Objective Measures updated at progress report unless specified.     Treatment     Tali received the treatments listed below:       therapeutic exercises to develop strength and endurance for 17 minutes including:         X 10 minutes SciFit (level 3) to promote flexibility prior to strength/balance/mobility training            X 20 seated bilateral lower extremity therapeutic exercise (3#) = marching, long arc quad, ball squeeze, hip abduction (green theraband)              X 5 sit to stand without use of upper extremities             neuromuscular re-education activities to improve: Balance and Proprioception for 28 minutes. The following activities were included:                    X 12-15 each standing heel raises/ toe raises and mini squats               X 25 feet each balance training in parallel bars = side stepping and backwards gait               X 45 seconds  full Romberg stance    X 12 alternating toe taps on 3-inch stool       X 1 minute stand on AirEx              Patient Education and Home Exercises     Home Exercises Provided and Patient Education Provided     Education provided:   - proper therapeutic exercise technique  - continue home exercise program     Written Home Exercises Provided: Patient instructed to cont prior HEP.       ASSESSMENT     Patient was able to tolerate use of green theraband during seated hip adduction; balance training limited by shortness of breath and unsteadiness; intermittent upper extremity support and/or minimal assist needed during balance training; increased time tolerated during full Romberg training; loss of balance x 3 while standing on AirEx; frequent verbal cues during balance training regarding weight shifting.    Tali Is progressing fairly well towards her goals.   Pt prognosis is Fair.     Pt will continue to benefit from skilled outpatient physical therapy to address the deficits listed in the problem list box on initial evaluation, provide pt/family education and to maximize pt's level of independence in the home and community environment.     Pt's spiritual, cultural and educational needs considered and pt agreeable to plan of care and goals.     Anticipated barriers to physical therapy: severity of imbalance and low back pain     Goals:     New Short Term Goals (2 Weeks):   1. Decrease patient's c/o pain to 5/10 during performance of ADL's for independence of self care activities. (MET)  2. Patient to tolerate use of 3# weights during lower extremity therapeutic exercise to improve overall strength. (PART MET)  3. Patient to tolerate x 10 repetitions sit to stand so that she may rise without using her arms to help. (PART MET)  4. Patient to tolerate x 30 seconds full Romberg stance without assist device to improve upright tolerance. (PART MET)     New Long Term Goals (4 Weeks):   1. Patient to  "demo competence with home exercise program to maintain therapeutic gains. (NOT MET)  2. Patient to improve bilateral hip MMT 1/2 grade to demo strength gains from therapeutic intervention. (NOT MET)  3. Patient to ambulate 200 feet with small-base quad cane and stand by assist with improved manuel/symmetry. (NOT MET)  4. Patient to demonstrate improvement in her Tinetti score from "high fall risk" to "moderate fall risk". (NOT MET)      PLAN     Continue to advance strength/balance/mobility training to patient's tolerance.      Alan Phipps, PT     "

## 2022-08-22 ENCOUNTER — PATIENT OUTREACH (OUTPATIENT)
Dept: ADMINISTRATIVE | Facility: HOSPITAL | Age: 77
End: 2022-08-22
Payer: MEDICARE

## 2022-08-22 NOTE — LETTER
AUTHORIZATION FOR RELEASE OF   CONFIDENTIAL INFORMATION    Dear Lukas,    We are seeing Tali Hopper, date of birth 1945, in the clinic at [facility]. Elizabeth Rodríguez MD is the patient's PCP. Tali Hopper has an outstanding lab/procedure at the time we reviewed her chart. In order to help keep her health information updated, she has authorized us to request the following medical record(s):        (  )  MAMMOGRAM                                      (  )  COLONOSCOPY      (  )  PAP SMEAR                                          (  )  OUTSIDE LAB RESULTS     (  )  DEXA SCAN                                          (X) EYE EXAM - most recent          (  )  FOOT EXAM                                          (  )  ENTIRE RECORD     (  )  OUTSIDE IMMUNIZATIONS                 (  )  _______________         Please fax records to [facility], Amy L Hammons, MD, [phone]     Elian Gagnon  [phone]          Patient Name: Tali Hopper  : 1945  Patient Phone #: [phone]

## 2022-08-23 ENCOUNTER — CLINICAL SUPPORT (OUTPATIENT)
Dept: REHABILITATION | Facility: HOSPITAL | Age: 77
End: 2022-08-23
Payer: MEDICARE

## 2022-08-23 DIAGNOSIS — R26.89 IMBALANCE: Primary | ICD-10-CM

## 2022-08-23 DIAGNOSIS — R29.898 LEG WEAKNESS, BILATERAL: ICD-10-CM

## 2022-08-23 PROCEDURE — 97112 NEUROMUSCULAR REEDUCATION: CPT | Mod: PN

## 2022-08-23 PROCEDURE — 97110 THERAPEUTIC EXERCISES: CPT | Mod: PN

## 2022-08-23 NOTE — PROGRESS NOTES
[facility]   Physical Therapy Treatment Note     [id number]    Therapy Diagnosis:   Encounter Diagnoses   Name Primary?    Imbalance Yes    Leg weakness, bilateral      Physician: Hubert Aleman MD    Visit Date: 8/23/2022    Physician Orders: PT Eval and Treat   Medical Diagnosis from Referral: chronic LBP,unspcified laterality, unspecified whether sciatica present.  Evaluation Date: 6/28/2022  Authorization Period Expiration: 12/31/22  Plan of Care Expiration: 9/2/22  Visit # / Visits authorized: 14/ 20      Time In: 1415  Time Out: 1500  Total Appointment Time (timed & untimed codes): 45 minutes     Precautions: fall      SUBJECTIVE     Pt reports: No low back at rest.     She was compliant with home exercise program.  Response to previous treatment: decreased pain  Functional change: none     Pain: 0/10  Location: bilateral low back         OBJECTIVE     Objective Measures updated at progress report unless specified.     Treatment     Tali received the treatments listed below:       therapeutic exercises to develop strength and endurance for 23 minutes including:         X 10 minutes SciFit (level 3) to promote flexibility prior to strength/balance/mobility training            X 15 seated bilateral lower extremity therapeutic exercise (4#) = marching, long arc quad, ball squeeze, hip abduction (green theraband)              X 7 sit to stand without use of upper extremities   Functional ambulation 120 feet with rollator and stand by assist              neuromuscular re-education activities to improve: Balance and Proprioception for 22 minutes. The following activities were included:                    X 15 each standing heel raises/ toe raises and mini squats               X 25 feet each balance training in parallel bars = side stepping and backwards gait              X 45 seconds  full Romberg stance    X 12 alternating toe taps  on 3-inch stool       X 1 minute stand on AirEx              Patient Education and Home Exercises     Home Exercises Provided and Patient Education Provided     Education provided:   - proper therapeutic exercise technique  - continue home exercise program     Written Home Exercises Provided: Patient instructed to cont prior HEP.       ASSESSMENT     Patient was able to tolerate increased resistance during seated lower extremity therapeutic exercise; able to perform increased repetitions during sit to stand without upper extremity support; balance training limited by shortness of breath and unsteadiness; intermittent upper extremity support and/or minimal assist needed during balance training; loss of balance x 2 while standing on AirEx; frequent verbal cues during balance training regarding weight shifting.    Tali Is progressing fairly well towards her goals.   Pt prognosis is Fair.     Pt will continue to benefit from skilled outpatient physical therapy to address the deficits listed in the problem list box on initial evaluation, provide pt/family education and to maximize pt's level of independence in the home and community environment.     Pt's spiritual, cultural and educational needs considered and pt agreeable to plan of care and goals.     Anticipated barriers to physical therapy: severity of imbalance and low back pain     Goals:     New Short Term Goals (2 Weeks):   1. Decrease patient's c/o pain to 5/10 during performance of ADL's for independence of self care activities. (MET)  2. Patient to tolerate use of 3# weights during lower extremity therapeutic exercise to improve overall strength. (PART MET)  3. Patient to tolerate x 10 repetitions sit to stand so that she may rise without using her arms to help. (PART MET)  4. Patient to tolerate x 30 seconds full Romberg stance without assist device to improve upright tolerance. (PART MET)     New Long Term Goals (4 Weeks):   1. Patient to demo competence  "with home exercise program to maintain therapeutic gains. (NOT MET)  2. Patient to improve bilateral hip MMT 1/2 grade to demo strength gains from therapeutic intervention. (NOT MET)  3. Patient to ambulate 200 feet with small-base quad cane and stand by assist with improved manuel/symmetry. (NOT MET)  4. Patient to demonstrate improvement in her Tinetti score from "high fall risk" to "moderate fall risk". (NOT MET)      PLAN     Continue to advance strength/balance/mobility training to patient's tolerance.      Alan Phipps, PT     "

## 2022-08-25 ENCOUNTER — PES CALL (OUTPATIENT)
Dept: ADMINISTRATIVE | Facility: CLINIC | Age: 77
End: 2022-08-25
Payer: MEDICARE

## 2022-08-25 ENCOUNTER — CLINICAL SUPPORT (OUTPATIENT)
Dept: REHABILITATION | Facility: HOSPITAL | Age: 77
End: 2022-08-25
Payer: MEDICARE

## 2022-08-25 DIAGNOSIS — R29.898 LEG WEAKNESS, BILATERAL: ICD-10-CM

## 2022-08-25 DIAGNOSIS — R26.89 IMBALANCE: Primary | ICD-10-CM

## 2022-08-25 PROCEDURE — 97110 THERAPEUTIC EXERCISES: CPT | Mod: PN,CQ

## 2022-08-25 PROCEDURE — 97112 NEUROMUSCULAR REEDUCATION: CPT | Mod: PN,CQ

## 2022-08-30 ENCOUNTER — CLINICAL SUPPORT (OUTPATIENT)
Dept: REHABILITATION | Facility: HOSPITAL | Age: 77
End: 2022-08-30
Payer: MEDICARE

## 2022-08-30 DIAGNOSIS — R29.898 LEG WEAKNESS, BILATERAL: ICD-10-CM

## 2022-08-30 DIAGNOSIS — R26.89 IMBALANCE: Primary | ICD-10-CM

## 2022-08-30 PROCEDURE — 97112 NEUROMUSCULAR REEDUCATION: CPT | Mod: PN

## 2022-08-30 PROCEDURE — 97110 THERAPEUTIC EXERCISES: CPT | Mod: PN

## 2022-08-30 NOTE — PROGRESS NOTES
[facility]   Physical Therapy Treatment Note     [id number]    Therapy Diagnosis:   Encounter Diagnoses   Name Primary?    Imbalance Yes    Leg weakness, bilateral      Physician: Hubert Aleman MD    Visit Date: 8/30/2022    Physician Orders: PT Eval and Treat   Medical Diagnosis from Referral: chronic LBP,unspcified laterality, unspecified whether sciatica present.  Evaluation Date: 6/28/2022  Authorization Period Expiration: 12/31/22  Plan of Care Expiration: 9/2/22  Visit # / Visits authorized: 16/ 20      Time In: 1418  Time Out: 1500  Total Appointment Time (timed & untimed codes): 42 minutes     Precautions: fall      SUBJECTIVE     Pt reports: No low back pain at rest.     She was compliant with home exercise program.  Response to previous treatment: no changes  Functional change: none     Pain: 0/10  Location: bilateral low back         OBJECTIVE     Objective Measures updated at progress report unless specified.     Treatment     Tali received the treatments listed below:       therapeutic exercises to develop strength and endurance for 23 minutes including:         X 10 minutes SciFit (level 3) to promote flexibility prior to strength/balance/mobility training            X 20 seated bilateral lower extremity therapeutic exercise (4#) = marching, long arc quad, ball squeeze, hip abduction (green theraband)              X 8 sit to stand without use of upper extremities   Functional ambulation 2 x 120 feet with stand by assist              neuromuscular re-education activities to improve: Balance and Proprioception for 19 minutes. The following activities were included:                    X 15 each standing heel raises/ toe raises and mini squats               X 25 feet each balance training in parallel bars = side stepping and backwards gait              X 30 seconds  full Romberg stance    X 15 alternating toe taps on 5-inch  stool       X 1 minute stand on AirEx              Patient Education and Home Exercises     Home Exercises Provided and Patient Education Provided     Education provided:   - proper therapeutic exercise technique  - continue home exercise program     Written Home Exercises Provided: Patient instructed to cont prior HEP.       ASSESSMENT     Patient was able to perform sit to stand without upper extremity support; balance training limited by shortness of breath and unsteadiness; intermittent upper extremity support and/or minimal assist needed during balance training; loss of balance x 2 during full Romberg training and standing on AirEx; frequent verbal cues during balance training regarding weight shifting; decreased time endured during Romberg training.    Tali Is progressing fairly well towards her goals.   Pt prognosis is Fair.     Pt will continue to benefit from skilled outpatient physical therapy to address the deficits listed in the problem list box on initial evaluation, provide pt/family education and to maximize pt's level of independence in the home and community environment.     Pt's spiritual, cultural and educational needs considered and pt agreeable to plan of care and goals.     Anticipated barriers to physical therapy: severity of imbalance and low back pain     Goals:     New Short Term Goals (2 Weeks):   Decrease patient's c/o pain to 5/10 during performance of ADL's for independence of self care activities. (MET)  Patient to tolerate use of 3# weights during lower extremity therapeutic exercise to improve overall strength. (PART MET)  Patient to tolerate x 10 repetitions sit to stand so that she may rise without using her arms to help. (PART MET)  Patient to tolerate x 30 seconds full Romberg stance without assist device to improve upright tolerance. (PART MET)     New Long Term Goals (4 Weeks):   Patient to demo competence with home exercise program to maintain therapeutic gains. (NOT  "MET)  Patient to improve bilateral hip MMT 1/2 grade to demo strength gains from therapeutic intervention. (NOT MET)  Patient to ambulate 200 feet with small-base quad cane and stand by assist with improved manuel/symmetry. (NOT MET)  Patient to demonstrate improvement in her Tinetti score from "high fall risk" to "moderate fall risk". (NOT MET)      PLAN     Continue to advance strength/balance/mobility training to patient's tolerance.      Alan Phipps, PT       "

## 2022-09-01 ENCOUNTER — CLINICAL SUPPORT (OUTPATIENT)
Dept: REHABILITATION | Facility: HOSPITAL | Age: 77
End: 2022-09-01
Payer: MEDICARE

## 2022-09-01 DIAGNOSIS — R29.898 LEG WEAKNESS, BILATERAL: ICD-10-CM

## 2022-09-01 DIAGNOSIS — R26.89 IMBALANCE: Primary | ICD-10-CM

## 2022-09-01 LAB
LEFT EYE DM RETINOPATHY: NEGATIVE
RIGHT EYE DM RETINOPATHY: NEGATIVE

## 2022-09-01 PROCEDURE — 97110 THERAPEUTIC EXERCISES: CPT | Mod: PN

## 2022-09-01 PROCEDURE — 97112 NEUROMUSCULAR REEDUCATION: CPT | Mod: PN

## 2022-09-01 NOTE — PLAN OF CARE
[facility]   Physical Therapy Updated Plan of Care      Name: Tali Hopper  Clinic Number: [id number]     Therapy Diagnosis:        Encounter Diagnoses   Name Primary?    Imbalance Yes    Leg weakness, bilateral        Physician: Hubert Aleman MD     Visit Date: 9/1/2022     Physician Orders: PT Eval and Treat   Medical Diagnosis from Referral: chronic LBP,unspcified laterality, unspecified whether sciatica present.  Evaluation Date: 6/28/2022  Authorization Period Expiration: 12/31/22  Plan of Care Expiration: 9/2/22  Visit # / Visits authorized: 17/ 20      Time In: 1418  Time Out: 1500  Total Appointment Time (timed & untimed codes): 42 minutes     Precautions: fall  Functional Level Prior to Evaluation: supervision needed      SUBJECTIVE     Pt reports: that she took a fall a few days ago - tripped over the bath mat in her bathroom and hit the side of the bath tub; states has bruise on lateral aspect of right hip.      She was compliant with home exercise program.  Response to previous treatment: no changes  Functional change: none     Pain: 1/10  Location: right hip        OBJECTIVE     Lower Extremity Strength:  Right LE   Left LE     Hip flexion:  4-/5 Hip flexion: 4/5   Knee extension: 4/5 Knee extension: 4/5   Ankle dorsiflexion:  4/5 Ankle dorsiflexion: 4/5     Gait Assessment:(if indicated)  - AD used: rollator  - Assistance: stand by assist  - Distance: 200 feet        OTHER: Tinetti: 16/28 (high fall risk)      Treatment     Tali received the treatments listed below:      therapeutic exercises to develop strength and endurance for 19 minutes including:                 X 10 minutes SciFit (level 3) to promote flexibility prior to strength/balance/mobility training               X 10 sit to stand without use of upper extremities              Functional ambulation 200 feet with rollator and stand by assist              neuromuscular re-education activities to  improve: Balance and Proprioception for 23 minutes. The following activities were included:                    X 15 each standing heel raises/ toe raises and mini squats               X 25 feet each balance training in parallel bars = side stepping and backwards gait              X 30 seconds each modified full Romberg stance (close but not touching) = eyes open, eyes closed              X 15 alternating toe taps on 5-inch stool                  X 1 minute stand on AirEx              Patient Education and Home Exercises     Home Exercises Provided and Patient Education Provided      Education provided:   - proper therapeutic exercise technique  - continue home exercise program   - instructed on proper use of rollator     Written Home Exercises Provided: Patient instructed to cont prior HEP.       ASSESSMENT     Patient was able to demonstrate increased repetitions during sit to stand without upper extremity support; frequent verbal cues needed regarding proper use of rollator - keep assist device close to her and lock brakes before sitting down; balance training limited by shortness of breath and unsteadiness; intermittent upper extremity support needed during balance training; loss of balance x 2 while standing on AirEx; able to perform Romberg training with eyes closed.            Tali Is progressing fairly well towards her goals.   Pt prognosis is Fair.     Pt will continue to benefit from skilled outpatient physical therapy to address the deficits listed in the problem list box on initial evaluation, provide pt/family education and to maximize pt's level of independence in the home and community environment.     Pt's spiritual, cultural and educational needs considered and pt agreeable to plan of care and goals.     Anticipated barriers to physical therapy: severity of low back pain; level of imbalance    Goals:     Previous Short Term Goals (4 Weeks):   Decrease patient's c/o pain to 5/10 during performance of  "activities of daily living for independence of self care activities. (MET)  Patient to tolerate use of 3# weights during lower extremity therapeutic exercise to improve overall strength. (PART MET)  Patient to tolerate x 10 repetitions sit to stand so that she may rise without using her arms to help. (MET)  Patient to tolerate x 30 seconds full Romberg stance without assist device to improve upright tolerance. (MET)     Previous Long Term Goals (8 Weeks):   Patient to demo competence with home exercise program to maintain therapeutic gains. (NOT MET)  Patient to improve bilateral hip MMT 1/2 grade to demo strength gains from therapeutic intervention. (PART MET)  Patient to ambulate 200 feet with small-base quad cane and stand by assist with improved manuel/symmetry. (PART MET)  Patient to demonstrate improvement in her Tinetti score from "high fall risk" to "moderate fall risk". (NOT MET)     New Short Term Goals (2 Weeks):   Maintain patient's c/o pain at 1/10 or less during performance of activities of daily living for independence of self care activities.  Patient to require less than 50% verbal cues regarding proper use of rollator during functional ambulation.  Patient to tolerate x 12 repetitions sit to stand so that she may rise on one attempt.  Patient to tolerate x 45 seconds full Romberg stance (eyes closed) to improve upright tolerance.    New Long Term Goals (4 Weeks):   Patient to demo competence with home exercise program to maintain therapeutic gains.  Patient to improve right hip MMT 1/2 grade to demo strength gains from therapeutic intervention.  Patient to ambulate 300 feet with rollator and stand by assist with proper technique.  Patient to demonstrate improvement in her Tinetti score from "high fall risk" to "moderate fall risk".    Reasons for Recertification of Therapy:   Given patient's recent fall, she would benefit from further physical therapy visits to continue addressing " strength/balance/mobility deficits.      PLAN     Updated Certification Period: 09/01/22 to 10/01/22   Recommended Treatment Plan: 2 times per week for 4 weeks (starting week of 09/05/22):  Gait Training, Manual Therapy, Moist Heat/ Ice, Neuromuscular Re-ed, Patient Education, Self Care, Therapeutic Activities, Therapeutic Exercise and home exercise program.  Other Recommendations: n/a    Alan Phipps, PT    I CERTIFY THE NEED FOR THESE SERVICES FURNISHED UNDER THIS PLAN OF TREATMENT AND WHILE UNDER MY CARE  Physician's comments:      Physician's Signature: ___________________________________________________

## 2022-09-01 NOTE — PROGRESS NOTES
Given patient's recent fall, she would benefit from further physical therapy visits to continue addressing strength/balance/mobility deficits. Please see updated plan of care.

## 2022-09-08 ENCOUNTER — PATIENT OUTREACH (OUTPATIENT)
Dept: ADMINISTRATIVE | Facility: HOSPITAL | Age: 77
End: 2022-09-08
Payer: MEDICARE

## 2022-09-08 NOTE — LETTER
AUTHORIZATION FOR RELEASE OF   CONFIDENTIAL INFORMATION    Dear Dr. Gaytan,    We are seeing Tali Hopper, date of birth 1945, in the clinic at [facility]. Elizabeth Rodríguez MD is the patient's PCP. Tali Hopper has an outstanding lab/procedure at the time we reviewed her chart. In order to help keep her health information updated, she has authorized us to request the following medical record(s):        (  )  MAMMOGRAM                                      (  )  COLONOSCOPY      (  )  PAP SMEAR                                          (  )  OUTSIDE LAB RESULTS     (  )  DEXA SCAN                                          (X) EYE EXAM - most recent           (  )  FOOT EXAM                                          (  )  ENTIRE RECORD     (  )  OUTSIDE IMMUNIZATIONS                 (  )  _______________         Please fax records to [facility], Amy L Hammons, MD, [phone]     If you have any questions, please contact [facility] at ([phone].           Patient Name: Tali Hopper  : 1945  Patient Phone #: [phone]

## 2022-09-09 ENCOUNTER — PATIENT OUTREACH (OUTPATIENT)
Dept: ADMINISTRATIVE | Facility: HOSPITAL | Age: 77
End: 2022-09-09
Payer: MEDICARE

## 2022-09-13 ENCOUNTER — CLINICAL SUPPORT (OUTPATIENT)
Dept: REHABILITATION | Facility: HOSPITAL | Age: 77
End: 2022-09-13
Payer: MEDICARE

## 2022-09-13 ENCOUNTER — TELEPHONE (OUTPATIENT)
Dept: FAMILY MEDICINE | Facility: CLINIC | Age: 77
End: 2022-09-13
Payer: MEDICARE

## 2022-09-13 DIAGNOSIS — R26.89 IMBALANCE: Primary | ICD-10-CM

## 2022-09-13 DIAGNOSIS — R29.898 LEG WEAKNESS, BILATERAL: ICD-10-CM

## 2022-09-13 PROCEDURE — 97110 THERAPEUTIC EXERCISES: CPT | Mod: PN

## 2022-09-13 PROCEDURE — 97112 NEUROMUSCULAR REEDUCATION: CPT | Mod: PN

## 2022-09-13 NOTE — TELEPHONE ENCOUNTER
----- Message from Rocky Junior sent at 9/12/2022  3:43 PM CDT -----  Type: Needs Medical Advice  Who Called:  pt  Symptoms (please be specific):  pt said she have questions about metFORMIN (GLUCOPHAGE-XR) 500 MG ER 24hr tablet--please call her and advise  Best Call Back Number: [phone] (home)     Additional Information: thank you

## 2022-09-13 NOTE — PROGRESS NOTES
[facility]   Physical Therapy Treatment Note     [id number]    Therapy Diagnosis:   Encounter Diagnoses   Name Primary?    Imbalance Yes    Leg weakness, bilateral      Physician: Hubert Aleman MD    Visit Date: 9/13/2022    Physician Orders: PT Eval and Treat   Medical Diagnosis from Referral: chronic LBP,unspcified laterality, unspecified whether sciatica present.  Evaluation Date: 6/28/2022  Authorization Period Expiration: 12/31/22  Plan of Care Expiration: 10/01/22  Visit # / Visits authorized: 18/ 20      Time In: 1538  Time Out: 1623  Total Appointment Time (timed & untimed codes): 45 minutes     Precautions: fall      SUBJECTIVE     Pt reports: No low back pain at rest.     She was compliant with home exercise program.  Response to previous treatment: no changes  Functional change: arrived with rollator     Pain: 0/10  Location: bilateral low back         OBJECTIVE     Objective Measures updated at progress report unless specified.     Treatment     Tali received the treatments listed below:       therapeutic exercises to develop strength and endurance for 21 minutes including:         X 10 minutes SciFit (level 3) to promote flexibility prior to strength/balance/mobility training            X 20 seated bilateral lower extremity therapeutic exercise (4#) = marching, long arc quad, ball squeeze, hip abduction (green theraband)              X 12 sit to stand without use of upper extremities   Functional ambulation x 190 feet with rollator and stand by assist              neuromuscular re-education activities to improve: Balance and Proprioception for 24 minutes. The following activities were included:                    X 20 each standing heel raises/ toe raises and mini squats               X 25 feet each balance training in parallel bars = side stepping and backwards gait              X 45 seconds  full Romberg stance, eyes  open   X 15 alternating toe taps on 5-inch stool       X 1 minute stand on AirEx              Patient Education and Home Exercises     Home Exercises Provided and Patient Education Provided     Education provided:   - proper therapeutic exercise technique  - continue home exercise program     Written Home Exercises Provided: Patient instructed to cont prior HEP.       ASSESSMENT     Patient was able to demonstrate increased repetitions during balance therapeutic exercise and sit to stand without upper extremity support; improved endurance during rollator-assisted ambulation - minimal verbal cues needed regarding proper use of assist device; use of single upper extremity support needed during standing heel raises/ toe raises; no upper extremity support needed during mini squats but loss of balance x 1; loss of balance x 3 while standing on AirEx - no loss of balance during Romberg training; increased time endured during Romberg training, eyes open.    Tali Is progressing fairly well towards her goals.   Pt prognosis is Fair.     Pt will continue to benefit from skilled outpatient physical therapy to address the deficits listed in the problem list box on initial evaluation, provide pt/family education and to maximize pt's level of independence in the home and community environment.     Pt's spiritual, cultural and educational needs considered and pt agreeable to plan of care and goals.     Anticipated barriers to physical therapy: severity of imbalance and low back pain     Goals:     New Short Term Goals (2 Weeks):   Maintain patient's c/o pain at 1/10 or less during performance of activities of daily living for independence of self care activities. (MET)  Patient to require less than 50% verbal cues regarding proper use of rollator during functional ambulation. (MET)  Patient to tolerate x 12 repetitions sit to stand so that she may rise on one attempt. (PART MET)  Patient to tolerate x 45 seconds full Romberg  "stance (eyes closed) to improve upright tolerance. (PART MET)     New Long Term Goals (4 Weeks):   Patient to demo competence with home exercise program to maintain therapeutic gains. (NOT MET)  Patient to improve right hip MMT 1/2 grade to demo strength gains from therapeutic intervention. (NOT MET)  Patient to ambulate 300 feet with rollator and stand by assist with proper technique. (NOT MET)  Patient to demonstrate improvement in her Tinetti score from "high fall risk" to "moderate fall risk". (NOT MET)      PLAN     Continue to advance strength/balance/mobility training to patient's tolerance.      Alan Phipps, PT         "

## 2022-09-13 NOTE — TELEPHONE ENCOUNTER
Returned call to patient in regards to medication questions. No answer , left voicemail to return call.

## 2022-09-15 ENCOUNTER — CLINICAL SUPPORT (OUTPATIENT)
Dept: REHABILITATION | Facility: HOSPITAL | Age: 77
End: 2022-09-15
Payer: MEDICARE

## 2022-09-15 DIAGNOSIS — R29.898 LEG WEAKNESS, BILATERAL: ICD-10-CM

## 2022-09-15 DIAGNOSIS — R26.89 IMBALANCE: Primary | ICD-10-CM

## 2022-09-15 PROCEDURE — 97112 NEUROMUSCULAR REEDUCATION: CPT | Mod: PN,CQ

## 2022-09-15 PROCEDURE — 97110 THERAPEUTIC EXERCISES: CPT | Mod: PN,CQ

## 2022-09-15 NOTE — PROGRESS NOTES
[facility]   Physical Therapy Treatment Note     [id number]    Therapy Diagnosis:   Encounter Diagnoses   Name Primary?    Imbalance Yes    Leg weakness, bilateral      Physician: Hubert Aleman MD    Visit Date: 9/15/2022    Physician Orders: PT Eval and Treat   Medical Diagnosis from Referral: chronic LBP,unspcified laterality, unspecified whether sciatica present.  Evaluation Date: 6/28/2022  Authorization Period Expiration: 12/31/22  Plan of Care Expiration: 10/01/22  Visit # / Visits authorized: 20/ 20 (19)  PTA visit # 1/5         Time In: 1410  Time Out: 1450  Total Appointment Time: 40 minutes     Precautions: fall      SUBJECTIVE     Pt reports: she is feeling tired today.   She was compliant with home exercise program.  Response to previous treatment: no changes  Functional change: arrived with rollator     Pain: 0/10  Location: bilateral low back         OBJECTIVE     Objective Measures updated at progress report unless specified.     Treatment     Tali received the treatments listed below:       therapeutic exercises to develop strength and endurance for 15 minutes including:         X 10 minutes SciFit (level 3) to promote flexibility prior to strength/balance/mobility training            X 20 seated bilateral lower extremity therapeutic exercise (4#) = marching, long arc quad, ball squeeze, hip abduction (green theraband)              X 12 sit to stand without use of upper extremities   Functional ambulation x 200' feet with rollator and stand by assist and verbal cues to stay close to rollator.         neuromuscular re-education activities to improve: Balance and Proprioception for 25 minutes. The following activities were included:                    X 20 each standing heel raises/ toe raises and mini squats               X 25 feet each balance training in parallel bars = side stepping and backwards gait              X 45  seconds  full Romberg stance, eyes open   X 20 alternating toe taps on 6-inch stool  (close Contact Guard Assist/minimal A)         X 1 minute stand on AirEx   (close Contact Guard Assist/minimal A)          Patient Education and Home Exercises     Home Exercises Provided and Patient Education Provided     Education provided:   - proper therapeutic exercise technique  - continue home exercise program     Written Home Exercises Provided: Patient instructed to cont prior HEP.       ASSESSMENT     Pt experiencing increased dyspnea on exertion with standing exercises.  Pt also sweating a lot during exercises. Pt did need occasional support of parallel bars with standing exercises especially toe taps.        Tali Is progressing fairly well towards her goals.   Pt prognosis is Fair.     Pt will continue to benefit from skilled outpatient physical therapy to address the deficits listed in the problem list box on initial evaluation, provide pt/family education and to maximize pt's level of independence in the home and community environment.     Pt's spiritual, cultural and educational needs considered and pt agreeable to plan of care and goals.     Anticipated barriers to physical therapy: severity of imbalance and low back pain     Goals:     New Short Term Goals (2 Weeks):   Maintain patient's c/o pain at 1/10 or less during performance of activities of daily living for independence of self care activities.  Patient to require less than 50% verbal cues regarding proper use of rollator during functional ambulation.  Patient to tolerate x 12 repetitions sit to stand so that she may rise on one attempt.  Patient to tolerate x 45 seconds full Romberg stance (eyes closed) to improve upright tolerance.     New Long Term Goals (4 Weeks):   Patient to demo competence with home exercise program to maintain therapeutic gains.  Patient to improve right hip MMT 1/2 grade to demo strength gains from therapeutic  "intervention.  Patient to ambulate 300 feet with rollator and stand by assist with proper technique.  Patient to demonstrate improvement in her Tinetti score from "high fall risk" to "moderate fall risk".  PLAN     Updated Certification Period: 09/01/22 to 10/01/22   Recommended Treatment Plan: 2 times per week for 4 weeks (starting week of 09/05/22):  Gait Training, Manual Therapy, Moist Heat/ Ice, Neuromuscular Re-ed, Patient Education, Self Care, Therapeutic Activities, Therapeutic Exercise and home exercise program.    Continue to advance strength/balance/mobility training to patient's tolerance.      Em East, PTA           "

## 2022-09-20 ENCOUNTER — CLINICAL SUPPORT (OUTPATIENT)
Dept: REHABILITATION | Facility: HOSPITAL | Age: 77
End: 2022-09-20
Payer: MEDICARE

## 2022-09-20 DIAGNOSIS — R29.898 LEG WEAKNESS, BILATERAL: ICD-10-CM

## 2022-09-20 DIAGNOSIS — R26.89 IMBALANCE: Primary | ICD-10-CM

## 2022-09-20 PROCEDURE — 97112 NEUROMUSCULAR REEDUCATION: CPT | Mod: PN

## 2022-09-20 PROCEDURE — 97110 THERAPEUTIC EXERCISES: CPT | Mod: PN

## 2022-09-20 NOTE — PROGRESS NOTES
[facility]   Physical Therapy Treatment Note     [id number]    Therapy Diagnosis:   Encounter Diagnoses   Name Primary?    Imbalance Yes    Leg weakness, bilateral      Physician: Hubert Aleman MD    Visit Date: 9/20/2022    Physician Orders: PT Eval and Treat   Medical Diagnosis from Referral: chronic LBP,unspcified laterality, unspecified whether sciatica present.  Evaluation Date: 6/28/2022  Authorization Period Expiration: 12/31/22  Plan of Care Expiration: 10/01/22  Visit # / Visits authorized: 20/ 20      Time In: 1424  Time Out: 1509  Total Appointment Time (timed & untimed codes): 45 minutes     Precautions: fall      SUBJECTIVE     Pt reports: No low back pain at rest.     She was compliant with home exercise program.  Response to previous treatment: no changes  Functional change: arrived with rollator     Pain: 0/10  Location: bilateral low back         OBJECTIVE     Objective Measures updated at progress report unless specified.     Treatment     Tali received the treatments listed below:       therapeutic exercises to develop strength and endurance for 21 minutes including:         X 10 minutes SciFit (level 3) to promote flexibility prior to strength/balance/mobility training            X 20 seated bilateral lower extremity therapeutic exercise (4#) = marching, long arc quad, ball squeeze, hip abduction (green theraband)              X 6 sit to stand with hands on thighs   Functional ambulation x 250 feet with rollator and stand by assist              neuromuscular re-education activities to improve: Balance and Proprioception for 24 minutes. The following activities were included:                    X 20 each standing heel raises/ toe raises and mini squats               X 25 feet each balance training in parallel bars = side stepping and backwards gait              X 45 seconds  full Romberg stance, eyes open   X 15  alternating toe taps on 5-inch stool       X 1 minute stand on AirEx              Patient Education and Home Exercises     Home Exercises Provided and Patient Education Provided     Education provided:   - proper therapeutic exercise technique  - continue home exercise program     Written Home Exercises Provided: Patient instructed to cont prior HEP.       ASSESSMENT     Patient was able to perform sit to stand on AirEx with hands on thighs; improved endurance during rollator-assisted ambulation - no verbal cues needed regarding proper use of assist device; use of single upper extremity support needed during alternating toe taps and standing heel raises/ toe raises - no upper extremity support needed during mini squats; loss of balance x 2 while standing on AirEx - no loss of balance during Romberg training.    Tali Is progressing fairly well towards her goals.   Pt prognosis is Fair.     Pt will continue to benefit from skilled outpatient physical therapy to address the deficits listed in the problem list box on initial evaluation, provide pt/family education and to maximize pt's level of independence in the home and community environment.     Pt's spiritual, cultural and educational needs considered and pt agreeable to plan of care and goals.     Anticipated barriers to physical therapy: severity of imbalance and low back pain     Goals:     New Short Term Goals (2 Weeks):   Maintain patient's c/o pain at 1/10 or less during performance of activities of daily living for independence of self care activities. (MET)  Patient to require less than 50% verbal cues regarding proper use of rollator during functional ambulation. (MET)  Patient to tolerate x 12 repetitions sit to stand so that she may rise on one attempt. (PART MET)  Patient to tolerate x 45 seconds full Romberg stance (eyes closed) to improve upright tolerance. (PART MET)     New Long Term Goals (4 Weeks):   Patient to demo competence with home exercise  "program to maintain therapeutic gains. (NOT MET)  Patient to improve right hip MMT 1/2 grade to demo strength gains from therapeutic intervention. (NOT MET)  Patient to ambulate 300 feet with rollator and stand by assist with proper technique. (NOT MET)  Patient to demonstrate improvement in her Tinetti score from "high fall risk" to "moderate fall risk". (NOT MET)      PLAN     Continue to advance strength/balance/mobility training to patient's tolerance.      Alan Phipps, PT           "

## 2022-09-21 ENCOUNTER — TELEPHONE (OUTPATIENT)
Dept: ADMINISTRATIVE | Facility: CLINIC | Age: 77
End: 2022-09-21
Payer: MEDICARE

## 2022-09-22 ENCOUNTER — CLINICAL SUPPORT (OUTPATIENT)
Dept: REHABILITATION | Facility: HOSPITAL | Age: 77
End: 2022-09-22
Payer: MEDICARE

## 2022-09-22 DIAGNOSIS — R29.898 LEG WEAKNESS, BILATERAL: ICD-10-CM

## 2022-09-22 DIAGNOSIS — R26.89 IMBALANCE: Primary | ICD-10-CM

## 2022-09-22 PROCEDURE — 97112 NEUROMUSCULAR REEDUCATION: CPT | Mod: PN,CQ

## 2022-09-22 PROCEDURE — 97110 THERAPEUTIC EXERCISES: CPT | Mod: PN,CQ

## 2022-09-22 NOTE — PROGRESS NOTES
[facility]   Physical Therapy Treatment Note     Name: Tali Hopper  [facility] Number: [id number]    Therapy Diagnosis:   Encounter Diagnoses   Name Primary?    Imbalance Yes    Leg weakness, bilateral      Physician: Hubert Aleman MD    Visit Date: 9/22/2022    Physician Orders: PT Eval and Treat   Medical Diagnosis from Referral: chronic LBP,unspcified laterality, unspecified whether sciatica present.  Evaluation Date: 6/28/2022  Authorization Period Expiration: 12/31/22  Plan of Care Expiration: 10/01/22  Visit # / Visits authorized: 22/ 40 (21)  PTA visit # 1/5         Time In: 1500  Time Out: 1540  Total Appointment Time: 40 minutes     Precautions: fall      SUBJECTIVE     Pt reports: she is feeling tired today.   She was compliant with home exercise program.  Response to previous treatment: no changes  Functional change: arrived with rollator     Pain: 0/10  Location: bilateral low back         OBJECTIVE     Objective Measures updated at progress report unless specified.     Treatment     Tali received the treatments listed below:       therapeutic exercises to develop strength and endurance for 15 minutes including:         X 10 minutes SciFit (level 3) to promote flexibility prior to strength/balance/mobility training            X 20 seated bilateral lower extremity therapeutic exercise (4#) = marching, long arc quad, ball squeeze, hip abduction (green theraband)              X 12 sit to stand without use of upper extremities   Functional ambulation x 200' feet with rollator and stand by assist and verbal cues to stay close to rollator.         neuromuscular re-education activities to improve: Balance and Proprioception for 25 minutes. The following activities were included:                    X 20 each standing heel raises/ toe raises and mini squats    X 20 reps hip Abduction bilateral lower extremity alternating              X 25 feet each balance training in  parallel bars = side stepping and backwards gait   X 20 alternating toe taps on 6-inch stool  (close Contact Guard Assist/minimal A)         X 30 seconds stand on AirEx   level of assist varying from Contact Guard Assist/minimal Assist/moderated assist         Patient Education and Home Exercises     Home Exercises Provided and Patient Education Provided     Education provided:   - continue home exercise program     Written Home Exercises Provided: Patient instructed to cont prior HEP.       ASSESSMENT     Pt experiencing increased dyspnea on exertion with standing exercises.  Pt also sweating a lot during exercises. Pt unsteady with standing on airex and needed increased assist.  Time decreased to 30 seconds.  Pt perform toe taps with unilateral Right hand support on parallel bars.    Tali Is progressing fairly well towards her goals.   Pt prognosis is Fair.     Pt will continue to benefit from skilled outpatient physical therapy to address the deficits listed in the problem list box on initial evaluation, provide pt/family education and to maximize pt's level of independence in the home and community environment.     Pt's spiritual, cultural and educational needs considered and pt agreeable to plan of care and goals.     Anticipated barriers to physical therapy: severity of imbalance and low back pain     Goals:     New Short Term Goals (2 Weeks):   Maintain patient's c/o pain at 1/10 or less during performance of activities of daily living for independence of self care activities.  Patient to require less than 50% verbal cues regarding proper use of rollator during functional ambulation.  Patient to tolerate x 12 repetitions sit to stand so that she may rise on one attempt.  Patient to tolerate x 45 seconds full Romberg stance (eyes closed) to improve upright tolerance.     New Long Term Goals (4 Weeks):   Patient to demo competence with home exercise program to maintain therapeutic gains.  Patient to improve  "right hip MMT 1/2 grade to demo strength gains from therapeutic intervention.  Patient to ambulate 300 feet with rollator and stand by assist with proper technique.  Patient to demonstrate improvement in her Tinetti score from "high fall risk" to "moderate fall risk".  PLAN     Updated Certification Period: 09/01/22 to 10/01/22   Recommended Treatment Plan: 2 times per week for 4 weeks (starting week of 09/05/22):  Gait Training, Manual Therapy, Moist Heat/ Ice, Neuromuscular Re-ed, Patient Education, Self Care, Therapeutic Activities, Therapeutic Exercise and home exercise program.    Continue to advance strength/balance/mobility training to patient's tolerance.      Em East, PTA             "

## 2022-09-23 ENCOUNTER — OFFICE VISIT (OUTPATIENT)
Dept: FAMILY MEDICINE | Facility: CLINIC | Age: 77
End: 2022-09-23
Payer: MEDICARE

## 2022-09-23 VITALS
OXYGEN SATURATION: 98 % | WEIGHT: 211.19 LBS | HEART RATE: 73 BPM | HEIGHT: 63 IN | DIASTOLIC BLOOD PRESSURE: 82 MMHG | BODY MASS INDEX: 37.42 KG/M2 | SYSTOLIC BLOOD PRESSURE: 128 MMHG

## 2022-09-23 DIAGNOSIS — E11.9 TYPE 2 DIABETES MELLITUS WITHOUT COMPLICATION, WITHOUT LONG-TERM CURRENT USE OF INSULIN: ICD-10-CM

## 2022-09-23 DIAGNOSIS — I10 ESSENTIAL HYPERTENSION: ICD-10-CM

## 2022-09-23 DIAGNOSIS — E78.5 HYPERLIPIDEMIA, UNSPECIFIED HYPERLIPIDEMIA TYPE: ICD-10-CM

## 2022-09-23 DIAGNOSIS — Z00.00 ENCOUNTER FOR PREVENTIVE HEALTH EXAMINATION: Primary | ICD-10-CM

## 2022-09-23 DIAGNOSIS — E03.9 HYPOTHYROIDISM, UNSPECIFIED TYPE: ICD-10-CM

## 2022-09-23 DIAGNOSIS — E66.01 SEVERE OBESITY (BMI 35.0-39.9) WITH COMORBIDITY: ICD-10-CM

## 2022-09-23 DIAGNOSIS — F33.1 MODERATE EPISODE OF RECURRENT MAJOR DEPRESSIVE DISORDER: ICD-10-CM

## 2022-09-23 PROBLEM — U07.1 COVID-19 VIRUS INFECTION: Status: RESOLVED | Noted: 2021-09-06 | Resolved: 2022-09-23

## 2022-09-23 PROCEDURE — 1159F MED LIST DOCD IN RCRD: CPT | Mod: CPTII,S$GLB,, | Performed by: NURSE PRACTITIONER

## 2022-09-23 PROCEDURE — 1126F AMNT PAIN NOTED NONE PRSNT: CPT | Mod: CPTII,S$GLB,, | Performed by: NURSE PRACTITIONER

## 2022-09-23 PROCEDURE — 3288F FALL RISK ASSESSMENT DOCD: CPT | Mod: CPTII,S$GLB,, | Performed by: NURSE PRACTITIONER

## 2022-09-23 PROCEDURE — 3079F DIAST BP 80-89 MM HG: CPT | Mod: CPTII,S$GLB,, | Performed by: NURSE PRACTITIONER

## 2022-09-23 PROCEDURE — G0439 PPPS, SUBSEQ VISIT: HCPCS | Mod: S$GLB,,, | Performed by: NURSE PRACTITIONER

## 2022-09-23 PROCEDURE — 1101F PR PT FALLS ASSESS DOC 0-1 FALLS W/OUT INJ PAST YR: ICD-10-PCS | Mod: CPTII,S$GLB,, | Performed by: NURSE PRACTITIONER

## 2022-09-23 PROCEDURE — 1159F PR MEDICATION LIST DOCUMENTED IN MEDICAL RECORD: ICD-10-PCS | Mod: CPTII,S$GLB,, | Performed by: NURSE PRACTITIONER

## 2022-09-23 PROCEDURE — G0439 PR MEDICARE ANNUAL WELLNESS SUBSEQUENT VISIT: ICD-10-PCS | Mod: S$GLB,,, | Performed by: NURSE PRACTITIONER

## 2022-09-23 PROCEDURE — 99999 PR PBB SHADOW E&M-EST. PATIENT-LVL IV: CPT | Mod: PBBFAC,,, | Performed by: NURSE PRACTITIONER

## 2022-09-23 PROCEDURE — 1101F PT FALLS ASSESS-DOCD LE1/YR: CPT | Mod: CPTII,S$GLB,, | Performed by: NURSE PRACTITIONER

## 2022-09-23 PROCEDURE — 1160F PR REVIEW ALL MEDS BY PRESCRIBER/CLIN PHARMACIST DOCUMENTED: ICD-10-PCS | Mod: CPTII,S$GLB,, | Performed by: NURSE PRACTITIONER

## 2022-09-23 PROCEDURE — 1160F RVW MEDS BY RX/DR IN RCRD: CPT | Mod: CPTII,S$GLB,, | Performed by: NURSE PRACTITIONER

## 2022-09-23 PROCEDURE — 3074F SYST BP LT 130 MM HG: CPT | Mod: CPTII,S$GLB,, | Performed by: NURSE PRACTITIONER

## 2022-09-23 PROCEDURE — 3074F PR MOST RECENT SYSTOLIC BLOOD PRESSURE < 130 MM HG: ICD-10-PCS | Mod: CPTII,S$GLB,, | Performed by: NURSE PRACTITIONER

## 2022-09-23 PROCEDURE — 99999 PR PBB SHADOW E&M-EST. PATIENT-LVL IV: ICD-10-PCS | Mod: PBBFAC,,, | Performed by: NURSE PRACTITIONER

## 2022-09-23 PROCEDURE — 3288F PR FALLS RISK ASSESSMENT DOCUMENTED: ICD-10-PCS | Mod: CPTII,S$GLB,, | Performed by: NURSE PRACTITIONER

## 2022-09-23 PROCEDURE — 1126F PR PAIN SEVERITY QUANTIFIED, NO PAIN PRESENT: ICD-10-PCS | Mod: CPTII,S$GLB,, | Performed by: NURSE PRACTITIONER

## 2022-09-23 PROCEDURE — 3079F PR MOST RECENT DIASTOLIC BLOOD PRESSURE 80-89 MM HG: ICD-10-PCS | Mod: CPTII,S$GLB,, | Performed by: NURSE PRACTITIONER

## 2022-09-23 NOTE — PATIENT INSTRUCTIONS
Counseling and Referral of Other Preventative  (Italic type indicates deductible and co-insurance are waived)    Patient Name: Tali Hopper  Today's Date: 9/23/2022    Health Maintenance       Date Due Completion Date    Shingles Vaccine (1 of 2) Never done ---    COVID-19 Vaccine (2 - Booster for Ibeth series) 01/03/2022 11/8/2021    Diabetes Urine Screening 06/11/2022 6/11/2021    Pneumococcal Vaccines (Age 65+) (1 - PCV) 08/04/2023 (Originally 6/19/1951) ---    Hemoglobin A1c 01/29/2023 7/29/2022    Lipid Panel 07/29/2023 7/29/2022    Eye Exam 09/01/2023 9/1/2022    DEXA Scan 06/23/2024 6/23/2021    TETANUS VACCINE 08/17/2027 8/17/2017        No orders of the defined types were placed in this encounter.    The following information is provided to all patients.  This information is to help you find resources for any of the problems found today that may be affecting your health:                Living healthy guide: www.[facility].[place].gov      Understanding Diabetes: www.diabetes.org      Eating healthy: www.cdc.gov/healthyweight      CDC home safety checklist: www.cdc.gov/steadi/patient.html      Agency on Aging: www.goea.[place].gov      Alcoholics anonymous (AA): www.aa.org      Physical Activity: www.yazmin.nih.gov/uq6eecu      Tobacco use: www.quitwithusla.org

## 2022-09-23 NOTE — PROGRESS NOTES
"  Tali Hopper presented for a  Medicare AWV and comprehensive Health Risk Assessment today. The following components were reviewed and updated:    Medical history  Family History  Social history  Allergies and Current Medications  Health Risk Assessment  Health Maintenance  Care Team     ** See Completed Assessments for Annual Wellness Visit within the encounter summary.**     The following assessments were completed:  Living Situation  CAGE  Depression Screening  Timed Get Up and Go  Whisper Test  Cognitive Function Screening      Nutrition Screening  ADL Screening  PAQ Screening    Review for Opioid Screening: Pt does not have Rx for Opioids  Review for Substance Use Disorders: Patient does not use substance     Vitals:    09/23/22 1250   BP: 128/82   BP Location: Left arm   Patient Position: Sitting   BP Method: Medium (Manual)   Pulse: 73   SpO2: 98%   Weight: 95.8 kg (211 lb 3.2 oz)   Height: 5' 3" (1.6 m)     Body mass index is 37.41 kg/m².  Physical Exam  Vitals reviewed.   Constitutional:       Appearance: Normal appearance.   Cardiovascular:      Rate and Rhythm: Normal rate and regular rhythm.      Pulses: Normal pulses.      Heart sounds: Normal heart sounds.   Pulmonary:      Effort: Pulmonary effort is normal. No respiratory distress.      Breath sounds: Normal breath sounds.   Skin:     General: Skin is warm and dry.   Neurological:      Mental Status: She is alert and oriented to person, place, and time.     Diagnoses and health risks identified today and associated recommendations/orders:    1. Encounter for preventive health examination  Reviewed and discussed health maintenance.    - Microalbumin/Creatinine Ratio, Urine; Future    2. Essential hypertension  Stable- continue current treatment and follow up routinely with PCP     3. Hyperlipidemia, unspecified hyperlipidemia type  Stable- continue current treatment and follow up routinely with PCP     4. Hypothyroidism, unspecified " type  Stable- continue current treatment and follow up routinely with PCP     5. Severe obesity (BMI 35.0-39.9) with comorbidity  Reviewed and discussed health maintenance.      6. Type 2 diabetes mellitus without complication, without long-term current use of insulin  Stable- continue current treatment and follow up routinely with PCP   - Microalbumin/Creatinine Ratio, Urine; Future    7. Moderate episode of recurrent major depressive disorder  Stable- continue current treatment and follow up routinely with PCP     Provided Tali with a 5-10 year written screening schedule and personal prevention plan. Recommendations were developed using the USPSTF age appropriate recommendations. Education, counseling, and referrals were provided as needed. After Visit Summary printed and given to patient which includes a list of additional screenings\tests needed.    I offered to discuss advanced care planning, including how to pick a person who would make decisions for you if you were unable to make them for yourself, called a health care power of , and what kind of decisions you might make such as use of life sustaining treatments such as ventilators and tube feeding when faced with a life limiting illness recorded on a living will that they will need to know. (How you want to be cared for as you near the end of your natural life)     X Patient is interested in learning more about how to make advanced directives.  I provided them paperwork and offered to discuss this with them.    Sayra Inman NP

## 2022-10-03 ENCOUNTER — TELEPHONE (OUTPATIENT)
Dept: SPINE | Facility: CLINIC | Age: 77
End: 2022-10-03
Payer: MEDICARE

## 2022-10-03 DIAGNOSIS — G89.29 CHRONIC LOW BACK PAIN, UNSPECIFIED BACK PAIN LATERALITY, UNSPECIFIED WHETHER SCIATICA PRESENT: Primary | ICD-10-CM

## 2022-10-03 DIAGNOSIS — M54.50 CHRONIC LOW BACK PAIN, UNSPECIFIED BACK PAIN LATERALITY, UNSPECIFIED WHETHER SCIATICA PRESENT: Primary | ICD-10-CM

## 2022-10-03 NOTE — TELEPHONE ENCOUNTER
----- Message from Dilcia Heart sent at 10/3/2022  1:04 PM CDT -----  Regarding: New PT Orders  Good morning,     When you have a moment, please place new PT orders for this pt to resume PT tx at [facility] in [place] with Alan Phipps. This patient came in and advised me that she received surgery on her eye and she would like to resume PT tx. If you have any addtl questions/comments, please don't hesitate to reach out to us directly at [phone] & our fax number is [phone].     Thank you in advance for your assistance with this patient, have a jaja day.

## 2022-10-06 ENCOUNTER — CLINICAL SUPPORT (OUTPATIENT)
Dept: REHABILITATION | Facility: HOSPITAL | Age: 77
End: 2022-10-06
Payer: MEDICARE

## 2022-10-06 DIAGNOSIS — R26.89 IMBALANCE: Primary | ICD-10-CM

## 2022-10-06 DIAGNOSIS — R29.898 LEG WEAKNESS, BILATERAL: ICD-10-CM

## 2022-10-06 PROCEDURE — 97112 NEUROMUSCULAR REEDUCATION: CPT | Mod: PN

## 2022-10-06 PROCEDURE — 97110 THERAPEUTIC EXERCISES: CPT | Mod: PN

## 2022-10-06 NOTE — PROGRESS NOTES
[facility]   Physical Therapy Discharge Note     [id number]    Therapy Diagnosis:   Encounter Diagnoses   Name Primary?    Imbalance Yes    Leg weakness, bilateral      Physician: Hubert Aleman MD    Visit Date: 10/6/2022    Physician Orders: PT Eval and Treat   Medical Diagnosis from Referral: chronic LBP,unspcified laterality, unspecified whether sciatica present.  Evaluation Date: 6/28/2022    Date of Last visit: 10/06/22  Total Visits Received: 23      Time In: 1518  Time Out: 1600  Total Appointment Time (timed & untimed codes): 42 minutes     Precautions: fall      SUBJECTIVE     Pt reports: No low back pain at rest.     She was compliant with home exercise program.  Response to previous treatment: no changes  Functional change: arrived with rollator     Pain: 0/10  Location: bilateral low back         OBJECTIVE     Lower Extremity Strength:  Right LE   Left LE     Hip flexion:  4-/5 Hip flexion: 4/5   Knee extension: 4-/5 Knee extension: 4/5   Ankle dorsiflexion:  4-/5 Ankle dorsiflexion: 4/5      Gait Assessment:(if indicated)  - AD used: Rolling-walker   - Assistance: stand by assist  - Distance: 300 feet         OTHER: Tinetti: 19/28 (moderate fall risk)      Treatment     Tali received the treatments listed below:       therapeutic exercises to develop strength and endurance for 24 minutes including:         X 10 minutes SciFit (level 3) to promote flexibility prior to strength/balance/mobility training            X 20 seated bilateral lower extremity therapeutic exercise (4#) = marching, long arc quad, ball squeeze, hip abduction (green theraband)              X 12 sit to stand with hands on thighs   Functional ambulation x 300 feet with rollator and stand by assist    Reviewed home exercise program - instructed to perform twice a day              neuromuscular re-education activities to improve: Balance and Proprioception for 18  minutes. The following activities were included:                    X 20 each standing heel raises/ toe raises and mini squats               X 25 feet each balance training in parallel bars = side stepping and backwards gait              X 30 seconds  full Romberg stance, eyes closed   X 15 alternating toe taps on 5-inch stool       X 1 minute stand on AirEx              Patient Education and Home Exercises     Home Exercises Provided and Patient Education Provided     Education provided:   - proper therapeutic exercise technique  - continue home exercise program     Written Home Exercises Provided: Patient instructed to cont prior HEP.       ASSESSMENT     Patient was able to demonstrate improved endurance during rollator-assisted ambulation - no verbal cues needed regarding proper use of assist device; use of single upper extremity support needed during alternating toe taps and standing heel raises/ toe raises - no upper extremity support needed during mini squats; loss of balance x 4 while standing on AirEx; frequent loss of balance during Romberg training.    Tali has progressed fairly well towards her goals.     Pt's spiritual, cultural and educational needs considered and pt agreeable to plan of care and goals.     Goals:     New Short Term Goals (2 Weeks):   Maintain patient's c/o pain at 1/10 or less during performance of activities of daily living for independence of self care activities. (MET)  Patient to require less than 50% verbal cues regarding proper use of rollator during functional ambulation. (MET)  Patient to tolerate x 12 repetitions sit to stand so that she may rise on one attempt. (MET)  Patient to tolerate x 45 seconds full Romberg stance (eyes closed) to improve upright tolerance. (PART MET)     New Long Term Goals (4 Weeks):   Patient to demo competence with home exercise program to maintain therapeutic gains. (MET)  Patient to improve right hip MMT 1/2 grade to demo strength gains  "from therapeutic intervention. (NOT MET)  Patient to ambulate 300 feet with rollator and stand by assist with proper technique. (MET)  Patient to demonstrate improvement in her Tinetti score from "high fall risk" to "moderate fall risk". (MET)    Discharge reason: Patient has reached the maximum rehab potential for the present time.      PLAN     This patient is discharged from Physical Therapy.      Alan Phipps, PT                "

## 2022-10-07 ENCOUNTER — TELEPHONE (OUTPATIENT)
Dept: FAMILY MEDICINE | Facility: CLINIC | Age: 77
End: 2022-10-07
Payer: MEDICARE

## 2022-10-07 NOTE — TELEPHONE ENCOUNTER
----- Message from Kun Turner sent at 10/7/2022  9:46 AM CDT -----  Regarding: pt called  Can the clinic reply in [facility]: CASSANDRA LEON [[id number]]      Please refill the medication(s) listed below. Please call the patient when the prescription(s) is ready for  at this phone number         Medication #1 amLODIPine (NORVASC) 5 MG tablet    Medication #2       Preferred Pharmacy:    "[facility] - [place] [id number] [address]  Phone: [phone] Fax: [phone]

## 2022-10-31 DIAGNOSIS — F32.A DEPRESSION, UNSPECIFIED DEPRESSION TYPE: ICD-10-CM

## 2022-10-31 RX ORDER — DULOXETIN HYDROCHLORIDE 60 MG/1
CAPSULE, DELAYED RELEASE ORAL
Qty: 90 CAPSULE | Refills: 1 | Status: SHIPPED | OUTPATIENT
Start: 2022-10-31 | End: 2023-02-03 | Stop reason: SDUPTHER

## 2022-10-31 NOTE — TELEPHONE ENCOUNTER
----- Message from Mer De La Cruz sent at 10/31/2022 11:50 AM CDT -----  Regarding: short prescription  Contact: patient  Type:  RX Refill Request    Who Called:  patient  Refill or New Rx:  new  RX Name and Strength:  DULoxetine (CYMBALTA) 60 MG capsule  How is the patient currently taking it? (ex. 1XDay):  1xday  Is this a 30 day or 90 day RX:  2 week   Preferred Pharmacy with phone number:    [facility]/pharm[facility] #[id number] - [place] - [address]  [address] [place] [address]  Phone: [phone] Fax: [phone]  Local or Mail Order:  local  Ordering Provider:  Dr Rodríguez  Best Call Back Number:  [phone] (home)   Additional Information:  Patient states [facility] told her the shipped her prescription and it is in [place] but she has not received it. Patient has been without the medication for a few days and needs a short prescription until she can get it. Please call patient to advise.Thanks!

## 2022-10-31 NOTE — TELEPHONE ENCOUNTER
Care Due:                  Date            Visit Type   Department     Provider  --------------------------------------------------------------------------------                                EP -                              PRIMARY      [facility]  Last Visit: 08-      CARE ([facility])   CHANELL Rodríguez                              EP -                              PRIMARY      [facility]  Next Visit: 02-      CARE ([facility])   CHANELL Rodríguez                                                            Last  Test          Frequency    Reason                     Performed    Due Date  --------------------------------------------------------------------------------    HBA1C.......  6 months...  metFORMIN................  07- 01-    [facility] Embedded Care Gaps. Reference number: [id number]. 10/31/2022   5:02:20 PM CDT

## 2022-11-11 DIAGNOSIS — M81.0 OSTEOPOROSIS, POST-MENOPAUSAL: ICD-10-CM

## 2022-11-11 RX ORDER — RALOXIFENE HYDROCHLORIDE 60 MG/1
60 TABLET, FILM COATED ORAL DAILY
Qty: 90 TABLET | Refills: 3 | Status: SHIPPED | OUTPATIENT
Start: 2022-11-11 | End: 2023-08-03 | Stop reason: SDUPTHER

## 2022-11-11 NOTE — TELEPHONE ENCOUNTER
----- Message from Ngozi Larkin sent at 11/10/2022  2:26 PM CST -----  Contact: Patient  Type:  RX Refill Request    Who Called:  patient     Refill or New Rx: Refill    RX Name and Strength: raloxifene (EVISTA) 60 mg tablet      How is the patient currently taking it? (ex. 1XDay): 1 time a day     Is this a 30 day or 90 day RX: 90    Preferred Pharmacy with phone number:     [facility] ([facility] [place] [id number] [place] [id number] [facility] [address]  Phone: [phone] Fax: [phone]      Local or Mail Order: Local     Ordering Provider: Dr Rodríguez     Would the patient rather a call back or a response via [facility]? Call    Best Call Back Number: [phone] (home)     Additional Information:

## 2023-02-02 DIAGNOSIS — F32.A DEPRESSION, UNSPECIFIED DEPRESSION TYPE: ICD-10-CM

## 2023-02-02 RX ORDER — DULOXETIN HYDROCHLORIDE 60 MG/1
CAPSULE, DELAYED RELEASE ORAL
Qty: 90 CAPSULE | Refills: 1 | Status: CANCELLED | OUTPATIENT
Start: 2023-02-02

## 2023-02-02 NOTE — TELEPHONE ENCOUNTER
Care Due:                  Date            Visit Type   Department     Provider  --------------------------------------------------------------------------------                                EP -                              PRIMARY      [facility]  Last Visit: 08-      CARE ([facility])   CHANELL Rodríguez                              EP -                              PRIMARY      [facility]  Next Visit: 02-      CARE ([facility])   CHANELL Rodríguez                                                            Last  Test          Frequency    Reason                     Performed    Due Date  --------------------------------------------------------------------------------    HBA1C.......  6 months...  metFORMIN................  07- 01-    Health [facility] Embedded Care Gaps. Reference number: [id number]. 2/02/2023   1:52:19 PM CST

## 2023-02-02 NOTE — TELEPHONE ENCOUNTER
----- Message from Karuna Nettles, Patient Care Assistant sent at 2/2/2023  1:34 PM CST -----  Contact: self  Type:  RX Refill Request    Who Called:  self  Refill or New Rx:  refill  RX Name and Strength:  DULoxetine (CYMBALTA) 60 MG capsule  How is the patient currently taking it? (ex. 1XDay):  as directed  Is this a 30 day or 90 day RX:  90  Preferred Pharmacy with phone number:   [facility]/pharmacy #[id number] - [place] - [address]  [address] [address]  Phone: [phone] Fax: [phone]  Local or Mail Order:  local  Ordering Provider:  Dr. Rodríguez  [facility] Call Back Number:  [phone]  Additional Information:  thanks

## 2023-02-03 ENCOUNTER — OFFICE VISIT (OUTPATIENT)
Dept: FAMILY MEDICINE | Facility: CLINIC | Age: 78
End: 2023-02-03
Payer: MEDICARE

## 2023-02-03 VITALS
SYSTOLIC BLOOD PRESSURE: 120 MMHG | BODY MASS INDEX: 36.88 KG/M2 | RESPIRATION RATE: 16 BRPM | DIASTOLIC BLOOD PRESSURE: 60 MMHG | OXYGEN SATURATION: 95 % | WEIGHT: 208.13 LBS | HEART RATE: 94 BPM | TEMPERATURE: 98 F | HEIGHT: 63 IN

## 2023-02-03 DIAGNOSIS — I10 ESSENTIAL HYPERTENSION: Primary | ICD-10-CM

## 2023-02-03 DIAGNOSIS — R22.42 LOCALIZED SWELLING OF LEFT LOWER LEG: ICD-10-CM

## 2023-02-03 DIAGNOSIS — E66.01 SEVERE OBESITY (BMI 35.0-39.9) WITH COMORBIDITY: ICD-10-CM

## 2023-02-03 DIAGNOSIS — E11.9 TYPE 2 DIABETES MELLITUS WITHOUT COMPLICATION, WITHOUT LONG-TERM CURRENT USE OF INSULIN: ICD-10-CM

## 2023-02-03 DIAGNOSIS — E03.9 HYPOTHYROIDISM, UNSPECIFIED TYPE: ICD-10-CM

## 2023-02-03 DIAGNOSIS — F32.A DEPRESSION, UNSPECIFIED DEPRESSION TYPE: ICD-10-CM

## 2023-02-03 DIAGNOSIS — E78.5 HYPERLIPIDEMIA, UNSPECIFIED HYPERLIPIDEMIA TYPE: ICD-10-CM

## 2023-02-03 PROCEDURE — 1159F PR MEDICATION LIST DOCUMENTED IN MEDICAL RECORD: ICD-10-PCS | Mod: CPTII,S$GLB,, | Performed by: FAMILY MEDICINE

## 2023-02-03 PROCEDURE — 1125F PR PAIN SEVERITY QUANTIFIED, PAIN PRESENT: ICD-10-PCS | Mod: CPTII,S$GLB,, | Performed by: FAMILY MEDICINE

## 2023-02-03 PROCEDURE — 1159F MED LIST DOCD IN RCRD: CPT | Mod: CPTII,S$GLB,, | Performed by: FAMILY MEDICINE

## 2023-02-03 PROCEDURE — 3288F FALL RISK ASSESSMENT DOCD: CPT | Mod: CPTII,S$GLB,, | Performed by: FAMILY MEDICINE

## 2023-02-03 PROCEDURE — 99214 PR OFFICE/OUTPT VISIT, EST, LEVL IV, 30-39 MIN: ICD-10-PCS | Mod: S$GLB,,, | Performed by: FAMILY MEDICINE

## 2023-02-03 PROCEDURE — 99214 OFFICE O/P EST MOD 30 MIN: CPT | Mod: S$GLB,,, | Performed by: FAMILY MEDICINE

## 2023-02-03 PROCEDURE — 1160F PR REVIEW ALL MEDS BY PRESCRIBER/CLIN PHARMACIST DOCUMENTED: ICD-10-PCS | Mod: CPTII,S$GLB,, | Performed by: FAMILY MEDICINE

## 2023-02-03 PROCEDURE — 3074F PR MOST RECENT SYSTOLIC BLOOD PRESSURE < 130 MM HG: ICD-10-PCS | Mod: CPTII,S$GLB,, | Performed by: FAMILY MEDICINE

## 2023-02-03 PROCEDURE — 1160F RVW MEDS BY RX/DR IN RCRD: CPT | Mod: CPTII,S$GLB,, | Performed by: FAMILY MEDICINE

## 2023-02-03 PROCEDURE — 1101F PR PT FALLS ASSESS DOC 0-1 FALLS W/OUT INJ PAST YR: ICD-10-PCS | Mod: CPTII,S$GLB,, | Performed by: FAMILY MEDICINE

## 2023-02-03 PROCEDURE — 3074F SYST BP LT 130 MM HG: CPT | Mod: CPTII,S$GLB,, | Performed by: FAMILY MEDICINE

## 2023-02-03 PROCEDURE — 3288F PR FALLS RISK ASSESSMENT DOCUMENTED: ICD-10-PCS | Mod: CPTII,S$GLB,, | Performed by: FAMILY MEDICINE

## 2023-02-03 PROCEDURE — 99999 PR PBB SHADOW E&M-EST. PATIENT-LVL IV: ICD-10-PCS | Mod: PBBFAC,,, | Performed by: FAMILY MEDICINE

## 2023-02-03 PROCEDURE — 1125F AMNT PAIN NOTED PAIN PRSNT: CPT | Mod: CPTII,S$GLB,, | Performed by: FAMILY MEDICINE

## 2023-02-03 PROCEDURE — 3078F DIAST BP <80 MM HG: CPT | Mod: CPTII,S$GLB,, | Performed by: FAMILY MEDICINE

## 2023-02-03 PROCEDURE — 99999 PR PBB SHADOW E&M-EST. PATIENT-LVL IV: CPT | Mod: PBBFAC,,, | Performed by: FAMILY MEDICINE

## 2023-02-03 PROCEDURE — 3078F PR MOST RECENT DIASTOLIC BLOOD PRESSURE < 80 MM HG: ICD-10-PCS | Mod: CPTII,S$GLB,, | Performed by: FAMILY MEDICINE

## 2023-02-03 PROCEDURE — 1101F PT FALLS ASSESS-DOCD LE1/YR: CPT | Mod: CPTII,S$GLB,, | Performed by: FAMILY MEDICINE

## 2023-02-03 RX ORDER — DULOXETIN HYDROCHLORIDE 60 MG/1
CAPSULE, DELAYED RELEASE ORAL
Qty: 90 CAPSULE | Refills: 1 | Status: SHIPPED | OUTPATIENT
Start: 2023-02-03 | End: 2023-02-06 | Stop reason: SDUPTHER
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
## 2023-02-06 RX ORDER — DULOXETIN HYDROCHLORIDE 60 MG/1
60 CAPSULE, DELAYED RELEASE ORAL DAILY
Qty: 90 CAPSULE | Refills: 3 | Status: SHIPPED | OUTPATIENT
Start: 2023-02-06 | End: 2024-02-02 | Stop reason: SDUPTHER

## 2023-02-07 ENCOUNTER — HOSPITAL ENCOUNTER (EMERGENCY)
Facility: HOSPITAL | Age: 78
Discharge: HOME | End: 2023-02-07
Attending: EMERGENCY MEDICINE
Payer: MEDICARE

## 2023-02-07 ENCOUNTER — HOSPITAL ENCOUNTER (OUTPATIENT)
Dept: RADIOLOGY | Facility: HOSPITAL | Age: 78
Discharge: HOME | End: 2023-02-07
Attending: FAMILY MEDICINE
Payer: MEDICARE

## 2023-02-07 VITALS
DIASTOLIC BLOOD PRESSURE: 74 MMHG | BODY MASS INDEX: 36.86 KG/M2 | RESPIRATION RATE: 20 BRPM | OXYGEN SATURATION: 96 % | SYSTOLIC BLOOD PRESSURE: 178 MMHG | HEART RATE: 77 BPM | TEMPERATURE: 99 F | WEIGHT: 208 LBS | HEIGHT: 63 IN
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
## 2023-02-07 DIAGNOSIS — S51.011A SKIN TEAR OF RIGHT ELBOW WITHOUT COMPLICATION, INITIAL ENCOUNTER: ICD-10-CM

## 2023-02-07 DIAGNOSIS — S40.021A CONTUSION OF RIGHT UPPER EXTREMITY, INITIAL ENCOUNTER: ICD-10-CM

## 2023-02-07 DIAGNOSIS — R22.42 LOCALIZED SWELLING OF LEFT LOWER LEG: ICD-10-CM

## 2023-02-07 DIAGNOSIS — S00.03XA HEMATOMA OF SCALP, INITIAL ENCOUNTER: ICD-10-CM

## 2023-02-07 DIAGNOSIS — S09.90XA CLOSED HEAD INJURY, INITIAL ENCOUNTER: Primary | ICD-10-CM

## 2023-02-07 PROCEDURE — 93971 EXTREMITY STUDY: CPT | Mod: 26,LT,, | Performed by: RADIOLOGY

## 2023-02-07 PROCEDURE — 93971 EXTREMITY STUDY: CPT | Mod: TC,LT

## 2023-02-07 PROCEDURE — 25000003 PHARM REV CODE 250: Performed by: PHYSICIAN ASSISTANT

## 2023-02-07 PROCEDURE — 93971 US LOWER EXTREMITY VEINS LEFT: ICD-10-PCS | Mod: 26,LT,, | Performed by: RADIOLOGY

## 2023-02-07 PROCEDURE — 99284 EMERGENCY DEPT VISIT MOD MDM: CPT | Mod: 25

## 2023-02-07 RX ORDER — ACETAMINOPHEN 500 MG
1000 TABLET ORAL
Status: COMPLETED | OUTPATIENT
Start: 2023-02-07 | End: 2023-02-07

## 2023-02-07 RX ADMIN — ACETAMINOPHEN 1000 MG: 500 TABLET ORAL at 06:02

## 2023-02-07 NOTE — ED PROVIDER NOTES
Encounter Date: 2/7/2023    SCRIBE #1 NOTE: IAlbert, am scribing for, and in the presence of,  Lucrecia Arceo PA-C.     History     Chief Complaint   Patient presents with    Fall     Patient fell here outside and hit her head, denies LOC.  Hematoma to head, abrasion to right elbow,  right shoulder pain     Time seen by provider: 5:46 PM on 02/07/2023    Tali Hopper is a 77 y.o. female who presents to the ED with bruising to her right forehead, abrasion to right elbow, and pain to her right shoulder that began just prior to arrival when she tripped and fell outside of the ED. The patient hit her head, but did not lose consciousness. She is not on any blood thinners. She denies any pain to her left arm and both legs. She states she is clumsy and walks with a cane normally, but she left her cane in the car when she fell. The patient denies syncope or any other symptoms at this time. PMHx of throid disease, osetoporosis, HTN, and loss of equilibrium. There is no pertinent PSHx.     The history is provided by the patient.   Review of patient's allergies indicates:   Allergen Reactions    Benadryl decongestant Shortness Of Breath     Respiration problems    Penicillins Hives     Past Medical History:   Diagnosis Date    Cataract of left eye     Depression     Glaucoma     Hypertension     Loss of equilibrium     Osteoporosis     Thyroid disease      Past Surgical History:   Procedure Laterality Date    CHOLECYSTECTOMY      EYE SURGERY      right cataract    HYSTERECTOMY      TONSILLECTOMY       Family History   Problem Relation Age of Onset    Heart disease Maternal Grandmother     Heart disease Paternal Grandmother      Social History     Tobacco Use    Smoking status: Never    Smokeless tobacco: Never   Substance Use Topics    Alcohol use: No    Drug use: No     Review of Systems   Constitutional:  Negative for fever.   HENT:  Negative for sore throat.    Respiratory:  Negative for shortness of breath.     Cardiovascular:  Negative for chest pain.   Gastrointestinal:  Negative for nausea.   Genitourinary:  Negative for dysuria.   Musculoskeletal:  Positive for arthralgias. Negative for back pain.   Skin:  Positive for wound. Negative for rash.        Positive for bruising.   Neurological:  Positive for headaches. Negative for syncope and weakness.   Hematological:  Does not bruise/bleed easily.     Physical Exam     Initial Vitals [02/07/23 1736]   BP Pulse Resp Temp SpO2   (!) 178/74 77 20 98.6 °F (37 °C) 96 %      MAP       --         Physical Exam    Nursing note and vitals reviewed.  Constitutional: She appears well-developed and well-nourished. She is not diaphoretic. No distress.   HENT:   Head: Normocephalic.   Right Ear: External ear normal.   Left Ear: External ear normal.   Nose: Nose normal.   Moderately sized area of swelling and ecchymosis with superficial abrasion noted to right anterior forehead.  No skull depression.     Eyes: Conjunctivae and EOM are normal. Pupils are equal, round, and reactive to light.   Neck: Neck supple.   Normal range of motion.  Cardiovascular:  Normal rate, regular rhythm, normal heart sounds and intact distal pulses.           Pulmonary/Chest: Breath sounds normal. No respiratory distress. She has no wheezes. She has no rhonchi. She has no rales.   Musculoskeletal:         General: Tenderness present. No edema. Normal range of motion.      Cervical back: Normal range of motion and neck supple.      Comments: TTP noted to right lateral upper arm and shoulder.  Superficial abrasion noted to right elbow without bony tenderness.  No decreased ROM, decreased strength or loss of sensation to bilateral upper or lower extremities. Palpable 2+ radial and pedal pulses.      Neurological: She is alert and oriented to person, place, and time. She has normal strength. No cranial nerve deficit or sensory deficit. GCS score is 15. GCS eye subscore is 4. GCS verbal subscore is 5. GCS  motor subscore is 6.   No focal neurological deficits noted.  Cranial nerves III-XII grossly intact.  5/5 strength and sensation to bilateral upper and lower extremities.    Skin: Skin is warm and dry. No rash and no abscess noted. No erythema.   Psychiatric: She has a normal mood and affect.       ED Course   Procedures  Labs Reviewed - No data to display       Imaging Results              X-Ray Shoulder Complete 2 View Right (Final result)  Result time 02/07/23 18:22:07      Final result by Nicholas Chen MD (02/07/23 18:22:07)                   Impression:      1. No acute bony changes/dislocation of the right shoulder.      Electronically signed by: Nicholas Chen  Date:    02/07/2023  Time:    18:22               Narrative:    EXAMINATION:  XR SHOULDER COMPLETE 2 OR MORE VIEWS RIGHT    CLINICAL HISTORY:  Unspecified fall, initial encounter    TECHNIQUE:  Two or three views of the right shoulder were performed.    COMPARISON:  Radiographs 03/16/2016.    FINDINGS:  No fracture or dislocation.  No abnormal bony lucencies.  Degenerative changes of the glenohumeral joint/humeral head and acromioclavicular joint which are overall mild.  Visualized portions of the chest are within normal limits.                                       CT Head Without Contrast (Final result)  Result time 02/07/23 18:13:22      Final result by Nicholas Chen MD (02/07/23 18:13:22)                   Impression:      1. No acute intracranial CT findings or adverse change.  2. Large right inferior frontotemporal scalp hematoma and associated laceration.  No skull fracture.      Electronically signed by: Nichloas Chen  Date:    02/07/2023  Time:    18:13               Narrative:    EXAMINATION:  CT HEAD WITHOUT CONTRAST    CLINICAL HISTORY:  Head trauma, minor (Age >= 65y);    TECHNIQUE:  Low dose axial CT images obtained throughout the head without intravenous contrast. Sagittal and coronal reconstructions were performed.    COMPARISON:  MRI brain  09/06/2021.    FINDINGS:  Brain: There is no evidence of a mass, edema, midline shift, or intracranial hemorrhage. No extra-axial fluid collection.  Redemonstrated grossly similar confluent low-density throughout the cerebral hemispheric white matter consistent with moderate sequela of chronic small vessel ischemic change.  No CT evidence of an acute major vascular territorial infarct.    Ventricles: The ventricles, sulci, and cisterns are within normal limits.    Skull: The osseous structures are unremarkable in appearance.    Extracranial soft tissues: Large right inferior frontal/temporal scalp hematoma.  Associated laceration with soft tissue emphysema.    Other: The visualized portions of the sinuses, orbits, and mastoid air cells are unremarkable.                                       Medications   acetaminophen tablet 1,000 mg (1,000 mg Oral Given 2/7/23 1842)     Medical Decision Making:   History:   Old Medical Records: I decided to obtain old medical records.  Old Records Summarized: records from clinic visits and records from previous admission(s).  Differential Diagnosis:   Skull fracture   Acute intracranial bleed   Fracture   Abrasion   Clinical Tests:   Radiological Study: Ordered and Reviewed  ED Management:  Head CT negative for acute skull fracture or other intracranial bleeding.  X-rays of right shoulder show no acute bony abnormality.  Superficial abrasion to elbow without bony tenderness and low suspicion for elbow fracture and no need for further imaging or testing at this time.  She will be discharged home to follow-up with her PCP for re-evaluation as needed.  Patient voices understanding and is agreeable to the plan.  Specific return precautions are given.           Scribe Attestation:   Scribe #1: I performed the above scribed service and the documentation accurately describes the services I performed. I attest to the accuracy of the note.            I, Lucrecia Arceo PA-C, personally  performed the services described in this documentation. All medical record entries made by the scribe were at my direction and in my presence.  I have reviewed the chart and agree that the record reflects my personal performance and is accurate and complete. Lucrecia Arceo PA-C.  8:30 PM 02/07/2023         Clinical Impression:   Final diagnoses:  [W19.XXXA] Fall  [S09.90XA] Closed head injury, initial encounter (Primary)  [S00.03XA] Hematoma of scalp, initial encounter  [S40.021A] Contusion of right upper extremity, initial encounter  [S51.011A] Skin tear of right elbow without complication, initial encounter        ED Disposition Condition    Discharge Stable          ED Prescriptions    None       Follow-up Information       Follow up With Specialties Details Why Contact Info    [facility] Emergency Medicine  As needed, If symptoms worsen [address] [place] [id number] [phone]    Elizabeth Rodríugez MD Family Medicine  As needed [address] [facility] [address]  [phone]               Lucrecia Arceo PA-C  02/07/23 2030

## 2023-02-07 NOTE — ED NOTES
Pt fell outside of outpatient hematoma present to right frontal side of head. Pt complains of right shoulder and side pain. Pt denies headache,nausea and dizziness.

## 2023-02-08 ENCOUNTER — NURSE TRIAGE (OUTPATIENT)
Dept: ADMINISTRATIVE | Facility: CLINIC | Age: 78
End: 2023-02-08
Payer: MEDICARE

## 2023-02-09 ENCOUNTER — PES CALL (OUTPATIENT)
Dept: ADMINISTRATIVE | Facility: CLINIC | Age: 78
End: 2023-02-09
Payer: MEDICARE

## 2023-02-09 ENCOUNTER — TELEPHONE (OUTPATIENT)
Dept: FAMILY MEDICINE | Facility: CLINIC | Age: 78
End: 2023-02-09
Payer: MEDICARE

## 2023-02-09 ENCOUNTER — LAB VISIT (OUTPATIENT)
Dept: LAB | Facility: HOSPITAL | Age: 78
End: 2023-02-09
Payer: MEDICARE

## 2023-02-09 DIAGNOSIS — E03.9 HYPOTHYROIDISM, UNSPECIFIED TYPE: ICD-10-CM

## 2023-02-09 DIAGNOSIS — E78.5 HYPERLIPIDEMIA, UNSPECIFIED HYPERLIPIDEMIA TYPE: ICD-10-CM

## 2023-02-09 DIAGNOSIS — E11.9 TYPE 2 DIABETES MELLITUS WITHOUT COMPLICATION, WITHOUT LONG-TERM CURRENT USE OF INSULIN: ICD-10-CM

## 2023-02-09 LAB
ALBUMIN SERPL BCP-MCNC: 3.4 G/DL (ref 3.5–5.2)
ALP SERPL-CCNC: 58 U/L (ref 55–135)
ALT SERPL W/O P-5'-P-CCNC: 12 U/L (ref 10–44)
ANION GAP SERPL CALC-SCNC: 12 MMOL/L (ref 8–16)
AST SERPL-CCNC: 16 U/L (ref 10–40)
BASOPHILS # BLD AUTO: 0.09 K/UL (ref 0–0.2)
BASOPHILS NFR BLD: 1 % (ref 0–1.9)
BILIRUB SERPL-MCNC: 0.4 MG/DL (ref 0.1–1)
BUN SERPL-MCNC: 16 MG/DL (ref 8–23)
CALCIUM SERPL-MCNC: 8.7 MG/DL (ref 8.7–10.5)
CHLORIDE SERPL-SCNC: 103 MMOL/L (ref 95–110)
CHOLEST SERPL-MCNC: 181 MG/DL (ref 120–199)
CHOLEST/HDLC SERPL: 4.6 {RATIO} (ref 2–5)
CO2 SERPL-SCNC: 25 MMOL/L (ref 23–29)
CREAT SERPL-MCNC: 0.9 MG/DL (ref 0.5–1.4)
DIFFERENTIAL METHOD: ABNORMAL
EOSINOPHIL # BLD AUTO: 0.2 K/UL (ref 0–0.5)
EOSINOPHIL NFR BLD: 2 % (ref 0–8)
ERYTHROCYTE [DISTWIDTH] IN BLOOD BY AUTOMATED COUNT: 13.1 % (ref 11.5–14.5)
EST. GFR  (NO RACE VARIABLE): >60 ML/MIN/1.73 M^2
ESTIMATED AVG GLUCOSE: 120 MG/DL (ref 68–131)
GLUCOSE SERPL-MCNC: 107 MG/DL (ref 70–110)
HBA1C MFR BLD: 5.8 % (ref 4–5.6)
HCT VFR BLD AUTO: 42 % (ref 37–48.5)
HDLC SERPL-MCNC: 39 MG/DL (ref 40–75)
HDLC SERPL: 21.5 % (ref 20–50)
HGB BLD-MCNC: 13.2 G/DL (ref 12–16)
IMM GRANULOCYTES # BLD AUTO: 0.05 K/UL (ref 0–0.04)
IMM GRANULOCYTES NFR BLD AUTO: 0.5 % (ref 0–0.5)
LDLC SERPL CALC-MCNC: 102.2 MG/DL (ref 63–159)
LYMPHOCYTES # BLD AUTO: 2.1 K/UL (ref 1–4.8)
LYMPHOCYTES NFR BLD: 22.2 % (ref 18–48)
MCH RBC QN AUTO: 28.6 PG (ref 27–31)
MCHC RBC AUTO-ENTMCNC: 31.4 G/DL (ref 32–36)
MCV RBC AUTO: 91 FL (ref 82–98)
MONOCYTES # BLD AUTO: 0.7 K/UL (ref 0.3–1)
MONOCYTES NFR BLD: 7.4 % (ref 4–15)
NEUTROPHILS # BLD AUTO: 6.3 K/UL (ref 1.8–7.7)
NEUTROPHILS NFR BLD: 66.9 % (ref 38–73)
NONHDLC SERPL-MCNC: 142 MG/DL
NRBC BLD-RTO: 0 /100 WBC
PLATELET # BLD AUTO: 218 K/UL (ref 150–450)
PMV BLD AUTO: 12.1 FL (ref 9.2–12.9)
POTASSIUM SERPL-SCNC: 3.9 MMOL/L (ref 3.5–5.1)
PROT SERPL-MCNC: 7 G/DL (ref 6–8.4)
RBC # BLD AUTO: 4.62 M/UL (ref 4–5.4)
SODIUM SERPL-SCNC: 140 MMOL/L (ref 136–145)
T4 FREE SERPL-MCNC: 1.24 NG/DL (ref 0.71–1.51)
TRIGL SERPL-MCNC: 199 MG/DL (ref 30–150)
TSH SERPL DL<=0.005 MIU/L-ACNC: 2.11 UIU/ML (ref 0.4–4)
WBC # BLD AUTO: 9.46 K/UL (ref 3.9–12.7)

## 2023-02-09 PROCEDURE — 80061 LIPID PANEL: CPT | Performed by: FAMILY MEDICINE

## 2023-02-09 PROCEDURE — 83036 HEMOGLOBIN GLYCOSYLATED A1C: CPT | Performed by: FAMILY MEDICINE

## 2023-02-09 PROCEDURE — 36415 COLL VENOUS BLD VENIPUNCTURE: CPT | Mod: PO | Performed by: FAMILY MEDICINE

## 2023-02-09 PROCEDURE — 85025 COMPLETE CBC W/AUTO DIFF WBC: CPT | Performed by: FAMILY MEDICINE

## 2023-02-09 PROCEDURE — 80053 COMPREHEN METABOLIC PANEL: CPT | Performed by: FAMILY MEDICINE

## 2023-02-09 PROCEDURE — 84443 ASSAY THYROID STIM HORMONE: CPT | Performed by: FAMILY MEDICINE

## 2023-02-09 PROCEDURE — 84439 ASSAY OF FREE THYROXINE: CPT | Performed by: FAMILY MEDICINE

## 2023-02-09 NOTE — TELEPHONE ENCOUNTER
----- Message from Vilma Bhatt sent at 2/9/2023 11:07 AM CST -----  Contact: pt at [phone]  Type: Needs Medical Advice  Who Called:  cristian  Best Call Back Number: [phone]  Additional Information: pt is calling the office to inform the Dr that she sustained a fall and was seen in the ED. Please call back to advise.

## 2023-02-09 NOTE — TELEPHONE ENCOUNTER
Called patient in regards to having a fall and needing a hospital follow up. No answer , left voicemail to return call.

## 2023-02-09 NOTE — TELEPHONE ENCOUNTER
Tali calling to report to PCP her emergency room visit secondary to fall on 2/7/23. Per AVS instructions, pt instructed to f/u with PCP for re-evaluation as needed. Tali reports swelling to head which she has been applying an ice pack to intermittently and right shoulder pain. Tali states she was seen in and treated in the ED for her symptoms. Tali denies new/worsening symptoms since being seen in the ED. States she is just calling to notify PCP as this is what she was instructed to do. Informefd pt that a high priority message will be sent to PCP during office hours. Advised per triage protocol to call PCP during office hours tomorrow. Also instructed pt to call back if new/worsening symptoms develop and/or questions/concerns. V/u.   Reason for Disposition   [1] Follow-up call from patient regarding patient's clinical status AND [2] information NON-URGENT    Protocols used: PCP Call - No Triage-A-

## 2023-02-09 NOTE — TELEPHONE ENCOUNTER
Pt states she has not been experiencing any side effects related to fall on 2/7/23 no interventions identified @ this time pt stated she will make an appt if needed.

## 2023-02-09 NOTE — TELEPHONE ENCOUNTER
Give head trauma precautions. Monitor for sx of severe ha, lethargy, excess somnolence, confusion, trouble talking or walking, nausea/vomiting and get immediate medical attention if occurs. Offer appt if she wishes to be seen with any provider

## 2023-02-27 ENCOUNTER — TELEPHONE (OUTPATIENT)
Dept: FAMILY MEDICINE | Facility: CLINIC | Age: 78
End: 2023-02-27
Payer: MEDICARE

## 2023-02-27 NOTE — TELEPHONE ENCOUNTER
----- Message from Vick Beck sent at 2/27/2023  3:49 PM CST -----  Caller: patient    Requesting Appt With Provider: Anne    Preferred Date Range: asap    Preferred Times asap    Reason For Visit: Pt fell 2 weeks ago and is wanting to be seen asap due to her hurting, pt is taking otc medications and she does not want to keep taking them forever she stated.     Additional Information: [phone]

## 2023-03-06 ENCOUNTER — TELEPHONE (OUTPATIENT)
Dept: FAMILY MEDICINE | Facility: CLINIC | Age: 78
End: 2023-03-06
Payer: MEDICARE

## 2023-03-06 NOTE — TELEPHONE ENCOUNTER
----- Message from Roxane Washington sent at 3/6/2023 10:13 AM CST -----  Contact: Cailin w/ [facility] @ [phone]  Type:  Needs Medical Advice    Who Called: Delio will/ [facility]  Would the patient rather a call back or a response via [facility]? Call Delio  Best Call Back Number: [phone]  Additional Information: Paperwork was faxed to the office on 03/02/2023 and hasn't been faxed back to [facility]. Please call Delio with an updated status on the paperwork.

## 2023-03-20 ENCOUNTER — TELEPHONE (OUTPATIENT)
Dept: FAMILY MEDICINE | Facility: CLINIC | Age: 78
End: 2023-03-20
Payer: MEDICARE

## 2023-03-20 NOTE — TELEPHONE ENCOUNTER
----- Message from Cynthia Albright sent at 3/17/2023 10:56 AM CDT -----  Contact: [phone]  Type: Needs Medical Advice  Who Called:  Delio from [facility]     Best Call Back Number: [phone]    Additional Information: Delio will be refaxing forms today. They need to filled out and needs signature by provider. Also attch pts last office visit. All instructions will be on the cover page.

## 2023-03-27 ENCOUNTER — TELEPHONE (OUTPATIENT)
Dept: FAMILY MEDICINE | Facility: CLINIC | Age: 78
End: 2023-03-27
Payer: MEDICARE

## 2023-03-27 NOTE — TELEPHONE ENCOUNTER
----- Message from Agustina Richter sent at 3/27/2023 10:59 AM CDT -----  Contact: [facility]    Type: Needs Medical Advice  Who Called:  Delio from [facility]     Best Call Back Number: [phone]   Fax  [phone]    Additional Information: Delio will be refaxing forms today. They need to filled out and needs signature by provider. Also attch pts last office visit. All instructions will be on the cover page.     Please be advise that Delio will be re-faxing the forms today 3/27 for completion...     Thank you...

## 2023-03-27 NOTE — TELEPHONE ENCOUNTER
Addressed in another encounter; MD advised pt have an appt for document completion.  Called [facility] and pt to make notification left detailed voicemail message w/ [facility] unable to leave message for pt.  Pt still needs to be scheduled for appt as advised.

## 2023-03-27 NOTE — TELEPHONE ENCOUNTER
Left detailed voicemail message notifying [facility] that pt needs office visit to complete documentation.  Unable to leave voicemail for pt to return phone call.

## 2023-03-30 ENCOUNTER — TELEPHONE (OUTPATIENT)
Dept: FAMILY MEDICINE | Facility: CLINIC | Age: 78
End: 2023-03-30

## 2023-03-30 NOTE — TELEPHONE ENCOUNTER
----- Message from Cynthia Albright sent at 3/29/2023 10:02 AM CDT -----  Contact: [phone]  Type: Needs Medical Advice  Who Called:  Delio with [facility]     Best Call Back Number: [phone]    Additional Information: Delio requesting a call back regarding paperwork faxed to office. Pls call back and advise

## 2023-03-30 NOTE — TELEPHONE ENCOUNTER
----- Message from Peyman Ortiz sent at 3/30/2023 11:34 AM CDT -----  Contact: self  Type: Needs Medical Advice  Who Called: Patient    Best Call Back Number: [phone]  Additional Information: Pt states [facility] reached out to her letting her know Dr. Rodríguez has to sign paperwork concerning a right shoulder brace. Plz call to have this done. Pt states she just needs a call back confirming it was done. Plz call twice  Thanks

## 2023-04-03 ENCOUNTER — OFFICE VISIT (OUTPATIENT)
Dept: ORTHOPEDICS | Facility: CLINIC | Age: 78
End: 2023-04-03
Payer: MEDICARE

## 2023-04-03 ENCOUNTER — TELEPHONE (OUTPATIENT)
Dept: FAMILY MEDICINE | Facility: CLINIC | Age: 78
End: 2023-04-03
Payer: MEDICARE

## 2023-04-03 DIAGNOSIS — M12.811 ROTATOR CUFF ARTHROPATHY, RIGHT: Primary | ICD-10-CM

## 2023-04-03 PROCEDURE — 99999 PR PBB SHADOW E&M-EST. PATIENT-LVL I: CPT | Mod: PBBFAC,,, | Performed by: ORTHOPAEDIC SURGERY

## 2023-04-03 PROCEDURE — 99204 OFFICE O/P NEW MOD 45 MIN: CPT | Mod: S$GLB,,, | Performed by: ORTHOPAEDIC SURGERY

## 2023-04-03 PROCEDURE — 99999 PR PBB SHADOW E&M-EST. PATIENT-LVL I: ICD-10-PCS | Mod: PBBFAC,,, | Performed by: ORTHOPAEDIC SURGERY

## 2023-04-03 PROCEDURE — 1160F RVW MEDS BY RX/DR IN RCRD: CPT | Mod: CPTII,S$GLB,, | Performed by: ORTHOPAEDIC SURGERY

## 2023-04-03 PROCEDURE — 1160F PR REVIEW ALL MEDS BY PRESCRIBER/CLIN PHARMACIST DOCUMENTED: ICD-10-PCS | Mod: CPTII,S$GLB,, | Performed by: ORTHOPAEDIC SURGERY

## 2023-04-03 PROCEDURE — 99204 PR OFFICE/OUTPT VISIT, NEW, LEVL IV, 45-59 MIN: ICD-10-PCS | Mod: S$GLB,,, | Performed by: ORTHOPAEDIC SURGERY

## 2023-04-03 PROCEDURE — 1159F MED LIST DOCD IN RCRD: CPT | Mod: CPTII,S$GLB,, | Performed by: ORTHOPAEDIC SURGERY

## 2023-04-03 PROCEDURE — 1159F PR MEDICATION LIST DOCUMENTED IN MEDICAL RECORD: ICD-10-PCS | Mod: CPTII,S$GLB,, | Performed by: ORTHOPAEDIC SURGERY

## 2023-04-03 NOTE — TELEPHONE ENCOUNTER
----- Message from Denilson Manzo sent at 4/3/2023 10:57 AM CDT -----      Name of Who is Calling:Delio/[facility]          What is the request in detail:[facility] is requesting a call or paperwork faxed back on the PT behalf, Delio stated he sent paperwork over for the PT prescriptions and got approval but never received the additional paperwork back and that's been over a month now. Please be Advised!    Delio is re faxing paperwork over now!      Can the clinic reply by [facility]:no          What Number to Call Back if not in [facility]. [phone] FAX: [phone] or [phone]

## 2023-04-03 NOTE — PROGRESS NOTES
Past Medical History:   Diagnosis Date    Cataract of left eye     Depression     Glaucoma     Hypertension     Loss of equilibrium     Osteoporosis     Thyroid disease        Past Surgical History:   Procedure Laterality Date    CHOLECYSTECTOMY      EYE SURGERY      right cataract    HYSTERECTOMY      TONSILLECTOMY         Current Outpatient Medications   Medication Sig    albuterol (PROVENTIL/VENTOLIN HFA) 90 mcg/actuation inhaler Inhale 2 puffs into the lungs every 6 (six) hours. Rescue    amLODIPine (NORVASC) 5 MG tablet TAKE 1 TABLET BY MOUTH ONCE A DAY    ascorbic acid, vitamin C, (VITAMIN C) 100 MG tablet Take 100 mg by mouth once daily.    aspirin (ECOTRIN) 81 MG EC tablet Take 81 mg by mouth once daily.    b complex vitamins tablet Take 1 tablet by mouth once daily.    CALCIUM CARBONATE/VITAMIN D3 (CALTRATE 600 + D ORAL) Take by mouth once daily.     DULoxetine (CYMBALTA) 60 MG capsule Take 1 capsule (60 mg total) by mouth once daily. TAKE 1 CAPSULE EVERY NIGHT AT BEDTIME    fluticasone propionate (FLONASE) 50 mcg/actuation nasal spray 2 sprays (100 mcg total) by Each Nostril route once daily.    latanoprost 0.005 % ophthalmic solution 1 drop every evening.    levothyroxine (SYNTHROID) 112 MCG tablet TAKE 1 TABLET BY MOUTH ONCE A DAY    losartan-hydrochlorothiazide 100-12.5 mg (HYZAAR) 100-12.5 mg Tab TAKE 1 TABLET BY MOUTH EVERY DAY    metFORMIN (GLUCOPHAGE-XR) 500 MG ER 24hr tablet TAKE 2 TABLETS BY MOUTH ONCE A DAY WITH BREAKFAST    cr-YR-C1-K-lycop-lut-herb#220 (ESTROBLEND) 500 mcg-1,000 unit-20 mcg Tab Take 1 tablet by mouth once daily.    raloxifene (EVISTA) 60 mg tablet Take 1 tablet (60 mg total) by mouth once daily.    timolol maleate 0.5% (TIMOPTIC-XE) 0.5 % SolG Place 1 drop into both eyes 2 (two) times daily.      No current facility-administered medications for this visit.       Review of patient's allergies indicates:   Allergen Reactions    Benadryl decongestant Shortness Of Breath      Respiration problems    Penicillins Hives       Family History   Problem Relation Age of Onset    Heart disease Maternal Grandmother     Heart disease Paternal Grandmother        Social History     Socioeconomic History    Marital status:    Tobacco Use    Smoking status: Never    Smokeless tobacco: Never   Substance and Sexual Activity    Alcohol use: No    Drug use: No    Sexual activity: Not Currently     Social Determinants of Health     Financial Resource Strain: Low Risk     Difficulty of Paying Living Expenses: Not hard at all   Food Insecurity: No Food Insecurity    Worried About Running Out of Food in the Last Year: Never true    Ran Out of Food in the Last Year: Never true   Transportation Needs: No Transportation Needs    Lack of Transportation (Medical): No    Lack of Transportation (Non-Medical): No   Physical Activity: Sufficiently Active    Days of Exercise per Week: 3 days    Minutes of Exercise per Session: 60 min   Stress: Stress Concern Present    Feeling of Stress : Rather much   Social Connections: Moderately Integrated    Frequency of Communication with Friends and Family: More than three times a week    Frequency of Social Gatherings with Friends and Family: Three times a week    Attends [employer] Services: More than 4 times per year    Active Member of Clubs or Organizations: Yes    Attends Club or Organization Meetings: More than 4 times per year    Marital Status:    Housing Stability: Low Risk     Unable to Pay for Housing in the Last Year: No    Number of Places Lived in the Last Year: 1    Unstable Housing in the Last Year: No       Chief Complaint: No chief complaint on file.      History of present illness:  77-year-old female who is right-hand-dominant seen for right shoulder injury.  Patient denies acute pain before a fall on February 7th.  Patient was seen at the emergency room.  X-rays showed some arthritic change with no acute fracture.  Patient has limited range of  motion.  Pain reaching up above her head or out to the side.  Pain can be as high as a 10/10.  No prior treatment.      Review of Systems:    Constitution: Negative for chills, fever, and sweats.  Negative for unexplained weight loss.    HENT:  Negative for headaches and blurry vision.    Cardiovascular:Negative for chest pain or irregular heart beat. Negative for hypertension.    Respiratory:  Negative for cough and shortness of breath.    Gastrointestinal: Negative for abdominal pain, heartburn, melena, nausea, and vomitting.    Genitourinary:  Negative bladder incontinence and dysuria.    Musculoskeletal:  See HPI    Neurological: Negative for numbness.    Psychiatric/Behavioral: Negative for depression.  The patient is not nervous/anxious.      Endocrine: Negative for polyuria    Hematologic/Lymphatic: Negative for bleeding problem.  Does not bruise/bleed easily.    Skin: Negative for poor would healing and rash      Physical Examination:    Vital Signs:  There were no vitals filed for this visit.    There is no height or weight on file to calculate BMI.    This a well-developed, well nourished patient in no acute distress.  They are alert and oriented and cooperative to examination.  Pt. walks without an antalgic gait.      Examination of the right shoulder shows no rashes or erythema. There are no masses, ecchymosis, or atrophy. The patient has decreased active range of motion in forward flexion, external rotation, and internal rotation to the mid T-spine. The patient has positive Pyle and Neer - Darlington's test. - Speeds test. Nontender to palpation over a.c. joint. Normal stability anteriorly, posteriorly, and negative sulcus sign. Passive range of motion: Forward flexion of 180°, external rotation at 90° of 90°, internal rotation of 50°, and external rotation at 0° of 50°. 2+ radial pulse. Intact axillary, radial, median and ulnar sensation. 4  out of 5 resisted forward flexion, external rotation, and  negative lift off test.      X-rays:  X-rays of the right shoulder available for review which show some moderate arthritic change.  Possibly some decrease in the acromial humeral distance.     Assessment::  Right rotator cuff tear with underlying arthritis    Plan:  I reviewed the findings with her today.  Recommended formal physical therapy to see if we can get her shoulder better.  I will see her back in 2 months.  Next step would be an MRI but the only real surgical treatment option would likely be reverse total shoulder arthroplasty.    All previous pertinent notes including ER visits, physical therapy visits, other orthopedic visits as well as other care for the same musculoskeletal problem were reviewed.  All pertinent lab values and previous imaging was reviewed pertinent to the current visit.    This note was created using [id number] voice recognition software that occasionally misinterpreted phrases or words.    Consult note is delivered via Epic messaging service.

## 2023-04-10 ENCOUNTER — CLINICAL SUPPORT (OUTPATIENT)
Dept: REHABILITATION | Facility: HOSPITAL | Age: 78
End: 2023-04-10
Attending: ORTHOPAEDIC SURGERY
Payer: MEDICARE

## 2023-04-10 DIAGNOSIS — R53.1 DECREASED STRENGTH: ICD-10-CM

## 2023-04-10 DIAGNOSIS — M12.811 ROTATOR CUFF ARTHROPATHY, RIGHT: ICD-10-CM

## 2023-04-10 DIAGNOSIS — M62.89 MUSCLE TIGHTNESS: ICD-10-CM

## 2023-04-10 DIAGNOSIS — R26.89 DECREASED FUNCTIONAL MOBILITY: ICD-10-CM

## 2023-04-10 DIAGNOSIS — M25.60 DECREASED RANGE OF MOTION: ICD-10-CM

## 2023-04-10 PROCEDURE — 97161 PT EVAL LOW COMPLEX 20 MIN: CPT | Mod: PN

## 2023-04-10 PROCEDURE — 97110 THERAPEUTIC EXERCISES: CPT | Mod: PN

## 2023-04-10 NOTE — PLAN OF CARE
[facility]  Physical Therapy Initial Evaluation    Name: Tali Hopper  [facility] Number: [id number]    Therapy Diagnosis:  Encounter Diagnoses   Name Primary?    Rotator cuff arthropathy, right     Decreased strength     Muscle tightness     Decreased functional mobility     Decreased range of motion       Physician: Jose M Harper,*   Physician Orders: PT Eval and Treat  Medical Diagnosis from Referral: M12.811 (ICD-10-CM) - Rotator cuff arthropathy, right   Evaluation Date: 4/10/2023  Authorization Period Expiration: 12/31/2023   Plan of Care Expiration: 7/30/2023    Progress Update: 5/10/2023  FOTO: 1 / 3    Visit # / Visits authorized: 1 / 1      PRECAUTIONS: Standard Precautions     Time In:  1120  (Pnt arrived early)  Time Out: 1205  Total Appointment Time (timed & untimed codes): 45 minutes    SUBJECTIVE     Chief Complaint:  R shoulder pain    History of current condition:  Pain started about 1 month ago.  States that she fell directly on her R shoulder.   Pain intensity and frequency has improved since onset.  States that she is unable to raise her arm.   Denies numbness/tingling neck pain, instability.   States that she would rather do exercises at home as she is going to [employer] 3x per week and working on her shoulder mobility.  Dominant Extremity: Right    Aggravating Factors:  brushing hair, lifting overhead, lifting objects  Easing Factors:  alleve    Imaging:     Impression:     1. No acute bony changes/dislocation of the right shoulder.        Electronically signed by: Nicholas Chen  Date:                                            02/07/2023  Time:                                           18:22    Prior Therapy: Yes  Social History: Pt lives alone.  Occupation: Pt is retired.   Prior Level of Function: Independent with all ADLs  Current Level of Function: 60% of PLOF    Pain:  Current 0 /10, worst 8 /10, best 0 /10   Description: Aching and Dull    Pts goals:  Pt reported goal is to be able to reach overhead without pain.    _______________________________________________________  Medical History:   Past Medical History:   Diagnosis Date    Cataract of left eye     Depression     Glaucoma     Hypertension     Loss of equilibrium     Osteoporosis     Thyroid disease        Surgical History:   Tali Hopper  has a past surgical history that includes Hysterectomy; Tonsillectomy; Cholecystectomy; and Eye surgery.    Medications:   Tali has a current medication list which includes the following prescription(s): albuterol, amlodipine, ascorbic acid (vitamin c), aspirin, b complex vitamins, calcium carbonate/vitamin d3, duloxetine, fluticasone propionate, latanoprost, levothyroxine, losartan-hydrochlorothiazide 100-12.5 mg, metformin, estroblend, raloxifene, timolol maleate 0.5%, [DISCONTINUED] bupropion, and [DISCONTINUED] escitalopram oxalate.    Allergies:   Review of patient's allergies indicates:   Allergen Reactions    Benadryl decongestant Shortness Of Breath     Respiration problems    Penicillins Hives        OBJECTIVE   (x = not tested due to pain and/or inability to obtain test position)    RANGE OF MOTION:      Shoulder AROM/PROM Right  4/10/2023 Left  4/10/2023 Goal   Forward Flexion (180) 80 c pain wfl 180     Abduction (180) 90 c pain wfl 180   ER at 90 degrees (90) wfl wfl 90     ER at 0 degrees (45)  wfl wfl 45     Functional ER (C7) wfl wfl C7     Functional IR (T10) wfl wfl T10         STRENGTH:    U/E MMT Right  4/10/2023 Goal   Shoulder Flexion NT 5/5 B   Shoulder Abduction NT 5/5 B   Shoulder IR 3+/5 5/5 B   Shoulder ER 3+/5 c pain 5/5 B   Elbow Flexion  4-/5 5/5 B   Elbow Extension 4-/5 5/5 B     U/E MMT Left  4/10/2023 Goal   Shoulder Flexion 4-/5 5/5 B   Shoulder Abduction 4-/5 5/5 B   Shoulder IR 4-/5 5/5 B   Shoulder ER 4-/5 5/5 B   Elbow Flexion  4-/5 5/5 B   Elbow Extension 4-/5 5/5 B     MUSCLE LENGTH:     Muscle Tested  Right  4/10/2023 Left    4/10/2023 Goal   Scalenes  normal normal Normal B    Pectoralis Minor  decreased decreased Normal B   Pectoralis Major decreased decreased Normal B       SPECIAL TESTS:     Right  4/10/2023 Left   4/10/2023 Goal   [place] Positive Negative Negative B    Neers Positive Negative Negative B    Empty Can Positive Negative Negative B    Full Can Positive Negative Negative B    Sulcus Sign Negative Negative Negative B    Drop arm Positive Negative Negative B   Obriens Negative Negative Negative B   Apprehension Negative Negative Negative B       Observation:  No edema, ecchymosis noted.    Sensation:  Sensation is intact to light touch    Palpation: Increased tone and tenderness noted with palpation to the superior portion of R shoulder.    Posture:  Pt presents with postural abnormalities which include: forward head and rounded shoulders    Gait: N/A    Movement Analysis: N/A    Balance: N/A      FUNCTION:         TREATMENT     Total Treatment time separate from Evaluation: (10) minutes    Tali received therapeutic exercises to develop strength, endurance, ROM, flexibility, and posture for (10) minutes including: x = exercises performed     TherEx 4/10/2023    Scapular squeezes x 3x10   Shoulder flexion with towel on wall x 3x10   IR with yellow tb x 3x10   ER with yellow tb x 3x10       Home Exercises and Patient Education Provided    Education/Self-Care provided:   Patient educated on the impairments noted above and the effects of physical therapy intervention to improve overall condition and QOL.   Patient was educated on all the above exercise prior/during/after for proper posture, positioning, and execution for safe performance with home exercise program.    Written Home Exercises Provided: yes. Prefers: Electronic Copies  Exercises were reviewed and Tali was able to demonstrate them prior to the end of the session.  Tali demonstrated good understanding of the education provided.     See EMR under  "Patient Instructions for exercises provided during therapy sessions.    ASSESSMENT     Tali is a 77 y.o. female referred to outpatient Physical Therapy with a medical diagnosis of M12.811 (ICD-10-CM) - Rotator cuff arthropathy, right .  Tali presents with clinical signs and symptoms that support this diagnosis with decreased shoulder girdle ROM, decreased scapular and shoulder strength, Glenohumeral joint hypomobility, and impaired functional mobility.    The above impairments will be addressed through manual therapy techniques, therapeutic exercises, functional training, and modalities as necessary. Patient was treated and educated on exercises for home program, progression of therapy, and benefits of therapy to achieve full functional mobility.     Pt prognosis is Good.   Pt will benefit from skilled outpatient Physical Therapy to address the deficits stated above and in the chart below, provide pt/family education, and to maximize pt's level of independence.     Plan of care discussed with patient: Yes  Pt's spiritual, cultural and educational needs considered and patient is agreeable to the plan of care and goals as stated below:     Anticipated Barriers for therapy: none    Medical Necessity is demonstrated by the following  History  Co-morbidities and personal factors that may impact the plan of care Co-morbidities:   advanced age    Personal Factors:   no deficits     low   Examination  Body Structures and Functions, activity limitations and participation restrictions that may impact the plan of care Body Regions:   upper extremities    Body Systems:    gross symmetry  ROM  strength  gross coordinated movement  motor control  motor learning    Participation Restrictions:   See above in "Current Level of Function"     Activity limitations:   Learning and applying knowledge  no deficits    General Tasks and Commands  no deficits    Communication  no deficits    Mobility  no deficits    Self care  no " deficits    Domestic Life  no deficits    Interactions/Relationships  no deficits    Life Areas  no deficits    Community and Social Life  community life  recreation and leisure  no deficits         low   Clinical Presentation stable and uncomplicated low   Decision Making/ Complexity Score: low       GOALS:  SHORT TERM GOALS: 4 weeks 4/10/2023   Recent signs and systems trend is improving in order to progress towards LTG's.    Patient will be independent with HEP in order to further progress and return to maximal function.    Pain rating at Worst: 5/10 in order to progress towards increased independence with activity.    Patient will be able to correct postural deviations in sitting and standing, to decrease pain and promote postural awareness for injury prevention.       LONG TERM GOALS: 8 weeks 4/10/2023   Patient will return to normal ADL, recreational, and work related activities with less pain and limitation.     Patient will improve AROM to stated goals in order to return to maximal functional potential.     Patient will improve Strength to stated goals of appropriate musculature in order to improve functional independence.     Pain Rating at Best: 1/10 to improve Quality of Life.     Patient will meet predicted functional outcome (FOTO) score: 80% to increase self-worth & perceived functional ability.    Patient will have met/partially met personal goal of being able to reach overhead with no pain.        PLAN   Plan of care Certification: 4/10/2023 to 7/30/2023    Outpatient Physical Therapy 2 times weekly for 8 weeks to include any combination of the following interventions: virtual visits, dry needling, modalities, electrical stimulation (IFC, Pre-Mod, Attended with Functional Dry Needling), Manual Therapy, Moist Heat/ Ice, Neuromuscular Re-ed, Patient Education, Self Care, Therapeutic Exercise, Functional Training, and Therapeutic Activites     Thank you for this referral.    These services are  reasonable and necessary for the conditions set forth above while under my care.    Marino Pickens, PT, DPT

## 2023-04-13 ENCOUNTER — TELEPHONE (OUTPATIENT)
Dept: FAMILY MEDICINE | Facility: CLINIC | Age: 78
End: 2023-04-13
Payer: MEDICARE

## 2023-04-13 NOTE — TELEPHONE ENCOUNTER
Explained to deliver my meds rep, Delio, that pt needs appt to determine needs; rep not receptive to direction requiring repeatative statements.  Pt called x2 to schedule appt per Dr. Rodríguez unable to leave voicemail message.

## 2023-04-28 ENCOUNTER — HOSPITAL ENCOUNTER (EMERGENCY)
Facility: HOSPITAL | Age: 78
Discharge: HOME | End: 2023-04-28
Attending: EMERGENCY MEDICINE | Admitting: NURSE PRACTITIONER
Payer: MEDICARE

## 2023-04-28 VITALS
SYSTOLIC BLOOD PRESSURE: 159 MMHG | WEIGHT: 208 LBS | BODY MASS INDEX: 36.86 KG/M2 | HEART RATE: 92 BPM | RESPIRATION RATE: 20 BRPM | DIASTOLIC BLOOD PRESSURE: 70 MMHG | TEMPERATURE: 98 F | HEIGHT: 63 IN | OXYGEN SATURATION: 96 %

## 2023-04-28 DIAGNOSIS — M54.50 ACUTE BILATERAL LOW BACK PAIN WITHOUT SCIATICA: Primary | ICD-10-CM

## 2023-04-28 DIAGNOSIS — S39.012A LUMBAR STRAIN, INITIAL ENCOUNTER: ICD-10-CM

## 2023-04-28 PROCEDURE — 25000003 PHARM REV CODE 250: Performed by: NURSE PRACTITIONER

## 2023-04-28 PROCEDURE — 99284 EMERGENCY DEPT VISIT MOD MDM: CPT

## 2023-04-28 RX ORDER — METHOCARBAMOL 500 MG/1
1000 TABLET, FILM COATED ORAL
Status: COMPLETED | OUTPATIENT
Start: 2023-04-28 | End: 2023-04-28

## 2023-04-28 RX ORDER — METHOCARBAMOL 750 MG/1
750 TABLET, FILM COATED ORAL 3 TIMES DAILY PRN
Qty: 15 TABLET | Refills: 0 | Status: SHIPPED | OUTPATIENT
Start: 2023-04-28 | End: 2023-05-03

## 2023-04-28 RX ORDER — LIDOCAINE 50 MG/G
2 PATCH TOPICAL
Status: DISCONTINUED | OUTPATIENT
Start: 2023-04-28 | End: 2023-04-28 | Stop reason: HOSPADM

## 2023-04-28 RX ORDER — MELOXICAM 15 MG/1
15 TABLET ORAL DAILY
Qty: 5 TABLET | Refills: 0 | Status: SHIPPED | OUTPATIENT
Start: 2023-04-28 | End: 2023-05-03

## 2023-04-28 RX ORDER — LIDOCAINE 50 MG/G
1 PATCH TOPICAL DAILY
Qty: 5 PATCH | Refills: 0 | Status: SHIPPED | OUTPATIENT
Start: 2023-04-28 | End: 2023-05-03

## 2023-04-28 RX ADMIN — METHOCARBAMOL 1000 MG: 500 TABLET ORAL at 05:04

## 2023-04-28 RX ADMIN — LIDOCAINE 2 PATCH: 50 PATCH TOPICAL at 05:04

## 2023-04-28 NOTE — ED PROVIDER NOTES
"Source of History:  Patient, chart    Chief complaint:  Back Pain (Woke this am with pain  / denies injury )      HPI:  Tali Hopper is a 77 y.o. female with medical history of depression, hypertension, osteoporosis, diabetes, chronic back pain presenting with  bilateral low back pain since this morning.  Patient states she woke up with pain this morning. Pt denies any falls or trauma.  Patient states she took a leave prior to coming to the ED with no relief.  Patient denies any dysuria or urinary frequency.      This is the extent to the patients complaints today here in the emergency department.    ROS: As per HPI and below:  Constitutional: No fever.  No chills.  Eyes: No visual changes.   ENT: No sore throat. No ear pain.  Urinary: No abnormal urination.  No pain with urination  MSK:  Positive for bilateral low back pain.  Integument: No rashes or lesions.    Review of patient's allergies indicates:   Allergen Reactions    Benadryl decongestant Shortness Of Breath     Respiration problems    Penicillins Hives       PMH:  As per HPI and below:  Past Medical History:   Diagnosis Date    Cataract of left eye     Depression     Glaucoma     Hypertension     Loss of equilibrium     Osteoporosis     Thyroid disease      Past Surgical History:   Procedure Laterality Date    CHOLECYSTECTOMY      EYE SURGERY      right cataract    HYSTERECTOMY      TONSILLECTOMY         Social History     Tobacco Use    Smoking status: Never    Smokeless tobacco: Never   Substance Use Topics    Alcohol use: No    Drug use: No       Physical Exam:    BP (!) 159/70   Pulse 92   Temp 97.8 °F (36.6 °C) (Oral)   Resp 20   Ht 5' 3" (1.6 m)   Wt 94.3 kg (208 lb)   SpO2 96%   Breastfeeding No   BMI 36.85 kg/m²   Nursing note and vital signs reviewed.  Constitutional: No acute distress.  Nontoxic  Head:  Normocephalic atraumatic  Eyes: No conjunctival injection.  Extraocular muscles are intact.  ENT: Normal " phonation.  Musculoskeletal:  Tenderness to palpation to bilateral lower back.  Negative bilateral straight leg test.  No midline T or L-spine tenderness to palpation.  No CVA tenderness to palpation.  Skin: No rashes seen.  Good turgor.  No abrasions.  No ecchymoses.  Psych: Appropriate, conversant.    MDM    Emergent evaluation of a 78 yo female presenting for bilateral low pain.  Patient states pain started this morning when she woke up.  Patient denies any falls or trauma.  No dysuria.  Patient states she took to leave a few hours prior to coming to the ED with no relief.  On exam pt is A&Ox3. VSS. Nonfebrile and nontoxic appearing.  Mucous membranes pink and moist. Tenderness to palpation to bilateral lower back.  Negative bilateral straight leg test.  No midline T or L-spine tenderness to palpation.  No CVA tenderness to palpation.  Pt speaking in full sentences.  Steady gait appreciated. Cap refill < 3 seconds.      History Acquisition   Additional history was acquired from other historians.  Chart  The patient's list of active medical problems, social history, medications, and allergies as documented per RN staff has been reviewed.     Differential Diagnoses   Based on available information and the initial assessment, the working differential diagnoses considered during this evaluation include but are not limited to strain, sprain, radiculopathy, sciatica, UTI, pyelonephritis, others.    I will get urine, medicate and reassess.      LABS     Labs Reviewed   URINALYSIS, REFLEX TO URINE CULTURE     Additional Consideration   All available testing was considered during the course of this workup.  Comorbidities taken into consideration during the patient's evaluation and treatment include weight, age.    Social determinants of health were taken into consideration during development of our treatment plan.    Medications   LIDOcaine 5 % patch 2 patch (2 patches Transdermal Patch Applied 4/28/23 1711)    methocarbamoL tablet 1,000 mg (1,000 mg Oral Given 4/28/23 1712)      ED Course as of 04/28/23 1905   Fri Apr 28, 2023   1830 Patient reassessed.  Patient states feeling better after lidocaine patch and Robaxin.  Advised pain appears to be muscular.  Advised we will prescribe medications for pain.  Advised to follow-up with PCP as needed.  Advised to continue physical therapy.  Apply ice or heat for pain relief.  Maintain movement and stretches.  Strict return to ED precautions discussed.  Patient verbalized understanding of this plan of care.  All questions and concerns addressed. [RZ]   1845 Patient is hemodynamically stable, vital signs are normal. Discharge instructions given. Return to ED precautions discussed. Follow up as directed. Pt verbalized understanding of this plan.  Pt is stable for discharge.  [RZ]      ED Course User Index  [RZ] Blanca Guerrero NP             CLINICAL IMPRESSION  1. Acute bilateral low back pain without sciatica    2. Lumbar strain, initial encounter         ED Disposition Condition    Discharge Stable            Instruction:  I see no indication of an emergent process beyond that addressed during our encounter but have duly counseled the patient/family regarding the need for prompt follow-up as well as the indications that should prompt immediate return to the emergency room should new or worrisome developments occur.  The patient/family has been provided with verbal and printed direction regarding our final diagnosis(es) as well as instructions regarding use of OTC and/or Rx medications intended to manage the patient's aforementioned conditions including:  ED Prescriptions       Medication Sig Dispense Start Date End Date Auth. Provider    methocarbamoL (ROBAXIN) 750 MG Tab Take 1 tablet (750 mg total) by mouth 3 (three) times daily as needed (muscle strain). 15 tablet 4/28/2023 5/3/2023 Blanca Guerrero NP    LIDOcaine (LIDODERM) 5 % Place 1 patch onto the skin once daily. for  5 days 5 patch 4/28/2023 5/3/2023 Blanca Guerrero NP    meloxicam (MOBIC) 15 MG tablet Take 1 tablet (15 mg total) by mouth once daily. for 5 days 5 tablet 4/28/2023 5/3/2023 Blanca Guerrero NP          Patient has been advised of following recommended follow-up instructions:  Follow-up Information       Follow up With Specialties Details Why Contact Info    Elizabeth Rodríguez MD Family Medicine Schedule an appointment as soon as possible for a visit  As needed [address] [facility] [address]  [phone]            The patient/family communicates understanding of all this information and all remaining questions and concerns were addressed at this time.      The patient's condition did not warrant review of the  and prescription of controlled substances.         Blanca Guerrero NP  04/28/23 5705

## 2023-04-29 NOTE — DISCHARGE INSTRUCTIONS
You were seen and evaluated in the ER today.  We are going to treat you for muscle strain.  Please take medications as prescribed.  Heating pad may help with pain.  Maintain movement and stretches.  Continue with your physical therapy.  Please follow-up with your PCP as needed.  Please return to the ED for any worsening symptoms such as chest pain, shortness of breath, fever not controlled with Tylenol or ibuprofen or uncontrolled pain.      Our goal in the emergency department is to always give you outstanding care and exceptional service. You may receive a survey by mail or e-mail in the next week regarding your experience in our ED. We would greatly appreciate your completing and returning the survey. Your feedback provides us with a way to recognize our staff who give very good care and it helps us learn how to improve when your experience was below our aspiration of excellence.

## 2023-05-29 ENCOUNTER — OFFICE VISIT (OUTPATIENT)
Dept: ORTHOPEDICS | Facility: CLINIC | Age: 78
End: 2023-05-29
Payer: MEDICARE

## 2023-05-29 VITALS — WEIGHT: 208 LBS | HEIGHT: 63 IN | BODY MASS INDEX: 36.86 KG/M2 | RESPIRATION RATE: 18 BRPM

## 2023-05-29 DIAGNOSIS — M12.811 ROTATOR CUFF ARTHROPATHY, RIGHT: Primary | ICD-10-CM

## 2023-05-29 PROCEDURE — 99999 PR PBB SHADOW E&M-EST. PATIENT-LVL II: CPT | Mod: PBBFAC,,, | Performed by: ORTHOPAEDIC SURGERY

## 2023-05-29 PROCEDURE — 1125F PR PAIN SEVERITY QUANTIFIED, PAIN PRESENT: ICD-10-PCS | Mod: CPTII,S$GLB,, | Performed by: ORTHOPAEDIC SURGERY

## 2023-05-29 PROCEDURE — 1159F PR MEDICATION LIST DOCUMENTED IN MEDICAL RECORD: ICD-10-PCS | Mod: CPTII,S$GLB,, | Performed by: ORTHOPAEDIC SURGERY

## 2023-05-29 PROCEDURE — 1160F RVW MEDS BY RX/DR IN RCRD: CPT | Mod: CPTII,S$GLB,, | Performed by: ORTHOPAEDIC SURGERY

## 2023-05-29 PROCEDURE — 99213 PR OFFICE/OUTPT VISIT, EST, LEVL III, 20-29 MIN: ICD-10-PCS | Mod: S$GLB,,, | Performed by: ORTHOPAEDIC SURGERY

## 2023-05-29 PROCEDURE — 99999 PR PBB SHADOW E&M-EST. PATIENT-LVL II: ICD-10-PCS | Mod: PBBFAC,,, | Performed by: ORTHOPAEDIC SURGERY

## 2023-05-29 PROCEDURE — 99213 OFFICE O/P EST LOW 20 MIN: CPT | Mod: S$GLB,,, | Performed by: ORTHOPAEDIC SURGERY

## 2023-05-29 PROCEDURE — 1159F MED LIST DOCD IN RCRD: CPT | Mod: CPTII,S$GLB,, | Performed by: ORTHOPAEDIC SURGERY

## 2023-05-29 PROCEDURE — 1160F PR REVIEW ALL MEDS BY PRESCRIBER/CLIN PHARMACIST DOCUMENTED: ICD-10-PCS | Mod: CPTII,S$GLB,, | Performed by: ORTHOPAEDIC SURGERY

## 2023-05-29 PROCEDURE — 1125F AMNT PAIN NOTED PAIN PRSNT: CPT | Mod: CPTII,S$GLB,, | Performed by: ORTHOPAEDIC SURGERY

## 2023-05-29 NOTE — PROGRESS NOTES
Past Medical History:   Diagnosis Date    Cataract of left eye     Depression     Glaucoma     Hypertension     Loss of equilibrium     Osteoporosis     Thyroid disease        Past Surgical History:   Procedure Laterality Date    CHOLECYSTECTOMY      EYE SURGERY      right cataract    HYSTERECTOMY      TONSILLECTOMY         Current Outpatient Medications   Medication Sig    albuterol (PROVENTIL/VENTOLIN HFA) 90 mcg/actuation inhaler Inhale 2 puffs into the lungs every 6 (six) hours. Rescue    amLODIPine (NORVASC) 5 MG tablet TAKE 1 TABLET BY MOUTH ONCE A DAY    ascorbic acid, vitamin C, (VITAMIN C) 100 MG tablet Take 100 mg by mouth once daily.    aspirin (ECOTRIN) 81 MG EC tablet Take 81 mg by mouth once daily.    b complex vitamins tablet Take 1 tablet by mouth once daily.    CALCIUM CARBONATE/VITAMIN D3 (CALTRATE 600 + D ORAL) Take by mouth once daily.     DULoxetine (CYMBALTA) 60 MG capsule Take 1 capsule (60 mg total) by mouth once daily. TAKE 1 CAPSULE EVERY NIGHT AT BEDTIME    fluticasone propionate (FLONASE) 50 mcg/actuation nasal spray 2 sprays (100 mcg total) by Each Nostril route once daily.    latanoprost 0.005 % ophthalmic solution 1 drop every evening.    levothyroxine (SYNTHROID) 112 MCG tablet TAKE 1 TABLET BY MOUTH ONCE A DAY    losartan-hydrochlorothiazide 100-12.5 mg (HYZAAR) 100-12.5 mg Tab TAKE 1 TABLET BY MOUTH EVERY DAY    metFORMIN (GLUCOPHAGE-XR) 500 MG ER 24hr tablet TAKE 2 TABLETS BY MOUTH ONCE A DAY WITH BREAKFAST    sk-HD-W2-K-lycop-lut-herb#220 (ESTROBLEND) 500 mcg-1,000 unit-20 mcg Tab Take 1 tablet by mouth once daily.    raloxifene (EVISTA) 60 mg tablet Take 1 tablet (60 mg total) by mouth once daily.    timolol maleate 0.5% (TIMOPTIC-XE) 0.5 % SolG Place 1 drop into both eyes 2 (two) times daily.      No current facility-administered medications for this visit.       Review of patient's allergies indicates:   Allergen Reactions    Benadryl decongestant Shortness Of Breath      Respiration problems    Penicillins Hives       Family History   Problem Relation Age of Onset    Heart disease Maternal Grandmother     Heart disease Paternal Grandmother        Social History     Socioeconomic History    Marital status:    Tobacco Use    Smoking status: Never    Smokeless tobacco: Never   Substance and Sexual Activity    Alcohol use: No    Drug use: No    Sexual activity: Not Currently     Social Determinants of Health     Financial Resource Strain: Low Risk     Difficulty of Paying Living Expenses: Not hard at all   Food Insecurity: No Food Insecurity    Worried About Running Out of Food in the Last Year: Never true    Ran Out of Food in the Last Year: Never true   Transportation Needs: No Transportation Needs    Lack of Transportation (Medical): No    Lack of Transportation (Non-Medical): No   Physical Activity: Sufficiently Active    Days of Exercise per Week: 3 days    Minutes of Exercise per Session: 60 min   Stress: Stress Concern Present    Feeling of Stress : Rather much   Social Connections: Moderately Integrated    Frequency of Communication with Friends and Family: More than three times a week    Frequency of Social Gatherings with Friends and Family: Three times a week    Attends Religion Services: More than 4 times per year    Active Member of Clubs or Organizations: Yes    Attends Club or Organization Meetings: More than 4 times per year    Marital Status:    Housing Stability: Low Risk     Unable to Pay for Housing in the Last Year: No    Number of Places Lived in the Last Year: 1    Unstable Housing in the Last Year: No       Chief Complaint:   Chief Complaint   Patient presents with    Right Shoulder - Pain       History of present illness:  77-year-old female who is right-hand-dominant seen for right shoulder injury.  Patient denies acute pain before a fall on February 7th.  Patient was seen at the emergency room.  X-rays showed some arthritic change with no acute  fracture.  We got her into some physical therapy.  It helped.  She is doing some home exercises.  Still has some pain with use.  Pain is as high as an 8/10.      Review of Systems:    Constitution: Negative for chills, fever, and sweats.  Negative for unexplained weight loss.    HENT:  Negative for headaches and blurry vision.    Cardiovascular:Negative for chest pain or irregular heart beat. Negative for hypertension.    Respiratory:  Negative for cough and shortness of breath.    Gastrointestinal: Negative for abdominal pain, heartburn, melena, nausea, and vomitting.    Genitourinary:  Negative bladder incontinence and dysuria.    Musculoskeletal:  See HPI    Neurological: Negative for numbness.    Psychiatric/Behavioral: Negative for depression.  The patient is not nervous/anxious.      Endocrine: Negative for polyuria    Hematologic/Lymphatic: Negative for bleeding problem.  Does not bruise/bleed easily.    Skin: Negative for poor would healing and rash      Physical Examination:    Vital Signs:    Vitals:    05/29/23 1442   Resp: 18       Body mass index is 36.85 kg/m².    This a well-developed, well nourished patient in no acute distress.  They are alert and oriented and cooperative to examination.  Pt. walks without an antalgic gait.      Examination of the right shoulder shows no rashes or erythema. There are no masses, ecchymosis, or atrophy. The patient has decreased active range of motion in forward flexion, external rotation, and internal rotation to the mid T-spine. The patient has positive Pyle and Neer - Miner's test. - Speeds test. Nontender to palpation over a.c. joint. Normal stability anteriorly, posteriorly, and negative sulcus sign. Passive range of motion: Forward flexion of 180°, external rotation at 90° of 90°, internal rotation of 50°, and external rotation at 0° of 50°. 2+ radial pulse. Intact axillary, radial, median and ulnar sensation. 4  out of 5 resisted forward flexion, external  rotation, and negative lift off test.      X-rays:  X-rays of the right shoulder available for review which show some moderate arthritic change.  Possibly some decrease in the acromial humeral distance.     Assessment::  Right rotator cuff tear with underlying arthritis    Plan:  I reviewed the findings with her today.   Next step would be an MRI but the only real surgical treatment option would likely be reverse total shoulder arthroplasty.  Continue the home exercises.  Follow-up as needed.    All previous pertinent notes including ER visits, physical therapy visits, other orthopedic visits as well as other care for the same musculoskeletal problem were reviewed.  All pertinent lab values and previous imaging was reviewed pertinent to the current visit.    This note was created using EGEN voice recognition software that occasionally misinterpreted phrases or words.    Consult note is delivered via Epic messaging service.

## 2023-06-09 ENCOUNTER — TELEPHONE (OUTPATIENT)
Dept: ADMINISTRATIVE | Facility: CLINIC | Age: 78
End: 2023-06-09
Payer: MEDICARE

## 2023-06-12 ENCOUNTER — OFFICE VISIT (OUTPATIENT)
Dept: FAMILY MEDICINE | Facility: CLINIC | Age: 78
End: 2023-06-12
Payer: MEDICARE

## 2023-06-12 VITALS
DIASTOLIC BLOOD PRESSURE: 74 MMHG | TEMPERATURE: 98 F | HEIGHT: 63 IN | HEART RATE: 91 BPM | WEIGHT: 197.75 LBS | BODY MASS INDEX: 35.04 KG/M2 | SYSTOLIC BLOOD PRESSURE: 134 MMHG | OXYGEN SATURATION: 98 %

## 2023-06-12 DIAGNOSIS — I15.2 HYPERTENSION ASSOCIATED WITH DIABETES: ICD-10-CM

## 2023-06-12 DIAGNOSIS — E11.59 HYPERTENSION ASSOCIATED WITH DIABETES: ICD-10-CM

## 2023-06-12 DIAGNOSIS — E11.69 HYPERLIPIDEMIA ASSOCIATED WITH TYPE 2 DIABETES MELLITUS: ICD-10-CM

## 2023-06-12 DIAGNOSIS — Z00.00 ENCOUNTER FOR PREVENTIVE HEALTH EXAMINATION: Primary | ICD-10-CM

## 2023-06-12 DIAGNOSIS — E78.5 HYPERLIPIDEMIA ASSOCIATED WITH TYPE 2 DIABETES MELLITUS: ICD-10-CM

## 2023-06-12 DIAGNOSIS — E11.9 TYPE 2 DIABETES MELLITUS WITHOUT COMPLICATION, WITHOUT LONG-TERM CURRENT USE OF INSULIN: ICD-10-CM

## 2023-06-12 DIAGNOSIS — F33.1 MODERATE EPISODE OF RECURRENT MAJOR DEPRESSIVE DISORDER: ICD-10-CM

## 2023-06-12 DIAGNOSIS — E66.01 SEVERE OBESITY (BMI 35.0-39.9) WITH COMORBIDITY: ICD-10-CM

## 2023-06-12 PROCEDURE — G0439 PR MEDICARE ANNUAL WELLNESS SUBSEQUENT VISIT: ICD-10-PCS | Mod: S$GLB,,, | Performed by: NURSE PRACTITIONER

## 2023-06-12 PROCEDURE — 3078F PR MOST RECENT DIASTOLIC BLOOD PRESSURE < 80 MM HG: ICD-10-PCS | Mod: CPTII,S$GLB,, | Performed by: NURSE PRACTITIONER

## 2023-06-12 PROCEDURE — 99999 PR PBB SHADOW E&M-EST. PATIENT-LVL V: CPT | Mod: PBBFAC,,, | Performed by: NURSE PRACTITIONER

## 2023-06-12 PROCEDURE — 3288F PR FALLS RISK ASSESSMENT DOCUMENTED: ICD-10-PCS | Mod: CPTII,S$GLB,, | Performed by: NURSE PRACTITIONER

## 2023-06-12 PROCEDURE — 1100F PTFALLS ASSESS-DOCD GE2>/YR: CPT | Mod: CPTII,S$GLB,, | Performed by: NURSE PRACTITIONER

## 2023-06-12 PROCEDURE — 3288F FALL RISK ASSESSMENT DOCD: CPT | Mod: CPTII,S$GLB,, | Performed by: NURSE PRACTITIONER

## 2023-06-12 PROCEDURE — 1126F PR PAIN SEVERITY QUANTIFIED, NO PAIN PRESENT: ICD-10-PCS | Mod: CPTII,S$GLB,, | Performed by: NURSE PRACTITIONER

## 2023-06-12 PROCEDURE — 99999 PR PBB SHADOW E&M-EST. PATIENT-LVL V: ICD-10-PCS | Mod: PBBFAC,,, | Performed by: NURSE PRACTITIONER

## 2023-06-12 PROCEDURE — 3078F DIAST BP <80 MM HG: CPT | Mod: CPTII,S$GLB,, | Performed by: NURSE PRACTITIONER

## 2023-06-12 PROCEDURE — G0439 PPPS, SUBSEQ VISIT: HCPCS | Mod: S$GLB,,, | Performed by: NURSE PRACTITIONER

## 2023-06-12 PROCEDURE — 1159F MED LIST DOCD IN RCRD: CPT | Mod: CPTII,S$GLB,, | Performed by: NURSE PRACTITIONER

## 2023-06-12 PROCEDURE — 1170F PR FUNCTIONAL STATUS ASSESSED: ICD-10-PCS | Mod: CPTII,S$GLB,, | Performed by: NURSE PRACTITIONER

## 2023-06-12 PROCEDURE — 1160F RVW MEDS BY RX/DR IN RCRD: CPT | Mod: CPTII,S$GLB,, | Performed by: NURSE PRACTITIONER

## 2023-06-12 PROCEDURE — 1160F PR REVIEW ALL MEDS BY PRESCRIBER/CLIN PHARMACIST DOCUMENTED: ICD-10-PCS | Mod: CPTII,S$GLB,, | Performed by: NURSE PRACTITIONER

## 2023-06-12 PROCEDURE — 1100F PR PT FALLS ASSESS DOC 2+ FALLS/FALL W/INJURY/YR: ICD-10-PCS | Mod: CPTII,S$GLB,, | Performed by: NURSE PRACTITIONER

## 2023-06-12 PROCEDURE — 1126F AMNT PAIN NOTED NONE PRSNT: CPT | Mod: CPTII,S$GLB,, | Performed by: NURSE PRACTITIONER

## 2023-06-12 PROCEDURE — 3075F SYST BP GE 130 - 139MM HG: CPT | Mod: CPTII,S$GLB,, | Performed by: NURSE PRACTITIONER

## 2023-06-12 PROCEDURE — 3075F PR MOST RECENT SYSTOLIC BLOOD PRESS GE 130-139MM HG: ICD-10-PCS | Mod: CPTII,S$GLB,, | Performed by: NURSE PRACTITIONER

## 2023-06-12 PROCEDURE — 1170F FXNL STATUS ASSESSED: CPT | Mod: CPTII,S$GLB,, | Performed by: NURSE PRACTITIONER

## 2023-06-12 PROCEDURE — 1159F PR MEDICATION LIST DOCUMENTED IN MEDICAL RECORD: ICD-10-PCS | Mod: CPTII,S$GLB,, | Performed by: NURSE PRACTITIONER

## 2023-06-12 NOTE — PROGRESS NOTES
"  Tali Hopper presented for a  Medicare AWV and comprehensive Health Risk Assessment today. The following components were reviewed and updated:    Medical history  Family History  Social history  Allergies and Current Medications  Health Risk Assessment  Health Maintenance  Care Team         ** See Completed Assessments for Annual Wellness Visit within the encounter summary.**         The following assessments were completed:  Living Situation  CAGE  Depression Screening  Timed Get Up and Go  Whisper Test  Cognitive Function Screening  Nutrition Screening  ADL Screening  PAQ Screening    Clock in media     Vitals:    06/12/23 1549   BP: 134/74   Pulse: 91   Temp: 98 °F (36.7 °C)   TempSrc: Oral   SpO2: 98%   Weight: 89.7 kg (197 lb 12 oz)   Height: 5' 3" (1.6 m)     Body mass index is 35.03 kg/m².  Physical Exam  Constitutional:       Appearance: She is well-developed.   HENT:      Head: Normocephalic and atraumatic.      Nose: Nose normal.   Eyes:      General: Lids are normal.      Conjunctiva/sclera: Conjunctivae normal.   Cardiovascular:      Rate and Rhythm: Normal rate.   Pulmonary:      Effort: Pulmonary effort is normal.   Neurological:      Mental Status: She is alert and oriented to person, place, and time.   Psychiatric:         Speech: Speech normal.         Behavior: Behavior normal.             Diagnoses and health risks identified today and associated recommendations/orders:    1. Encounter for preventive health examination  Discussed health maintenance guidelines appropriate for age.    Review for Opioid Screening: Patient does not have rx for Opioids.    Review for Substance Use Disorders: Patient does not use substance.      2. Type 2 diabetes mellitus without complication, without long-term current use of insulin  Controlled, continue current medication regimen  Last HgA1c - 5.8  ADA diet  Increase physical activity   Followed by       3. Severe obesity (BMI 35.0-39.9) with " "comorbidity  Uncontrolled, Counseled patient on his ideal body weight, health consequences of being obese and current recommendations including weekly exercise and a heart healthy diet.  Current BMI is:Estimated body mass index is 35.03 kg/m² as calculated from the following:    Height as of this encounter: 5' 3" (1.6 m).    Weight as of this encounter: 89.7 kg (197 lb 12 oz)..  Patient is aware that ideal BMI < 25 or Weight in (lb) to have BMI = 25: 140.8.      4. Hypertension associated with diabetes  Controlled, continue current medication regimen  Low salt diet  Increase physical activity  Followed by pcp      5. Hyperlipidemia associated with type 2 diabetes mellitus  Controlled, continue to monitor      6. Moderate episode of recurrent major depressive disorder  Stable, continue current medications  Followed by pcp       Provided Tali with a 5-10 year written screening schedule and personal prevention plan. Recommendations were developed using the USPSTF age appropriate recommendations. Education, counseling, and referrals were provided as needed. After Visit Summary printed and given to patient which includes a list of additional screenings\tests needed.    Follow up for One year for Annual Wellness Visit.    An Carty NP      I offered to discuss advanced care planning, including how to pick a person who would make decisions for you if you were unable to make them for yourself, called a health care power of , and what kind of decisions you might make such as use of life sustaining treatments such as ventilators and tube feeding when faced with a life limiting illness recorded on a living will that they will need to know. (How you want to be cared for as you near the end of your natural life)     X  Patient is unwilling to engage in a discussion regarding advance directives at this time.  "

## 2023-06-12 NOTE — PATIENT INSTRUCTIONS
Counseling and Referral of Other Preventative  (Italic type indicates deductible and co-insurance are waived)    Patient Name: Tali Hopper  Today's Date: 6/12/2023    Health Maintenance       Date Due Completion Date    Shingles Vaccine (1 of 2) Never done ---    Diabetes Urine Screening 06/11/2022 6/11/2021    Pneumococcal Vaccines (Age 65+) (1 - PCV) 08/04/2023 (Originally 6/19/1951) ---    COVID-19 Vaccine (2 - Booster for Ibeth series) 09/23/2023 (Originally 1/3/2022) 11/8/2021    Hemoglobin A1c 08/09/2023 2/9/2023    Eye Exam 09/01/2023 9/1/2022    Lipid Panel 02/09/2024 2/9/2023    DEXA Scan 06/23/2024 6/23/2021    TETANUS VACCINE 08/17/2027 8/17/2017        No orders of the defined types were placed in this encounter.    The following information is provided to all patients.  This information is to help you find resources for any of the problems found today that may be affecting your health:                Living healthy guide: www.[facility].[place].gov      Understanding Diabetes: www.diabetes.org      Eating healthy: www.cdc.gov/healthyweight      CDC home safety checklist: www.cdc.gov/steadi/patient.html      Agency on Aging: www.goea.[place].[facility]      Alcoholics anonymous (AA): www.aa.org      Physical Activity: www.yazmin.nih.gov/wg9ywcr      Tobacco use: www.quitwithusla.org

## 2023-08-03 ENCOUNTER — OFFICE VISIT (OUTPATIENT)
Dept: FAMILY MEDICINE | Facility: CLINIC | Age: 78
End: 2023-08-03
Payer: MEDICARE

## 2023-08-03 VITALS
OXYGEN SATURATION: 96 % | RESPIRATION RATE: 16 BRPM | SYSTOLIC BLOOD PRESSURE: 110 MMHG | DIASTOLIC BLOOD PRESSURE: 60 MMHG | TEMPERATURE: 98 F | HEART RATE: 95 BPM | HEIGHT: 63 IN | BODY MASS INDEX: 34.61 KG/M2 | WEIGHT: 195.31 LBS

## 2023-08-03 DIAGNOSIS — I10 ESSENTIAL HYPERTENSION: ICD-10-CM

## 2023-08-03 DIAGNOSIS — E78.5 HYPERLIPIDEMIA ASSOCIATED WITH TYPE 2 DIABETES MELLITUS: ICD-10-CM

## 2023-08-03 DIAGNOSIS — E11.9 TYPE 2 DIABETES MELLITUS WITHOUT COMPLICATION, WITHOUT LONG-TERM CURRENT USE OF INSULIN: Primary | ICD-10-CM

## 2023-08-03 DIAGNOSIS — M81.0 OSTEOPOROSIS, POST-MENOPAUSAL: ICD-10-CM

## 2023-08-03 DIAGNOSIS — E66.01 SEVERE OBESITY (BMI 35.0-39.9) WITH COMORBIDITY: ICD-10-CM

## 2023-08-03 DIAGNOSIS — E03.9 HYPOTHYROIDISM, UNSPECIFIED TYPE: ICD-10-CM

## 2023-08-03 DIAGNOSIS — E03.4 HYPOTHYROIDISM DUE TO ACQUIRED ATROPHY OF THYROID: ICD-10-CM

## 2023-08-03 DIAGNOSIS — E11.69 HYPERLIPIDEMIA ASSOCIATED WITH TYPE 2 DIABETES MELLITUS: ICD-10-CM

## 2023-08-03 PROCEDURE — 3288F FALL RISK ASSESSMENT DOCD: CPT | Mod: CPTII,S$GLB,, | Performed by: FAMILY MEDICINE

## 2023-08-03 PROCEDURE — 1160F PR REVIEW ALL MEDS BY PRESCRIBER/CLIN PHARMACIST DOCUMENTED: ICD-10-PCS | Mod: CPTII,S$GLB,, | Performed by: FAMILY MEDICINE

## 2023-08-03 PROCEDURE — 3074F PR MOST RECENT SYSTOLIC BLOOD PRESSURE < 130 MM HG: ICD-10-PCS | Mod: CPTII,S$GLB,, | Performed by: FAMILY MEDICINE

## 2023-08-03 PROCEDURE — 99999 PR PBB SHADOW E&M-EST. PATIENT-LVL III: CPT | Mod: PBBFAC,,, | Performed by: FAMILY MEDICINE

## 2023-08-03 PROCEDURE — 1126F AMNT PAIN NOTED NONE PRSNT: CPT | Mod: CPTII,S$GLB,, | Performed by: FAMILY MEDICINE

## 2023-08-03 PROCEDURE — 99214 PR OFFICE/OUTPT VISIT, EST, LEVL IV, 30-39 MIN: ICD-10-PCS | Mod: S$GLB,,, | Performed by: FAMILY MEDICINE

## 2023-08-03 PROCEDURE — 3074F SYST BP LT 130 MM HG: CPT | Mod: CPTII,S$GLB,, | Performed by: FAMILY MEDICINE

## 2023-08-03 PROCEDURE — 3078F PR MOST RECENT DIASTOLIC BLOOD PRESSURE < 80 MM HG: ICD-10-PCS | Mod: CPTII,S$GLB,, | Performed by: FAMILY MEDICINE

## 2023-08-03 PROCEDURE — 1101F PR PT FALLS ASSESS DOC 0-1 FALLS W/OUT INJ PAST YR: ICD-10-PCS | Mod: CPTII,S$GLB,, | Performed by: FAMILY MEDICINE

## 2023-08-03 PROCEDURE — 1159F MED LIST DOCD IN RCRD: CPT | Mod: CPTII,S$GLB,, | Performed by: FAMILY MEDICINE

## 2023-08-03 PROCEDURE — 1159F PR MEDICATION LIST DOCUMENTED IN MEDICAL RECORD: ICD-10-PCS | Mod: CPTII,S$GLB,, | Performed by: FAMILY MEDICINE

## 2023-08-03 PROCEDURE — 99214 OFFICE O/P EST MOD 30 MIN: CPT | Mod: S$GLB,,, | Performed by: FAMILY MEDICINE

## 2023-08-03 PROCEDURE — 1126F PR PAIN SEVERITY QUANTIFIED, NO PAIN PRESENT: ICD-10-PCS | Mod: CPTII,S$GLB,, | Performed by: FAMILY MEDICINE

## 2023-08-03 PROCEDURE — 3078F DIAST BP <80 MM HG: CPT | Mod: CPTII,S$GLB,, | Performed by: FAMILY MEDICINE

## 2023-08-03 PROCEDURE — 99999 PR PBB SHADOW E&M-EST. PATIENT-LVL III: ICD-10-PCS | Mod: PBBFAC,,, | Performed by: FAMILY MEDICINE

## 2023-08-03 PROCEDURE — 1101F PT FALLS ASSESS-DOCD LE1/YR: CPT | Mod: CPTII,S$GLB,, | Performed by: FAMILY MEDICINE

## 2023-08-03 PROCEDURE — 1160F RVW MEDS BY RX/DR IN RCRD: CPT | Mod: CPTII,S$GLB,, | Performed by: FAMILY MEDICINE

## 2023-08-03 PROCEDURE — 3288F PR FALLS RISK ASSESSMENT DOCUMENTED: ICD-10-PCS | Mod: CPTII,S$GLB,, | Performed by: FAMILY MEDICINE

## 2023-08-03 RX ORDER — AMLODIPINE BESYLATE 5 MG/1
5 TABLET ORAL DAILY
Qty: 90 TABLET | Refills: 3 | Status: SHIPPED | OUTPATIENT
Start: 2023-08-03

## 2023-08-03 RX ORDER — LEVOTHYROXINE SODIUM 112 UG/1
112 TABLET ORAL DAILY
Qty: 90 TABLET | Refills: 3 | Status: SHIPPED | OUTPATIENT
Start: 2023-08-03

## 2023-08-03 RX ORDER — RALOXIFENE HYDROCHLORIDE 60 MG/1
60 TABLET, FILM COATED ORAL DAILY
Qty: 90 TABLET | Refills: 3 | Status: SHIPPED | OUTPATIENT
Start: 2023-08-03
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
## 2023-09-20 LAB
LEFT EYE DM RETINOPATHY: NEGATIVE
RIGHT EYE DM RETINOPATHY: NEGATIVE

## 2023-09-22 ENCOUNTER — PATIENT OUTREACH (OUTPATIENT)
Dept: ADMINISTRATIVE | Facility: HOSPITAL | Age: 78
End: 2023-09-22
Payer: MEDICARE

## 2023-09-26 ENCOUNTER — OFFICE VISIT (OUTPATIENT)
Dept: FAMILY MEDICINE | Facility: CLINIC | Age: 78
End: 2023-09-26
Payer: MEDICARE

## 2023-09-26 VITALS
BODY MASS INDEX: 34.18 KG/M2 | OXYGEN SATURATION: 98 % | DIASTOLIC BLOOD PRESSURE: 80 MMHG | HEART RATE: 65 BPM | TEMPERATURE: 97 F | HEIGHT: 63 IN | SYSTOLIC BLOOD PRESSURE: 122 MMHG | WEIGHT: 192.88 LBS

## 2023-09-26 DIAGNOSIS — L30.9 DERMATITIS: Primary | ICD-10-CM

## 2023-09-26 PROCEDURE — 99999 PR PBB SHADOW E&M-EST. PATIENT-LVL V: CPT | Mod: PBBFAC,,, | Performed by: NURSE PRACTITIONER

## 2023-09-26 PROCEDURE — 2023F DILAT RTA XM W/O RTNOPTHY: CPT | Mod: CPTII,S$GLB,, | Performed by: NURSE PRACTITIONER

## 2023-09-26 PROCEDURE — 1101F PR PT FALLS ASSESS DOC 0-1 FALLS W/OUT INJ PAST YR: ICD-10-PCS | Mod: CPTII,S$GLB,, | Performed by: NURSE PRACTITIONER

## 2023-09-26 PROCEDURE — 1126F PR PAIN SEVERITY QUANTIFIED, NO PAIN PRESENT: ICD-10-PCS | Mod: CPTII,S$GLB,, | Performed by: NURSE PRACTITIONER

## 2023-09-26 PROCEDURE — 1126F AMNT PAIN NOTED NONE PRSNT: CPT | Mod: CPTII,S$GLB,, | Performed by: NURSE PRACTITIONER

## 2023-09-26 PROCEDURE — 99999 PR PBB SHADOW E&M-EST. PATIENT-LVL V: ICD-10-PCS | Mod: PBBFAC,,, | Performed by: NURSE PRACTITIONER

## 2023-09-26 PROCEDURE — 3079F DIAST BP 80-89 MM HG: CPT | Mod: CPTII,S$GLB,, | Performed by: NURSE PRACTITIONER

## 2023-09-26 PROCEDURE — 3079F PR MOST RECENT DIASTOLIC BLOOD PRESSURE 80-89 MM HG: ICD-10-PCS | Mod: CPTII,S$GLB,, | Performed by: NURSE PRACTITIONER

## 2023-09-26 PROCEDURE — 3288F FALL RISK ASSESSMENT DOCD: CPT | Mod: CPTII,S$GLB,, | Performed by: NURSE PRACTITIONER

## 2023-09-26 PROCEDURE — 1160F PR REVIEW ALL MEDS BY PRESCRIBER/CLIN PHARMACIST DOCUMENTED: ICD-10-PCS | Mod: CPTII,S$GLB,, | Performed by: NURSE PRACTITIONER

## 2023-09-26 PROCEDURE — 99213 OFFICE O/P EST LOW 20 MIN: CPT | Mod: S$GLB,,, | Performed by: NURSE PRACTITIONER

## 2023-09-26 PROCEDURE — 1159F MED LIST DOCD IN RCRD: CPT | Mod: CPTII,S$GLB,, | Performed by: NURSE PRACTITIONER

## 2023-09-26 PROCEDURE — 1101F PT FALLS ASSESS-DOCD LE1/YR: CPT | Mod: CPTII,S$GLB,, | Performed by: NURSE PRACTITIONER

## 2023-09-26 PROCEDURE — 3074F PR MOST RECENT SYSTOLIC BLOOD PRESSURE < 130 MM HG: ICD-10-PCS | Mod: CPTII,S$GLB,, | Performed by: NURSE PRACTITIONER

## 2023-09-26 PROCEDURE — 1159F PR MEDICATION LIST DOCUMENTED IN MEDICAL RECORD: ICD-10-PCS | Mod: CPTII,S$GLB,, | Performed by: NURSE PRACTITIONER

## 2023-09-26 PROCEDURE — 99213 PR OFFICE/OUTPT VISIT, EST, LEVL III, 20-29 MIN: ICD-10-PCS | Mod: S$GLB,,, | Performed by: NURSE PRACTITIONER

## 2023-09-26 PROCEDURE — 2023F PR DILATED RETINAL EXAM W/O EVID OF RETINOPATHY: ICD-10-PCS | Mod: CPTII,S$GLB,, | Performed by: NURSE PRACTITIONER

## 2023-09-26 PROCEDURE — 3288F PR FALLS RISK ASSESSMENT DOCUMENTED: ICD-10-PCS | Mod: CPTII,S$GLB,, | Performed by: NURSE PRACTITIONER

## 2023-09-26 PROCEDURE — 1160F RVW MEDS BY RX/DR IN RCRD: CPT | Mod: CPTII,S$GLB,, | Performed by: NURSE PRACTITIONER

## 2023-09-26 PROCEDURE — 3074F SYST BP LT 130 MM HG: CPT | Mod: CPTII,S$GLB,, | Performed by: NURSE PRACTITIONER

## 2023-09-26 RX ORDER — MUPIROCIN 20 MG/G
OINTMENT TOPICAL 3 TIMES DAILY
Qty: 30 G | Refills: 0 | Status: SHIPPED | OUTPATIENT
Start: 2023-09-26 | End: 2023-11-05

## 2023-09-26 RX ORDER — TRIAMCINOLONE ACETONIDE 1 MG/G
OINTMENT TOPICAL 2 TIMES DAILY
Qty: 80 G | Refills: 0 | Status: SHIPPED | OUTPATIENT
Start: 2023-09-26 | End: 2023-12-14

## 2023-09-26 NOTE — PROGRESS NOTES
Subjective:       Patient ID: Tali Hopper is a 78 y.o. female.    Chief Complaint: rash on left ankle     HPI   79 y/o female patient with medical problems listed below presents for rash around left ankle for 2 weeks. Denies recent trauma or recent travel. Denies recent outdoor activities or insect bite. States it is very itchy and so scratched the skin at night which led to bleeding but it stopped now. Uses hydrocortisone 1 % cream with mild relief.     Labs reviewed from 2/2023    Patient Active Problem List   Diagnosis    Hypothyroid    Osteoporosis, post-menopausal    Moderate episode of recurrent major depressive disorder    Hyperlipemia    Hypocalcemia    Dizziness    Decreased pulse    Faintness    Chronic low back pain without sciatica    Cataract of left eye    Essential hypertension    Severe obesity (BMI 35.0-39.9) with comorbidity    Atrial bigeminy    Prolonged Q-T interval on ECG    Type 2 diabetes mellitus without complication, without long-term current use of insulin    Advanced care planning/counseling discussion    History of recent fall    Imbalance    Leg weakness, bilateral    Decreased strength    Muscle tightness    Decreased functional mobility    Decreased range of motion      Review of patient's allergies indicates:   Allergen Reactions    Benadryl decongestant Shortness Of Breath     Respiration problems    Penicillins Hives     Past Surgical History:   Procedure Laterality Date    CHOLECYSTECTOMY      EYE SURGERY      right cataract    HYSTERECTOMY      TONSILLECTOMY          Current Outpatient Medications:     albuterol (PROVENTIL/VENTOLIN HFA) 90 mcg/actuation inhaler, Inhale 2 puffs into the lungs every 6 (six) hours. Rescue, Disp: 8 g, Rfl: 0    amLODIPine (NORVASC) 5 MG tablet, Take 1 tablet (5 mg total) by mouth once daily., Disp: 90 tablet, Rfl: 3    ascorbic acid, vitamin C, (VITAMIN C) 100 MG tablet, Take 100 mg by mouth once daily., Disp: , Rfl:     aspirin (ECOTRIN) 81  MG EC tablet, Take 81 mg by mouth once daily., Disp: , Rfl:     b complex vitamins tablet, Take 1 tablet by mouth once daily., Disp: , Rfl:     CALCIUM CARBONATE/VITAMIN D3 (CALTRATE 600 + D ORAL), Take by mouth once daily. , Disp: , Rfl:     DULoxetine (CYMBALTA) 60 MG capsule, Take 1 capsule (60 mg total) by mouth once daily. TAKE 1 CAPSULE EVERY NIGHT AT BEDTIME, Disp: 90 capsule, Rfl: 3    fluticasone propionate (FLONASE) 50 mcg/actuation nasal spray, 2 sprays (100 mcg total) by Each Nostril route once daily., Disp: 16 g, Rfl: 1    latanoprost 0.005 % ophthalmic solution, 1 drop every evening., Disp: , Rfl:     levothyroxine (SYNTHROID) 112 MCG tablet, Take 1 tablet (112 mcg total) by mouth once daily., Disp: 90 tablet, Rfl: 3    losartan-hydrochlorothiazide 100-12.5 mg (HYZAAR) 100-12.5 mg Tab, TAKE 1 TABLET BY MOUTH EVERY DAY, Disp: 90 tablet, Rfl: 3    metFORMIN (GLUCOPHAGE-XR) 500 MG ER 24hr tablet, TAKE 2 TABLETS BY MOUTH ONCE A DAY WITH BREAKFAST, Disp: 180 tablet, Rfl: 3    pt-AD-C3-K-lycop-lut-herb#220 (ESTROBLEND) 500 mcg-1,000 unit-20 mcg Tab, Take 1 tablet by mouth once daily., Disp: 90 tablet, Rfl: 1    raloxifene (EVISTA) 60 mg tablet, Take 1 tablet (60 mg total) by mouth once daily., Disp: 90 tablet, Rfl: 3    timolol maleate 0.5% (TIMOPTIC-XE) 0.5 % SolG, Place 1 drop into both eyes 2 (two) times daily. , Disp: , Rfl: 2    mupirocin (BACTROBAN) 2 % ointment, Apply topically 3 (three) times daily., Disp: 30 g, Rfl: 0    triamcinolone acetonide 0.1% (KENALOG) 0.1 % ointment, Apply topically 2 (two) times daily., Disp: 80 g, Rfl: 0    Lab Results   Component Value Date    WBC 9.46 02/09/2023    HGB 13.2 02/09/2023    HCT 42.0 02/09/2023     02/09/2023    CHOL 181 02/09/2023    TRIG 199 (H) 02/09/2023    HDL 39 (L) 02/09/2023    ALT 12 02/09/2023    AST 16 02/09/2023     02/09/2023    K 3.9 02/09/2023     02/09/2023    CREATININE 0.9 02/09/2023    BUN 16 02/09/2023    CO2 25  "02/09/2023    TSH 2.114 02/09/2023    INR 1.0 09/06/2021    HGBA1C 5.8 (H) 02/09/2023     Review of Systems   Constitutional:  Negative for chills and fever.   Respiratory:  Negative for cough, chest tightness and shortness of breath.    Cardiovascular:  Negative for chest pain and palpitations.   Skin:  Positive for color change and rash.   Neurological:  Negative for dizziness and headaches.       Objective:   /80 (BP Location: Left arm, Patient Position: Sitting, BP Method: Medium (Manual))   Pulse 65   Temp 97.2 °F (36.2 °C) (Oral)   Ht 5' 3" (1.6 m)   Wt 87.5 kg (192 lb 14.4 oz)   SpO2 98%   BMI 34.17 kg/m²         Physical Exam  Constitutional:       General: She is not in acute distress.     Appearance: Normal appearance.   HENT:      Head: Atraumatic.   Cardiovascular:      Rate and Rhythm: Normal rate and regular rhythm.      Pulses: Normal pulses.      Heart sounds: Normal heart sounds.   Pulmonary:      Effort: Pulmonary effort is normal.      Breath sounds: Normal breath sounds.   Skin:     General: Skin is warm and dry.      Comments: +localized erythema with pruritus and dry scaling around left ankle    Neurological:      General: No focal deficit present.      Mental Status: She is alert and oriented to person, place, and time.   Psychiatric:         Mood and Affect: Mood normal.                 Assessment:       1. Dermatitis        Plan:       1. Dermatitis  - triamcinolone acetonide 0.1% (KENALOG) 0.1 % ointment; Apply topically 2 (two) times daily.  Dispense: 80 g; Refill: 0  - mupirocin (BACTROBAN) 2 % ointment; Apply topically 3 (three) times daily.  Dispense: 30 g; Refill: 0   - advised to avoid scratching the skin   - advised to use skin lubricant (Eucerin or Cerave)    Patient with be reevaluated in  as needed if no improvement  or sooner hetal Haile NP    "

## 2023-10-18 ENCOUNTER — NURSE TRIAGE (OUTPATIENT)
Dept: ADMINISTRATIVE | Facility: CLINIC | Age: 78
End: 2023-10-18
Payer: MEDICARE

## 2023-10-19 NOTE — TELEPHONE ENCOUNTER
Caller states that she has been depressed and not feeling well. Caller states that she feels like she is a burden to her family and friends and that daughter would like her to move into a nursing home. Pt denies SI/HI at this time, but feels like she may be better off not being here sometimes. Pt states she would like to speak with someone. Attempted to make pt appt with PCP, caller refused appt within disposition time frame. States she would preferred appt on Friday. Caller told that I was unable to make appt outside of disposition to see provider within 24 hours, however pt given appt desk number and told that she could make appt for Friday if that worked best for her. Caller encouraged to make appt for within 24 hours again. Stated she needs to think about it.  Pt advised per protocol and verbalized understanding.   Reason for Disposition   Sometimes has thoughts of suicide    Additional Information   Negative: [1] Patient is very confused (disoriented, slurred speech) AND [2] no other adult (e.g., friend or family member) available   Negative: Violent behavior, or threatening to physically hurt or kill someone   Negative: Patient is threatening suicide now   Negative: Patient attempted suicide   Negative: [1] Difficult to awaken or acting very confused (disoriented, slurred speech) AND [2] new-onset   Negative: Sounds like a life-threatening emergency to the triager   Negative: [1] Depression AND [2] unable to do any of normal activities (e.g., self care, school, work; in comparison to baseline).   Negative: Very strange or confused behavior   Negative: Patient sounds very sick or weak to the triager   Negative: [1] Depression AND [2] worsening (e.g., sleeping poorly, less able to do activities of daily living)   Negative: Symptoms interfere with work or school    Protocols used: Depression-A-AH

## 2023-10-19 NOTE — TELEPHONE ENCOUNTER
Returned patients call in regards to note received from triage nurse. No answer, unable to leave voicemail.  Will continue to try to contact the patient.

## 2023-11-03 DIAGNOSIS — L30.9 DERMATITIS: ICD-10-CM

## 2023-11-05 RX ORDER — MUPIROCIN 20 MG/G
OINTMENT TOPICAL 3 TIMES DAILY
Qty: 22 G | Refills: 0 | Status: SHIPPED | OUTPATIENT
Start: 2023-11-05

## 2023-11-17 ENCOUNTER — TELEPHONE (OUTPATIENT)
Dept: FAMILY MEDICINE | Facility: CLINIC | Age: 78
End: 2023-11-17
Payer: MEDICARE

## 2023-11-17 NOTE — TELEPHONE ENCOUNTER
----- Message from Kinza Mcghee sent at 11/17/2023  3:30 PM CST -----  Regarding: Needs Medical Advice  Contact: patient at [phone]  Type: Needs Medical Advice  Who Called:  patient at [phone]    Additional Information: Would like a call to discuss her depression. Please call and advise. Thank you

## 2023-11-20 ENCOUNTER — TELEPHONE (OUTPATIENT)
Dept: FAMILY MEDICINE | Facility: CLINIC | Age: 78
End: 2023-11-20
Payer: MEDICARE

## 2023-11-20 NOTE — TELEPHONE ENCOUNTER
Called patient in regards to some depression she is having. No answer, unable to leave voicemail due to none being set up. Called all number listed. No answer.   Called daughter in regards to patient and her concerns. No answer, left voicemail to return call.   Patient not active on patient portal.

## 2023-12-11 DIAGNOSIS — L30.9 DERMATITIS: ICD-10-CM

## 2023-12-14 RX ORDER — TRIAMCINOLONE ACETONIDE 1 MG/G
1 OINTMENT TOPICAL 2 TIMES DAILY
Qty: 80 G | Refills: 0 | Status: SHIPPED | OUTPATIENT
Start: 2023-12-14

## 2024-01-24 ENCOUNTER — HOSPITAL ENCOUNTER (EMERGENCY)
Facility: HOSPITAL | Age: 79
Discharge: HOME | End: 2024-01-24
Attending: EMERGENCY MEDICINE
Payer: MEDICARE

## 2024-01-24 VITALS
BODY MASS INDEX: 34.02 KG/M2 | HEART RATE: 65 BPM | RESPIRATION RATE: 18 BRPM | DIASTOLIC BLOOD PRESSURE: 81 MMHG | HEIGHT: 63 IN | WEIGHT: 192 LBS | OXYGEN SATURATION: 98 % | TEMPERATURE: 97 F | SYSTOLIC BLOOD PRESSURE: 185 MMHG

## 2024-01-24 DIAGNOSIS — N39.0 URINARY TRACT INFECTION WITHOUT HEMATURIA, SITE UNSPECIFIED: Primary | ICD-10-CM

## 2024-01-24 LAB
ALBUMIN SERPL BCP-MCNC: 3.5 G/DL (ref 3.5–5.2)
ALP SERPL-CCNC: 54 U/L (ref 55–135)
ALT SERPL W/O P-5'-P-CCNC: 15 U/L (ref 10–44)
ANION GAP SERPL CALC-SCNC: 12 MMOL/L (ref 8–16)
AST SERPL-CCNC: 15 U/L (ref 10–40)
BACTERIA #/AREA URNS HPF: ABNORMAL /HPF
BASOPHILS # BLD AUTO: 0.14 K/UL (ref 0–0.2)
BASOPHILS NFR BLD: 1 % (ref 0–1.9)
BILIRUB SERPL-MCNC: 0.5 MG/DL (ref 0.1–1)
BILIRUB UR QL STRIP: NEGATIVE
BNP SERPL-MCNC: 22 PG/ML (ref 0–99)
BUN SERPL-MCNC: 22 MG/DL (ref 8–23)
CALCIUM SERPL-MCNC: 9 MG/DL (ref 8.7–10.5)
CHLORIDE SERPL-SCNC: 102 MMOL/L (ref 95–110)
CLARITY UR: ABNORMAL
CO2 SERPL-SCNC: 21 MMOL/L (ref 23–29)
COLOR UR: YELLOW
CREAT SERPL-MCNC: 1 MG/DL (ref 0.5–1.4)
DIFFERENTIAL METHOD BLD: ABNORMAL
EOSINOPHIL # BLD AUTO: 0.2 K/UL (ref 0–0.5)
EOSINOPHIL NFR BLD: 1.3 % (ref 0–8)
ERYTHROCYTE [DISTWIDTH] IN BLOOD BY AUTOMATED COUNT: 12.9 % (ref 11.5–14.5)
EST. GFR  (NO RACE VARIABLE): 57 ML/MIN/1.73 M^2
GLUCOSE SERPL-MCNC: 88 MG/DL (ref 70–110)
GLUCOSE UR QL STRIP: NEGATIVE
HCT VFR BLD AUTO: 43.6 % (ref 37–48.5)
HGB BLD-MCNC: 13.9 G/DL (ref 12–16)
HGB UR QL STRIP: NEGATIVE
HYALINE CASTS #/AREA URNS LPF: 8 /LPF
IMM GRANULOCYTES # BLD AUTO: 0.16 K/UL (ref 0–0.04)
IMM GRANULOCYTES NFR BLD AUTO: 1.1 % (ref 0–0.5)
KETONES UR QL STRIP: NEGATIVE
LEUKOCYTE ESTERASE UR QL STRIP: ABNORMAL
LIPASE SERPL-CCNC: 45 U/L (ref 4–60)
LYMPHOCYTES # BLD AUTO: 3.2 K/UL (ref 1–4.8)
LYMPHOCYTES NFR BLD: 21.8 % (ref 18–48)
MCH RBC QN AUTO: 28.5 PG (ref 27–31)
MCHC RBC AUTO-ENTMCNC: 31.9 G/DL (ref 32–36)
MCV RBC AUTO: 89 FL (ref 82–98)
MICROSCOPIC COMMENT: ABNORMAL
MONOCYTES # BLD AUTO: 1.1 K/UL (ref 0.3–1)
MONOCYTES NFR BLD: 7.6 % (ref 4–15)
NEUTROPHILS # BLD AUTO: 9.7 K/UL (ref 1.8–7.7)
NEUTROPHILS NFR BLD: 67.2 % (ref 38–73)
NITRITE UR QL STRIP: NEGATIVE
NRBC BLD-RTO: 0 /100 WBC
PH UR STRIP: 6 [PH] (ref 5–8)
PLATELET # BLD AUTO: 364 K/UL (ref 150–450)
PMV BLD AUTO: 10.6 FL (ref 9.2–12.9)
POTASSIUM SERPL-SCNC: 3.9 MMOL/L (ref 3.5–5.1)
PROT SERPL-MCNC: 7.1 G/DL (ref 6–8.4)
PROT UR QL STRIP: ABNORMAL
RBC # BLD AUTO: 4.88 M/UL (ref 4–5.4)
RBC #/AREA URNS HPF: 4 /HPF (ref 0–4)
SODIUM SERPL-SCNC: 135 MMOL/L (ref 136–145)
SP GR UR STRIP: 1.02 (ref 1–1.03)
SQUAMOUS #/AREA URNS HPF: 6 /HPF
TROPONIN I SERPL DL<=0.01 NG/ML-MCNC: 0.01 NG/ML (ref 0–0.03)
URN SPEC COLLECT METH UR: ABNORMAL
UROBILINOGEN UR STRIP-ACNC: NEGATIVE EU/DL
WBC # BLD AUTO: 14.43 K/UL (ref 3.9–12.7)
WBC #/AREA URNS HPF: 56 /HPF (ref 0–5)

## 2024-01-24 PROCEDURE — 96366 THER/PROPH/DIAG IV INF ADDON: CPT

## 2024-01-24 PROCEDURE — 84484 ASSAY OF TROPONIN QUANT: CPT | Performed by: EMERGENCY MEDICINE

## 2024-01-24 PROCEDURE — 96365 THER/PROPH/DIAG IV INF INIT: CPT

## 2024-01-24 PROCEDURE — 36415 COLL VENOUS BLD VENIPUNCTURE: CPT | Performed by: EMERGENCY MEDICINE

## 2024-01-24 PROCEDURE — 36415 COLL VENOUS BLD VENIPUNCTURE: CPT | Performed by: NURSE PRACTITIONER

## 2024-01-24 PROCEDURE — 80053 COMPREHEN METABOLIC PANEL: CPT | Performed by: NURSE PRACTITIONER

## 2024-01-24 PROCEDURE — 83880 ASSAY OF NATRIURETIC PEPTIDE: CPT | Performed by: EMERGENCY MEDICINE

## 2024-01-24 PROCEDURE — 96361 HYDRATE IV INFUSION ADD-ON: CPT

## 2024-01-24 PROCEDURE — 81000 URINALYSIS NONAUTO W/SCOPE: CPT | Performed by: NURSE PRACTITIONER

## 2024-01-24 PROCEDURE — 87077 CULTURE AEROBIC IDENTIFY: CPT | Performed by: NURSE PRACTITIONER

## 2024-01-24 PROCEDURE — 63600175 PHARM REV CODE 636 W HCPCS: Performed by: EMERGENCY MEDICINE

## 2024-01-24 PROCEDURE — 96375 TX/PRO/DX INJ NEW DRUG ADDON: CPT

## 2024-01-24 PROCEDURE — 87086 URINE CULTURE/COLONY COUNT: CPT | Performed by: NURSE PRACTITIONER

## 2024-01-24 PROCEDURE — 93005 ELECTROCARDIOGRAM TRACING: CPT

## 2024-01-24 PROCEDURE — 83690 ASSAY OF LIPASE: CPT | Performed by: NURSE PRACTITIONER

## 2024-01-24 PROCEDURE — 25000003 PHARM REV CODE 250: Performed by: EMERGENCY MEDICINE

## 2024-01-24 PROCEDURE — 63600175 PHARM REV CODE 636 W HCPCS: Performed by: NURSE PRACTITIONER

## 2024-01-24 PROCEDURE — 93010 ELECTROCARDIOGRAM REPORT: CPT | Mod: ,,, | Performed by: INTERNAL MEDICINE

## 2024-01-24 PROCEDURE — 85025 COMPLETE CBC W/AUTO DIFF WBC: CPT | Performed by: NURSE PRACTITIONER

## 2024-01-24 PROCEDURE — 94760 N-INVAS EAR/PLS OXIMETRY 1: CPT | Mod: GZ

## 2024-01-24 PROCEDURE — 99285 EMERGENCY DEPT VISIT HI MDM: CPT | Mod: 25

## 2024-01-24 PROCEDURE — 87186 SC STD MICRODIL/AGAR DIL: CPT | Performed by: NURSE PRACTITIONER

## 2024-01-24 RX ORDER — SODIUM CHLORIDE 9 MG/ML
500 INJECTION, SOLUTION INTRAVENOUS
Status: COMPLETED | OUTPATIENT
Start: 2024-01-24 | End: 2024-01-24

## 2024-01-24 RX ORDER — ONDANSETRON HYDROCHLORIDE 2 MG/ML
4 INJECTION, SOLUTION INTRAVENOUS
Status: COMPLETED | OUTPATIENT
Start: 2024-01-24 | End: 2024-01-24

## 2024-01-24 RX ORDER — CEFUROXIME AXETIL 500 MG/1
500 TABLET ORAL 2 TIMES DAILY
Qty: 10 TABLET | Refills: 0 | Status: SHIPPED | OUTPATIENT
Start: 2024-01-24 | End: 2024-02-02

## 2024-01-24 RX ORDER — ONDANSETRON HYDROCHLORIDE 2 MG/ML
4 INJECTION, SOLUTION INTRAVENOUS
Status: DISCONTINUED | OUTPATIENT
Start: 2024-01-24 | End: 2024-01-24 | Stop reason: HOSPADM

## 2024-01-24 RX ADMIN — CEFTRIAXONE SODIUM 1 G: 1 INJECTION, POWDER, FOR SOLUTION INTRAMUSCULAR; INTRAVENOUS at 04:01

## 2024-01-24 RX ADMIN — SODIUM CHLORIDE 500 ML: 9 INJECTION, SOLUTION INTRAVENOUS at 04:01

## 2024-01-24 RX ADMIN — ONDANSETRON 4 MG: 2 INJECTION INTRAMUSCULAR; INTRAVENOUS at 04:01

## 2024-01-24 NOTE — ED PROVIDER NOTES
Encounter Date: 1/24/2024       History     Chief Complaint   Patient presents with    Nausea     Ongoing x2 weeks     Vomiting    Diarrhea     Patient presents complaining of nausea, vomiting, diarrhea, dysuria, frequency, urgency, fatigue, malaise.  Symptoms have been present for the last 2 weeks.  Patient states her appetite off.  Patient denies any dyspnea when lying flat.  No dyspnea on exertion although she does feel fatigue.  No chest pain.  No fever or chills.  Nothing seems to make it better or worse.      Review of patient's allergies indicates:   Allergen Reactions    Benadryl decongestant Shortness Of Breath     Respiration problems    Penicillins Hives     Past Medical History:   Diagnosis Date    Cataract of left eye     Depression     Glaucoma     Hypertension     Loss of equilibrium     Osteoporosis     Thyroid disease      Past Surgical History:   Procedure Laterality Date    CHOLECYSTECTOMY      EYE SURGERY      right cataract    HYSTERECTOMY      TONSILLECTOMY       Family History   Problem Relation Age of Onset    Heart disease Maternal Grandmother     Heart disease Paternal Grandmother      Social History     Tobacco Use    Smoking status: Never    Smokeless tobacco: Never   Substance Use Topics    Alcohol use: No    Drug use: No     Review of Systems   All other systems reviewed and are negative.      Physical Exam     Initial Vitals [01/24/24 1259]   BP Pulse Resp Temp SpO2   (!) 122/58 66 18 97.4 °F (36.3 °C) 97 %      MAP       --         Physical Exam    Nursing note and vitals reviewed.  Constitutional: She appears well-developed and well-nourished.   Pleasant, polite   HENT:   Head: Normocephalic and atraumatic.   Mouth/Throat: Oropharynx is clear and moist.   Eyes: EOM are normal.   Neck: Neck supple.   Normal range of motion.  Cardiovascular:  Normal rate, regular rhythm, normal heart sounds and intact distal pulses.           Pulmonary/Chest: Breath sounds normal.  No respiratory distress.   Abdominal: Abdomen is soft.   Musculoskeletal:         General: Normal range of motion.      Cervical back: Normal range of motion and neck supple.     Neurological: She is alert and oriented to person, place, and time. She has normal strength.   Skin: Skin is warm and dry. Capillary refill takes less than 2 seconds.   Psychiatric: She has a normal mood and affect. Her behavior is normal. Judgment and thought content normal.         ED Course   Procedures  Labs Reviewed   URINALYSIS, REFLEX TO URINE CULTURE - Abnormal; Notable for the following components:       Result Value    Appearance, UA Hazy (*)     Protein, UA 1+ (*)     Leukocytes, UA 3+ (*)     All other components within normal limits    Narrative:     Specimen Source->Urine   URINALYSIS MICROSCOPIC - Abnormal; Notable for the following components:    WBC, UA 56 (*)     Bacteria Many (*)     Hyaline Casts, UA 8 (*)     All other components within normal limits    Narrative:     Specimen Source->Urine   CULTURE, URINE   B-TYPE NATRIURETIC PEPTIDE   TROPONIN I   CBC W/ AUTO DIFFERENTIAL   COMPREHENSIVE METABOLIC PANEL   LIPASE          Imaging Results              X-Ray Chest AP Portable (In process)                      Medications   ondansetron injection 4 mg (has no administration in time range)   cefTRIAXone (Rocephin) 1 g in dextrose 5 % in water (D5W) 100 mL IVPB (MB+) (has no administration in time range)   ondansetron injection 4 mg (4 mg Intravenous Given 1/24/24 1603)   0.9%  NaCl infusion (500 mLs Intravenous New Bag 1/24/24 1612)     Medical Decision Making  Patient appears in no acute distress     Considerations include but are not limited to acute kidney injury, dehydration, electrolyte abnormalities, urinary tract infection, less likely cardiac etiology    Urinalysis indeed does reveal multiple white blood cells which could be the source of patient's fatigue and malaise.  Patient received IV Rocephin in the emergency  department.    Amount and/or Complexity of Data Reviewed  Labs: ordered.  Radiology: ordered.    Risk  Prescription drug management.               ED Course as of 01/24/24 2146   Wed Jan 24, 2024   1650 WBC(!): 14.43 [AP]   1650 Hemoglobin: 13.9 [AP]   1650 Hematocrit: 43.6 [AP]   1650 Platelet Count: 364 [AP]   1650 WBC, UA(!): 56 [AP]   1650 Bacteria, UA(!): Many [AP]   1650 Squam Epithel, UA: 6 [AP]   1650 Hyaline Casts, UA(!): 8 [AP]   1650 Specimen UA: Urine, Clean Catch [AP]   1650 Color, UA: Yellow [AP]   1650 Appearance, UA(!): Hazy [AP]   1650 pH, UA: 6.0 [AP]   1650 Specific Gravity, UA: 1.020 [AP]   1650 Protein, UA(!): 1+ [AP]   1650 Glucose, UA: Negative [AP]   1650 Ketones, UA: Negative [AP]   1650 Bilirubin (UA): Negative [AP]   1650 Occult Blood UA: Negative [AP]   1650 NITRITE UA: Negative [AP]   1650 UROBILINOGEN UA: Negative [AP]   1650 Leukocytes, UA(!): 3+ [AP]   1650 BNP [AP]   1723 BNP: 22 [AP]   1723 Lipase: 45 [AP]   1723 Sodium(!): 135 [AP]   1723 Potassium: 3.9 [AP]   1723 Chloride: 102 [AP]   1723 CO2(!): 21 [AP]   1723 Glucose: 88 [AP]   1723 BUN: 22 [AP]   1723 Creatinine: 1.0 [AP]   1723 Anion Gap: 12 [AP]   1723 ALT: 15 [AP]   1723 AST: 15 [AP]   1723 ALP(!): 54 [AP]   1723 WBC(!): 14.43 [AP]   1723 Hemoglobin: 13.9 [AP]   1723 Hematocrit: 43.6 [AP]   1723 RDW: 12.9 [AP]   1723 Platelet Count: 364 [AP]   1728 X-Ray Chest AP Portable [AP]      ED Course User Index  [AP] Harris Morrison MD                             Clinical Impression:  Final diagnoses:  [R07.9] Chest pain  [R06.02] SOB (shortness of breath)                 Harris Morrison MD  01/24/24 2887

## 2024-01-24 NOTE — ED NOTES
Pt identifiers checked and accurate with Tali Hopper     Pt presents to ED with complaints of N/V/D intermittent with SOB on exertion x 2 weeks. Pt denies all other complaints at this time

## 2024-01-24 NOTE — FIRST PROVIDER EVALUATION
" Emergency Department TeleTriage Encounter Note      CHIEF COMPLAINT    Chief Complaint   Patient presents with    Nausea     Ongoing x2 weeks     Vomiting    Diarrhea       VITAL SIGNS   Initial Vitals [01/24/24 1259]   BP Pulse Resp Temp SpO2   (!) 122/58 66 18 97.4 °F (36.3 °C) 97 %      MAP       --            ALLERGIES    Review of patient's allergies indicates:   Allergen Reactions    Benadryl decongestant Shortness Of Breath     Respiration problems    Penicillins Hives       PROVIDER TRIAGE NOTE  This is a teletriage evaluation of a 78 y.o. female presenting to the ED complaining of n/v/d for two weeks. States that she came today due to generalized weakness. States she "almost collapsed" when walking today and it took her a while to catch her breath. No hematemesis or rectal bleeding. No recent antibiotics.     Alert, no distress.     Initial orders will be placed and care will be transferred to an alternate provider when patient is roomed for a full evaluation. Any additional orders and the final disposition will be determined by that provider.         ORDERS  Labs Reviewed   CBC W/ AUTO DIFFERENTIAL   COMPREHENSIVE METABOLIC PANEL   LIPASE   URINALYSIS, REFLEX TO URINE CULTURE   ISTAT CHEM8       ED Orders (720h ago, onward)      Start Ordered     Status Ordering Provider    01/24/24 1315 01/24/24 1305  ondansetron injection 4 mg  ED 1 Time         Ordered RUBEN DOUGLAS N.    01/24/24 1306 01/24/24 1305  Vital signs  Every 2 hours         Ordered RUBEN DOUGLAS N.    01/24/24 1306 01/24/24 1305  Orthostatic vital signs  Once         Ordered RUBEN DOUGLAS N.    01/24/24 1305 01/24/24 1305  Diet NPO  Diet effective now         Ordered RUBEN DOUGLAS N.    01/24/24 1305 01/24/24 1305  Insert peripheral IV  Once         Ordered RUBEN DOUGLAS N.    01/24/24 1305 01/24/24 1305  CBC W/ AUTO DIFFERENTIAL  STAT         Pending Collection RUBEN DOUGLAS.    " 01/24/24 1305 01/24/24 1305  Comp. Metabolic Panel  STAT         Pending Collection RUBEN DOUGLAS N.    01/24/24 1305 01/24/24 1305  ISTAT CHEM8  Once         Ordered RUBEN DOUGLAS N.    01/24/24 1305 01/24/24 1305  Lipase  STAT         Pending Collection BRIANADOUGLASREJIRUBEN N.    01/24/24 1305 01/24/24 1305  Urinalysis, Reflex to Urine Culture Urine, Clean Catch  STAT         Ordered JASPALALINABNERAMALIA RUBEN N.              Virtual Visit Note: The provider triage portion of this emergency department evaluation and documentation was performed via [employer], a HIPAA-compliant telemedicine application, in concert with a tele-presenter in the room. A face to face patient evaluation with one of my colleagues will occur once the patient is placed in an emergency department room.      DISCLAIMER: This note was prepared with [employer] voice recognition transcription software. Garbled syntax, mangled pronouns, and other bizarre constructions may be attributed to that software system.

## 2024-01-27 LAB — BACTERIA UR CULT: ABNORMAL

## 2024-01-29 ENCOUNTER — LAB VISIT (OUTPATIENT)
Dept: LAB | Facility: HOSPITAL | Age: 79
End: 2024-01-29
Attending: FAMILY MEDICINE
Payer: MEDICARE

## 2024-01-29 DIAGNOSIS — E11.69 HYPERLIPIDEMIA ASSOCIATED WITH TYPE 2 DIABETES MELLITUS: ICD-10-CM

## 2024-01-29 DIAGNOSIS — E11.9 TYPE 2 DIABETES MELLITUS WITHOUT COMPLICATION, WITHOUT LONG-TERM CURRENT USE OF INSULIN: ICD-10-CM

## 2024-01-29 DIAGNOSIS — E78.5 HYPERLIPIDEMIA ASSOCIATED WITH TYPE 2 DIABETES MELLITUS: ICD-10-CM

## 2024-01-29 LAB
ALBUMIN SERPL BCP-MCNC: 3.5 G/DL (ref 3.5–5.2)
ALBUMIN/CREAT UR: 153.8 UG/MG (ref 0–30)
ALP SERPL-CCNC: 53 U/L (ref 55–135)
ALT SERPL W/O P-5'-P-CCNC: 16 U/L (ref 10–44)
ANION GAP SERPL CALC-SCNC: 10 MMOL/L (ref 8–16)
AST SERPL-CCNC: 16 U/L (ref 10–40)
BASOPHILS # BLD AUTO: 0.12 K/UL (ref 0–0.2)
BASOPHILS NFR BLD: 1 % (ref 0–1.9)
BILIRUB SERPL-MCNC: 0.6 MG/DL (ref 0.1–1)
BUN SERPL-MCNC: 22 MG/DL (ref 8–23)
CALCIUM SERPL-MCNC: 9.8 MG/DL (ref 8.7–10.5)
CHLORIDE SERPL-SCNC: 103 MMOL/L (ref 95–110)
CHOLEST SERPL-MCNC: 189 MG/DL (ref 120–199)
CHOLEST/HDLC SERPL: 4.4 {RATIO} (ref 2–5)
CO2 SERPL-SCNC: 27 MMOL/L (ref 23–29)
CREAT SERPL-MCNC: 1 MG/DL (ref 0.5–1.4)
CREAT UR-MCNC: 80 MG/DL (ref 15–325)
DIFFERENTIAL METHOD BLD: ABNORMAL
EOSINOPHIL # BLD AUTO: 0.3 K/UL (ref 0–0.5)
EOSINOPHIL NFR BLD: 2.1 % (ref 0–8)
ERYTHROCYTE [DISTWIDTH] IN BLOOD BY AUTOMATED COUNT: 13.2 % (ref 11.5–14.5)
EST. GFR  (NO RACE VARIABLE): 57.3 ML/MIN/1.73 M^2
ESTIMATED AVG GLUCOSE: 117 MG/DL (ref 68–131)
GLUCOSE SERPL-MCNC: 99 MG/DL (ref 70–110)
HBA1C MFR BLD: 5.7 % (ref 4–5.6)
HCT VFR BLD AUTO: 43.2 % (ref 37–48.5)
HDLC SERPL-MCNC: 43 MG/DL (ref 40–75)
HDLC SERPL: 22.8 % (ref 20–50)
HGB BLD-MCNC: 13.7 G/DL (ref 12–16)
IMM GRANULOCYTES # BLD AUTO: 0.11 K/UL (ref 0–0.04)
IMM GRANULOCYTES NFR BLD AUTO: 0.9 % (ref 0–0.5)
LDLC SERPL CALC-MCNC: 112.8 MG/DL (ref 63–159)
LYMPHOCYTES # BLD AUTO: 3.5 K/UL (ref 1–4.8)
LYMPHOCYTES NFR BLD: 29.5 % (ref 18–48)
MCH RBC QN AUTO: 28.1 PG (ref 27–31)
MCHC RBC AUTO-ENTMCNC: 31.7 G/DL (ref 32–36)
MCV RBC AUTO: 89 FL (ref 82–98)
MICROALBUMIN UR DL<=1MG/L-MCNC: 123 UG/ML
MONOCYTES # BLD AUTO: 0.8 K/UL (ref 0.3–1)
MONOCYTES NFR BLD: 6.9 % (ref 4–15)
NEUTROPHILS # BLD AUTO: 7 K/UL (ref 1.8–7.7)
NEUTROPHILS NFR BLD: 59.6 % (ref 38–73)
NONHDLC SERPL-MCNC: 146 MG/DL
NRBC BLD-RTO: 0 /100 WBC
PLATELET # BLD AUTO: 346 K/UL (ref 150–450)
PMV BLD AUTO: 11.4 FL (ref 9.2–12.9)
POTASSIUM SERPL-SCNC: 4.1 MMOL/L (ref 3.5–5.1)
PROT SERPL-MCNC: 7.1 G/DL (ref 6–8.4)
RBC # BLD AUTO: 4.88 M/UL (ref 4–5.4)
SODIUM SERPL-SCNC: 140 MMOL/L (ref 136–145)
TRIGL SERPL-MCNC: 166 MG/DL (ref 30–150)
WBC # BLD AUTO: 11.72 K/UL (ref 3.9–12.7)

## 2024-01-29 PROCEDURE — 80053 COMPREHEN METABOLIC PANEL: CPT | Performed by: FAMILY MEDICINE

## 2024-01-29 PROCEDURE — 36415 COLL VENOUS BLD VENIPUNCTURE: CPT | Mod: PO | Performed by: FAMILY MEDICINE

## 2024-01-29 PROCEDURE — 80061 LIPID PANEL: CPT | Performed by: FAMILY MEDICINE

## 2024-01-29 PROCEDURE — 85025 COMPLETE CBC W/AUTO DIFF WBC: CPT | Performed by: FAMILY MEDICINE

## 2024-01-29 PROCEDURE — 83036 HEMOGLOBIN GLYCOSYLATED A1C: CPT | Performed by: FAMILY MEDICINE

## 2024-01-29 PROCEDURE — 82043 UR ALBUMIN QUANTITATIVE: CPT | Performed by: FAMILY MEDICINE

## 2024-02-02 ENCOUNTER — OFFICE VISIT (OUTPATIENT)
Dept: FAMILY MEDICINE | Facility: CLINIC | Age: 79
End: 2024-02-02
Payer: MEDICARE

## 2024-02-02 VITALS
HEIGHT: 63 IN | SYSTOLIC BLOOD PRESSURE: 112 MMHG | DIASTOLIC BLOOD PRESSURE: 60 MMHG | BODY MASS INDEX: 33.51 KG/M2 | WEIGHT: 189.13 LBS | RESPIRATION RATE: 20 BRPM | HEART RATE: 84 BPM | TEMPERATURE: 98 F | OXYGEN SATURATION: 96 %

## 2024-02-02 DIAGNOSIS — E11.69 HYPERLIPIDEMIA ASSOCIATED WITH TYPE 2 DIABETES MELLITUS: ICD-10-CM

## 2024-02-02 DIAGNOSIS — N95.9 MENOPAUSAL AND POSTMENOPAUSAL DISORDER: ICD-10-CM

## 2024-02-02 DIAGNOSIS — I10 ESSENTIAL HYPERTENSION: ICD-10-CM

## 2024-02-02 DIAGNOSIS — Z13.6 ENCOUNTER FOR SCREENING FOR CARDIOVASCULAR DISORDERS: ICD-10-CM

## 2024-02-02 DIAGNOSIS — F32.A DEPRESSION, UNSPECIFIED DEPRESSION TYPE: ICD-10-CM

## 2024-02-02 DIAGNOSIS — E03.4 HYPOTHYROIDISM DUE TO ACQUIRED ATROPHY OF THYROID: ICD-10-CM

## 2024-02-02 DIAGNOSIS — R06.83 SNORING: ICD-10-CM

## 2024-02-02 DIAGNOSIS — J43.9 PULMONARY EMPHYSEMA, UNSPECIFIED EMPHYSEMA TYPE: ICD-10-CM

## 2024-02-02 DIAGNOSIS — E11.9 TYPE 2 DIABETES MELLITUS WITHOUT COMPLICATION, WITHOUT LONG-TERM CURRENT USE OF INSULIN: Primary | ICD-10-CM

## 2024-02-02 DIAGNOSIS — Z12.31 ENCOUNTER FOR SCREENING MAMMOGRAM FOR MALIGNANT NEOPLASM OF BREAST: ICD-10-CM

## 2024-02-02 DIAGNOSIS — M81.0 OSTEOPOROSIS, POST-MENOPAUSAL: ICD-10-CM

## 2024-02-02 DIAGNOSIS — R40.0 DAYTIME SOMNOLENCE: ICD-10-CM

## 2024-02-02 DIAGNOSIS — F33.1 MODERATE EPISODE OF RECURRENT MAJOR DEPRESSIVE DISORDER: ICD-10-CM

## 2024-02-02 DIAGNOSIS — F51.12 INSUFFICIENT SLEEP SYNDROME: ICD-10-CM

## 2024-02-02 DIAGNOSIS — E66.01 SEVERE OBESITY (BMI 35.0-39.9) WITH COMORBIDITY: ICD-10-CM

## 2024-02-02 DIAGNOSIS — E78.5 HYPERLIPIDEMIA ASSOCIATED WITH TYPE 2 DIABETES MELLITUS: ICD-10-CM

## 2024-02-02 PROCEDURE — 99214 OFFICE O/P EST MOD 30 MIN: CPT | Mod: S$GLB,,, | Performed by: FAMILY MEDICINE

## 2024-02-02 PROCEDURE — 1101F PT FALLS ASSESS-DOCD LE1/YR: CPT | Mod: CPTII,S$GLB,, | Performed by: FAMILY MEDICINE

## 2024-02-02 PROCEDURE — 3078F DIAST BP <80 MM HG: CPT | Mod: CPTII,S$GLB,, | Performed by: FAMILY MEDICINE

## 2024-02-02 PROCEDURE — 3288F FALL RISK ASSESSMENT DOCD: CPT | Mod: CPTII,S$GLB,, | Performed by: FAMILY MEDICINE

## 2024-02-02 PROCEDURE — 1160F RVW MEDS BY RX/DR IN RCRD: CPT | Mod: CPTII,S$GLB,, | Performed by: FAMILY MEDICINE

## 2024-02-02 PROCEDURE — 99999 PR PBB SHADOW E&M-EST. PATIENT-LVL IV: CPT | Mod: PBBFAC,,, | Performed by: FAMILY MEDICINE

## 2024-02-02 PROCEDURE — 1159F MED LIST DOCD IN RCRD: CPT | Mod: CPTII,S$GLB,, | Performed by: FAMILY MEDICINE

## 2024-02-02 PROCEDURE — 3074F SYST BP LT 130 MM HG: CPT | Mod: CPTII,S$GLB,, | Performed by: FAMILY MEDICINE

## 2024-02-02 PROCEDURE — 1126F AMNT PAIN NOTED NONE PRSNT: CPT | Mod: CPTII,S$GLB,, | Performed by: FAMILY MEDICINE

## 2024-02-02 RX ORDER — LOSARTAN POTASSIUM AND HYDROCHLOROTHIAZIDE 12.5; 1 MG/1; MG/1
1 TABLET ORAL DAILY
Qty: 90 TABLET | Refills: 3 | Status: SHIPPED | OUTPATIENT
Start: 2024-02-02

## 2024-02-02 RX ORDER — METFORMIN HYDROCHLORIDE 500 MG/1
TABLET, EXTENDED RELEASE ORAL
Qty: 180 TABLET | Refills: 3 | Status: SHIPPED | OUTPATIENT
Start: 2024-02-02 | End: 2024-05-22

## 2024-02-02 RX ORDER — DULOXETIN HYDROCHLORIDE 60 MG/1
60 CAPSULE, DELAYED RELEASE ORAL DAILY
Qty: 90 CAPSULE | Refills: 3 | Status: SHIPPED | OUTPATIENT
Start: 2024-02-02
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
## 2024-02-12 ENCOUNTER — HOSPITAL ENCOUNTER (OUTPATIENT)
Dept: RADIOLOGY | Facility: HOSPITAL | Age: 79
Discharge: HOME | End: 2024-02-12
Attending: FAMILY MEDICINE
Payer: MEDICARE

## 2024-02-12 DIAGNOSIS — Z13.6 ENCOUNTER FOR SCREENING FOR CARDIOVASCULAR DISORDERS: ICD-10-CM

## 2024-02-12 PROCEDURE — 75571 CT HRT W/O DYE W/CA TEST: CPT | Mod: TC

## 2024-02-15 ENCOUNTER — PROCEDURE VISIT (OUTPATIENT)
Dept: SLEEP MEDICINE | Facility: HOSPITAL | Age: 79
End: 2024-02-15
Attending: FAMILY MEDICINE
Payer: MEDICARE

## 2024-02-15 DIAGNOSIS — F51.12 INSUFFICIENT SLEEP SYNDROME: ICD-10-CM

## 2024-02-15 DIAGNOSIS — R40.0 DAYTIME SOMNOLENCE: ICD-10-CM

## 2024-02-15 DIAGNOSIS — R06.83 SNORING: ICD-10-CM

## 2024-02-15 PROCEDURE — 95806 SLEEP STUDY UNATT&RESP EFFT: CPT

## 2024-02-22 DIAGNOSIS — R40.0 DAYTIME SOMNOLENCE: Primary | ICD-10-CM

## 2024-02-22 DIAGNOSIS — F51.12 INSUFFICIENT SLEEP SYNDROME: ICD-10-CM

## 2024-02-22 DIAGNOSIS — R93.1 AGATSTON CORONARY ARTERY CALCIUM SCORE GREATER THAN 400: Primary | ICD-10-CM

## 2024-03-06 ENCOUNTER — OFFICE VISIT (OUTPATIENT)
Dept: CARDIOLOGY | Facility: CLINIC | Age: 79
End: 2024-03-06
Payer: MEDICARE

## 2024-03-06 VITALS
HEIGHT: 63 IN | DIASTOLIC BLOOD PRESSURE: 74 MMHG | BODY MASS INDEX: 33.83 KG/M2 | HEART RATE: 89 BPM | OXYGEN SATURATION: 98 % | WEIGHT: 190.94 LBS | SYSTOLIC BLOOD PRESSURE: 118 MMHG

## 2024-03-06 DIAGNOSIS — I10 ESSENTIAL HYPERTENSION: ICD-10-CM

## 2024-03-06 DIAGNOSIS — R93.1 AGATSTON CORONARY ARTERY CALCIUM SCORE GREATER THAN 400: Primary | ICD-10-CM

## 2024-03-06 DIAGNOSIS — Z13.6 ENCOUNTER FOR SCREENING FOR CARDIOVASCULAR DISORDERS: ICD-10-CM

## 2024-03-06 PROCEDURE — 93010 ELECTROCARDIOGRAM REPORT: CPT | Mod: S$GLB,,, | Performed by: GENERAL PRACTICE

## 2024-03-06 PROCEDURE — 3078F DIAST BP <80 MM HG: CPT | Mod: CPTII,S$GLB,, | Performed by: STUDENT IN AN ORGANIZED HEALTH CARE EDUCATION/TRAINING PROGRAM

## 2024-03-06 PROCEDURE — 1159F MED LIST DOCD IN RCRD: CPT | Mod: CPTII,S$GLB,, | Performed by: STUDENT IN AN ORGANIZED HEALTH CARE EDUCATION/TRAINING PROGRAM

## 2024-03-06 PROCEDURE — 99999 PR PBB SHADOW E&M-EST. PATIENT-LVL IV: CPT | Mod: PBBFAC,,, | Performed by: STUDENT IN AN ORGANIZED HEALTH CARE EDUCATION/TRAINING PROGRAM

## 2024-03-06 PROCEDURE — 1100F PTFALLS ASSESS-DOCD GE2>/YR: CPT | Mod: CPTII,S$GLB,, | Performed by: STUDENT IN AN ORGANIZED HEALTH CARE EDUCATION/TRAINING PROGRAM

## 2024-03-06 PROCEDURE — 3288F FALL RISK ASSESSMENT DOCD: CPT | Mod: CPTII,S$GLB,, | Performed by: STUDENT IN AN ORGANIZED HEALTH CARE EDUCATION/TRAINING PROGRAM

## 2024-03-06 PROCEDURE — 93005 ELECTROCARDIOGRAM TRACING: CPT | Mod: S$GLB,,, | Performed by: STUDENT IN AN ORGANIZED HEALTH CARE EDUCATION/TRAINING PROGRAM

## 2024-03-06 PROCEDURE — 1160F RVW MEDS BY RX/DR IN RCRD: CPT | Mod: CPTII,S$GLB,, | Performed by: STUDENT IN AN ORGANIZED HEALTH CARE EDUCATION/TRAINING PROGRAM

## 2024-03-06 PROCEDURE — 99204 OFFICE O/P NEW MOD 45 MIN: CPT | Mod: S$GLB,,, | Performed by: STUDENT IN AN ORGANIZED HEALTH CARE EDUCATION/TRAINING PROGRAM

## 2024-03-06 PROCEDURE — 3074F SYST BP LT 130 MM HG: CPT | Mod: CPTII,S$GLB,, | Performed by: STUDENT IN AN ORGANIZED HEALTH CARE EDUCATION/TRAINING PROGRAM

## 2024-03-06 RX ORDER — ATORVASTATIN CALCIUM 20 MG/1
20 TABLET, FILM COATED ORAL DAILY
Qty: 90 TABLET | Refills: 3 | Status: SHIPPED | OUTPATIENT
Start: 2024-03-06 | End: 2025-03-06

## 2024-03-10 NOTE — TELEPHONE ENCOUNTER
----- Message from Kayley Rosen sent at 2/4/2020  1:49 PM CST -----  Contact: patient  Type: Needs Medical Advice    Who Called:  patient  Best Call Back Number: [phone]  Additional Information: Patient states would like to discuss medication.  Please call to advise. Thanks!     normal (ped)...

## 2024-03-20 ENCOUNTER — HOSPITAL ENCOUNTER (OUTPATIENT)
Dept: CARDIOLOGY | Facility: HOSPITAL | Age: 79
Discharge: HOME | End: 2024-03-20
Attending: STUDENT IN AN ORGANIZED HEALTH CARE EDUCATION/TRAINING PROGRAM
Payer: MEDICARE

## 2024-03-20 VITALS — HEIGHT: 63 IN | WEIGHT: 190 LBS | BODY MASS INDEX: 33.66 KG/M2

## 2024-03-20 DIAGNOSIS — I10 ESSENTIAL HYPERTENSION: ICD-10-CM

## 2024-03-20 PROCEDURE — 93306 TTE W/DOPPLER COMPLETE: CPT

## 2024-03-20 PROCEDURE — 93306 TTE W/DOPPLER COMPLETE: CPT | Mod: 26,,, | Performed by: STUDENT IN AN ORGANIZED HEALTH CARE EDUCATION/TRAINING PROGRAM

## 2024-03-29 LAB
AORTIC ROOT ANNULUS: 2.4 CM
AORTIC VALVE CUSP SEPERATION: 1.7 CM
AV INDEX (PROSTH): 0.97
AV MEAN GRADIENT: 2 MMHG
AV PEAK GRADIENT: 4 MMHG
AV VALVE AREA BY VELOCITY RATIO: 2.91 CM²
AV VALVE AREA: 3.06 CM²
AV VELOCITY RATIO: 0.93
BSA FOR ECHO PROCEDURE: 1.96 M2
CV ECHO LV RWT: 0.41 CM
DOP CALC AO PEAK VEL: 0.96 M/S
DOP CALC AO VTI: 19.1 CM
DOP CALC LVOT AREA: 3.1 CM2
DOP CALC LVOT DIAMETER: 2 CM
DOP CALC LVOT PEAK VEL: 0.89 M/S
DOP CALC LVOT STROKE VOLUME: 58.4 CM3
DOP CALCLVOT PEAK VEL VTI: 18.6 CM
E WAVE DECELERATION TIME: 282 MSEC
E/A RATIO: 0.74
E/E' RATIO: 11.45 M/S
ECHO LV POSTERIOR WALL: 0.71 CM (ref 0.6–1.1)
FRACTIONAL SHORTENING: 36 % (ref 28–44)
INTERVENTRICULAR SEPTUM: 0.85 CM (ref 0.6–1.1)
IVRT: 130 MSEC
LEFT INTERNAL DIMENSION IN SYSTOLE: 2.2 CM (ref 2.1–4)
LEFT VENTRICLE DIASTOLIC VOLUME INDEX: 25.66 ML/M2
LEFT VENTRICLE DIASTOLIC VOLUME: 48.5 ML
LEFT VENTRICLE MASS INDEX: 37 G/M2
LEFT VENTRICLE SYSTOLIC VOLUME INDEX: 8.6 ML/M2
LEFT VENTRICLE SYSTOLIC VOLUME: 16.2 ML
LEFT VENTRICULAR INTERNAL DIMENSION IN DIASTOLE: 3.43 CM (ref 3.5–6)
LEFT VENTRICULAR MASS: 70.4 G
LV LATERAL E/E' RATIO: 10.5 M/S
LV SEPTAL E/E' RATIO: 12.6 M/S
LVOT MG: 2 MMHG
LVOT MV: 0.57 CM/S
MV PEAK A VEL: 0.85 M/S
MV PEAK E VEL: 0.63 M/S
MV STENOSIS PRESSURE HALF TIME: 75 MS
MV VALVE AREA P 1/2 METHOD: 2.93 CM2
PISA TR MAX VEL: 2.14 M/S
RIGHT VENTRICULAR END-DIASTOLIC DIMENSION: 2.07 CM
TDI LATERAL: 0.06 M/S
TDI SEPTAL: 0.05 M/S
TDI: 0.06 M/S
TR MAX PG: 18 MMHG
Z-SCORE OF LEFT VENTRICULAR DIMENSION IN END DIASTOLE: -4.33
Z-SCORE OF LEFT VENTRICULAR DIMENSION IN END SYSTOLE: -3.11

## 2024-04-06 NOTE — PROGRESS NOTES
Patient ID:  Tali Hopper  78 y.o.  female      Assessment:       1. Essential hypertension    2. Agatston coronary artery calcium score greater than 400    3. Encounter for screening for cardiovascular disorders          Plan:     She has no angina. Has CAC of 556. Recommend aggressive ASCVD risk factor control and pharmacotherapy. Continue aspirin 81 mg daily. Start Lipitor 20 mg daily. Last . Would recommend goal LDL<70.  Blood pressure is controlled. Continue amlodipine 5 mg daily and losartan-HCTZ 100-12.5 mg daily.  Obtain TTE to evaluate heart function and structure.  ASCVD risk factor control and heart healthy lifestyle including diet and exercise.    Subjective:     Chief Complaint   Patient presents with    Hospital Follow Up     On 01/24/2024 was at [facility] , for fall . Dr Rodríguez sent this patient here. CT coronary score is very high 556 indicating high coronary plaque formation and risk. I recommend cardiology consult. Has some sob and dizzyness.        HPI:  Tali Hopper is a 78 y.o. female who presents to [facility] care. She has history of HTN, HLD, DM emphysema and MDD. She her CAC score is 556.    She has chronic mild GOODWIN for several years that has not progressed. She exercise 2 times a week. Denies any angina at rest or with exertion. Reports orthopnea, PND, swelling of extremities, palpitations, syncope or near syncope.       PREVIOUS CARDIAC TESTING/PROCEDURES HISTORY:  Most Recent Echocardiogram Results  Results for orders placed during the hospital encounter of 05/22/19    Summary  Concentric left ventricular remodeling.  Normal left ventricular systolic function. The estimated ejection fraction is 60%  Normal LV diastolic function.  No wall motion abnormalities.  Normal right ventricular systolic function.  Normal central venous pressure (3 mm Hg).  The estimated PA systolic pressure is 19 mm Hg      Most Recent EKG Results  Results for orders placed or performed in visit on  03/06/24   IN OFFICE EKG 12-LEAD (to [place])    Collection Time: 03/06/24  1:51 PM   Result Value Ref Range    QRS Duration 68 ms    [facility] QTC Calculation 444 ms    Narrative    Test Reason : R93.1,I10,I49.8,    Vent. Rate : 068 BPM     Atrial Rate : 068 BPM     P-R Int : 158 ms          QRS Dur : 068 ms      QT Int : 418 ms       P-R-T Axes : 070 074 076 degrees     QTc Int : 444 ms    Normal sinus rhythm  Low voltage QRS  Septal infarct ,age undetermined  Abnormal ECG  When compared with ECG of 24-JAN-2024 16:22,  Septal infarct is now Present    Referred By: PRIYANKA ROSSI           Confirmed By:        The 10-year ASCVD risk score (Noelle DK, et al., 2019) is: 44.4%    Values used to calculate the score:      Age: 78 years      Sex: Female      Is Non- : No      Diabetic: Yes      Tobacco smoker: No      Systolic Blood Pressure: 118 mmHg      Is BP treated: Yes      HDL Cholesterol: 43 mg/dL      Total Cholesterol: 189 mg/dL    Review of patient's allergies indicates:   Allergen Reactions    Benadryl decongestant Shortness Of Breath     Respiration problems    Penicillins Hives       Past Medical History:   Diagnosis Date    Cataract of left eye     Depression     Glaucoma     Hypertension     Loss of equilibrium     Osteoporosis     Thyroid disease      Past Surgical History:   Procedure Laterality Date    CHOLECYSTECTOMY      EYE SURGERY      right cataract    HYSTERECTOMY      TONSILLECTOMY       Social History     Tobacco Use    Smoking status: Never    Smokeless tobacco: Never   Substance Use Topics    Alcohol use: No    Drug use: No          REVIEW OF SYSTEMS  CONSTITUTIONAL: No chills, no fatigue, no fever.   EYES: No double vision, no blurred vision  NEURO: No headaches, no dizziness  RESPIRATORY: No cough, no wheezing.    CARDIOVASCULAR: See HPI   GI: No abdominal pain, no melena, no diarrhea, no nausea or vomiting.   : No  dysuria and frequency, no hematuria  SKIN: no bruising, no  "discoloration  ENDOCRINE: no polyphagia, no heat intolerance, no cold intolerance  PSYCHIATRIC: no depression, no anxiety, no memory loss  MUSCULOSKELETAL: no  neck pain, no muscle weakness,no back pain          Objective:        Vitals:    03/06/24 1346   BP: 118/74   Pulse: 89       PHYSICAL EXAM  CONSTITUTIONAL: In no apparent distress  HEENT: Normocephalic. Pupils normal and conjunctivae normal. No pallor  NECK: No JVD  LUNGS: B/L air entry to the lungs, clear to auscultation. No rales, wheezing or rhonchi.  HEART: Normal rate and regular rhythm. Normal S1 and S2.  No murmur   ABDOMEN: soft, non-tender; bowel sounds normal  EXTREMITIES: No edema. Good palpable distal pulses.  NEURO: AAO X 3, no gross sensory or motor deficits  SKIN:  No rash  Psych:  Normal affect    Lab Results   Component Value Date    WBC 11.72 01/29/2024    HGB 13.7 01/29/2024    HCT 43.2 01/29/2024     01/29/2024    CHOL 189 01/29/2024    TRIG 166 (H) 01/29/2024    HDL 43 01/29/2024    ALT 16 01/29/2024    AST 16 01/29/2024     01/29/2024    K 4.1 01/29/2024     01/29/2024    CREATININE 1.0 01/29/2024    BUN 22 01/29/2024    CO2 27 01/29/2024    TSH 2.114 02/09/2023    INR 1.0 09/06/2021    HGBA1C 5.7 (H) 01/29/2024        @  Lab Results   Component Value Date    CHOL 189 01/29/2024    CHOL 181 02/09/2023    CHOL 185 07/29/2022     Lab Results   Component Value Date    HDL 43 01/29/2024    HDL 39 (L) 02/09/2023    HDL 44 07/29/2022     Lab Results   Component Value Date    LDLCALC 112.8 01/29/2024    LDLCALC 102.2 02/09/2023    LDLCALC 111.2 07/29/2022     No results found for: "DLDL"  Lab Results   Component Value Date    TRIG 166 (H) 01/29/2024    TRIG 199 (H) 02/09/2023    TRIG 149 07/29/2022       f1   Lab Results   Component Value Date    CHOLHDL 22.8 01/29/2024    CHOLHDL 21.5 02/09/2023    CHOLHDL 23.8 07/29/2022            Current Outpatient Medications   Medication Instructions    albuterol (PROVENTIL/VENTOLIN " HFA) 90 mcg/actuation inhaler 2 puffs, Inhalation, Every 6 hours, Rescue    amLODIPine (NORVASC) 5 mg, Oral, Daily    ascorbic acid (vitamin C) (VITAMIN C) 100 mg, Oral, Daily    aspirin (ECOTRIN) 81 mg, Daily    atorvastatin (LIPITOR) 20 mg, Oral, Daily    b complex vitamins tablet 1 tablet, Daily    CALCIUM CARBONATE/VITAMIN D3 (CALTRATE 600 + D ORAL) Oral, Daily    DULoxetine (CYMBALTA) 60 mg, Oral, Daily, TAKE 1 CAPSULE EVERY NIGHT AT BEDTIME    fluticasone propionate (FLONASE) 100 mcg, Each Nostril, Daily    latanoprost 0.005 % ophthalmic solution 1 drop, Nightly    levothyroxine (SYNTHROID) 112 mcg, Oral, Daily    losartan-hydrochlorothiazide 100-12.5 mg (HYZAAR) 100-12.5 mg Tab 1 tablet, Oral, Daily    metFORMIN (GLUCOPHAGE-XR) 500 MG ER 24hr tablet TAKE 2 TABLETS BY MOUTH ONCE A DAY WITH BREAKFAST    mupirocin (BACTROBAN) 2 % ointment 3 times daily, Apply to affected area    [id number]-K-lycop-lut-herb#220 (ESTROBLEND) 500 mcg-1,000 unit-20 mcg Tab 1 tablet, Oral, Daily    raloxifene (EVISTA) 60 mg, Oral, Daily    timolol maleate 0.5% (TIMOPTIC-XE) 0.5 % SolG 1 drop, Both Eyes, 2 times daily    triamcinolone acetonide 0.1% (KENALOG) 0.1 % ointment 1 application , 2 times daily, Apply to affected area       Medication List with Changes/Refills   New Medications    ATORVASTATIN (LIPITOR) 20 MG TABLET    Take 1 tablet (20 mg total) by mouth once daily.   Current Medications    ALBUTEROL (PROVENTIL/VENTOLIN HFA) 90 MCG/ACTUATION INHALER    Inhale 2 puffs into the lungs every 6 (six) hours. Rescue    AMLODIPINE (NORVASC) 5 MG TABLET    Take 1 tablet (5 mg total) by mouth once daily.    ASCORBIC ACID, VITAMIN C, (VITAMIN C) 100 MG TABLET    Take 100 mg by mouth once daily.    ASPIRIN (ECOTRIN) 81 MG EC TABLET    Take 81 mg by mouth once daily.    B COMPLEX VITAMINS TABLET    Take 1 tablet by mouth once daily.    CALCIUM CARBONATE/VITAMIN D3 (CALTRATE 600 + D ORAL)    Take by mouth once daily.     DULOXETINE  (CYMBALTA) 60 MG CAPSULE    Take 1 capsule (60 mg total) by mouth once daily. TAKE 1 CAPSULE EVERY NIGHT AT BEDTIME    FLUTICASONE PROPIONATE (FLONASE) 50 MCG/ACTUATION NASAL SPRAY    2 sprays (100 mcg total) by Each Nostril route once daily.    LATANOPROST 0.005 % OPHTHALMIC SOLUTION    1 drop every evening.    LEVOTHYROXINE (SYNTHROID) 112 MCG TABLET    Take 1 tablet (112 mcg total) by mouth once daily.    LOSARTAN-HYDROCHLOROTHIAZIDE 100-12.5 MG (HYZAAR) 100-12.5 MG TAB    Take 1 tablet by mouth once daily.    METFORMIN (GLUCOPHAGE-XR) 500 MG ER 24HR TABLET    TAKE 2 TABLETS BY MOUTH ONCE A DAY WITH BREAKFAST    MUPIROCIN (BACTROBAN) 2 % OINTMENT    APPLY TO AFFECTED AREA 3 TIMES A DAY    MA-[id number]-K-LYCOP-LUT-HERB#220 (ESTROBLEND) 500 MCG-1,000 UNIT-20 MCG TAB    Take 1 tablet by mouth once daily.    RALOXIFENE (EVISTA) 60 MG TABLET    Take 1 tablet (60 mg total) by mouth once daily.    TIMOLOL MALEATE 0.5% (TIMOPTIC-XE) 0.5 % SOLG    Place 1 drop into both eyes 2 (two) times daily.     TRIAMCINOLONE ACETONIDE 0.1% (KENALOG) 0.1 % OINTMENT    APPLY TOPICALLY TWICE A DAY               All pertinent labs, imaging, and EKGs reviewed.  Patient's most recent EKG tracing was personally interpreted by this provider.    Problem List Items Addressed This Visit          Cardiac/Vascular    Essential hypertension - Primary    Relevant Orders    IN OFFICE EKG 12-LEAD (to [place])    Echo (Completed)     Other Visit Diagnoses       Agatston coronary artery calcium score greater than 400        Relevant Orders    IN OFFICE EKG 12-LEAD (to [place])    Encounter for screening for cardiovascular disorders                Follow up in about 5 weeks (around 4/10/2024).

## 2024-05-01 LAB
OHS QRS DURATION: 68 MS
OHS QTC CALCULATION: 444 MS

## 2024-05-02 ENCOUNTER — TELEPHONE (OUTPATIENT)
Dept: FAMILY MEDICINE | Facility: CLINIC | Age: 79
End: 2024-05-02
Payer: MEDICARE

## 2024-05-02 NOTE — TELEPHONE ENCOUNTER
Returned all and spoke to patient regarding message below. Appointment scheduled for Monday 5/6/24 with Fela Yanes NP

## 2024-05-02 NOTE — TELEPHONE ENCOUNTER
----- Message from Rashmijose Martin sent at 5/2/2024  1:12 PM CDT -----  Type: Needs Medical Advice  Who Called:  pt  Best Call Back Number: [phone]    Additional Information: pt is calling in regards to needing to speak to the nurse. Pt says her ears are stopped up and she is hearing funny sounds. Says she thinks it is tinnitus. Please call back and advise. Thanks!

## 2024-05-06 ENCOUNTER — OFFICE VISIT (OUTPATIENT)
Dept: FAMILY MEDICINE | Facility: CLINIC | Age: 79
End: 2024-05-06
Payer: MEDICARE

## 2024-05-06 VITALS
DIASTOLIC BLOOD PRESSURE: 58 MMHG | HEIGHT: 63 IN | HEART RATE: 69 BPM | RESPIRATION RATE: 16 BRPM | BODY MASS INDEX: 33.68 KG/M2 | OXYGEN SATURATION: 98 % | WEIGHT: 190.06 LBS | SYSTOLIC BLOOD PRESSURE: 130 MMHG

## 2024-05-06 DIAGNOSIS — H61.23 BILATERAL IMPACTED CERUMEN: Primary | ICD-10-CM

## 2024-05-06 DIAGNOSIS — I10 ESSENTIAL HYPERTENSION: ICD-10-CM

## 2024-05-06 DIAGNOSIS — H93.8X2 EAR FULLNESS, LEFT: ICD-10-CM

## 2024-05-06 PROCEDURE — 1100F PTFALLS ASSESS-DOCD GE2>/YR: CPT | Mod: CPTII,S$GLB,,

## 2024-05-06 PROCEDURE — 1160F RVW MEDS BY RX/DR IN RCRD: CPT | Mod: CPTII,S$GLB,,

## 2024-05-06 PROCEDURE — 3075F SYST BP GE 130 - 139MM HG: CPT | Mod: CPTII,S$GLB,,

## 2024-05-06 PROCEDURE — 99999 PR PBB SHADOW E&M-EST. PATIENT-LVL V: CPT | Mod: PBBFAC,,,

## 2024-05-06 PROCEDURE — 3078F DIAST BP <80 MM HG: CPT | Mod: CPTII,S$GLB,,

## 2024-05-06 PROCEDURE — 3288F FALL RISK ASSESSMENT DOCD: CPT | Mod: CPTII,S$GLB,,

## 2024-05-06 PROCEDURE — 99213 OFFICE O/P EST LOW 20 MIN: CPT | Mod: S$GLB,,,

## 2024-05-06 PROCEDURE — 1126F AMNT PAIN NOTED NONE PRSNT: CPT | Mod: CPTII,S$GLB,,

## 2024-05-06 PROCEDURE — 1159F MED LIST DOCD IN RCRD: CPT | Mod: CPTII,S$GLB,,

## 2024-05-06 RX ORDER — DORZOLAMIDE HYDROCHLORIDE AND TIMOLOL MALEATE 20; 5 MG/ML; MG/ML
1 SOLUTION/ DROPS OPHTHALMIC 2 TIMES DAILY
COMMUNITY
Start: 2024-03-25

## 2024-05-06 NOTE — PATIENT INSTRUCTIONS
-  DEBROX DROPS AS DIRECTED                  Thank you for allowing me to be part of your healthcare team at [facility]. It is a pleasure to care for you today.   Please take all of your medications as instructed and follow all new instructions from your visit today.  If you received labs or medical tests today you should hear information about results or scheduling either by phone or mychart within approximately a week.   If you have any questions or concerns please do not hesitate to call. Have a blessed day and I hope to see you again soon.  RAUL Manning

## 2024-05-06 NOTE — PROGRESS NOTES
Subjective:       Patient ID: Tali Hopper is a 78 y.o. female.    Chief Complaint: Left ear fullness    Patient presents to the clinic with complaint of left ear fullness.     Started a few weeks ago. States feels like there is water in it. She tried using Hydrogen Peroxide but his has not helped. States this especially bothers her at night.     Has no other complaints or concerns today.     Patient educated on plan of care, verbalized understanding.           Review of Systems   Constitutional:  Negative for activity change, appetite change, chills, diaphoresis and fever.   HENT:  Negative for congestion, ear pain, postnasal drip, sinus pressure, sneezing and sore throat.         Left Ear fullness   Eyes:  Negative for pain, discharge, redness and itching.   Respiratory:  Negative for apnea, cough, chest tightness, shortness of breath and wheezing.    Cardiovascular:  Negative for chest pain and leg swelling.   Gastrointestinal:  Negative for abdominal distention, abdominal pain, constipation, diarrhea, nausea and vomiting.   Genitourinary:  Negative for difficulty urinating, dysuria, flank pain and frequency.   Skin:  Negative for color change, rash and wound.   Neurological:  Negative for dizziness.   All other systems reviewed and are negative.      Patient Active Problem List   Diagnosis    Hypothyroid    Osteoporosis, post-menopausal    Moderate episode of recurrent major depressive disorder    Hyperlipemia    Hypocalcemia    Dizziness    Decreased pulse    Faintness    Chronic low back pain without sciatica    Cataract of left eye    Essential hypertension    Severe obesity (BMI 35.0-39.9) with comorbidity    Atrial bigeminy    Prolonged Q-T interval on ECG    Type 2 diabetes mellitus without complication, without long-term current use of insulin    Advanced care planning/counseling discussion    History of recent fall    Imbalance    Leg weakness, bilateral    Decreased strength    Muscle tightness     Decreased functional mobility    Decreased range of motion    Hyperlipidemia associated with type 2 diabetes mellitus    Pulmonary emphysema, unspecified emphysema type       Objective:      Physical Exam  Vitals and nursing note reviewed.   Constitutional:       General: She is not in acute distress.     Appearance: Normal appearance. She is well-developed.   HENT:      Head: Normocephalic.      Right Ear: There is impacted cerumen.      Left Ear: There is impacted cerumen.      Nose: Nose normal.   Eyes:      Conjunctiva/sclera: Conjunctivae normal.      Pupils: Pupils are equal, round, and reactive to light.   Cardiovascular:      Rate and Rhythm: Normal rate.   Pulmonary:      Effort: Pulmonary effort is normal. No respiratory distress.   Musculoskeletal:      Cervical back: Normal range of motion.   Skin:     General: Skin is warm and dry.      Findings: No rash.   Neurological:      Mental Status: She is alert and oriented to person, place, and time.   Psychiatric:         Behavior: Behavior normal.         Lab Results   Component Value Date    WBC 11.72 01/29/2024    HGB 13.7 01/29/2024    HCT 43.2 01/29/2024     01/29/2024    CHOL 189 01/29/2024    TRIG 166 (H) 01/29/2024    HDL 43 01/29/2024    ALT 16 01/29/2024    AST 16 01/29/2024     01/29/2024    K 4.1 01/29/2024     01/29/2024    CREATININE 1.0 01/29/2024    BUN 22 01/29/2024    CO2 27 01/29/2024    TSH 2.114 02/09/2023    INR 1.0 09/06/2021    HGBA1C 5.7 (H) 01/29/2024     The 10-year ASCVD risk score (Noelle WARE, et al., 2019) is: 51.1%    Values used to calculate the score:      Age: 78 years      Sex: Female      Is Non- : No      Diabetic: Yes      Tobacco smoker: No      Systolic Blood Pressure: 130 mmHg      Is BP treated: Yes      HDL Cholesterol: 43 mg/dL      Total Cholesterol: 189 mg/dL  Visit Vitals  BP (!) 130/58 (BP Location: Right arm, Patient Position: Sitting, BP Method: Small (Manual))  "  Pulse 69   Resp 16   Ht 5' 3" (1.6 m)   Wt 86.2 kg (190 lb 0.6 oz)   SpO2 98%   BMI 33.66 kg/m²      Assessment:       1. Bilateral impacted cerumen    2. Ear fullness, left    3. Essential hypertension        Plan:       1. Bilateral impacted cerumen/Ear fullness, left  -     Ambulatory referral/consult to ENT; Future; Expected date: 05/13/2024   - Debrox to bilateral ears as directed    2. Essential hypertension   - Stable-Continue Losartan/HCTZ, Norvasc    - Continue current plan of care       Follow up if symptoms worsen or fail to improve.      Future Appointments       Date Provider Specialty Appt Notes    5/8/2024 Forrest Arceo MD Otolaryngology ear fullness    7/30/2024  Lab .    7/30/2024  Radiology mammo    7/30/2024  Radiology .    8/6/2024 Elizabeth Rodríguez MD Family Medicine 6 month f/u    8/8/2024 Ana Cristina Montoya MD Dermatology rash             "

## 2024-05-08 ENCOUNTER — OFFICE VISIT (OUTPATIENT)
Dept: OTOLARYNGOLOGY | Facility: CLINIC | Age: 79
End: 2024-05-08
Payer: MEDICARE

## 2024-05-08 VITALS
BODY MASS INDEX: 33.98 KG/M2 | WEIGHT: 191.81 LBS | SYSTOLIC BLOOD PRESSURE: 131 MMHG | DIASTOLIC BLOOD PRESSURE: 58 MMHG | HEART RATE: 73 BPM | HEIGHT: 63 IN

## 2024-05-08 DIAGNOSIS — H61.23 BILATERAL IMPACTED CERUMEN: ICD-10-CM

## 2024-05-08 DIAGNOSIS — H90.3 BILATERAL HIGH FREQUENCY SENSORINEURAL HEARING LOSS: ICD-10-CM

## 2024-05-08 DIAGNOSIS — H93.13 BILATERAL TINNITUS: Primary | ICD-10-CM

## 2024-05-08 PROCEDURE — 3078F DIAST BP <80 MM HG: CPT | Mod: CPTII,S$GLB,, | Performed by: OTOLARYNGOLOGY

## 2024-05-08 PROCEDURE — 69210 REMOVE IMPACTED EAR WAX UNI: CPT | Mod: S$GLB,,, | Performed by: OTOLARYNGOLOGY

## 2024-05-08 PROCEDURE — 1159F MED LIST DOCD IN RCRD: CPT | Mod: CPTII,S$GLB,, | Performed by: OTOLARYNGOLOGY

## 2024-05-08 PROCEDURE — 99213 OFFICE O/P EST LOW 20 MIN: CPT | Mod: 25,S$GLB,, | Performed by: OTOLARYNGOLOGY

## 2024-05-08 PROCEDURE — 99999 PR PBB SHADOW E&M-EST. PATIENT-LVL V: CPT | Mod: PBBFAC,,, | Performed by: OTOLARYNGOLOGY

## 2024-05-08 PROCEDURE — 1101F PT FALLS ASSESS-DOCD LE1/YR: CPT | Mod: CPTII,S$GLB,, | Performed by: OTOLARYNGOLOGY

## 2024-05-08 PROCEDURE — 1160F RVW MEDS BY RX/DR IN RCRD: CPT | Mod: CPTII,S$GLB,, | Performed by: OTOLARYNGOLOGY

## 2024-05-08 PROCEDURE — 1126F AMNT PAIN NOTED NONE PRSNT: CPT | Mod: CPTII,S$GLB,, | Performed by: OTOLARYNGOLOGY

## 2024-05-08 PROCEDURE — 3075F SYST BP GE 130 - 139MM HG: CPT | Mod: CPTII,S$GLB,, | Performed by: OTOLARYNGOLOGY

## 2024-05-08 PROCEDURE — 3288F FALL RISK ASSESSMENT DOCD: CPT | Mod: CPTII,S$GLB,, | Performed by: OTOLARYNGOLOGY

## 2024-05-08 NOTE — PATIENT INSTRUCTIONS
Routine ear care as outlined.  Tinnitus informational handout provided.  Reduce caffeine and potentially use nighttime masking background noise like a white noise generator.  See audiologist to repeat hearing testing and consider modifying hearing aids as needed for any changes and even if possible using them to mask tinnitus.    Return with any worsening of symptoms, failure to improve, or any other concerns for further evaluation and treatment.      Voice recognition software was used in the creation of this note/communication and any sound-alike errors which may have occurred from its use should be taken in context when interpreting.  If such errors prevent a clear understanding of the note/communication, please contact the office for clarification.      ROUTINE EAR CARE    Keep the ears dry in general.  Water and soap dry the ears increases scaling by stripping away the natural oils of the ears.    A twisted single ply of facial tissue can be used to wick out moisture on the rare occasion when water gets stuck in the ear; or the water can be displaced with concentrated alcohol like OTC SwimEar or a few drops of 72-95% isopropyl alcohol to fill the ear canal.  Regular use will cause excess drying of the ears.    The ear should be kept moist in general with mineral oil. Three to four drops into the ear canal 2 to 3 times a week or even every night.    If the ears need to be irrigated use either a 50 50 mixture of white vinegar and distilled water or 50 50 mixture of alcohol and white vinegar.    Painful draining ears that do not resolve with conservative care could represent infection and may need microscopic office debridement/clearing of the wax, and topical antibiotic drops with or without steroids may need to be prescribed.

## 2024-05-08 NOTE — PROGRESS NOTES
" [facility]    Subjective:      Patient: Tali Hopper Patient PCP: Elizabeth Rodríguez MD         :  1945     Sex:  female      MRN:  [id number]          Date of Visit: 2024      Chief Complaint: Tinnitus (Pt states that her ears are "singing" all of the time. No audiogram scheduled today, last audiogram done in  in [facility]. States was told has wax in the ears and to see ENT to get them cleaned out. )      Patient ID: Tali Hopper is a 78 y.o. female     Patient is a  lifelong NON-smoker with a past medical history of depression, emphysema, HTN, HLD, diabetes, hypothyroidism, chronic imbalance and high frequency sensorineural hearing loss referred to me by Fela Yanes in consultation for tinnitus.  Seems to have musical tinnitus in both ears ongoing for an extended period of time.  Audiogram completed in the summer of  reviewed shows normal sloping to moderately severe-severe right-greater-than-left sensorineural high-frequency hearing loss.  Normal through 1500 hertz mild at 2000 hertz.  Twenty dB right and 10 dB SRT at that time with 100% left and 92% left discrimination scores and stiff tympanometry bilaterally with no appreciable conductive hearing loss.  Amplification was indicated, cleared for, and recommended at that time.  Patient purchased but does not frequently where her hearing aids.  Because they are a pain in the neck.  No repeat audiogram since.  No sudden change in hearing.            Labs:  WBC   Date Value Ref Range Status   2024 11.72 3.90 - 12.70 K/uL Final     Hemoglobin   Date Value Ref Range Status   2024 13.7 12.0 - 16.0 g/dL Final     Platelets   Date Value Ref Range Status   2024 346 150 - 450 K/uL Final     Creatinine   Date Value Ref Range Status   2024 1.0 0.5 - 1.4 mg/dL Final     TSH   Date Value Ref Range Status   2023 2.114 0.400 - 4.000 uIU/mL Final     Hemoglobin A1C   Date Value Ref Range Status   2024 5.7 (H) " 4.0 - 5.6 % Final     Comment:     ADA Screening Guidelines:  5.7-6.4%  Consistent with prediabetes  >or=6.5%  Consistent with diabetes    High levels of fetal hemoglobin interfere with the HbA1C  assay. Heterozygous hemoglobin variants (HbS, HgC, etc)do  not significantly interfere with this assay.   However, presence of multiple variants may affect accuracy.         Past Medical History  She has a past medical history of Cataract of left eye, Depression, Glaucoma, Hypertension, Loss of equilibrium, Osteoporosis, and Thyroid disease.    Family / Surgical / Social History  Her family history includes Heart disease in her maternal grandmother and paternal grandmother.    Past Surgical History:   Procedure Laterality Date    CHOLECYSTECTOMY      EYE SURGERY      right cataract    HYSTERECTOMY      TONSILLECTOMY         Social History     Tobacco Use    Smoking status: Never    Smokeless tobacco: Never   Substance and Sexual Activity    Alcohol use: No    Drug use: No    Sexual activity: Not Currently       Medications  She has a current medication list which includes the following prescription(s): albuterol, amlodipine, ascorbic acid (vitamin c), aspirin, atorvastatin, b complex vitamins, calcium carbonate/vitamin d3, dorzolamide-timolol 2-0.5%, duloxetine, fluticasone propionate, latanoprost, levothyroxine, losartan-hydrochlorothiazide 100-12.5 mg, metformin, mupirocin, estroblend, raloxifene, timolol maleate 0.5%, triamcinolone acetonide 0.1%, [DISCONTINUED] bupropion, and [DISCONTINUED] escitalopram oxalate.      Allergies  Review of patient's allergies indicates:   Allergen Reactions    Benadryl decongestant Shortness Of Breath     Respiration problems    Penicillins Hives       All medications, allergies, and past history have been reviewed.    Objective:      Vitals:      3/20/2024     1:23 PM 5/6/2024    11:01 AM 5/8/2024    10:37 AM   Vitals - 1 value per visit   SYSTOLIC  130 131   DIASTOLIC  58 58   Pulse   "69 73   Resp  16    SPO2  98 %    Weight (lb) 190 190.04 191.8   Weight (kg) 86.183 86.2 87   Height 5' 3" (1.6 m) 5' 3" (1.6 m) 5' 3" (1.6 m)   BMI (Calculated) 33.7 33.7 34   Pain Score  Zero Zero       Body surface area is 1.97 meters squared.    Physical Exam:    GENERAL  APPEARANCE -  alert, appears stated age, and cooperative  BARRIER(S) TO COMMUNICATION -  none VOICE - appropriate for age and gender    INTEGUMENTARY  no suspicious head and neck lesions    HEENT  HEAD: Normocephalic, without obvious abnormality, atraumatic  FACE: INSPECTION - Symmetric, no signs of trauma, no suspicious lesion(s)      STRENGTH - facial symmetry intact     EYES  Normal occular alignment and mobility with no visible nystagmus at rest    EARS/NOSE/MOUTH/THROAT  EARS  PINNAE AND EXTERNAL EARS - no suspicious lesion OTOSCOPIC EXAM (surgical microscopy was used for visualization/instrumentation): EAR EXAM - soft right and dry left wax removed from the mid ear canal with suctioned right and Micro alligators left to reveal normal ear canals middle ear spaces and tympanic membranes  HEARING - adequate with firm conversation even with a mask in place    NOSE AND SINUSES  EXTERNAL NOSE - Grossly normal for age/sex   MUCOSA - no drainage    CHEST AND LUNG   INSPECTION & AUSCULTATION - normal effort, no stridor    NEUROLOGIC  MENTAL STATUS - alert, interactive CRANIAL NERVES - normal        Procedure(s):  Cerumen removal performed.  See procedure note.          Assessment:      Problem List Items Addressed This Visit    None  Visit Diagnoses       Bilateral tinnitus    -  Primary    Bilateral impacted cerumen        Bilateral high frequency sensorineural hearing loss                     Plan:      On tinnitus provided.  Questions answered.  Wearing of hearing aids encouraged including discussing repeat testing and possibly adjusting hearing aids for better masking.    Follow up as needed          Voice recognition software was used in the " creation of this note/communication and any sound-alike errors which may have occurred from its use should be taken in context when interpreting.  If such errors prevent a clear understanding of the note/communication, please contact the office for clarification.

## 2024-05-08 NOTE — PROCEDURES
"Ear Cerumen Removal    Date/Time: 5/8/2024 10:40 AM    Performed by: Forrest Arceo MD  Authorized by: Forrest Arceo MD    Time out: Immediately prior to procedure a "time out" was called to verify the correct patient, procedure, equipment, support staff and site/side marked as required.    Consent Done?:  Yes (Verbal)    Local anesthetic:  None  Location details:  Both ears  Cerumen  Removal Results:  Cerumen completely removed  Patient tolerance:  Patient tolerated the procedure well with no immediate complications     See note for details.     "

## 2024-07-24 DIAGNOSIS — M81.0 OSTEOPOROSIS, POST-MENOPAUSAL: ICD-10-CM

## 2024-07-25 NOTE — TELEPHONE ENCOUNTER
Refill Routing Note   Medication(s) are not appropriate for processing by [facility] for the following reason(s):        Medication outside of protocol    [facility] action(s):  Route        Medication Therapy Plan: FLOS      Appointments  past 12m or future 3m with PCP    Date Provider   Last Visit   2/2/2024 Elizabeth Rodríguez MD   Next Visit   8/6/2024 Elizabeth Rodríguez MD   ED visits in past 90 days: 0        Note composed:10:22 AM 07/25/2024

## 2024-07-25 NOTE — TELEPHONE ENCOUNTER
Care Due:                  Date            Visit Type   Department     Provider  --------------------------------------------------------------------------------                                EP -                              PRIMARY      [facility]  Last Visit: 02-      CARE ([facility])   CHANELL Rodríguez                               -                              PRIMARY      [facility]  Next Visit: 08-      CARE ([facility])   MEDICINE       Elizabeth Rodríguez                                                            Last  Test          Frequency    Reason                     Performed    Due Date  --------------------------------------------------------------------------------    HBA1C.......  6 months...  metFORMIN................  01- 07-    Mg Level....  12 months..  raloxifene...............  Not Found    Overdue    Phosphate...  15 months..  raloxifene...............  Not Found    Overdue    TSH.........  12 months..  levothyroxine............  02-   02-    Vitamin D...  15 months..  raloxifene...............  Not Found    Overdue    [employer] Embedded Care Due Messages. Reference number: [id number].   7/24/2024 7:04:03 PM CDT

## 2024-07-29 RX ORDER — RALOXIFENE HYDROCHLORIDE 60 MG/1
60 TABLET ORAL
Qty: 90 TABLET | Refills: 3 | Status: SHIPPED | OUTPATIENT
Start: 2024-07-29

## 2024-07-30 ENCOUNTER — HOSPITAL ENCOUNTER (OUTPATIENT)
Dept: RADIOLOGY | Facility: CLINIC | Age: 79
Discharge: HOME | End: 2024-07-30
Attending: FAMILY MEDICINE
Payer: MEDICARE

## 2024-07-30 DIAGNOSIS — Z12.31 ENCOUNTER FOR SCREENING MAMMOGRAM FOR MALIGNANT NEOPLASM OF BREAST: ICD-10-CM

## 2024-07-30 DIAGNOSIS — N95.9 MENOPAUSAL AND POSTMENOPAUSAL DISORDER: ICD-10-CM

## 2024-07-30 PROCEDURE — 77080 DXA BONE DENSITY AXIAL: CPT | Mod: 26,,, | Performed by: STUDENT IN AN ORGANIZED HEALTH CARE EDUCATION/TRAINING PROGRAM

## 2024-07-30 PROCEDURE — 77067 SCR MAMMO BI INCL CAD: CPT | Mod: 26,,, | Performed by: RADIOLOGY

## 2024-07-30 PROCEDURE — 77063 BREAST TOMOSYNTHESIS BI: CPT | Mod: 26,,, | Performed by: RADIOLOGY

## 2024-07-30 PROCEDURE — 77067 SCR MAMMO BI INCL CAD: CPT | Mod: TC,PO

## 2024-07-30 PROCEDURE — 77080 DXA BONE DENSITY AXIAL: CPT | Mod: TC,PO

## 2024-08-02 ENCOUNTER — TELEPHONE (OUTPATIENT)
Dept: FAMILY MEDICINE | Facility: CLINIC | Age: 79
End: 2024-08-02
Payer: MEDICARE

## 2024-08-02 NOTE — TELEPHONE ENCOUNTER
Attempted to contact pt x 3. Call ca not be completed. Call attempted with and without area code. Checked phone working properly by calling another pt. Phone is working properly. Attempted to contact pt again and no ringing, no voice, no voicemail etc.

## 2024-08-02 NOTE — TELEPHONE ENCOUNTER
----- Message from Rosana Huffman sent at 8/2/2024  5:04 PM CDT -----    Patient Returning Call        Who Called:pt  Does the patient know what this is regarding?:sinus infection coughing real bad ears ringing and stopping up   Would the patient rather a call back or a response via [facility]? call  Best Call Back Number:[phone]  Additional Information: call back

## 2024-08-03 ENCOUNTER — HOSPITAL ENCOUNTER (EMERGENCY)
Facility: HOSPITAL | Age: 79
Discharge: LEFT BEFORE DISCHARGE | End: 2024-08-04
Attending: EMERGENCY MEDICINE
Payer: MEDICARE

## 2024-08-03 DIAGNOSIS — U07.1 COVID-19: Primary | ICD-10-CM

## 2024-08-03 DIAGNOSIS — R29.818 ACUTE FOCAL NEUROLOGICAL DEFICIT: ICD-10-CM

## 2024-08-03 LAB
ALBUMIN SERPL BCP-MCNC: 4.1 G/DL (ref 3.5–5.2)
ALP SERPL-CCNC: 54 U/L (ref 55–135)
ALT SERPL W/O P-5'-P-CCNC: 11 U/L (ref 10–44)
ANION GAP SERPL CALC-SCNC: 11 MMOL/L (ref 8–16)
AST SERPL-CCNC: 17 U/L (ref 10–40)
BASOPHILS # BLD AUTO: 0.09 K/UL (ref 0–0.2)
BASOPHILS NFR BLD: 0.8 % (ref 0–1.9)
BILIRUB SERPL-MCNC: 0.5 MG/DL (ref 0.1–1)
BUN SERPL-MCNC: 17 MG/DL (ref 8–23)
CALCIUM SERPL-MCNC: 8.5 MG/DL (ref 8.7–10.5)
CHLORIDE SERPL-SCNC: 99 MMOL/L (ref 95–110)
CHOLEST SERPL-MCNC: 129 MG/DL (ref 120–199)
CHOLEST/HDLC SERPL: 2.7 {RATIO} (ref 2–5)
CO2 SERPL-SCNC: 26 MMOL/L (ref 23–29)
CREAT SERPL-MCNC: 1.1 MG/DL (ref 0.5–1.4)
CREAT SERPL-MCNC: 1.1 MG/DL (ref 0.5–1.4)
DIFFERENTIAL METHOD BLD: ABNORMAL
EOSINOPHIL # BLD AUTO: 0 K/UL (ref 0–0.5)
EOSINOPHIL NFR BLD: 0.1 % (ref 0–8)
ERYTHROCYTE [DISTWIDTH] IN BLOOD BY AUTOMATED COUNT: 12.9 % (ref 11.5–14.5)
EST. GFR  (NO RACE VARIABLE): 50.8 ML/MIN/1.73 M^2
GLUCOSE SERPL-MCNC: 141 MG/DL (ref 70–110)
GLUCOSE SERPL-MCNC: 150 MG/DL (ref 70–110)
HCT VFR BLD AUTO: 45.7 % (ref 37–48.5)
HDLC SERPL-MCNC: 48 MG/DL (ref 40–75)
HDLC SERPL: 37.2 % (ref 20–50)
HGB BLD-MCNC: 15.1 G/DL (ref 12–16)
IMM GRANULOCYTES # BLD AUTO: 0.08 K/UL (ref 0–0.04)
IMM GRANULOCYTES NFR BLD AUTO: 0.7 % (ref 0–0.5)
INR PPP: 1 (ref 0.8–1.2)
LDLC SERPL CALC-MCNC: 63 MG/DL (ref 63–159)
LYMPHOCYTES # BLD AUTO: 0.9 K/UL (ref 1–4.8)
LYMPHOCYTES NFR BLD: 8.1 % (ref 18–48)
MCH RBC QN AUTO: 28.6 PG (ref 27–31)
MCHC RBC AUTO-ENTMCNC: 33 G/DL (ref 32–36)
MCV RBC AUTO: 87 FL (ref 82–98)
MONOCYTES # BLD AUTO: 1.2 K/UL (ref 0.3–1)
MONOCYTES NFR BLD: 10.1 % (ref 4–15)
NEUTROPHILS # BLD AUTO: 9.2 K/UL (ref 1.8–7.7)
NEUTROPHILS NFR BLD: 80.2 % (ref 38–73)
NONHDLC SERPL-MCNC: 81 MG/DL
NRBC BLD-RTO: 0 /100 WBC
PLATELET # BLD AUTO: 189 K/UL (ref 150–450)
PMV BLD AUTO: 12.4 FL (ref 9.2–12.9)
POTASSIUM SERPL-SCNC: 3.3 MMOL/L (ref 3.5–5.1)
PROT SERPL-MCNC: 7.6 G/DL (ref 6–8.4)
PROTHROMBIN TIME: 11.2 SEC (ref 9–12.5)
RBC # BLD AUTO: 5.28 M/UL (ref 4–5.4)
SAMPLE: NORMAL
SODIUM SERPL-SCNC: 136 MMOL/L (ref 136–145)
TRIGL SERPL-MCNC: 90 MG/DL (ref 30–150)
TSH SERPL DL<=0.005 MIU/L-ACNC: 0.48 UIU/ML (ref 0.34–5.6)
WBC # BLD AUTO: 11.45 K/UL (ref 3.9–12.7)

## 2024-08-03 PROCEDURE — 82565 ASSAY OF CREATININE: CPT

## 2024-08-03 PROCEDURE — 80061 LIPID PANEL: CPT | Performed by: EMERGENCY MEDICINE

## 2024-08-03 PROCEDURE — 93010 ELECTROCARDIOGRAM REPORT: CPT | Mod: ,,, | Performed by: GENERAL PRACTICE

## 2024-08-03 PROCEDURE — 99285 EMERGENCY DEPT VISIT HI MDM: CPT | Mod: 25

## 2024-08-03 PROCEDURE — 82550 ASSAY OF CK (CPK): CPT | Performed by: EMERGENCY MEDICINE

## 2024-08-03 PROCEDURE — 85610 PROTHROMBIN TIME: CPT | Performed by: EMERGENCY MEDICINE

## 2024-08-03 PROCEDURE — 85025 COMPLETE CBC W/AUTO DIFF WBC: CPT | Performed by: EMERGENCY MEDICINE

## 2024-08-03 PROCEDURE — 93005 ELECTROCARDIOGRAM TRACING: CPT | Performed by: GENERAL PRACTICE

## 2024-08-03 PROCEDURE — 25500020 PHARM REV CODE 255: Performed by: EMERGENCY MEDICINE

## 2024-08-03 PROCEDURE — 82962 GLUCOSE BLOOD TEST: CPT

## 2024-08-03 PROCEDURE — 80053 COMPREHEN METABOLIC PANEL: CPT | Performed by: EMERGENCY MEDICINE

## 2024-08-03 PROCEDURE — 84443 ASSAY THYROID STIM HORMONE: CPT | Performed by: EMERGENCY MEDICINE

## 2024-08-03 PROCEDURE — 94761 N-INVAS EAR/PLS OXIMETRY MLT: CPT

## 2024-08-03 RX ADMIN — IOHEXOL 100 ML: 350 INJECTION, SOLUTION INTRAVENOUS at 11:08

## 2024-08-04 VITALS
SYSTOLIC BLOOD PRESSURE: 197 MMHG | DIASTOLIC BLOOD PRESSURE: 84 MMHG | WEIGHT: 180 LBS | TEMPERATURE: 100 F | HEIGHT: 63 IN | HEART RATE: 76 BPM | RESPIRATION RATE: 26 BRPM | BODY MASS INDEX: 31.89 KG/M2 | OXYGEN SATURATION: 95 %

## 2024-08-04 DIAGNOSIS — U07.1 COVID-19 VIRUS DETECTED: ICD-10-CM

## 2024-08-04 LAB
CK SERPL-CCNC: 106 U/L (ref 20–180)
INFLUENZA A, MOLECULAR: NEGATIVE
INFLUENZA B, MOLECULAR: NEGATIVE
OHS QRS DURATION: 64 MS
OHS QTC CALCULATION: 459 MS
SARS-COV-2 RDRP RESP QL NAA+PROBE: POSITIVE
SPECIMEN SOURCE: NORMAL

## 2024-08-04 PROCEDURE — U0002 COVID-19 LAB TEST NON-CDC: HCPCS

## 2024-08-04 PROCEDURE — 87502 INFLUENZA DNA AMP PROBE: CPT | Performed by: EMERGENCY MEDICINE

## 2024-08-04 RX ORDER — NIRMATRELVIR AND RITONAVIR 300-100 MG
KIT ORAL
Qty: 10 TABLET | Refills: 0 | Status: SHIPPED | OUTPATIENT
Start: 2024-08-04

## 2024-08-04 NOTE — ED PROVIDER NOTES
Encounter Date: 8/3/2024       History     Chief Complaint   Patient presents with    Fall     Pt reports falling x 1 week ago and hitting her head. Today she fell and cannot remember what happened to cause her to fall. Pt reports being off balance since she fell last week. Pt also more drowsy       HPI    Tali Hopper 79 y.o. w hx of hypothyroid, HLD, type 2 DM, who presents with fatigue and global weakness that has been ongoing for the past few days and worsened today. Per daughter, patient was unable to ambulate today due to diffuse weakness. Patient has associated rhinorrhea. Denies dizziness, lightheadedness,  or vision changes. No dysphasia, aphasia, facial asymmetry.     Review of patient's allergies indicates:   Allergen Reactions    Benadryl decongestant Shortness Of Breath     Respiration problems    Penicillins Hives     Past Medical History:   Diagnosis Date    Cataract of left eye     Depression     Glaucoma     Hypertension     Loss of equilibrium     Osteoporosis     Thyroid disease      Past Surgical History:   Procedure Laterality Date    CHOLECYSTECTOMY      EYE SURGERY      right cataract    HYSTERECTOMY      TONSILLECTOMY       Family History   Problem Relation Name Age of Onset    Heart disease Maternal Grandmother      Heart disease Paternal Grandmother       Social History     Tobacco Use    Smoking status: Never    Smokeless tobacco: Never   Substance Use Topics    Alcohol use: No    Drug use: No     Review of Systems  See HPI       Physical Exam     Initial Vitals [08/03/24 2207]   BP Pulse Resp Temp SpO2   (!) 160/80 94 20 100 °F (37.8 °C) 96 %      MAP       --         Physical Exam    Nursing note and vitals reviewed.  Constitutional: She appears well-developed and well-nourished.   HENT:   Head: Normocephalic and atraumatic.   Nose: Rhinorrhea present.   Eyes: Conjunctivae and EOM are normal. Pupils are equal, round, and reactive to light.   Neck: Neck supple.   Normal range of  motion.  Cardiovascular:  Normal rate and regular rhythm.           Pulmonary/Chest: Breath sounds normal. No respiratory distress.   Abdominal: Abdomen is soft. There is no abdominal tenderness.   Musculoskeletal:         General: No tenderness or edema.      Cervical back: Normal range of motion and neck supple.     Neurological: She is alert. No cranial nerve deficit.   Skin: Skin is warm and dry. Capillary refill takes less than 2 seconds.         ED Course   Procedures  Labs Reviewed   CBC W/ AUTO DIFFERENTIAL - Abnormal       Result Value    WBC 11.45      RBC 5.28      Hemoglobin 15.1      Hematocrit 45.7      MCV 87      MCH 28.6      MCHC 33.0      RDW 12.9      Platelets 189      MPV 12.4      Immature Granulocytes 0.7 (*)     Gran # (ANC) 9.2 (*)     Immature Grans (Abs) 0.08 (*)     Lymph # 0.9 (*)     Mono # 1.2 (*)     Eos # 0.0      Baso # 0.09      nRBC 0      Gran % 80.2 (*)     Lymph % 8.1 (*)     Mono % 10.1      Eosinophil % 0.1      Basophil % 0.8      Differential Method Automated     COMPREHENSIVE METABOLIC PANEL - Abnormal    Sodium 136      Potassium 3.3 (*)     Chloride 99      CO2 26      Glucose 150 (*)     BUN 17      Creatinine 1.1      Calcium 8.5 (*)     Total Protein 7.6      Albumin 4.1      Total Bilirubin 0.5      Alkaline Phosphatase 54 (*)     AST 17      ALT 11      eGFR 50.8 (*)     Anion Gap 11     SARS-COV-2 RNA AMPLIFICATION, QUAL - Abnormal    SARS-CoV-2 RNA, Amplification, Qual Positive (*)    POCT GLUCOSE - Abnormal    POC Glucose 141 (*)    PROTIME-INR    Prothrombin Time 11.2      INR 1.0     TSH    TSH 0.485     LIPID PANEL    Cholesterol 129      Triglycerides 90      HDL 48      LDL Cholesterol 63.0      HDL/Cholesterol Ratio 37.2      Total Cholesterol/HDL Ratio 2.7      Non-HDL Cholesterol 81     CK         CK   INFLUENZA A AND B ANTIGEN    Influenza A, Molecular Negative      Influenza B, Molecular Negative      Flu A & B Source Nasal swab      Narrative:      Specimen Source->Nasopharyngeal Swab   ISTAT CREATININE    POC Creatinine 1.1      Sample VENOUS     POCT GLUCOSE MONITORING CONTINUOUS          Imaging Results              CTA Head and Neck (xpd) (Final result)  Result time 08/04/24 00:06:09      Final result by Dennis Powell MD (08/04/24 00:06:09)                   Impression:      No hemodynamically significant stenosis of the neck vessels.    Mild to moderate narrowing of the right distal M1.  No large vessel occlusion in the intracranial circulation.    Additional findings as above.      Electronically signed by: Dennis Powell MD  Date:    08/04/2024  Time:    00:06               Narrative:    EXAMINATION:  CTA HEAD AND NECK (XPD)    CLINICAL HISTORY:  Neuro deficit, acute, stroke suspected;    TECHNIQUE:  CT angiogram was performed from the level of the danna to the top of the head following the IV administration of 100mL of Omnipaque 350.   Sagittal and coronal reconstructions and maximum intensity projection reconstructions were performed. Arterial stenosis percentages are based on NASCET measurement criteria.    COMPARISON:  CT scan of the head dated 02/07/2023.    FINDINGS:  The visualized portions of the pulmonary tree are within normal limits.  The pulmonary trunk appears enlarged.    There is a 2 vessel aortic arch with common origin of the brachiocephalic trunk and the right common carotid artery.  The subclavian arteries are within normal limits.    There are minimal calcifications at the carotid bulbs.  The cervical ICAs are unremarkable.    The vertebral artery origins are unremarkable.  The vertebral arteries are within normal limits.    There is no hemodynamically significant stenosis in the neck vessels.    CTA head:    The intracranial segments of the ICA are within normal limits.  The anterior cerebral arteries and anterior component complex are within normal limits.  There is mild to moderate degree of narrowing involving the distal  segment of the right M1.  The left M1 remains unremarkable.    The intracranial segments of the right vertebral artery is diminutive with minimal contribution to the basilar.  There is a calcification involving the V4 segment left vertebral artery.  The basilar is unremarkable.  The posterior cerebral arteries are unremarkable.    There is no evidence of aneurysm occlusion of the intracranial vessels.    The dural venous sinuses are within normal limits.    There is no abnormal enhancement following intravenous contrast administration.    The soft tissue the neck are unremarkable.  There is no acute abnormality in the visualized lung apices.  There are degenerative changes in the osseous structures.                                       CT HEAD FOR STROKE (Final result)  Result time 08/03/24 23:53:54      Final result by Dennis Powell MD (08/03/24 23:53:54)                   Impression:      No CT findings of large acute territorial infarction.  Additional evaluation with MRI of the brain may be obtained, as clinically warranted    Old infarctions within the periventricular white matter and posterior limb left internal capsule.  Old infarctions in the valerie.    Follow-up with MRI of the brain, as clinically warranted.      Electronically signed by: Dennis Powell MD  Date:    08/03/2024  Time:    23:53               Narrative:    EXAMINATION:  CT HEAD FOR STROKE    CLINICAL HISTORY:  Neuro deficit, acute, stroke suspected;    TECHNIQUE:  Low dose axial images were obtained through the head.  Coronal and sagittal reformations were also performed. Contrast was not administered.    COMPARISON:  CT head dated 02/07/2023.    MRI of the brain dated 09/06/2021.    FINDINGS:  The subcutaneous tissues are unremarkable.  The bony calvarium is intact.  The paranasal sinuses are unremarkable.  The mastoid air cells are clear.  There are postoperative changes in the globes.    The craniocervical junction is intact.  The sellar and  parasellar structures are within normal limits.  There is no evidence of intracranial hemorrhage.  There are no extra-axial fluid collections.  The ventricles and sulci are prominent, consistent cerebral volume loss.  There are extensive hypodensities within the periventricular and subcortical white matter.  There are old infarctions within the periventricular white matter.  There is a probable old infarction within the posterior limb of the left internal capsule.  There are old infarctions in the valerie.  There is no dense vessel sign.  There is no evidence of mass effect.                                       Medications   iohexoL (OMNIPAQUE 350) injection 100 mL (100 mLs Intravenous Given 8/3/24 4410)     Medical Decision Making  Amount and/or Complexity of Data Reviewed  Labs: ordered.  Radiology: ordered.    Risk  Prescription drug management.               ED Course as of 08/04/24 0338   Sun Aug 04, 2024   0020 CTA head and neck: No hemodynamically significant stenosis of the neck vessels.     Mild to moderate narrowing of the right distal M1.  No large vessel occlusion in the intracranial circulation.   [JG]   0020 CT head: No CT findings of large acute territorial infarction.  Additional evaluation with MRI of the brain may be obtained, as clinically warranted     Old infarctions within the periventricular white matter and posterior limb left internal capsule.  Old infarctions in the valerie. [JG]      ED Course User Index  [JG] Edilson Mayorga DO Carol S Bodenheimer 79 y.o. w hx of HTN, type 2 DM, HLD, who presetns with global weakness x 1 day. Difficulty ambulating. Was stroke activated prior to evaluation for weakness. Exam without focal neurologic deficits, 5/5 strength in BUE/BLE, CN II-XII intact. Ddx includes but is not limited to CVA, electrolyte derangement, anemia, occult infection, thyroid disorder, arrhythmia, ACS.       CTA with no hemodynamically significant stenosis of the  neck vessels.Mild to moderate narrowing of the right distal M1.  No large vessel occlusion in the intracranial circulation.     Labs reviewed and are significant for COVID 10 infection. CBC with no anemia or leukocytosis. CMP with mild hypokalemia at 3.3 otherwise unremarkable. TSH normal. CK negative for rhabdo.     Given rx for paxlovid for COVID 19 due to risk factors. Instructed to stop statin medication during treatment.  Encouraged admission for further stroke work up.       This patient has elected to leave against medical advice. In my opinion, the patient has capacity to leave AMA. The patient is clinically sober, free from distracting injury, appears to have intact insight and judgment and reason, and in my opinion has capacity to make decisions. I explained to the patient that her  symptoms may represent a stroke and the patient verbalized understanding of my concerns. I am unable to convince the patient to stay. I have asked them to return as soon as possible to complete their evaluation, and also explained that they were welcome to return to the ER for further evaluation whenever they choose. I have asked the patient to follow up with their primary doctor as soon as possible. Answered all questions.       Case discussed with attending physician, Dr. Didier Mayorga DO 3:39 AM   PGY-3 [employer] [facility]   Clinical Impression:  Final diagnoses:  [R29.818] Acute focal neurological deficit  [U07.1] COVID-19 (Primary)          ED Disposition Condition    AMA Stable                Edilson Mayorga DO  Resident  08/04/24 5161

## 2024-08-04 NOTE — DISCHARGE INSTRUCTIONS
Please return to the ER at any time for further management of your weakness and concern for stroke     Do not take your atorvastatin medication while taking the COVID 19 medication Paxlovid.

## 2024-08-06 ENCOUNTER — OFFICE VISIT (OUTPATIENT)
Dept: FAMILY MEDICINE | Facility: CLINIC | Age: 79
End: 2024-08-06
Payer: MEDICARE

## 2024-08-06 ENCOUNTER — TELEPHONE (OUTPATIENT)
Dept: FAMILY MEDICINE | Facility: CLINIC | Age: 79
End: 2024-08-06
Payer: MEDICARE

## 2024-08-06 VITALS
BODY MASS INDEX: 32.86 KG/M2 | OXYGEN SATURATION: 98 % | TEMPERATURE: 98 F | HEIGHT: 63 IN | WEIGHT: 185.44 LBS | HEART RATE: 95 BPM | RESPIRATION RATE: 16 BRPM | SYSTOLIC BLOOD PRESSURE: 110 MMHG | DIASTOLIC BLOOD PRESSURE: 60 MMHG

## 2024-08-06 DIAGNOSIS — E78.5 HYPERLIPIDEMIA ASSOCIATED WITH TYPE 2 DIABETES MELLITUS: ICD-10-CM

## 2024-08-06 DIAGNOSIS — F40.240 CLAUSTROPHOBIA: ICD-10-CM

## 2024-08-06 DIAGNOSIS — E03.9 HYPOTHYROIDISM, UNSPECIFIED TYPE: ICD-10-CM

## 2024-08-06 DIAGNOSIS — R29.818 OTHER SYMPTOMS AND SIGNS INVOLVING THE NERVOUS SYSTEM: ICD-10-CM

## 2024-08-06 DIAGNOSIS — M81.0 OSTEOPOROSIS, POST-MENOPAUSAL: ICD-10-CM

## 2024-08-06 DIAGNOSIS — E11.9 TYPE 2 DIABETES MELLITUS WITHOUT COMPLICATION, WITHOUT LONG-TERM CURRENT USE OF INSULIN: ICD-10-CM

## 2024-08-06 DIAGNOSIS — E11.69 HYPERLIPIDEMIA ASSOCIATED WITH TYPE 2 DIABETES MELLITUS: ICD-10-CM

## 2024-08-06 DIAGNOSIS — I10 ESSENTIAL HYPERTENSION: ICD-10-CM

## 2024-08-06 DIAGNOSIS — I63.9 CEREBROVASCULAR ACCIDENT (CVA), UNSPECIFIED MECHANISM: Primary | ICD-10-CM

## 2024-08-06 DIAGNOSIS — J43.9 PULMONARY EMPHYSEMA, UNSPECIFIED EMPHYSEMA TYPE: ICD-10-CM

## 2024-08-06 DIAGNOSIS — R41.3 OTHER AMNESIA: ICD-10-CM

## 2024-08-06 DIAGNOSIS — N18.31 CHRONIC KIDNEY DISEASE, STAGE 3A: ICD-10-CM

## 2024-08-06 DIAGNOSIS — R41.3 MEMORY LOSS: ICD-10-CM

## 2024-08-06 PROCEDURE — 1126F AMNT PAIN NOTED NONE PRSNT: CPT | Mod: CPTII,S$GLB,, | Performed by: FAMILY MEDICINE

## 2024-08-06 PROCEDURE — G2211 COMPLEX E/M VISIT ADD ON: HCPCS | Mod: S$GLB,,, | Performed by: FAMILY MEDICINE

## 2024-08-06 PROCEDURE — 3288F FALL RISK ASSESSMENT DOCD: CPT | Mod: CPTII,S$GLB,, | Performed by: FAMILY MEDICINE

## 2024-08-06 PROCEDURE — 1160F RVW MEDS BY RX/DR IN RCRD: CPT | Mod: CPTII,S$GLB,, | Performed by: FAMILY MEDICINE

## 2024-08-06 PROCEDURE — 1100F PTFALLS ASSESS-DOCD GE2>/YR: CPT | Mod: CPTII,S$GLB,, | Performed by: FAMILY MEDICINE

## 2024-08-06 PROCEDURE — 1159F MED LIST DOCD IN RCRD: CPT | Mod: CPTII,S$GLB,, | Performed by: FAMILY MEDICINE

## 2024-08-06 PROCEDURE — 99214 OFFICE O/P EST MOD 30 MIN: CPT | Mod: S$GLB,,, | Performed by: FAMILY MEDICINE

## 2024-08-06 PROCEDURE — 3078F DIAST BP <80 MM HG: CPT | Mod: CPTII,S$GLB,, | Performed by: FAMILY MEDICINE

## 2024-08-06 PROCEDURE — 3074F SYST BP LT 130 MM HG: CPT | Mod: CPTII,S$GLB,, | Performed by: FAMILY MEDICINE

## 2024-08-06 PROCEDURE — 99999 PR PBB SHADOW E&M-EST. PATIENT-LVL V: CPT | Mod: PBBFAC,,, | Performed by: FAMILY MEDICINE

## 2024-08-06 RX ORDER — DIAZEPAM 5 MG/1
TABLET ORAL
Qty: 3 TABLET | Refills: 0 | Status: SHIPPED | OUTPATIENT
Start: 2024-08-06

## 2024-08-08 ENCOUNTER — PATIENT OUTREACH (OUTPATIENT)
Dept: ADMINISTRATIVE | Facility: OTHER | Age: 79
End: 2024-08-08
Payer: MEDICARE

## 2024-08-08 NOTE — PROGRESS NOTES
CHW - Case Closure    This Community Health Worker spoke to patient via telephone today.   Pt/Caregiver reported: Pt declined assistance with resources.   Pt/Caregiver denied any additional needs at this time and agrees with episode closure at this time.  Provided patient with Community Health Worker's contact information and encouraged him/her to contact this Community Health Worker if additional needs arise.          CHW - Outreach Attempt    Community Health Worker left a voicemail message for 1st attempt to contact patient regarding: Left vm notifying pt that if she needs assisiance with resources to call me back.   Community Health Worker to attempt to contact patient on:   8/15/24

## 2024-08-16 ENCOUNTER — HOSPITAL ENCOUNTER (OUTPATIENT)
Dept: RADIOLOGY | Facility: HOSPITAL | Age: 79
Discharge: HOME | End: 2024-08-16
Attending: FAMILY MEDICINE
Payer: MEDICARE

## 2024-08-16 DIAGNOSIS — R29.818 OTHER SYMPTOMS AND SIGNS INVOLVING THE NERVOUS SYSTEM: ICD-10-CM

## 2024-08-16 DIAGNOSIS — R41.3 OTHER AMNESIA: ICD-10-CM

## 2024-08-16 PROCEDURE — 70553 MRI BRAIN STEM W/O & W/DYE: CPT | Mod: 26,,, | Performed by: RADIOLOGY

## 2024-08-16 PROCEDURE — 70553 MRI BRAIN STEM W/O & W/DYE: CPT | Mod: TC,PO

## 2024-08-16 PROCEDURE — A9585 GADOBUTROL INJECTION: HCPCS | Mod: PO | Performed by: FAMILY MEDICINE

## 2024-08-16 PROCEDURE — 25500020 PHARM REV CODE 255: Mod: PO | Performed by: FAMILY MEDICINE

## 2024-08-16 RX ORDER — GADOBUTROL 604.72 MG/ML
8.5 INJECTION INTRAVENOUS
Status: COMPLETED | OUTPATIENT
Start: 2024-08-16 | End: 2024-08-16

## 2024-08-16 RX ADMIN — GADOBUTROL 8.5 ML: 604.72 INJECTION INTRAVENOUS at 04:08

## 2024-09-03 DIAGNOSIS — E03.4 HYPOTHYROIDISM DUE TO ACQUIRED ATROPHY OF THYROID: ICD-10-CM

## 2024-09-03 NOTE — TELEPHONE ENCOUNTER
No care due was identified.  [facility] Embedded Care Due Messages. Reference number: [id number].   9/03/2024 3:43:48 PM CDT

## 2024-09-04 RX ORDER — LEVOTHYROXINE SODIUM 112 UG/1
112 TABLET ORAL
Qty: 90 TABLET | Refills: 3 | Status: SHIPPED | OUTPATIENT
Start: 2024-09-04

## 2024-09-04 RX ORDER — AMLODIPINE BESYLATE 5 MG/1
5 TABLET ORAL
Qty: 90 TABLET | Refills: 3 | Status: SHIPPED | OUTPATIENT
Start: 2024-09-04

## 2024-09-04 NOTE — TELEPHONE ENCOUNTER
Refill Decision Note   Tali Ward  is requesting a refill authorization.  Brief Assessment and Rationale for Refill:  Approve     Medication Therapy Plan: TSH WNL      Comments:     Note composed:11:57 AM 09/04/2024

## 2024-09-24 ENCOUNTER — TELEPHONE (OUTPATIENT)
Dept: FAMILY MEDICINE | Facility: CLINIC | Age: 79
End: 2024-09-24
Payer: MEDICARE

## 2024-09-24 NOTE — TELEPHONE ENCOUNTER
----- Message from Patrick Woodard sent at 9/24/2024  1:46 PM CDT -----  Contact: Daughter  Type:  Needs Medical Advice    Who Called:  Destinee London  Symptoms (please be specific):  How long has patient had these symptoms:    Pharmacy name and phone #:    Would the patient rather a call back or a response via [facility]? call back/  Best Call Back Number: [phone]  Additional Information: Daughter states pt has had more episodes and would like to speak to staff in regards to plan of action  Please advise  Thanks

## 2024-09-24 NOTE — TELEPHONE ENCOUNTER
Spoke to pt daughter who states she is really worried about mother stating pt is not able to care for herself more. Pt daughter states she is still having dizzy spells, and weakness. Pt needs assistance getting in and out of car, in and out of pew at [employer]. Pt is not remembering some of these situations. Pt seems to be declining and daughter is requesting the next step to assist pt in her care. Pt states she admitted to daughter that this has happened multiple times before but never wanted to tell anyone. Pt is comfortable with Dr. Rodríguez and req to be seen by Dr. Rodríguez. Pt daughter is a teacher and requesting pt to call her during appt so she is able to assist if needed.appt scheduled

## 2024-09-26 ENCOUNTER — OFFICE VISIT (OUTPATIENT)
Dept: FAMILY MEDICINE | Facility: CLINIC | Age: 79
End: 2024-09-26
Payer: MEDICARE

## 2024-09-26 VITALS
HEART RATE: 85 BPM | RESPIRATION RATE: 18 BRPM | OXYGEN SATURATION: 97 % | BODY MASS INDEX: 32.81 KG/M2 | TEMPERATURE: 98 F | SYSTOLIC BLOOD PRESSURE: 110 MMHG | HEIGHT: 63 IN | WEIGHT: 185.19 LBS | DIASTOLIC BLOOD PRESSURE: 60 MMHG

## 2024-09-26 DIAGNOSIS — R41.3 MEMORY LOSS: ICD-10-CM

## 2024-09-26 DIAGNOSIS — E03.4 HYPOTHYROIDISM DUE TO ACQUIRED ATROPHY OF THYROID: ICD-10-CM

## 2024-09-26 DIAGNOSIS — N18.31 CHRONIC KIDNEY DISEASE, STAGE 3A: ICD-10-CM

## 2024-09-26 DIAGNOSIS — E11.9 TYPE 2 DIABETES MELLITUS WITHOUT COMPLICATION, WITHOUT LONG-TERM CURRENT USE OF INSULIN: ICD-10-CM

## 2024-09-26 DIAGNOSIS — I10 ESSENTIAL HYPERTENSION: ICD-10-CM

## 2024-09-26 DIAGNOSIS — R53.1 WEAKNESS GENERALIZED: ICD-10-CM

## 2024-09-26 DIAGNOSIS — I63.9 CEREBROVASCULAR ACCIDENT (CVA), UNSPECIFIED MECHANISM: Primary | ICD-10-CM

## 2024-09-26 DIAGNOSIS — J43.9 PULMONARY EMPHYSEMA, UNSPECIFIED EMPHYSEMA TYPE: ICD-10-CM

## 2024-09-26 PROCEDURE — 99999 PR PBB SHADOW E&M-EST. PATIENT-LVL IV: CPT | Mod: PBBFAC,,, | Performed by: FAMILY MEDICINE
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
## 2024-09-26 NOTE — PROGRESS NOTES
Called and spoke to patient daughter regarding Medical Eligibility form. Informed patient daughter that she can  original paperwork at the . Copy sent to scanning.

## 2024-09-30 PROCEDURE — G0180 MD CERTIFICATION HHA PATIENT: HCPCS | Mod: ,,, | Performed by: FAMILY MEDICINE

## 2024-10-01 ENCOUNTER — TELEPHONE (OUTPATIENT)
Dept: FAMILY MEDICINE | Facility: CLINIC | Age: 79
End: 2024-10-01
Payer: MEDICARE

## 2024-10-01 NOTE — TELEPHONE ENCOUNTER
----- Message from Euroling sent at 10/1/2024  1:06 PM CDT -----  Type:  Needs Medical Advice    Who Called: the patient  Symptoms (please be specific):  How long has patient had these symptoms:    Pharmacy name and phone #:    Would the patient rather a call back or a response via [facility]? call back  Best Call Back Number: [phone]   Additional Information: Pt states she would like to speak to nurse in regards to appt  Thanks

## 2024-10-01 NOTE — TELEPHONE ENCOUNTER
Pt states she has not received a call from Neurology to schedule. I see you were working on this. Are you able to help?

## 2024-10-01 NOTE — TELEPHONE ENCOUNTER
Chief complaint:   Chief Complaint   Patient presents with   • Rash     Rash on bilateral cheeks about 1 month, using hydrocortisone as recommended by pediatrician        Vitals:  Visit Vitals  Pulse 159   Temp 99.9 °F (37.7 °C) (Temporal)   Wt 6.5 kg (14 lb 5.3 oz)   SpO2 98%       HISTORY OF PRESENT ILLNESS     Jose G Muse is a 4 month old male brought in by mom for blood on his right cheek. He has eczema and has been scratching his face more. His pediatrician had them using aquaphor and hydrocortisone. Doesn't seem to be helping.       Other significant problems:  There are no problems to display for this patient.      PAST MEDICAL, FAMILY AND SOCIAL HISTORY     Medications:  Current Outpatient Medications   Medication Sig Dispense Refill   • triamcinolone (ARISTOCORT) 0.1 % cream Apply topically 2 times daily. Apply a thin layer three times a day as needed for rash, rub in well 30 g 1     No current facility-administered medications for this visit.       Allergies:  ALLERGIES:  No Known Allergies    Past Medical  History/Surgeries:  History reviewed. No pertinent past medical history.    History reviewed. No pertinent surgical history.    Family History:  History reviewed. No pertinent family history.    Social History:  Social History     Tobacco Use   • Smoking status: Not on file   • Smokeless tobacco: Not on file   Substance Use Topics   • Alcohol use: Not on file       REVIEW OF SYSTEMS     Review of Systems   All other systems reviewed and are negative.      PHYSICAL EXAM     Physical Exam  Vitals and nursing note reviewed.   Constitutional:       General: He is active. He is not in acute distress.     Appearance: Normal appearance. He is well-developed. He is not toxic-appearing.   Skin:     General: Skin is warm.      Turgor: Normal.      Comments: Dry eczema patches to bilateral cheeks. He does have abrasions to right cheek, no active bleeding but there is dry blood. Area was cleansed with  Left message for patient to return call to clinic     water.    Neurological:      Mental Status: He is alert.         ASSESSMENT/PLAN     Facial eczema  (primary encounter diagnosis)  Plan: triamcinolone (ARISTOCORT) 0.1 % cream    We can try triamcinolone instead of hydrocortisone  Use sparingly  Recommend they put mittens or socks on his hands if he is scratching more

## 2024-10-01 NOTE — TELEPHONE ENCOUNTER
----- Message from Moisés sent at 10/1/2024  2:05 PM CDT -----  Type: Needs Medical Advice  Who Called:  pt  Best Call Back Number: [phone]    Additional Information: Pt is calling the office returning call back to the nurse.Please call back and advise.

## 2024-10-03 ENCOUNTER — TELEPHONE (OUTPATIENT)
Dept: NEUROLOGY | Facility: CLINIC | Age: 79
End: 2024-10-03
Payer: MEDICARE

## 2024-10-03 NOTE — TELEPHONE ENCOUNTER
Called to schedule hospital fu. Spoke to pt's daughter first who said call the pt. Spoke to pt who said call the daughter. Advised pt that if she would like daughter to bring her that she needs to communicate with daughter to call us to schedule a day that works. Gave neuro phone number.

## 2024-10-03 NOTE — TELEPHONE ENCOUNTER
----- Message from Kinza sent at 10/3/2024 12:34 PM CDT -----  Regarding: Sooner Appointment Request  Contact: patient at [phone]  Type:  Sooner Appointment Request    Name of Caller:  patient at [phone]    When is the first available appointment?  None    Symptoms:  stroke in hospital, referral in system    Additional Information:  Please call and advise. Thank you

## 2024-10-04 ENCOUNTER — EXTERNAL HOME HEALTH (OUTPATIENT)
Dept: HOME HEALTH SERVICES | Facility: HOSPITAL | Age: 79
End: 2024-10-04
Payer: MEDICARE

## 2024-10-04 ENCOUNTER — DOCUMENT SCAN (OUTPATIENT)
Dept: HOME HEALTH SERVICES | Facility: HOSPITAL | Age: 79
End: 2024-10-04
Payer: MEDICARE

## 2024-10-07 ENCOUNTER — TELEPHONE (OUTPATIENT)
Dept: NEUROLOGY | Facility: CLINIC | Age: 79
End: 2024-10-07
Payer: MEDICARE

## 2024-10-07 NOTE — TELEPHONE ENCOUNTER
----- Message from Kinza sent at 10/7/2024 11:59 AM CDT -----  Regarding: Sooner Appointment Request  Contact: daughter at [phone]  Type:  Sooner Appointment Request    Name of Caller:  daughter at [phone]    When is the first available appointment?  None    Symptoms:  memory, referral in system    Additional Information:  Please call and advise after 4:30pm. Thank you

## 2024-10-07 NOTE — TELEPHONE ENCOUNTER
Returned call to patients daughter to schedule appointment. No answer. Mailbox full unable to leave message

## 2024-10-08 ENCOUNTER — TELEPHONE (OUTPATIENT)
Dept: NEUROLOGY | Facility: CLINIC | Age: 79
End: 2024-10-08
Payer: MEDICARE

## 2024-10-08 NOTE — TELEPHONE ENCOUNTER
Spoke with patients daughter and offered first available appointment in [place] for 2/19/25. Daughter states PCP wanted patient seen sooner. Scheduled for 11/8/24 with Dr. Milian in [place].

## 2024-10-08 NOTE — TELEPHONE ENCOUNTER
----- Message from Jossie sent at 10/8/2024 12:11 PM CDT -----  Type:  Patient Returning Call    Who Called:Pretty - daughter      Who Left Message for Patient:Connie    Does the patient know what this is regarding?:yes    Would the patient rather a call back or a response via [facility]? Call back    Best Call Back Number:[phone]      Additional Information: Please do not call until after 4:30 because she is working and can't answer phone     Please call Back to advise. Thanks!

## 2024-10-15 ENCOUNTER — DOCUMENT SCAN (OUTPATIENT)
Dept: HOME HEALTH SERVICES | Facility: HOSPITAL | Age: 79
End: 2024-10-15
Payer: MEDICARE

## 2024-10-22 ENCOUNTER — PATIENT OUTREACH (OUTPATIENT)
Dept: ADMINISTRATIVE | Facility: OTHER | Age: 79
End: 2024-10-22
Payer: MEDICARE

## 2024-10-23 NOTE — PROGRESS NOTES
LPN spoke to patient/caregiver as per OPCM referral for: Placement, caregiver support    Does the patient consent to completing the assessment/enrollment: Yes  Does the patient consent for LPN to speak to a caregiver? No    Health Insurance Coverage:     Does the patient have adequate health insurance coverage? Yes  Education provided: Yes    PCP Follow-Up Appointments:    Does the patient have a primary care provider? yes - Elizabeth Rodríguez MD  Date of last PCP appointment? 9/26/24  Next PCP appointment:  11/6/24  Was patient provided with education surrounding PCP services/creating a medical home? yes -       Specialist Appointments:     Does the patient have a pending specialist referral? no  Does the patient have an upcoming specialist appointment? yes - neurology, care at home  Is the patient pregnant? No  Does the patient have a mental health provider? no       Home Medications:     Reviewed medication list with patient? No  Is the patient able to afford their medications? Yes        Recent lab results:  Blood Sugar:    Provided education: No  Blood Pressure:   Provided education: No        Social Determinants of Health (SDOH)    Patient's identified areas of need:      Education/Resources provided:          Home Health/DME:    Current Home Health: Yes  Patient has all healthcare equipment/supplies indicated: yes      Additional Documentation:   Spoke to patient based on OPCM referral. States doing pretty will at home. Has HH coming. Discussed upcoming care at home NP appt. States would like transp options and she was told she should no longer be driving. Will look into insurance and other options. Pt does have upcoming appt with neurology on 11/8/24. She declines discussion on SNF or other placement at this time. Will f/u.       Follow up:   Patient agrees to scheduled follow up call.

## 2024-10-28 ENCOUNTER — TELEPHONE (OUTPATIENT)
Dept: FAMILY MEDICINE | Facility: CLINIC | Age: 79
End: 2024-10-28
Payer: MEDICARE

## 2024-10-28 ENCOUNTER — PATIENT OUTREACH (OUTPATIENT)
Dept: ADMINISTRATIVE | Facility: OTHER | Age: 79
End: 2024-10-28
Payer: MEDICARE

## 2024-10-29 ENCOUNTER — OFFICE VISIT (OUTPATIENT)
Dept: HOME HEALTH SERVICES | Facility: CLINIC | Age: 79
End: 2024-10-29
Payer: MEDICARE

## 2024-10-29 ENCOUNTER — TELEPHONE (OUTPATIENT)
Dept: FAMILY MEDICINE | Facility: CLINIC | Age: 79
End: 2024-10-29
Payer: MEDICARE

## 2024-10-29 DIAGNOSIS — I10 ESSENTIAL HYPERTENSION: ICD-10-CM

## 2024-10-29 DIAGNOSIS — Z86.73 HISTORY OF CVA (CEREBROVASCULAR ACCIDENT): ICD-10-CM

## 2024-10-29 DIAGNOSIS — E78.5 HYPERLIPIDEMIA ASSOCIATED WITH TYPE 2 DIABETES MELLITUS: ICD-10-CM

## 2024-10-29 DIAGNOSIS — N18.31 TYPE 2 DIABETES MELLITUS WITH STAGE 3A CHRONIC KIDNEY DISEASE, WITHOUT LONG-TERM CURRENT USE OF INSULIN: ICD-10-CM

## 2024-10-29 DIAGNOSIS — E03.9 HYPOTHYROIDISM, UNSPECIFIED TYPE: ICD-10-CM

## 2024-10-29 DIAGNOSIS — R26.81 UNSTEADY GAIT WHEN WALKING: ICD-10-CM

## 2024-10-29 DIAGNOSIS — E66.811 CLASS 1 OBESITY WITH SERIOUS COMORBIDITY AND BODY MASS INDEX (BMI) OF 32.0 TO 32.9 IN ADULT, UNSPECIFIED OBESITY TYPE: ICD-10-CM

## 2024-10-29 DIAGNOSIS — Z00.00 ENCOUNTER FOR PREVENTIVE HEALTH EXAMINATION: Primary | ICD-10-CM

## 2024-10-29 DIAGNOSIS — M81.0 OSTEOPOROSIS, POST-MENOPAUSAL: ICD-10-CM

## 2024-10-29 DIAGNOSIS — F33.1 MODERATE EPISODE OF RECURRENT MAJOR DEPRESSIVE DISORDER: ICD-10-CM

## 2024-10-29 DIAGNOSIS — R60.9 EDEMA, UNSPECIFIED TYPE: Primary | ICD-10-CM

## 2024-10-29 DIAGNOSIS — H40.89 OTHER SPECIFIED GLAUCOMA, UNSPECIFIED LATERALITY: ICD-10-CM

## 2024-10-29 DIAGNOSIS — J43.9 PULMONARY EMPHYSEMA, UNSPECIFIED EMPHYSEMA TYPE: ICD-10-CM

## 2024-10-29 DIAGNOSIS — R41.3 MEMORY CHANGES: ICD-10-CM

## 2024-10-29 DIAGNOSIS — E11.69 HYPERLIPIDEMIA ASSOCIATED WITH TYPE 2 DIABETES MELLITUS: ICD-10-CM

## 2024-10-29 DIAGNOSIS — E11.22 TYPE 2 DIABETES MELLITUS WITH STAGE 3A CHRONIC KIDNEY DISEASE, WITHOUT LONG-TERM CURRENT USE OF INSULIN: ICD-10-CM

## 2024-10-29 PROCEDURE — 3078F DIAST BP <80 MM HG: CPT | Mod: CPTII,S$GLB,, | Performed by: NURSE PRACTITIONER

## 2024-10-29 PROCEDURE — G0439 PPPS, SUBSEQ VISIT: HCPCS | Mod: S$GLB,,, | Performed by: NURSE PRACTITIONER

## 2024-10-29 PROCEDURE — 1158F ADVNC CARE PLAN TLK DOCD: CPT | Mod: CPTII,S$GLB,, | Performed by: NURSE PRACTITIONER

## 2024-10-29 PROCEDURE — 1101F PT FALLS ASSESS-DOCD LE1/YR: CPT | Mod: CPTII,S$GLB,, | Performed by: NURSE PRACTITIONER

## 2024-10-29 PROCEDURE — 1126F AMNT PAIN NOTED NONE PRSNT: CPT | Mod: CPTII,S$GLB,, | Performed by: NURSE PRACTITIONER

## 2024-10-29 PROCEDURE — 1160F RVW MEDS BY RX/DR IN RCRD: CPT | Mod: CPTII,S$GLB,, | Performed by: NURSE PRACTITIONER

## 2024-10-29 PROCEDURE — 3288F FALL RISK ASSESSMENT DOCD: CPT | Mod: CPTII,S$GLB,, | Performed by: NURSE PRACTITIONER

## 2024-10-29 PROCEDURE — 1159F MED LIST DOCD IN RCRD: CPT | Mod: CPTII,S$GLB,, | Performed by: NURSE PRACTITIONER

## 2024-10-29 PROCEDURE — 3075F SYST BP GE 130 - 139MM HG: CPT | Mod: CPTII,S$GLB,, | Performed by: NURSE PRACTITIONER

## 2024-10-29 RX ORDER — SOY ISOFLAV/BCOHOSH/CISSUS QUA 198 MG
CAPSULE ORAL
COMMUNITY

## 2024-10-29 RX ORDER — FUROSEMIDE 20 MG/1
20 TABLET ORAL DAILY PRN
Qty: 30 TABLET | Refills: 2 | Status: SHIPPED | OUTPATIENT
Start: 2024-10-29 | End: 2025-10-29

## 2024-10-29 RX ORDER — POTASSIUM CHLORIDE 750 MG/1
10 TABLET, EXTENDED RELEASE ORAL DAILY PRN
Qty: 30 TABLET | Refills: 2 | Status: SHIPPED | OUTPATIENT
Start: 2024-10-29

## 2024-10-29 NOTE — PROGRESS NOTES
"  Tali Hopper presented for a follow-up Medicare AWV today. The following components were reviewed and updated:    Medical history  Family History  Social history  Allergies and Current Medications  Health Risk Assessment  Health Maintenance  Care Team    **See Completed Assessments for Annual Wellness visit with in the encounter summary    The following assessments were completed:  Depression Screening  Cognitive function Screening  Timed Get Up Test  Whisper Test      Opioid documentation:      Patient does not have a current opioid prescription.          Vitals:    10/29/24 1037   BP: 138/78   Patient Position: Sitting   Pulse: 78   Resp: 20   Temp: 98.7 °F (37.1 °C)   SpO2: 98%   Weight: 83.5 kg (184 lb)   Height: 5' 3" (1.6 m)     Body mass index is 32.59 kg/m².       Physical Exam  Constitutional:       General: She is not in acute distress.     Appearance: She is obese. She is not ill-appearing.   HENT:      Head: Normocephalic and atraumatic.      Right Ear: External ear normal.      Left Ear: External ear normal.      Nose: Nose normal.      Mouth/Throat:      Mouth: Mucous membranes are dry.      Pharynx: Oropharynx is clear.   Eyes:      Extraocular Movements: Extraocular movements intact.      Conjunctiva/sclera: Conjunctivae normal.      Pupils: Pupils are equal, round, and reactive to light.   Cardiovascular:      Rate and Rhythm: Normal rate and regular rhythm.      Pulses: Normal pulses.      Heart sounds: Normal heart sounds.   Pulmonary:      Effort: Pulmonary effort is normal.      Breath sounds: Normal breath sounds.   Abdominal:      General: Bowel sounds are normal. There is no distension.      Palpations: Abdomen is soft.      Tenderness: There is no abdominal tenderness.   Musculoskeletal:         General: Normal range of motion.      Cervical back: Normal range of motion and neck supple.   Skin:     General: Skin is warm and dry.      Capillary Refill: Capillary refill takes 2 to 3 " seconds.   Neurological:      Mental Status: She is alert and oriented to person, place, and time.      Motor: No weakness.      Gait: Gait abnormal.   Psychiatric:         Mood and Affect: Mood normal.         Behavior: Behavior normal.         Thought Content: Thought content normal.         Judgment: Judgment normal.           Diagnoses and health risks identified today and associated recommendations/orders:  1. Encounter for preventive health examination  Assessment completed. Preventive measures and maintenance reviewed with patient.    2. Moderate episode of recurrent major depressive disorder  Stable on duloxetine.  PHQ9 10 today, denies self harm thoughts. Misses driving and doesn't like staying home so much.  Followed by PCP.     3. Pulmonary emphysema, unspecified emphysema type  Stable, uses albuterol HFA prn.  Followed by PCP.    4. Hypothyroidism, unspecified type  Stable on levothyroxine.   Followed by PCP.     5. Essential hypertension  Stable on losartan-hctz, norvasc,furosemide.  Followed by PCP.  Follow heart healthy diet.    6. Hyperlipidemia associated with type 2 diabetes mellitus  Stable on atorvastatin, metformin.  Follow heart healthy and ADA diet.  Last AIC 5.8.  Followed by PCP.     7. Class 1 obesity with serious comorbidity and body mass index (BMI) of 32.0 to 32.9 in adult, unspecified obesity type  BMI 32.59.  -Discussed weight reduction strategies, including following ADA, heart healthy/low chol diet, reducing portion sizes, caloric intake and snacking.  -Discussed importance of engaging in physical activity at least 5x/week for a minimum of 30 min/day.    8. Type 2 diabetes mellitus with stage 3a chronic kidney disease, without long-term current use of insulin  Stable AIC on metformin.  Encouraged to avoid NSAIDs, ensure adequate hydration.  Followed by PCP.    9. Unsteady gait when walking  Stable, uses rollator for ambulatory support.  Fall precautions and safety advised.    10.  Other specified glaucoma, unspecified laterality  Stable, followed by Ophthalmology-Dr. Gaytan.  Continue cosopt, latanoprost, timoptic.    11. Osteoporosis, post-menopausal  Stable on Evista.  Followed by PCP.  Fall precautions and safety advised.    12. History of CVA (cerebrovascular accident)  Stable with memory changes reported.  Continue asa and statin.   Followed by PCP.    13. Memory changes  Chronic, relates memory changes with CVA.  Continue asa and statin.   Followed by PCP.      Provided Tali with a 5-10 year written screening schedule and personal prevention plan. Recommendations were developed using the USPSTF age appropriate recommendations. Education, counseling, and referrals were provided as needed.  After Visit Summary printed and given to patient which includes a list of additional screenings\tests needed.    Follow up in about 1 year (around 10/29/2025) for your next annual wellness visit..        Sayra Montgomery NP      I offered to discuss advanced care planning, including how to pick a person who would make decisions for you if you were unable to make them for yourself, called a health care power of , and what kind of decisions you might make such as use of life sustaining treatments such as ventilators and tube feeding when faced with a life limiting illness recorded on a living will that they will need to know. (How you want to be cared for as you near the end of your natural life)     X Patient is interested in learning more about how to make advanced directives.  I provided them paperwork and offered to discuss this with them.

## 2024-10-29 NOTE — PATIENT INSTRUCTIONS
Counseling and Referral of Other Preventative  (Italic type indicates deductible and co-insurance are waived)    Patient Name: Tali Hopper  Today's Date: 10/29/2024    Health Maintenance       Date Due Completion Date    Pneumococcal Vaccines (Age 65+) (1 of 2 - PCV) Never done ---    Shingles Vaccine (1 of 2) Never done ---    RSV Vaccine (Age 60+ and Pregnant patients) (1 - 1-dose 75+ series) Never done ---    COVID-19 Vaccine (2 - 2024-25 season) 09/01/2024 11/8/2021    Eye Exam 09/20/2024 9/20/2023    Diabetes Urine Screening 01/29/2025 1/29/2024    Hemoglobin A1c 01/30/2025 7/30/2024    Lipid Panel 08/03/2025 8/3/2024    DEXA Scan 07/30/2027 7/30/2024    TETANUS VACCINE 08/17/2027 8/17/2017        No orders of the defined types were placed in this encounter.    The following information is provided to all patients.  This information is to help you find resources for any of the problems found today that may be affecting your health:                  Living healthy guide: www.[facility].[place].gov      Understanding Diabetes: www.diabetes.org      Eating healthy: www.cdc.gov/healthyweight      CDC home safety checklist: www.cdc.gov/steadi/patient.html      Agency on Aging: www.goea.[place].[facility]      Alcoholics anonymous (AA): www.aa.org      Physical Activity: www.yazmin.nih.gov/qc2rkpp      Tobacco use: www.quitwithusla.org

## 2024-11-03 VITALS
OXYGEN SATURATION: 98 % | SYSTOLIC BLOOD PRESSURE: 138 MMHG | BODY MASS INDEX: 32.6 KG/M2 | TEMPERATURE: 99 F | HEART RATE: 78 BPM | RESPIRATION RATE: 20 BRPM | DIASTOLIC BLOOD PRESSURE: 78 MMHG | HEIGHT: 63 IN | WEIGHT: 184 LBS

## 2024-11-03 PROBLEM — Z91.81 HISTORY OF RECENT FALL: Status: RESOLVED | Noted: 2021-09-13 | Resolved: 2024-11-03

## 2024-11-03 PROBLEM — H40.89 OTHER SPECIFIED GLAUCOMA: Status: ACTIVE | Noted: 2024-11-03

## 2024-11-03 PROBLEM — M25.60 DECREASED RANGE OF MOTION: Status: RESOLVED | Noted: 2023-04-10 | Resolved: 2024-11-03

## 2024-11-03 PROBLEM — R41.3 MEMORY CHANGES: Status: ACTIVE | Noted: 2024-09-26

## 2024-11-03 PROBLEM — R53.1 WEAKNESS GENERALIZED: Status: RESOLVED | Noted: 2024-09-26 | Resolved: 2024-11-03

## 2024-11-03 PROBLEM — R53.1 DECREASED STRENGTH: Status: RESOLVED | Noted: 2023-04-10 | Resolved: 2024-11-03

## 2024-11-03 PROBLEM — Z86.73 HISTORY OF CVA (CEREBROVASCULAR ACCIDENT): Status: ACTIVE | Noted: 2024-11-03

## 2024-11-03 PROBLEM — R94.31 PROLONGED Q-T INTERVAL ON ECG: Status: RESOLVED | Noted: 2019-08-05 | Resolved: 2024-11-03

## 2024-11-03 PROBLEM — E11.22 TYPE 2 DIABETES MELLITUS WITH STAGE 3A CHRONIC KIDNEY DISEASE, WITHOUT LONG-TERM CURRENT USE OF INSULIN: Status: ACTIVE | Noted: 2020-12-17

## 2024-11-03 PROBLEM — R41.3 MEMORY LOSS: Status: RESOLVED | Noted: 2024-09-26 | Resolved: 2024-11-03

## 2024-11-03 PROBLEM — R29.898 LEG WEAKNESS, BILATERAL: Status: RESOLVED | Noted: 2022-07-07 | Resolved: 2024-11-03

## 2024-11-03 PROBLEM — R26.81 UNSTEADY GAIT WHEN WALKING: Status: ACTIVE | Noted: 2024-11-03

## 2024-11-03 PROBLEM — R26.89 IMBALANCE: Status: RESOLVED | Noted: 2022-07-07 | Resolved: 2024-11-03

## 2024-11-03 PROBLEM — N18.31 TYPE 2 DIABETES MELLITUS WITH STAGE 3A CHRONIC KIDNEY DISEASE, WITHOUT LONG-TERM CURRENT USE OF INSULIN: Status: ACTIVE | Noted: 2020-12-17

## 2024-11-03 PROBLEM — M62.89 MUSCLE TIGHTNESS: Status: RESOLVED | Noted: 2023-04-10 | Resolved: 2024-11-03

## 2024-11-03 PROBLEM — E66.811 CLASS 1 OBESITY WITH SERIOUS COMORBIDITY AND BODY MASS INDEX (BMI) OF 32.0 TO 32.9 IN ADULT: Status: ACTIVE | Noted: 2019-05-20

## 2024-11-03 PROBLEM — Z71.89 ADVANCED CARE PLANNING/COUNSELING DISCUSSION: Status: RESOLVED | Noted: 2021-09-13 | Resolved: 2024-11-03

## 2024-11-03 PROBLEM — N18.31 CHRONIC KIDNEY DISEASE, STAGE 3A: Status: RESOLVED | Noted: 2024-08-06 | Resolved: 2024-11-03

## 2024-11-08 ENCOUNTER — OFFICE VISIT (OUTPATIENT)
Dept: NEUROLOGY | Facility: CLINIC | Age: 79
End: 2024-11-08
Payer: MEDICARE

## 2024-11-08 ENCOUNTER — LAB VISIT (OUTPATIENT)
Dept: LAB | Facility: HOSPITAL | Age: 79
End: 2024-11-08
Attending: FAMILY MEDICINE
Payer: MEDICARE

## 2024-11-08 VITALS
SYSTOLIC BLOOD PRESSURE: 150 MMHG | WEIGHT: 187 LBS | HEIGHT: 63 IN | BODY MASS INDEX: 33.13 KG/M2 | DIASTOLIC BLOOD PRESSURE: 69 MMHG | HEART RATE: 77 BPM

## 2024-11-08 DIAGNOSIS — E78.5 HYPERLIPIDEMIA ASSOCIATED WITH TYPE 2 DIABETES MELLITUS: ICD-10-CM

## 2024-11-08 DIAGNOSIS — I63.9 CEREBROVASCULAR ACCIDENT (CVA), UNSPECIFIED MECHANISM: ICD-10-CM

## 2024-11-08 DIAGNOSIS — R60.9 EDEMA, UNSPECIFIED TYPE: ICD-10-CM

## 2024-11-08 DIAGNOSIS — E11.9 TYPE 2 DIABETES MELLITUS WITHOUT COMPLICATION, WITHOUT LONG-TERM CURRENT USE OF INSULIN: ICD-10-CM

## 2024-11-08 DIAGNOSIS — E03.9 HYPOTHYROIDISM, UNSPECIFIED TYPE: ICD-10-CM

## 2024-11-08 DIAGNOSIS — E11.69 HYPERLIPIDEMIA ASSOCIATED WITH TYPE 2 DIABETES MELLITUS: ICD-10-CM

## 2024-11-08 DIAGNOSIS — R41.3 MEMORY LOSS: Primary | ICD-10-CM

## 2024-11-08 LAB
ALBUMIN SERPL BCP-MCNC: 3.5 G/DL (ref 3.5–5.2)
ALP SERPL-CCNC: 62 U/L (ref 40–150)
ALT SERPL W/O P-5'-P-CCNC: 13 U/L (ref 10–44)
ANION GAP SERPL CALC-SCNC: 11 MMOL/L (ref 8–16)
ANION GAP SERPL CALC-SCNC: 11 MMOL/L (ref 8–16)
AST SERPL-CCNC: 14 U/L (ref 10–40)
BASOPHILS # BLD AUTO: 0.1 K/UL (ref 0–0.2)
BASOPHILS NFR BLD: 0.9 % (ref 0–1.9)
BILIRUB SERPL-MCNC: 0.4 MG/DL (ref 0.1–1)
BUN SERPL-MCNC: 25 MG/DL (ref 8–23)
BUN SERPL-MCNC: 25 MG/DL (ref 8–23)
CALCIUM SERPL-MCNC: 9.3 MG/DL (ref 8.7–10.5)
CALCIUM SERPL-MCNC: 9.3 MG/DL (ref 8.7–10.5)
CHLORIDE SERPL-SCNC: 106 MMOL/L (ref 95–110)
CHLORIDE SERPL-SCNC: 106 MMOL/L (ref 95–110)
CHOLEST SERPL-MCNC: 123 MG/DL (ref 120–199)
CHOLEST/HDLC SERPL: 3.2 {RATIO} (ref 2–5)
CO2 SERPL-SCNC: 22 MMOL/L (ref 23–29)
CO2 SERPL-SCNC: 22 MMOL/L (ref 23–29)
CREAT SERPL-MCNC: 1.2 MG/DL (ref 0.5–1.4)
CREAT SERPL-MCNC: 1.2 MG/DL (ref 0.5–1.4)
DIFFERENTIAL METHOD BLD: ABNORMAL
EOSINOPHIL # BLD AUTO: 0.3 K/UL (ref 0–0.5)
EOSINOPHIL NFR BLD: 2.4 % (ref 0–8)
ERYTHROCYTE [DISTWIDTH] IN BLOOD BY AUTOMATED COUNT: 13.4 % (ref 11.5–14.5)
EST. GFR  (NO RACE VARIABLE): 45.8 ML/MIN/1.73 M^2
EST. GFR  (NO RACE VARIABLE): 45.8 ML/MIN/1.73 M^2
ESTIMATED AVG GLUCOSE: 114 MG/DL (ref 68–131)
GLUCOSE SERPL-MCNC: 112 MG/DL (ref 70–110)
GLUCOSE SERPL-MCNC: 112 MG/DL (ref 70–110)
HBA1C MFR BLD: 5.6 % (ref 4–5.6)
HCT VFR BLD AUTO: 42 % (ref 37–48.5)
HDLC SERPL-MCNC: 39 MG/DL (ref 40–75)
HDLC SERPL: 31.7 % (ref 20–50)
HGB BLD-MCNC: 12.8 G/DL (ref 12–16)
IMM GRANULOCYTES # BLD AUTO: 0.06 K/UL (ref 0–0.04)
IMM GRANULOCYTES NFR BLD AUTO: 0.5 % (ref 0–0.5)
LDLC SERPL CALC-MCNC: 60.8 MG/DL (ref 63–159)
LYMPHOCYTES # BLD AUTO: 3 K/UL (ref 1–4.8)
LYMPHOCYTES NFR BLD: 26.9 % (ref 18–48)
MCH RBC QN AUTO: 28.1 PG (ref 27–31)
MCHC RBC AUTO-ENTMCNC: 30.5 G/DL (ref 32–36)
MCV RBC AUTO: 92 FL (ref 82–98)
MONOCYTES # BLD AUTO: 0.8 K/UL (ref 0.3–1)
MONOCYTES NFR BLD: 7.7 % (ref 4–15)
NEUTROPHILS # BLD AUTO: 6.8 K/UL (ref 1.8–7.7)
NEUTROPHILS NFR BLD: 61.6 % (ref 38–73)
NONHDLC SERPL-MCNC: 84 MG/DL
NRBC BLD-RTO: 0 /100 WBC
PLATELET # BLD AUTO: 308 K/UL (ref 150–450)
PMV BLD AUTO: 12.6 FL (ref 9.2–12.9)
POTASSIUM SERPL-SCNC: 4.2 MMOL/L (ref 3.5–5.1)
POTASSIUM SERPL-SCNC: 4.2 MMOL/L (ref 3.5–5.1)
PROT SERPL-MCNC: 6.9 G/DL (ref 6–8.4)
RBC # BLD AUTO: 4.55 M/UL (ref 4–5.4)
SODIUM SERPL-SCNC: 139 MMOL/L (ref 136–145)
SODIUM SERPL-SCNC: 139 MMOL/L (ref 136–145)
T4 FREE SERPL-MCNC: 1.21 NG/DL (ref 0.71–1.51)
TRIGL SERPL-MCNC: 116 MG/DL (ref 30–150)
TSH SERPL DL<=0.005 MIU/L-ACNC: 0.51 UIU/ML (ref 0.4–4)
WBC # BLD AUTO: 10.98 K/UL (ref 3.9–12.7)

## 2024-11-08 PROCEDURE — 80061 LIPID PANEL: CPT | Performed by: FAMILY MEDICINE

## 2024-11-08 PROCEDURE — 85025 COMPLETE CBC W/AUTO DIFF WBC: CPT | Performed by: FAMILY MEDICINE

## 2024-11-08 PROCEDURE — 80053 COMPREHEN METABOLIC PANEL: CPT | Performed by: FAMILY MEDICINE

## 2024-11-08 PROCEDURE — 99999 PR PBB SHADOW E&M-EST. PATIENT-LVL V: CPT | Mod: PBBFAC,,, | Performed by: STUDENT IN AN ORGANIZED HEALTH CARE EDUCATION/TRAINING PROGRAM

## 2024-11-08 PROCEDURE — 83036 HEMOGLOBIN GLYCOSYLATED A1C: CPT | Performed by: FAMILY MEDICINE

## 2024-11-08 PROCEDURE — 84439 ASSAY OF FREE THYROXINE: CPT | Performed by: FAMILY MEDICINE

## 2024-11-08 PROCEDURE — 84443 ASSAY THYROID STIM HORMONE: CPT | Performed by: FAMILY MEDICINE

## 2024-11-08 NOTE — PROGRESS NOTES
GENERAL NEUROLOGY VISIT   Date: 24  Patient Name: Tali Hopper   MRN: [id number]   PCP: Elizabeth Rodríguez  Referring Provider: Elizabeth Rodríguez MD    History:    Patient is a 79 y.o.  female who is here for memory concern.   History of recent COVID      Memory: patient reported after COVID she started not remember things, reported she can not remember her daughter age, sometimes she can not remember which places she going to and family took her car keys, she is living by herself , reported her house is a mess but able to take care of herself.     Sleep: on and off, go to be late usually, lay for 45 minutes, no night nassar, reported rarely would see something is not there. Reported she does not care about the memory problems because she is 79 yrs old and do not care about remember things.     Daughter reported she started to get lost while driving and call girlfriend to help, can not remember people like how many kids her daughter friend has , her house is a mess , trash everywhere , girlfriend helps with cleaning. Reported those issues for couple years but worsen.   Daughter also reported an event when they found her lying on the grass outside, patient reported she was tired and wanted to rest for a couple minutes.     Patient reported since   she started not care, daughter reported father gone for 12 years.     Insight has good insight, fluent speech  Activity of daily living able to do her activities of daily living, does not clean, does not wash dishes, reported she does not care  Personality:  No personality changes    Labs  TSH, CMP with low potassium before, last A1c 5.8    MRI brain with and without contrast  Moderate to severe supratentorial white matter changes while nonspecific suggest chronic microvascular pathology.  Mild volume loss with enlarged ventricles.     [facility]      Past Medical History:   Diagnosis Date    Cataract of left eye     Depression     Glaucoma     Hypertension      Loss of equilibrium     Osteoporosis     Thyroid disease        Past Surgical History:   Procedure Laterality Date    CHOLECYSTECTOMY      EYE SURGERY      right cataract    HYSTERECTOMY      TONSILLECTOMY         Social History     Socioeconomic History    Marital status:    Tobacco Use    Smoking status: Never    Smokeless tobacco: Never   Substance and Sexual Activity    Alcohol use: No    Drug use: No    Sexual activity: Not Currently     Social Drivers of Health     Financial Resource Strain: Medium Risk (10/29/2024)    Overall Financial Resource Strain (CARDIA)     Difficulty of Paying Living Expenses: Somewhat hard   Food Insecurity: No Food Insecurity (10/29/2024)    Hunger Vital Sign     Worried About Running Out of Food in the Last Year: Never true     Ran Out of Food in the Last Year: Never true   Transportation Needs: No Transportation Needs (10/29/2024)    PRAPARE - Transportation     Lack of Transportation (Medical): No     Lack of Transportation (Non-Medical): No   Recent Concern: Transportation Needs - Unmet Transportation Needs (10/23/2024)    PRAPARE - Transportation     Lack of Transportation (Medical): Yes     Lack of Transportation (Non-Medical): No   Physical Activity: Insufficiently Active (10/29/2024)    Exercise Vital Sign     Days of Exercise per Week: 1 day     Minutes of Exercise per Session: 10 min   Stress: No Stress Concern Present (10/29/2024)    [employer] - Occupational Stress Questionnaire     Feeling of Stress : Not at all   Recent Concern: Stress - Stress Concern Present (10/23/2024)    [employer] - Occupational Stress Questionnaire     Feeling of Stress : To some extent   Housing Stability: Low Risk  (10/29/2024)    Housing Stability Vital Sign     Unable to Pay for Housing in the Last Year: No     Homeless in the Last Year: No       Review of patient's allergies indicates:   Allergen Reactions    Benadryl  decongestant Shortness Of Breath     Respiration problems    Penicillins Hives       Current Outpatient Medications on File Prior to Visit   Medication Sig Dispense Refill    albuterol (PROVENTIL/VENTOLIN HFA) 90 mcg/actuation inhaler Inhale 2 puffs into the lungs every 6 (six) hours. Rescue (Patient not taking: Reported on 10/29/2024) 8 g 0    ascorbic acid, vitamin C, (VITAMIN C) 100 MG tablet Take 100 mg by mouth once daily.      aspirin (ECOTRIN) 81 MG EC tablet Take 81 mg by mouth once daily.      atorvastatin (LIPITOR) 20 MG tablet Take 1 tablet (20 mg total) by mouth once daily. 90 tablet 3    b complex vitamins tablet Take 1 tablet by mouth once daily.      CALCIUM CARBONATE/VITAMIN D3 (CALTRATE 600 + D ORAL) Take by mouth once daily.       diazePAM (VALIUM) 5 MG tablet 5 mg 1 h po prior to procedure , may repeat q 30 min prn up to 3/ 24 h (Patient not taking: Reported on 10/29/2024) 3 tablet 0    dorzolamide-timolol 2-0.5% (COSOPT) 22.3-6.8 mg/mL ophthalmic solution Place 1 drop into both eyes 2 (two) times daily.      DULoxetine (CYMBALTA) 60 MG capsule Take 1 capsule (60 mg total) by mouth once daily. TAKE 1 CAPSULE EVERY NIGHT AT BEDTIME 90 capsule 3    EVISTA 60 mg tablet TAKE 1 TABLET BY MOUTH ONCE A DAY 90 tablet 3    fluticasone propionate (FLONASE) 50 mcg/actuation nasal spray 2 sprays (100 mcg total) by Each Nostril route once daily. 16 g 1    furosemide (LASIX) 20 MG tablet Take 1 tablet (20 mg total) by mouth daily as needed (lasix). 30 tablet 2    latanoprost 0.005 % ophthalmic solution 1 drop every evening.      losartan-hydrochlorothiazide 100-12.5 mg (HYZAAR) 100-12.5 mg Tab Take 1 tablet by mouth once daily. 90 tablet 3    metFORMIN (GLUCOPHAGE-XR) 500 MG ER 24hr tablet TAKE 2 TABLETS BY MOUTH ONCE A DAY WITH BREAKFAST 180 tablet 0    mupirocin (BACTROBAN) 2 % ointment APPLY TO AFFECTED AREA 3 TIMES A DAY (Patient not taking: Reported on 10/29/2024) 22 g 0    [id number]-lycop-lut-herb#220  "(ESTROBLEND) 500 mcg-1,000 unit-20 mcg Tab Take 1 tablet by mouth once daily. 90 tablet 1    NORVASC 5 mg tablet TAKE 1 TABLET BY MOUTH ONCE A DAY 90 tablet 3    potassium chloride SA (K-DUR,KLOR-CON M) 10 MEQ tablet Take 1 tablet (10 mEq total) by mouth daily as needed (lasix). 30 tablet 2    rhubarb root extract (ESTROVEN CMPLT MENOPAUSE RLF) 4 mg Tab Take by mouth.      SYNTHROID 112 mcg tablet TAKE 1 TABLET BY MOUTH ONCE A DAY 90 tablet 3    timolol maleate 0.5% (TIMOPTIC-XE) 0.5 % SolG Place 1 drop into both eyes 2 (two) times daily.   2    triamcinolone acetonide 0.1% (KENALOG) 0.1 % ointment APPLY TOPICALLY TWICE A DAY (Patient not taking: Reported on 10/29/2024) 80 g 0    [DISCONTINUED] buPROPion (WELLBUTRIN XL) 150 MG TB24 tablet Take 1 tablet (150 mg total) by mouth once daily. 90 tablet 3    [DISCONTINUED] escitalopram oxalate (LEXAPRO) 20 MG tablet TAKE 1 TABLET EVERY DAY 90 tablet 3     No current facility-administered medications on file prior to visit.        Family history:  Family History   Problem Relation Name Age of Onset    Heart disease Maternal Grandmother      Heart disease Paternal Grandmother         Review Of Systems     Constitutional Negative for fevers, chills, weigh loss   HEENT Negative for hearing loss, dysphagia, sore throat, diplopia   Respiratory Negative for shortness of breath, cough    Cardiovascular Negative for chest pain, palpitations    Gastrointestinal Negative for constipation, diarrhea, early satiety    Skin Negative for rashes    Musculoskeletal Negative for joint pains, myalgias.   Neurological See Above    Psychological Negative for sleep disturbances.    Heme/Lymph Negative for easy bruising, easy bleeding    Endocrine Negative for polyuria, polydypsia     Physical Exam:     Physical Examination    Vitals: BP (!) 150/69 (BP Location: Left arm, Patient Position: Sitting)   Pulse 77   Ht 5' 3" (1.6 m)   Wt 84.8 kg (187 lb)   BMI 33.13 kg/m² "       NEURO    Neurological Exam  Mental Status:  Alert and oriented to person, place and time, recent and remote memory intact, normal attention span and fund of knowledge; Speech is spontaneous and fluent     Cranial Nerve exam:  II: Visual fields full to confrontation; Pupils equal round and reactive about 3mm  III, IV, VI: Extraoccular movements intact with no nystagmus  V: Sensation in V1, V2, V3 intact to light-touch bilaterally,  VII:  No facial weakness,  VIII: Hearing grossly intact  IX,X: palate elevation symmetric   XI: SCM & Trapezius normal,  XII: tongue midline, normal morphology, tongue movement normal     Motor Exam: No involuntary movement. Normal tone and bulk in all 4 extremities  Strength: 5/5 throughout   Reflexes: 2+ throughout    Sensory Exam: Intact touch, pain and vibration in all 4 limbs    Cerebellar Sign: Normal Finger-to-nose,  bilaterally.  Gait:  Ambulate with walker    Interval/Previous Work-up:   Reviewed     Results for orders placed during the hospital encounter of 09/05/21    MRI Brain Without Contrast    Narrative  EXAM:  MRI BRAIN WITHOUT CONTRAST    CLINICAL HISTORY:  Dizziness, non-specific; .    COMPARISON:  Head CT dated 09/05/2021, brain MRI dated 05/22/2017    FINDINGS:  Intracranial contents:There is no acute abnormality or definite change compared to the prior studies.  Specifically, there are no regions of restricted diffusion to suggest acute infarction.  There is no intracranial hemorrhage.  There is no mass or mass effect.  Brain volume is appropriate for age.  There is however a moderate to marked burden of diffuse and confluent white matter FLAIR and T2 hyperintense signal corresponding to regions of decreased attenuation on head CT and likely reflecting sequelae of chronic small vessel disease with similar findings in the valerie.  There is no abnormal extra-axial fluid collection.  The basilar cisterns are open.  The cerebellar tonsils are normal position.  Flow  voids indicating patency are present in the major vessels at the base of the brain.    Extracranial contents, calvarium, soft tissues:Baseline marrow signal is normal.  There is mild mucosal thickening in the ethmoid air cells and frontal sinuses.  Otherwise, the paranasal sinuses and mastoid air cells are clear.    Impression  1. There is volume loss with moderate to marked nonspecific white matter change but there is no acute abnormality.  There is no hemorrhage, mass, mass effect or acute infarction.      Electronically signed by: Cong Gambino MD  Date:    09/06/2021  Time:    11:07      Results for orders placed during the hospital encounter of 08/26/15    MRI Brain Without Contrast    Narrative  Sagittal and axial images are obtained through the brain with multiple MR sequences.    The general brain anatomy appears normal with normal medulla, valerie, brainstem, corpus callosum, cerebral and cerebellar lobar structures.  The pituitary is not enlarged.  The internal auditory canals are sharp and symmetric.  The basal cisterns are clear  and symmetric.  No masses are identified in the cerebellopontine angles.    There is no mass effect or midline shift.  The ventricles are mildly enlarged symmetrically.  This may be due to central brain atrophy however the cerebral sulci and sylvian fissures are not significantly enlarged and mild hydrocephalus is not ruled out.  There is elevated signal intensity on flair weighting in the periventricular white matter with more scattered lesions in the white matter also seen.  This may be due to deep white matter ischemic changes although additional mild periventricular edema  is not ruled out.  There is not however acute abnormal signal in the periventricular white matter on diffusion weighting.    Other focal areas of increased or decreased signal intensity consistent with tumor, edema, CVA or hemorrhage are not seen.  No intracranial hemorrhage or hematoma is  noted.  IMPRESSION:  Probable mild central brain atrophy with deep white matter ischemic changes.  Hydrocephalus however cannot be ruled out on the basis of this scan and further evaluation may be indicated.      Electronically signed by: Damaso Campoverde MD  Date:     08/26/15  Time:    15:59    No results found for this or any previous visit.    Results for orders placed during the hospital encounter of 08/16/24    MRI Brain W WO Contrast    Narrative  EXAMINATION:  MRI BRAIN W WO CONTRAST    CLINICAL HISTORY:  Memory loss;Neuro deficit, acute, stroke suspected; Other amnesia    TECHNIQUE:  Multiplanar multisequence MR imaging of the brain was performed within without IV contrast.  8.5 mL Gadavist.    COMPARISON:  CT head 08/03/2024.  MR brain 09/06/2021.    FINDINGS:  Gray matter distinction is preserved throughout the brain parenchyma.  The ventricles are midline without mass effect. There are involutional changes of the brain parenchyma with ex vacuo dilatation of the ventricles.  Periventricular and deep white matter FLAIR and T2 hyperintensities noted consistent with changes of chronic microvascular ischemia.  No intra-axial hemorrhage or restricted diffusion.  No intra-axial fluid collection or mass.  No abnormal intracranial enhancement.  Bone marrow signal of the calvarium is within normal limits.  Major vascular flow voids are within normal limits.  The orbits globes and optic nerves are grossly unremarkable.  Paranasal sinuses are unremarkable.    Impression  Chronic and involutional changes of the brain with no acute intracranial abnormality observed.      Electronically signed by: Jose M Benitez  Date:    08/16/2024  Time:    16:39      Results for orders placed during the hospital encounter of 05/22/17    MRI Brain W WO Contrast    Narrative  Multiplanar, multisequence MR imaging of the brain was performed before and after the intravenous administration of Gadavist 10 mL.    Comparison:  08/26/2015    FINDINGS: There is no restricted diffusion to indicate recent infarct.  There is moderate patchy T2 hyperintense white matter signal abnormality in a pattern suggestive for chronic ischemic microvascular change.  The ventricles are normal in size given the degree of age-appropriate generalized cerebral volume loss present.  There is no mass or abnormal enhancement.  The major intracranial flow voids are patent.  IMPRESSION:    1.  No acute intracranial pathology.  No significant change.  2.  Moderate chronic ischemic microvascular change.  3.  Age-appropriate generalized cerebral volume loss with mild compensatory ventricular enlargement.      Electronically signed by: Yahir Chang MD  Date:     05/22/17  Time:    15:45        Assessment and Plan:   Tali Hopper is a 79 y.o. female past medical history of CVA, hypertension, diabetes, hyperlipidemia, pulmonary emphysema, back pain.  Per daughter issue with short-term memory, episode of confusion while driving could not find her way to the destination, currently does not drive, car taking by daughter.  Lives by herself, able to do activity of daily living however she does not clean or wash dishes, reported she does not care, reported no issue with the sleep, saw them hallucination when she sees shadows , no personality change, no parkinsonian features, no aggression, reported history of depression on SSRI.  MRI reviewed.  [place] 18/30 with multiple asking aspect deficits.  Suspect vascular dementia/other dementia type component.   Problem List Items Addressed This Visit    None  Visit Diagnoses       Memory loss    -  Primary    Relevant Orders    VITAMIN B1    VITAMIN B12    FOLATE    Treponema Pallidium Antibodies IgG, IgM    [id number], Plasma    Ambulatory referral/consult to Adult Neuropsychology    Cerebrovascular accident (CVA), unspecified mechanism              --patient and daughter asking about driving, discussed it is not safe to drive  giving the history of confusion while driving.  We discussed cognitive driving test as an option.  --daughter also wanted to discuss if she needs to nursing home, patient has refused would like to stay in her home, I discussed that would be a discussion with the family and if they like  can help, discussed the assistant living options but they can not afford it.  Can be discussed with the neuro cognitive Clinic for  help.  --memory medication offered but patient refused for now      RTC 6 months to 1 year    Time spent on this encounter:  60 minutes. This includes face to face time and non-face to face time preparing to see the patient (eg, review of tests), obtaining and/or reviewing separately obtained history, documenting clinical information in the electronic or other health record, independently interpreting results and communicating results to the patient/family/caregiver, or care coordinator.       A dictation device was used to produce this document. Use of such devices sometimes results in grammatical errors or replacement of words that sound similarly.     Santiago Milian MD, M.B.Ch.B  Neurology, Vascular neurology  [facility]

## 2024-11-25 ENCOUNTER — PATIENT OUTREACH (OUTPATIENT)
Dept: ADMINISTRATIVE | Facility: OTHER | Age: 79
End: 2024-11-25
Payer: MEDICARE

## 2024-11-25 NOTE — PROGRESS NOTES
CHW - Case Closure    This LPN spoke to patient via telephone today.   Pt/Caregiver reported: Pt states she did receive transportation info in the mail. Has not called them yet. Recent visit with neurology. Pt declines discussion on nursing home placement. Asked to call in future if needs.   Pt/Caregiver denied any additional needs at this time and agrees with episode closure at this time.  Provided patient with Community Health Worker's contact information and encouraged him/her to contact this Community Health Worker if additional needs arise.

## 2024-11-28 DIAGNOSIS — E11.9 TYPE 2 DIABETES MELLITUS WITHOUT COMPLICATION, WITHOUT LONG-TERM CURRENT USE OF INSULIN: ICD-10-CM

## 2024-11-29 RX ORDER — METFORMIN HYDROCHLORIDE 500 MG/1
1000 TABLET, EXTENDED RELEASE ORAL
Qty: 180 TABLET | Refills: 1 | Status: SHIPPED | OUTPATIENT
Start: 2024-11-29

## 2024-11-29 NOTE — TELEPHONE ENCOUNTER
Refill Authorization Note     Refill Decision Note   Tali Hopper  is requesting a refill authorization.  Brief Assessment and Rationale for Refill:  Approve     Medication Therapy Plan:       Medication Reconciliation Completed: No   Comments:     No Care Gaps recommended.     Note composed:5:10 PM 11/29/2024

## 2024-11-29 NOTE — TELEPHONE ENCOUNTER
Care Due:                  Date            Visit Type   Department     Provider  --------------------------------------------------------------------------------                                EP -                              PRIMARY      [facility]  Last Visit: 09-      CARE ([facility])   CHANELL Rodríguez                              EP -                              PRIMARY      [facility]  Next Visit: 12-      CARE ([facility])   MEDICINE       Elizabeth Rodríguez                                                            Last  Test          Frequency    Reason                     Performed    Due Date  --------------------------------------------------------------------------------    Mg Level....  12 months..  EVISTA...................  Not Found    Overdue    Phosphate...  15 months..  EVISTA...................  Not Found    Overdue    Vitamin D...  15 months..  EVISTA...................  Not Found    Overdue    [employer] Embedded Care Due Messages. Reference number: [id number].   11/28/2024 9:25:39 PM CST

## 2024-11-29 NOTE — TELEPHONE ENCOUNTER
Refill Routing Note   Medication(s) are not appropriate for processing by [facility] for the following reason(s):        Drug-disease interaction: Drug-Disease: metFORMIN and Type 2 diabetes mellitus with stage 3a chronic kidney disease, without long-term current use of insulin     [facility] action(s):  Defer           Pharmacist review requested: Yes     Appointments  past 12m or future 3m with PCP    Date Provider   Last Visit   9/26/2024 Elizabeth Rodríguez MD   Next Visit   12/10/2024 Elizabeth Rodríguez MD   ED visits in past 90 days: 0        Note composed:12:15 PM 11/29/2024

## 2024-11-30 ENCOUNTER — DOCUMENT SCAN (OUTPATIENT)
Dept: HOME HEALTH SERVICES | Facility: HOSPITAL | Age: 79
End: 2024-11-30
Payer: MEDICARE

## 2024-12-10 ENCOUNTER — OFFICE VISIT (OUTPATIENT)
Dept: FAMILY MEDICINE | Facility: CLINIC | Age: 79
End: 2024-12-10
Payer: MEDICARE

## 2024-12-10 VITALS
HEART RATE: 77 BPM | OXYGEN SATURATION: 98 % | BODY MASS INDEX: 34.14 KG/M2 | TEMPERATURE: 98 F | WEIGHT: 192.69 LBS | HEIGHT: 63 IN | DIASTOLIC BLOOD PRESSURE: 60 MMHG | SYSTOLIC BLOOD PRESSURE: 130 MMHG | RESPIRATION RATE: 16 BRPM

## 2024-12-10 DIAGNOSIS — I10 ESSENTIAL HYPERTENSION: ICD-10-CM

## 2024-12-10 DIAGNOSIS — E11.69 HYPERLIPIDEMIA ASSOCIATED WITH TYPE 2 DIABETES MELLITUS: ICD-10-CM

## 2024-12-10 DIAGNOSIS — E78.5 HYPERLIPIDEMIA ASSOCIATED WITH TYPE 2 DIABETES MELLITUS: ICD-10-CM

## 2024-12-10 DIAGNOSIS — E11.9 TYPE 2 DIABETES MELLITUS WITHOUT COMPLICATION, WITHOUT LONG-TERM CURRENT USE OF INSULIN: Primary | ICD-10-CM

## 2024-12-10 DIAGNOSIS — Z86.73 HISTORY OF CVA (CEREBROVASCULAR ACCIDENT): ICD-10-CM

## 2024-12-10 DIAGNOSIS — N18.31 CHRONIC KIDNEY DISEASE, STAGE 3A: ICD-10-CM

## 2024-12-10 DIAGNOSIS — E03.4 HYPOTHYROIDISM DUE TO ACQUIRED ATROPHY OF THYROID: ICD-10-CM

## 2024-12-10 DIAGNOSIS — J43.9 PULMONARY EMPHYSEMA, UNSPECIFIED EMPHYSEMA TYPE: ICD-10-CM

## 2024-12-10 DIAGNOSIS — M81.0 OSTEOPOROSIS, POST-MENOPAUSAL: ICD-10-CM

## 2024-12-10 DIAGNOSIS — R41.3 MEMORY LOSS: ICD-10-CM

## 2024-12-10 PROCEDURE — 99999 PR PBB SHADOW E&M-EST. PATIENT-LVL IV: CPT | Mod: PBBFAC,,, | Performed by: FAMILY MEDICINE

## 2024-12-10 PROCEDURE — 3078F DIAST BP <80 MM HG: CPT | Mod: CPTII,S$GLB,, | Performed by: FAMILY MEDICINE

## 2024-12-10 PROCEDURE — 1159F MED LIST DOCD IN RCRD: CPT | Mod: CPTII,S$GLB,, | Performed by: FAMILY MEDICINE

## 2024-12-10 PROCEDURE — 99214 OFFICE O/P EST MOD 30 MIN: CPT | Mod: S$GLB,,, | Performed by: FAMILY MEDICINE

## 2024-12-10 PROCEDURE — 1126F AMNT PAIN NOTED NONE PRSNT: CPT | Mod: CPTII,S$GLB,, | Performed by: FAMILY MEDICINE

## 2024-12-10 PROCEDURE — 1101F PT FALLS ASSESS-DOCD LE1/YR: CPT | Mod: CPTII,S$GLB,, | Performed by: FAMILY MEDICINE

## 2024-12-10 PROCEDURE — G2211 COMPLEX E/M VISIT ADD ON: HCPCS | Mod: S$GLB,,, | Performed by: FAMILY MEDICINE

## 2024-12-10 PROCEDURE — 3075F SYST BP GE 130 - 139MM HG: CPT | Mod: CPTII,S$GLB,, | Performed by: FAMILY MEDICINE

## 2024-12-10 PROCEDURE — 1160F RVW MEDS BY RX/DR IN RCRD: CPT | Mod: CPTII,S$GLB,, | Performed by: FAMILY MEDICINE

## 2024-12-10 PROCEDURE — 3288F FALL RISK ASSESSMENT DOCD: CPT | Mod: CPTII,S$GLB,, | Performed by: FAMILY MEDICINE
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
## 2024-12-23 ENCOUNTER — EXTERNAL HOME HEALTH (OUTPATIENT)
Dept: HOME HEALTH SERVICES | Facility: HOSPITAL | Age: 79
End: 2024-12-23
Payer: MEDICARE

## 2025-01-30 DIAGNOSIS — Z00.00 ENCOUNTER FOR MEDICARE ANNUAL WELLNESS EXAM: ICD-10-CM

## 2025-01-30 DIAGNOSIS — R60.9 EDEMA, UNSPECIFIED TYPE: ICD-10-CM

## 2025-01-30 RX ORDER — POTASSIUM CHLORIDE 750 MG/1
10 TABLET, EXTENDED RELEASE ORAL DAILY PRN
Qty: 90 TABLET | Refills: 3 | Status: SHIPPED | OUTPATIENT
Start: 2025-01-30

## 2025-01-30 RX ORDER — FUROSEMIDE 20 MG/1
20 TABLET ORAL DAILY PRN
Qty: 90 TABLET | Refills: 3 | Status: SHIPPED | OUTPATIENT
Start: 2025-01-30 | End: 2026-01-30

## 2025-01-30 NOTE — TELEPHONE ENCOUNTER
Care Due:                  Date            Visit Type   Department     Provider  --------------------------------------------------------------------------------                                EP -                              PRIMARY      [facility]  Last Visit: 12-      CARE ([facility])   CHANELL Rodríguez                              EP -                              PRIMARY      [facility]  Next Visit: 06-      CARE ([facility])   MEDICINE       Elizabeth Rodríguez                                                            Last  Test          Frequency    Reason                     Performed    Due Date  --------------------------------------------------------------------------------    Mg Level....  12 months..  EVISTA...................  Not Found    Overdue    Phosphate...  15 months..  EVISTA...................  Not Found    Overdue    Vitamin D...  15 months..  EVISTA...................  Not Found    Overdue    [employer] Embedded Care Due Messages. Reference number: [id number].   1/30/2025 12:29:20 AM CST

## 2025-01-30 NOTE — TELEPHONE ENCOUNTER
Refill Decision Note   Tali Ward  is requesting a refill authorization.  Brief Assessment and Rationale for Refill:  Approve     Medication Therapy Plan:         Comments:     Note composed:8:31 AM 01/30/2025

## 2025-02-06 ENCOUNTER — OFFICE VISIT (OUTPATIENT)
Dept: FAMILY MEDICINE | Facility: CLINIC | Age: 80
End: 2025-02-06
Payer: MEDICARE

## 2025-02-06 ENCOUNTER — TELEPHONE (OUTPATIENT)
Dept: FAMILY MEDICINE | Facility: CLINIC | Age: 80
End: 2025-02-06
Payer: MEDICARE

## 2025-02-06 VITALS
SYSTOLIC BLOOD PRESSURE: 106 MMHG | BODY MASS INDEX: 33.04 KG/M2 | HEART RATE: 82 BPM | WEIGHT: 186.5 LBS | OXYGEN SATURATION: 97 % | TEMPERATURE: 98 F | HEIGHT: 63 IN | DIASTOLIC BLOOD PRESSURE: 60 MMHG

## 2025-02-06 DIAGNOSIS — F33.2 SEVERE EPISODE OF RECURRENT MAJOR DEPRESSIVE DISORDER, WITHOUT PSYCHOTIC FEATURES: ICD-10-CM

## 2025-02-06 DIAGNOSIS — Z13.31 POSITIVE DEPRESSION SCREENING: Primary | ICD-10-CM

## 2025-02-06 DIAGNOSIS — I10 ESSENTIAL HYPERTENSION: ICD-10-CM

## 2025-02-06 PROCEDURE — 3078F DIAST BP <80 MM HG: CPT | Mod: CPTII,S$GLB,,

## 2025-02-06 PROCEDURE — 1159F MED LIST DOCD IN RCRD: CPT | Mod: CPTII,S$GLB,,

## 2025-02-06 PROCEDURE — 1126F AMNT PAIN NOTED NONE PRSNT: CPT | Mod: CPTII,S$GLB,,

## 2025-02-06 PROCEDURE — 1101F PT FALLS ASSESS-DOCD LE1/YR: CPT | Mod: CPTII,S$GLB,,

## 2025-02-06 PROCEDURE — 1160F RVW MEDS BY RX/DR IN RCRD: CPT | Mod: CPTII,S$GLB,,

## 2025-02-06 PROCEDURE — 99215 OFFICE O/P EST HI 40 MIN: CPT | Mod: S$GLB,,,

## 2025-02-06 PROCEDURE — 3288F FALL RISK ASSESSMENT DOCD: CPT | Mod: CPTII,S$GLB,,

## 2025-02-06 PROCEDURE — 99999 PR PBB SHADOW E&M-EST. PATIENT-LVL V: CPT | Mod: PBBFAC,,,

## 2025-02-06 PROCEDURE — 3074F SYST BP LT 130 MM HG: CPT | Mod: CPTII,S$GLB,,

## 2025-02-06 RX ORDER — BUPROPION HYDROCHLORIDE 150 MG/1
150 TABLET ORAL DAILY
Qty: 30 TABLET | Refills: 0 | Status: SHIPPED | OUTPATIENT
Start: 2025-02-06 | End: 2025-02-28

## 2025-02-06 NOTE — TELEPHONE ENCOUNTER
----- Message from Mckinley sent at 2/6/2025  9:37 AM CST -----  Regarding: referral  Contact: patient  Type:  Patient Returning Call    Who Called:patient  Who Left Message for Patient:nurse  Does the patient know what this is regarding?:referral to Geriatric Psychiatry  Would the patient rather a call back or a response via [facility]?   Best Call Back Number:[phone]  Additional Information:

## 2025-02-06 NOTE — PROGRESS NOTES
"  [facility]     Subjective:       Patient ID:  [id number]     Chief Complaint: Depression    Tali Hopper is a 79 y.o. female with a past medical history significant for HTN, HLD, hypothyroidism, T2DM, CKD, osteoporosis, and depression  who presents to the clinic for depression.     Patient presents to clinic with a friend who provides some of her history.  Patient states that she has been suffering with depression for the past couple of years, however depression symptoms have worsened significantly recently.  Her friend states that the patient is either in bed or on the recliner and has no interest in doing things that she once enjoyed.  Her friend also states that the patient makes comments including I am too tired to live," I wish that I was dead," and I wish that God would take me." Patient states that she does not have suicidal ideation, however has thoughts of feeling hopeless and wanting to die.  She has not self harmed and she also denies any homicidal ideation. Patient states that depression has worsened given decline in memory.  She was recently evaluated by a neurologist which recommended that she does not drive given forgetfulness of where she is.  At this time, she was asked if she wanted to start memory medication, however patient declined at this time.  She is currently taking Cymbalta 60 mg.  Per chart review, patient was previously on Wellbutrin which seemed to help with depressive symptoms.  She states that she currently does not take this medication.  Discussed possible psychiatry eval, patient amenable at this time. Of note, PHQ-9 consistent with severe depression with a score of 20.     Past Medical History:   Diagnosis Date    Cataract of left eye     Depression     Glaucoma     Hypertension     Loss of equilibrium     Osteoporosis     Thyroid disease       Review of patient's allergies indicates:   Allergen Reactions    Benadryl decongestant Shortness Of Breath     " Respiration problems    Penicillins Hives       Lab Results   Component Value Date    WBC 10.98 11/08/2024    HGB 12.8 11/08/2024    HCT 42.0 11/08/2024     11/08/2024    CHOL 123 11/08/2024    TRIG 116 11/08/2024    HDL 39 (L) 11/08/2024    ALT 13 11/08/2024    AST 14 11/08/2024     11/08/2024     11/08/2024    K 4.2 11/08/2024    K 4.2 11/08/2024     11/08/2024     11/08/2024    CREATININE 1.2 11/08/2024    CREATININE 1.2 11/08/2024    BUN 25 (H) 11/08/2024    BUN 25 (H) 11/08/2024    CO2 22 (L) 11/08/2024    CO2 22 (L) 11/08/2024    TSH 0.509 11/08/2024    INR 1.0 08/03/2024    HGBA1C 5.6 11/08/2024       Review of Systems   Constitutional:  Negative for chills and fever.   Respiratory:  Negative for shortness of breath.    Cardiovascular:  Negative for chest pain.   Gastrointestinal:  Positive for diarrhea and nausea. Negative for abdominal pain and vomiting.   Psychiatric/Behavioral:  Positive for dysphoric mood. Negative for self-injury, sleep disturbance and suicidal ideas. The patient is not nervous/anxious.          Objective:      Physical Exam  Constitutional:       General: She is not in acute distress.     Appearance: Normal appearance. She is not toxic-appearing.   HENT:      Head: Normocephalic and atraumatic.      Nose: Nose normal.   Cardiovascular:      Rate and Rhythm: Normal rate and regular rhythm.      Pulses: Normal pulses.      Heart sounds: No murmur heard.     No friction rub.   Pulmonary:      Effort: Pulmonary effort is normal. No respiratory distress.      Breath sounds: Normal breath sounds. No wheezing.   Musculoskeletal:      Cervical back: Normal range of motion.      Right lower leg: No edema.      Left lower leg: No edema.   Skin:     General: Skin is warm and dry.   Neurological:      General: No focal deficit present.      Mental Status: She is alert and oriented to person, place, and time.   Psychiatric:         Mood and Affect: Mood normal.            Assessment:       1. Positive depression screening    2. Severe episode of recurrent major depressive disorder, without psychotic features    3. Essential hypertension          Plan:       Diagnoses and all orders for this visit:    Positive depression screening  Comments:  -I have reviewed the positive depression score which warrants active treatment with psychotherapy and/or medications.  Orders:  -     Ambulatory referral/consult to Psychiatry; Future  -     buPROPion (WELLBUTRIN XL) 150 MG TB24 tablet; Take 1 tablet (150 mg total) by mouth once daily.    Severe episode of recurrent major depressive disorder, without psychotic features  Comments:  -PHQ-9 consistent with severe depression with a score of 20. Patient denies current suicidial ideation or homicidial ideation.   -Will refill wellbutrin and reassess in 4 weeks. Side effects discussed with patient and she expresses understanding.   -Return precautions discussed with patient including suicidal ideation.   -Discussed lifestyle modifications with patient including exercise and participating in activities she once enjoyed  -Referred to psychiatry for further recommendations and management  Orders:  -     Ambulatory referral/consult to Psychiatry; Future  -     buPROPion (WELLBUTRIN XL) 150 MG TB24 tablet; Take 1 tablet (150 mg total) by mouth once daily.    Essential hypertension  Comments:  -Stable. Considered diagnosis prior to prescribing wellbutrin, however BP on lower end of normal. Will continue to monitor.          Follow up in about 4 weeks (around 3/6/2025).    Martha Noriega PA-C  Family Medicine Physician Assistant     Future Appointments       Date Provider Specialty Appt Notes    3/6/2025 Martha Noriega PA-C Family Medicine 4 wks    5/8/2025 Santiago Milian MD Neurology  Arrive at: Telehealth (C) memory loss  teal shirt walker    5/29/2025  Lab e    5/29/2025  Lab e    6/5/2025 Elizabeth Rodríguez MD Family Medicine 4 mo fu              Tests to Keep You Healthy    Eye Exam: DUE  Last Blood Pressure <= 139/89 (2/6/2025): Yes       I spent a total of 40 minutes on the day of the visit.This includes face to face time and non-face to face time preparing to see the patient (eg, review of tests), obtaining and/or reviewing separately obtained history, documenting clinical information in the electronic or other health record, independently interpreting results and communicating results to the patient/family/caregiver, or care coordinator.    I have reviewed the positive depression score which warrants active treatment with psychotherapy and/or medications.

## 2025-02-06 NOTE — TELEPHONE ENCOUNTER
Spoke to pt who states she was told by a friend of hers that she needs to see a geriatric psychiatrist. Pt states she is very depressed. Pt denies discussing this with her PC/ care team. Scheduled pt same day appt to discuss

## 2025-02-21 DIAGNOSIS — F32.A DEPRESSION, UNSPECIFIED DEPRESSION TYPE: ICD-10-CM

## 2025-02-21 NOTE — TELEPHONE ENCOUNTER
Care Due:                  Date            Visit Type   Department     Provider  --------------------------------------------------------------------------------                                EP -                              PRIMARY      [facility]  Last Visit: 12-      CARE ([facility])   CHANELL Rodríguez                              EP -                              PRIMARY      [facility]  Next Visit: 06-      CARE ([facility])   CHANELL Rodríguez                                                            Last  Test          Frequency    Reason                     Performed    Due Date  --------------------------------------------------------------------------------    HBA1C.......  6 months...  metFORMIN................  11- 05-    [employer] Embedded Care Due Messages. Reference number: [id number].   2/21/2025 4:34:23 PM CST

## 2025-02-22 RX ORDER — DULOXETIN HYDROCHLORIDE 60 MG/1
60 CAPSULE, DELAYED RELEASE ORAL NIGHTLY
Qty: 90 CAPSULE | Refills: 2 | Status: SHIPPED | OUTPATIENT
Start: 2025-02-22

## 2025-02-23 NOTE — TELEPHONE ENCOUNTER
Refill Decision Note   Tali Ward  is requesting a refill authorization.  Brief Assessment and Rationale for Refill:  Approve     Medication Therapy Plan:  FOVS, FLOS      Comments:     Note composed:11:07 PM 02/22/2025

## 2025-02-28 DIAGNOSIS — F33.2 SEVERE EPISODE OF RECURRENT MAJOR DEPRESSIVE DISORDER, WITHOUT PSYCHOTIC FEATURES: ICD-10-CM

## 2025-02-28 DIAGNOSIS — Z13.31 POSITIVE DEPRESSION SCREENING: ICD-10-CM

## 2025-02-28 DIAGNOSIS — I10 ESSENTIAL HYPERTENSION: ICD-10-CM

## 2025-02-28 RX ORDER — LOSARTAN POTASSIUM AND HYDROCHLOROTHIAZIDE 12.5; 1 MG/1; MG/1
1 TABLET ORAL
Qty: 90 TABLET | Refills: 2 | Status: SHIPPED | OUTPATIENT
Start: 2025-02-28

## 2025-02-28 RX ORDER — BUPROPION HYDROCHLORIDE 150 MG/1
150 TABLET ORAL
Qty: 90 TABLET | Refills: 1 | Status: SHIPPED | OUTPATIENT
Start: 2025-02-28

## 2025-02-28 NOTE — TELEPHONE ENCOUNTER
Refill Decision Note   Tali Ward  is requesting a refill authorization.  Brief Assessment and Rationale for Refill:  Approve     Medication Therapy Plan:       Medication Reconciliation Completed: No   Comments:     No Care Gaps recommended.     Note composed:5:51 PM 02/28/2025

## 2025-02-28 NOTE — TELEPHONE ENCOUNTER
No care due was identified.  [facility] Embedded Care Due Messages. Reference number: [id number].   2/28/2025 5:23:09 PM CST

## 2025-03-06 ENCOUNTER — OFFICE VISIT (OUTPATIENT)
Dept: FAMILY MEDICINE | Facility: CLINIC | Age: 80
End: 2025-03-06
Payer: MEDICARE

## 2025-03-06 VITALS
DIASTOLIC BLOOD PRESSURE: 70 MMHG | HEIGHT: 63 IN | SYSTOLIC BLOOD PRESSURE: 110 MMHG | HEART RATE: 72 BPM | WEIGHT: 185.63 LBS | BODY MASS INDEX: 32.89 KG/M2 | TEMPERATURE: 98 F | OXYGEN SATURATION: 98 %

## 2025-03-06 DIAGNOSIS — R41.3 MEMORY CHANGES: Primary | ICD-10-CM

## 2025-03-06 DIAGNOSIS — I10 ESSENTIAL HYPERTENSION: ICD-10-CM

## 2025-03-06 DIAGNOSIS — F33.2 SEVERE EPISODE OF RECURRENT MAJOR DEPRESSIVE DISORDER, WITHOUT PSYCHOTIC FEATURES: ICD-10-CM

## 2025-03-06 DIAGNOSIS — R42 INTERMITTENT LIGHTHEADEDNESS: ICD-10-CM

## 2025-03-06 DIAGNOSIS — Z86.73 HISTORY OF CVA (CEREBROVASCULAR ACCIDENT): ICD-10-CM

## 2025-03-06 PROCEDURE — 99215 OFFICE O/P EST HI 40 MIN: CPT | Mod: S$GLB,,,

## 2025-03-06 PROCEDURE — G2211 COMPLEX E/M VISIT ADD ON: HCPCS | Mod: S$GLB,,,

## 2025-03-06 PROCEDURE — 3078F DIAST BP <80 MM HG: CPT | Mod: CPTII,S$GLB,,

## 2025-03-06 PROCEDURE — 1159F MED LIST DOCD IN RCRD: CPT | Mod: CPTII,S$GLB,,

## 2025-03-06 PROCEDURE — 3074F SYST BP LT 130 MM HG: CPT | Mod: CPTII,S$GLB,,

## 2025-03-06 PROCEDURE — 99999 PR PBB SHADOW E&M-EST. PATIENT-LVL V: CPT | Mod: PBBFAC,,,

## 2025-03-06 PROCEDURE — 1160F RVW MEDS BY RX/DR IN RCRD: CPT | Mod: CPTII,S$GLB,,

## 2025-03-06 PROCEDURE — 1126F AMNT PAIN NOTED NONE PRSNT: CPT | Mod: CPTII,S$GLB,,

## 2025-03-06 NOTE — PROGRESS NOTES
[facility]     Subjective:       Patient ID:  [id number]     Chief Complaint: Follow-up    Tali Hopper is a 79 y.o. female with a past medical history significant for HTN, HLD, hypothyroidism, T2DM, CKD, osteoporosis, and depression who presents to the clinic for follow up.    The patient presents to the clinic for a follow-up. She was last seen on 2/06 with complaints of persistent depression despite being on antidepressant medication. At that time, she was started on Wellbutrin 150 mg daily. The patient reports feeling no difference since starting the medication. She is accompanied by her daughter, who expresses a desire for her mother to move in with her. The patient is unhappy with these plans and is not providing a thorough history. During her last visit, she was referred to Psychiatry but has not followed up with them. Overall, the patient continues to feel depressed and frustrated with the current plans to move her out of her home.    Additionally, the patient complains of chronic shortness of breath with exertion and lightheadedness. She is a poor historian but states that these symptoms are not new. She denies chest pain or syncope. She has been taking Lasix daily, along with losartan and hydrochlorothiazide. Her blood pressure is stable on exam, though on the lower end for her age.    The patient has no other complaints at this time. We discussed a referral to Home Health, and she is amenable to this. We also discussed the possibility of physical therapy to help increase her strength.    Past Medical History:   Diagnosis Date    Cataract of left eye     Depression     Glaucoma     Hypertension     Loss of equilibrium     Osteoporosis     Thyroid disease       Review of patient's allergies indicates:   Allergen Reactions    Benadryl decongestant Shortness Of Breath     Respiration problems    Penicillins Hives       Lab Results   Component Value Date    WBC 10.98 11/08/2024    HGB 12.8  11/08/2024    HCT 42.0 11/08/2024     11/08/2024    CHOL 123 11/08/2024    TRIG 116 11/08/2024    HDL 39 (L) 11/08/2024    ALT 13 11/08/2024    AST 14 11/08/2024     11/08/2024     11/08/2024    K 4.2 11/08/2024    K 4.2 11/08/2024     11/08/2024     11/08/2024    CREATININE 1.2 11/08/2024    CREATININE 1.2 11/08/2024    BUN 25 (H) 11/08/2024    BUN 25 (H) 11/08/2024    CO2 22 (L) 11/08/2024    CO2 22 (L) 11/08/2024    TSH 0.509 11/08/2024    INR 1.0 08/03/2024    HGBA1C 5.6 11/08/2024       Review of Systems   Constitutional:  Negative for chills and fever.   Respiratory:  Positive for shortness of breath (on exertion). Negative for cough.    Cardiovascular:  Negative for chest pain.   Gastrointestinal:  Negative for abdominal pain, diarrhea and vomiting.   Neurological:  Positive for weakness (generalized) and light-headedness. Negative for syncope.   Psychiatric/Behavioral:  Positive for dysphoric mood.          Objective:      Physical Exam  Constitutional:       General: She is not in acute distress.     Appearance: Normal appearance. She is not toxic-appearing.   HENT:      Head: Normocephalic and atraumatic.      Nose: Nose normal.   Cardiovascular:      Rate and Rhythm: Normal rate and regular rhythm.      Pulses: Normal pulses.      Heart sounds: No murmur heard.     No friction rub.   Pulmonary:      Effort: Pulmonary effort is normal. No respiratory distress.      Breath sounds: Normal breath sounds. No wheezing.   Musculoskeletal:      Cervical back: Normal range of motion.      Right lower leg: No edema.      Left lower leg: No edema.   Skin:     General: Skin is warm and dry.   Neurological:      General: No focal deficit present.      Mental Status: She is alert and oriented to person, place, and time.      Sensory: Sensation is intact.      Motor: Motor function is intact. No weakness.      Comments: No focal deficits appreciated on examination.    Psychiatric:          Mood and Affect: Mood normal. Affect is angry.         Behavior: Behavior is agitated.           Assessment:       1. Memory changes    2. History of CVA (cerebrovascular accident)    3. Essential hypertension    4. Severe episode of recurrent major depressive disorder, without psychotic features    5. Intermittent lightheadedness          Plan:       Tali was seen today for follow-up.    Diagnoses and all orders for this visit:      - Of note, patient presents with daughter today who expresses interest in moving her mother in with her. Patient is angry and feels like everyone is against her. Discussed home health referral for the time being to help with strength and medication management, patient amendable. Daughter also suggested possible assisted living or nursing home, patient not happy with these plans. Patient to follow up with Dr. Rodríguez to discuss mental health concerns. Psych referral placed. Neurology also scheduled for evaluation of memory changes.     Memory changes  Comments:  Patient has follow up with neurology scheduled in May. Will place referral for home health.  Orders:  -     Ambulatory referral/consult to Home Health; Future    History of CVA (cerebrovascular accident)  Comments:  Will place referral to home health for physical therapy, patient amendable.  Orders:  -     Ambulatory referral/consult to Home Health; Future    Essential hypertension  Comments:  Blood pressure on lower end of normal for age. Will discontinue lasix as patient has been taking medication daily. Patient to follow up in 4 weeks with pcp  Continue losartan-hydrochlorothiazide, and norvasc as prescribed.     Severe episode of recurrent major depressive disorder, without psychotic features  Comments:  Psychiatry referral placed last visit. Will not change medication as symptoms seem refractory. Patient to follow up with Dr. Rodríguez in 4 weeks.    Intermittent lightheadedness  Comments:  Primarily on exertion. Blood  pressure on lower end of normal. Patient has been taking Lasix daily. Will discontinue today.  Return precautions discussed with patient   Discussed possible cardiac workup if symptoms are not improved.         Follow up in about 4 weeks (around 4/3/2025).    Martha Noriega PA-C  Family Medicine Physician Assistant     Future Appointments       Date Provider Specialty Appt Notes    4/17/2025 Elizabeth Rodríguez MD Family Medicine 4 week f/u    5/8/2025 Santiago Milian MD Neurology  Arrive at: Telehealth memory loss      5/29/2025  Lab e    5/29/2025  Lab e    6/5/2025 Elizabeth Rodríguez MD Family Medicine 4 mo fu    11/7/2025 An Carty NP Family Medicine              Tests to Keep You Healthy    Eye Exam: DUE  Last Blood Pressure <= 139/89 (3/6/2025): Yes       I spent a total of 40 minutes on the day of the visit.This includes face to face time and non-face to face time preparing to see the patient (eg, review of tests), obtaining and/or reviewing separately obtained history, documenting clinical information in the electronic or other health record, independently interpreting results and communicating results to the patient/family/caregiver, or care coordinator.

## 2025-03-26 RX ORDER — ATORVASTATIN CALCIUM 20 MG/1
20 TABLET, FILM COATED ORAL DAILY
Qty: 90 TABLET | Refills: 3 | Status: SHIPPED | OUTPATIENT
Start: 2025-03-26 | End: 2026-03-26

## 2025-04-16 ENCOUNTER — DOCUMENT SCAN (OUTPATIENT)
Dept: HOME HEALTH SERVICES | Facility: HOSPITAL | Age: 80
End: 2025-04-16
Payer: MEDICARE

## 2025-04-17 ENCOUNTER — OFFICE VISIT (OUTPATIENT)
Dept: FAMILY MEDICINE | Facility: CLINIC | Age: 80
End: 2025-04-17
Payer: MEDICARE

## 2025-04-17 VITALS
DIASTOLIC BLOOD PRESSURE: 60 MMHG | TEMPERATURE: 98 F | OXYGEN SATURATION: 98 % | SYSTOLIC BLOOD PRESSURE: 130 MMHG | WEIGHT: 183.44 LBS | BODY MASS INDEX: 32.5 KG/M2 | HEART RATE: 88 BPM | HEIGHT: 63 IN | RESPIRATION RATE: 16 BRPM

## 2025-04-17 DIAGNOSIS — E11.9 TYPE 2 DIABETES MELLITUS WITHOUT COMPLICATION, WITHOUT LONG-TERM CURRENT USE OF INSULIN: ICD-10-CM

## 2025-04-17 DIAGNOSIS — Z60.2 LIVING ALONE: ICD-10-CM

## 2025-04-17 DIAGNOSIS — N18.31 CHRONIC KIDNEY DISEASE, STAGE 3A: ICD-10-CM

## 2025-04-17 DIAGNOSIS — E78.5 HYPERLIPIDEMIA ASSOCIATED WITH TYPE 2 DIABETES MELLITUS: ICD-10-CM

## 2025-04-17 DIAGNOSIS — E11.69 HYPERLIPIDEMIA ASSOCIATED WITH TYPE 2 DIABETES MELLITUS: ICD-10-CM

## 2025-04-17 DIAGNOSIS — E03.4 HYPOTHYROIDISM DUE TO ACQUIRED ATROPHY OF THYROID: ICD-10-CM

## 2025-04-17 DIAGNOSIS — I10 ESSENTIAL HYPERTENSION: ICD-10-CM

## 2025-04-17 DIAGNOSIS — E66.01 SEVERE OBESITY (BMI 35.0-39.9) WITH COMORBIDITY: ICD-10-CM

## 2025-04-17 DIAGNOSIS — R41.3 MEMORY CHANGES: Primary | ICD-10-CM

## 2025-04-17 DIAGNOSIS — J43.9 PULMONARY EMPHYSEMA, UNSPECIFIED EMPHYSEMA TYPE: ICD-10-CM

## 2025-04-17 DIAGNOSIS — F33.2 SEVERE EPISODE OF RECURRENT MAJOR DEPRESSIVE DISORDER, WITHOUT PSYCHOTIC FEATURES: ICD-10-CM

## 2025-04-17 PROCEDURE — 3075F SYST BP GE 130 - 139MM HG: CPT | Mod: CPTII,S$GLB,, | Performed by: FAMILY MEDICINE

## 2025-04-17 PROCEDURE — 1159F MED LIST DOCD IN RCRD: CPT | Mod: CPTII,S$GLB,, | Performed by: FAMILY MEDICINE

## 2025-04-17 PROCEDURE — 3078F DIAST BP <80 MM HG: CPT | Mod: CPTII,S$GLB,, | Performed by: FAMILY MEDICINE

## 2025-04-17 PROCEDURE — 99214 OFFICE O/P EST MOD 30 MIN: CPT | Mod: S$GLB,,, | Performed by: FAMILY MEDICINE

## 2025-04-17 PROCEDURE — 99999 PR PBB SHADOW E&M-EST. PATIENT-LVL III: CPT | Mod: PBBFAC,,, | Performed by: FAMILY MEDICINE

## 2025-04-17 PROCEDURE — G2211 COMPLEX E/M VISIT ADD ON: HCPCS | Mod: S$GLB,,, | Performed by: FAMILY MEDICINE

## 2025-04-17 PROCEDURE — 3288F FALL RISK ASSESSMENT DOCD: CPT | Mod: CPTII,S$GLB,, | Performed by: FAMILY MEDICINE

## 2025-04-17 PROCEDURE — 1101F PT FALLS ASSESS-DOCD LE1/YR: CPT | Mod: CPTII,S$GLB,, | Performed by: FAMILY MEDICINE

## 2025-04-17 PROCEDURE — 1160F RVW MEDS BY RX/DR IN RCRD: CPT | Mod: CPTII,S$GLB,, | Performed by: FAMILY MEDICINE

## 2025-04-17 PROCEDURE — 1125F AMNT PAIN NOTED PAIN PRSNT: CPT | Mod: CPTII,S$GLB,, | Performed by: FAMILY MEDICINE

## 2025-04-17 SDOH — SOCIAL DETERMINANTS OF HEALTH (SDOH): PROBLEMS RELATED TO LIVING ALONE: Z60.2
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
## 2025-04-21 PROBLEM — E66.01 SEVERE OBESITY (BMI 35.0-39.9) WITH COMORBIDITY: Status: ACTIVE | Noted: 2025-04-21

## 2025-04-29 ENCOUNTER — EXTERNAL HOME HEALTH (OUTPATIENT)
Dept: HOME HEALTH SERVICES | Facility: HOSPITAL | Age: 80
End: 2025-04-29
Payer: MEDICARE

## 2025-05-14 ENCOUNTER — PATIENT OUTREACH (OUTPATIENT)
Dept: ADMINISTRATIVE | Facility: HOSPITAL | Age: 80
End: 2025-05-14
Payer: MEDICARE

## 2025-05-14 DIAGNOSIS — F32.A DEPRESSION, UNSPECIFIED DEPRESSION TYPE: ICD-10-CM

## 2025-05-14 RX ORDER — DULOXETIN HYDROCHLORIDE 60 MG/1
60 CAPSULE, DELAYED RELEASE ORAL NIGHTLY
Qty: 90 CAPSULE | Refills: 3 | Status: SHIPPED | OUTPATIENT
Start: 2025-05-14

## 2025-05-14 NOTE — TELEPHONE ENCOUNTER
Care Due:                  Date            Visit Type   Department     Provider  --------------------------------------------------------------------------------                                EP -                              PRIMARY      [facility]  Last Visit: 04-      CARE ([facility])   CHANELL Rodríguez                               -                              PRIMARY      [facility]  Next Visit: 08-      CARE ([facility])   MEDICINE       Elizabeth Rodríguez                                                            Last  Test          Frequency    Reason                     Performed    Due Date  --------------------------------------------------------------------------------    HBA1C.......  6 months...  metFORMIN................  11- 05-    Mg Level....  12 months..  EVISTA...................  Not Found    Overdue    Phosphate...  15 months..  EVISTA...................  Not Found    Overdue    Vitamin D...  15 months..  EVISTA...................  Not Found    Overdue    [employer] Embedded Care Due Messages. Reference number: [id number].   5/14/2025 12:06:20 PM CDT

## 2025-05-14 NOTE — PROGRESS NOTES
PAYOR NON COMPLIANT REPORT    LINKED LABS TO UPCOMING LAB APPOINTMENT    KIDNEY HEALTH EVALUATION FOR PATIENTS WITH DIABETES

## 2025-05-29 ENCOUNTER — APPOINTMENT (OUTPATIENT)
Dept: LAB | Facility: HOSPITAL | Age: 80
End: 2025-05-29
Attending: FAMILY MEDICINE
Payer: MEDICARE

## 2025-05-29 ENCOUNTER — RESULTS FOLLOW-UP (OUTPATIENT)
Dept: FAMILY MEDICINE | Facility: CLINIC | Age: 80
End: 2025-05-29

## 2025-05-29 DIAGNOSIS — R94.4 DECREASED GFR: Primary | ICD-10-CM

## 2025-06-02 ENCOUNTER — TELEPHONE (OUTPATIENT)
Dept: FAMILY MEDICINE | Facility: CLINIC | Age: 80
End: 2025-06-02
Payer: MEDICARE

## 2025-06-02 ENCOUNTER — NURSE TRIAGE (OUTPATIENT)
Dept: ADMINISTRATIVE | Facility: CLINIC | Age: 80
End: 2025-06-02
Payer: MEDICARE

## 2025-06-02 DIAGNOSIS — R41.3 MEMORY CHANGES: Primary | ICD-10-CM

## 2025-06-02 DIAGNOSIS — R53.1 WEAKNESS GENERALIZED: ICD-10-CM

## 2025-06-02 DIAGNOSIS — F33.2 SEVERE EPISODE OF RECURRENT MAJOR DEPRESSIVE DISORDER, WITHOUT PSYCHOTIC FEATURES: ICD-10-CM

## 2025-06-03 ENCOUNTER — OFFICE VISIT (OUTPATIENT)
Dept: INTERNAL MEDICINE | Facility: CLINIC | Age: 80
End: 2025-06-03
Attending: FAMILY MEDICINE
Payer: MEDICARE

## 2025-06-03 ENCOUNTER — TELEPHONE (OUTPATIENT)
Dept: FAMILY MEDICINE | Facility: CLINIC | Age: 80
End: 2025-06-03
Payer: MEDICARE

## 2025-06-03 DIAGNOSIS — R41.3 MEMORY CHANGES: Primary | ICD-10-CM

## 2025-06-05 ENCOUNTER — RESULTS FOLLOW-UP (OUTPATIENT)
Dept: FAMILY MEDICINE | Facility: CLINIC | Age: 80
End: 2025-06-05

## 2025-06-11 ENCOUNTER — OFFICE VISIT (OUTPATIENT)
Dept: FAMILY MEDICINE | Facility: CLINIC | Age: 80
End: 2025-06-11
Payer: MEDICARE

## 2025-06-11 VITALS
WEIGHT: 178.13 LBS | DIASTOLIC BLOOD PRESSURE: 60 MMHG | RESPIRATION RATE: 16 BRPM | TEMPERATURE: 97 F | SYSTOLIC BLOOD PRESSURE: 130 MMHG | OXYGEN SATURATION: 98 % | HEIGHT: 63 IN | HEART RATE: 91 BPM | BODY MASS INDEX: 31.56 KG/M2

## 2025-06-11 DIAGNOSIS — R94.4 DECREASED GFR: ICD-10-CM

## 2025-06-11 DIAGNOSIS — Z13.31 POSITIVE DEPRESSION SCREENING: ICD-10-CM

## 2025-06-11 DIAGNOSIS — E03.4 HYPOTHYROIDISM DUE TO ACQUIRED ATROPHY OF THYROID: ICD-10-CM

## 2025-06-11 DIAGNOSIS — R41.3 MEMORY CHANGES: ICD-10-CM

## 2025-06-11 DIAGNOSIS — E11.9 TYPE 2 DIABETES MELLITUS WITHOUT COMPLICATION, WITHOUT LONG-TERM CURRENT USE OF INSULIN: Primary | ICD-10-CM

## 2025-06-11 DIAGNOSIS — N18.31 CHRONIC KIDNEY DISEASE, STAGE 3A: ICD-10-CM

## 2025-06-11 DIAGNOSIS — I10 ESSENTIAL HYPERTENSION: ICD-10-CM

## 2025-06-11 DIAGNOSIS — F33.2 SEVERE EPISODE OF RECURRENT MAJOR DEPRESSIVE DISORDER, WITHOUT PSYCHOTIC FEATURES: ICD-10-CM

## 2025-06-11 DIAGNOSIS — E11.69 HYPERLIPIDEMIA ASSOCIATED WITH TYPE 2 DIABETES MELLITUS: ICD-10-CM

## 2025-06-11 DIAGNOSIS — I10 ESSENTIAL HYPERTENSION: Primary | ICD-10-CM

## 2025-06-11 DIAGNOSIS — N18.32 STAGE 3B CHRONIC KIDNEY DISEASE: ICD-10-CM

## 2025-06-11 DIAGNOSIS — E78.5 HYPERLIPIDEMIA ASSOCIATED WITH TYPE 2 DIABETES MELLITUS: ICD-10-CM

## 2025-06-11 PROCEDURE — 99999 PR PBB SHADOW E&M-EST. PATIENT-LVL IV: CPT | Mod: PBBFAC,,, | Performed by: FAMILY MEDICINE
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
## 2025-06-12 RX ORDER — LEVOTHYROXINE SODIUM 112 UG/1
112 TABLET ORAL DAILY
Qty: 90 TABLET | Refills: 3 | Status: SHIPPED | OUTPATIENT
Start: 2025-06-12

## 2025-06-12 RX ORDER — AMLODIPINE BESYLATE 5 MG/1
5 TABLET ORAL DAILY
Qty: 90 TABLET | Refills: 3 | Status: SHIPPED | OUTPATIENT
Start: 2025-06-12

## 2025-06-12 RX ORDER — BUPROPION HYDROCHLORIDE 150 MG/1
150 TABLET ORAL DAILY
Qty: 90 TABLET | Refills: 1 | Status: SHIPPED | OUTPATIENT
Start: 2025-06-12

## 2025-06-12 NOTE — TELEPHONE ENCOUNTER
Requesting refills for  Synthroid     Last visit 6/11/2025  Next visit  9/25/2025   Changing to local pharmacy

## 2025-06-12 NOTE — TELEPHONE ENCOUNTER
No care due was identified.  [facility] Embedded Care Due Messages. Reference number: [id number].   6/11/2025 7:00:48 PM CDT

## 2025-06-12 NOTE — TELEPHONE ENCOUNTER
Refill Routing Note   Medication(s) are not appropriate for processing by [facility] for the following reason(s):        Clarification of medication (Rx) details    [facility] action(s):  Defer      Medication Therapy Plan: Medications active on med list as Norvasc and Synthroid. Should medications be RONAK- brand medically necessary      Appointments  past 12m or future 3m with PCP    Date Provider   Last Visit   6/11/2025 Elizabeth Rodríguez MD   Next Visit   9/25/2025 Elizabeth Rodríguez MD   ED visits in past 90 days: 0        Note composed:9:30 AM 06/12/2025

## 2025-06-16 ENCOUNTER — PATIENT MESSAGE (OUTPATIENT)
Dept: FAMILY MEDICINE | Facility: CLINIC | Age: 80
End: 2025-06-16
Payer: MEDICARE

## 2025-06-16 DIAGNOSIS — F32.A DEPRESSION, UNSPECIFIED DEPRESSION TYPE: ICD-10-CM

## 2025-06-16 NOTE — TELEPHONE ENCOUNTER
No care due was identified.  [facility] Embedded Care Due Messages. Reference number: [id number].   6/16/2025 6:28:45 PM CDT

## 2025-06-17 RX ORDER — PNV NO.95/FERROUS FUM/FOLIC AC 28MG-0.8MG
1000 TABLET ORAL DAILY
COMMUNITY

## 2025-06-17 RX ORDER — DULOXETIN HYDROCHLORIDE 60 MG/1
60 CAPSULE, DELAYED RELEASE ORAL NIGHTLY
Qty: 90 CAPSULE | Refills: 3 | Status: SHIPPED | OUTPATIENT
Start: 2025-06-17

## 2025-06-24 ENCOUNTER — TELEPHONE (OUTPATIENT)
Dept: FAMILY MEDICINE | Facility: CLINIC | Age: 80
End: 2025-06-24
Payer: MEDICARE

## 2025-06-24 NOTE — TELEPHONE ENCOUNTER
Copied from CRM #[id number]. Topic: Appointments - Amb Referral  >> Jun 24, 2025 12:22 PM Lucasronisaelron wrote:  Type:  Patient Requesting Referral    Who Called:Pt Daughter- Pretty Dubose   Does the patient already have the specialty appointment scheduled?: Yes   If yes, what is the date of that appointment?:7/7/2025   Referral to What Specialty:ENT   Reason for Referral: New Hearing Aids  Does the patient want the referral with a specific physician?: Mirna Upton, CCC-A - Audiogram and Uri Rosas MD   [facility]  Patient Requesting a Response?:Yes   Would the patient rather a call back or a response via [facility]?  Message [facility]   Best Call Back Number:[phone]    Additional Information:

## 2025-07-03 ENCOUNTER — PATIENT MESSAGE (OUTPATIENT)
Dept: FAMILY MEDICINE | Facility: CLINIC | Age: 80
End: 2025-07-03
Payer: MEDICARE

## 2025-07-03 DIAGNOSIS — Z11.1 SCREENING-PULMONARY TB: Primary | ICD-10-CM

## 2025-07-03 NOTE — TELEPHONE ENCOUNTER
I spoke with pts daughter via phone. I advised her of   Dr. Rodríguez recommendations. States that she johny clarify with the facility to see how close this has to be done to the admit date. Will call back once she has clarification. Orders are placed.

## 2025-07-07 ENCOUNTER — CLINICAL SUPPORT (OUTPATIENT)
Dept: AUDIOLOGY | Facility: CLINIC | Age: 80
End: 2025-07-07
Payer: MEDICARE

## 2025-07-07 ENCOUNTER — OFFICE VISIT (OUTPATIENT)
Dept: OTOLARYNGOLOGY | Facility: CLINIC | Age: 80
End: 2025-07-07
Payer: MEDICARE

## 2025-07-07 VITALS — WEIGHT: 178.13 LBS | BODY MASS INDEX: 31.55 KG/M2

## 2025-07-07 DIAGNOSIS — Z71.89 HEARING AID CONSULTATION: Primary | ICD-10-CM

## 2025-07-07 DIAGNOSIS — H61.23 BILATERAL IMPACTED CERUMEN: Primary | ICD-10-CM

## 2025-07-07 DIAGNOSIS — H90.3 BILATERAL SENSORINEURAL HEARING LOSS: ICD-10-CM

## 2025-07-07 DIAGNOSIS — H90.3 SENSORINEURAL HEARING LOSS (SNHL) OF BOTH EARS: ICD-10-CM

## 2025-07-07 DIAGNOSIS — H93.13 TINNITUS, BILATERAL: Primary | ICD-10-CM

## 2025-07-07 PROCEDURE — 1100F PTFALLS ASSESS-DOCD GE2>/YR: CPT | Mod: CPTII,S$GLB,, | Performed by: OTOLARYNGOLOGY

## 2025-07-07 PROCEDURE — 99999 PR PBB SHADOW E&M-EST. PATIENT-LVL IV: CPT | Mod: PBBFAC,,, | Performed by: OTOLARYNGOLOGY

## 2025-07-07 PROCEDURE — 99213 OFFICE O/P EST LOW 20 MIN: CPT | Mod: 25,S$GLB,, | Performed by: OTOLARYNGOLOGY

## 2025-07-07 PROCEDURE — 92567 TYMPANOMETRY: CPT | Mod: S$GLB,,, | Performed by: AUDIOLOGIST-HEARING AID FITTER

## 2025-07-07 PROCEDURE — 1160F RVW MEDS BY RX/DR IN RCRD: CPT | Mod: CPTII,S$GLB,, | Performed by: OTOLARYNGOLOGY

## 2025-07-07 PROCEDURE — 3288F FALL RISK ASSESSMENT DOCD: CPT | Mod: CPTII,S$GLB,, | Performed by: OTOLARYNGOLOGY

## 2025-07-07 PROCEDURE — 99999 PR PBB SHADOW E&M-EST. PATIENT-LVL I: CPT | Mod: PBBFAC,,, | Performed by: AUDIOLOGIST-HEARING AID FITTER

## 2025-07-07 PROCEDURE — 1126F AMNT PAIN NOTED NONE PRSNT: CPT | Mod: CPTII,S$GLB,, | Performed by: OTOLARYNGOLOGY

## 2025-07-07 PROCEDURE — 92557 COMPREHENSIVE HEARING TEST: CPT | Mod: S$GLB,,, | Performed by: AUDIOLOGIST-HEARING AID FITTER

## 2025-07-07 PROCEDURE — G0268 REMOVAL OF IMPACTED WAX MD: HCPCS | Mod: S$GLB,,, | Performed by: OTOLARYNGOLOGY

## 2025-07-07 PROCEDURE — 1159F MED LIST DOCD IN RCRD: CPT | Mod: CPTII,S$GLB,, | Performed by: OTOLARYNGOLOGY

## 2025-07-07 NOTE — PROGRESS NOTES
Tali Hopper was seen 07/07/2025 for an audiological evaluation. Pt was accompanied by her daughter during today's visit. Pt confirms occasional tinnitus AU, history of loud noise exposure and early onset of genetic family history of hearing loss. Otoscopy revealed excessive cerumen in both ears that was removed by Uri Rosas MD, ENT, prior to testing. The tympanic membrane was visualized AU prior to proceeding with the hearing testing. Pt lost her binaural OTC hearing aids.     Results reveal a bilateral borderline normal sloping to severe sensorineural hearing loss.    Speech Reception Thresholds were  25 dBHL for the right ear and 20 dBHL for the left ear.    Word recognition scores were good for the right ear and good for the left ear. Tympanograms were Type A for the right ear and Type A for the left ear.  Some significant changes were noted when compared to previous hearing testing from 8/5/22.     Audiogram results were reviewed in detail with patient and all questions were answered. Results will be reviewed by the referring provider at the completion of this note. Recommend continued daily use of binaural amplification following adjustment to new thresholds from today's hearing test, repeat hearing testing in one year due to noise exposure and bilateral hearing protection with either muffs or in-ear protection in loud noises. Plan of care was discussed in detail with the patient, who agreed with the plan as above. Pt was seen immediately following this encounter for a HA consult.

## 2025-07-07 NOTE — PROGRESS NOTES
Tali Hopper was seen on 07/07/2025 for a hearing aid consultation. Pt was accompanied by her daughter during today's visit. She has some experience with OTC hearing aids. Patient's audiogram was discussed in detail. We also discussed the different brands, styles and levels of technology. It was decided based on the patients lifestyle that the best choice would be ITEMIZED ([facility]) Phonak Audeo Infinio 70-R in color P7 with size 2M receivers AU. The price quoted was $3825.98 with tax for 2 aids. Pt will pay the $250.00 non refundable fitting fee at the end of this visit and will pay the remaining balance for the hearing aids at time of fitting. Pt was also informed that insurance reimbursement by their insurance company for any hearing aid benefits would need to be filed for by the patient since [facility] does not file for insurance benefits for hearing aids at this time. The pt scheduled a fitting on 7/16/2025.  Plan of care was discussed in detail with the patient, who agreed with the plan as above. All complaints were addressed during this visit to the patient's satisfaction. Order number is [id number].

## 2025-07-07 NOTE — PROGRESS NOTES
Subjective:       Patient ID: Tali Hopper is a 80 y.o. female.    Chief Complaint: Cerumen Impaction      Tali is here for follow-up.   Here today for hearing loss and tinnitus.     Last OV 24 with Adis.  Last audiogram appears to be in 2022 with Odalys showing normal sloping to severe sensorineural hearing loss.  This was reviewed.  Denies vertigo   She did use over-the-counter amplifier ears but has not found much benefit from them    Patient validated questionnaires (if applicable):      %            No data to display                   No data to display                   No data to display                     Review of Systems   Constitutional: Negative for activity change and appetite change.   Respiratory: Negative for difficulty breathing and wheezing   Cardiovascular: Negative for chest pain.      Objective:        Constitutional:   Vital signs are normal. She appears well-developed and well-nourished.     Head:  Normocephalic and atraumatic.     Ears:    Right Ear: Decreased hearing is noted.   Left Ear: Decreased hearing is noted.   Bilateral cerumen impactions cleaned as below    Nose:  Nose normal including turbinates, nasal mucosa, sinuses and nasal septum.     Mouth/Throat  Oropharynx clear and moist without lesions or asymmetry.     Neck:  Neck normal without thyromegaly masses, asymmetry, normal tracheal structure, crepitus, and tenderness.         Tests / Results:  Personally reviewed the audiogram from 2022 showing normal to severe bilateral sloping sensorineural hearing loss which is symmetric.    Reviewed todays' audiogram which shows mild low frequency progression and slight reduction in WRS      Procedure: bilateral microscopy with removal of cerumen  Reason: cerumen impaction, inability to visualize the tympanic membrane  Details: microscope used to obtain view of ear canals bilaterally cerumen removed with the use curette, suction, and alligator forceps  Findings: AD:  Thick  and dry cerumen/skin filling medial canal, AS: Thick and dry cerumen/skin filling medial canal  Patient tolerated procedure well.    Assessment:       1. Bilateral impacted cerumen    2. Sensorineural hearing loss (SNHL) of both ears          Plan:         Ears cleaned  Audiogram obtained showing mild progression of sensorineural hearing loss mainly in the low frequencies.  Word recognition is still preserved.  I did discuss the background of hearing loss and benefit from amplification.  She is a candidate for aids and will look into this

## 2025-07-09 ENCOUNTER — RESULTS FOLLOW-UP (OUTPATIENT)
Dept: FAMILY MEDICINE | Facility: CLINIC | Age: 80
End: 2025-07-09
Payer: MEDICARE

## 2025-07-09 ENCOUNTER — DOCUMENTATION ONLY (OUTPATIENT)
Dept: AUDIOLOGY | Facility: CLINIC | Age: 80
End: 2025-07-09
Payer: MEDICARE

## 2025-07-09 NOTE — PROGRESS NOTES
I received in the patient's hearing aid order from [facility] and sent a copy of the invoice to Luz MAYES Dylan and Mando.    Hearing Aid Information   and Model: Phon[employer] Mobile [employer]eo I70-R  Color: Graphite Gray  Right SN: [id number]  Left SN: [id number]  Battery: Li-Ion Rechargeable 13  Repair Warranty Expiration Date: 08/06/28  L&D Warranty Expiration Date: 08/06/28   SN: [id number]

## 2025-07-16 ENCOUNTER — CLINICAL SUPPORT (OUTPATIENT)
Dept: AUDIOLOGY | Facility: CLINIC | Age: 80
End: 2025-07-16
Payer: MEDICARE

## 2025-07-16 DIAGNOSIS — Z46.1 ENCOUNTER FOR HEARING AID FITTING OF BOTH EARS: Primary | ICD-10-CM

## 2025-07-16 NOTE — PROGRESS NOTES
Tali Hopper was seen on 07/16/2025  for a hearing aid fitting. Pt was accompanied by her daughter during today's visit.     HA model and style: Phonak Audeo I70-R in color P7 ITEMIZED  Right S/N: [id number]  Left S/N: [id number]   size: 2M AU CERUSTOP  Dome size: small vented    Repair warranty and L&D coverage expires: 8/6/28    Set target gain to auto from % over 14 days with instructions that pt will not be hearing to total capacity for a couple weeks when the target gain has fully increased and demonstrated low battery signal for pt. Paired the patient's hearing aids with their cell phone and performed a test call to demonstrate function. Had pt play music through the phone to demonstrate streaming capabilities. Pt will get her apple ID password to download the hearing aid  miguel at our next visit.  Practiced insertion and removal until pt felt comfortable with the process. Patient was able to manipulate hearing aids well and reported satisfaction with sound quality thus far.  She was counseled on realistic expectations and acclimation strategies.  She signed and was given a copy of the hearing aid purchase agreement following payment of $1575.98. She did not have the flex card yet as it will be mailed to her by July 31st. Her daughter will call the clinic once they receive the card to give the number to be charged.  She was reminded that she should file for insurance reimbursement on her own since we do not take insurance benefits for hearing aids at this time. Patient's one month trial period will begin today and was informed of the 30 day return policy to include the nonrefundable $250 fitting fee, if the aids are returned. The $300 ONE TIME replacement fee was discussed in detail with pt voicing understanding. She will follow up for adjustments to the settings and instructions on cleaning the aids in one month. Pt will return on 8/20/2025 at 3:30 for her 1 month follow up. All  complaints were addressed during this visit to the patient's satisfaction. Plan of care was discussed in detail with the patient, who agreed with the plan as above.

## 2025-07-19 ENCOUNTER — PATIENT MESSAGE (OUTPATIENT)
Dept: FAMILY MEDICINE | Facility: CLINIC | Age: 80
End: 2025-07-19
Payer: MEDICARE

## 2025-07-19 DIAGNOSIS — I10 ESSENTIAL HYPERTENSION: ICD-10-CM

## 2025-07-19 DIAGNOSIS — R60.9 EDEMA, UNSPECIFIED TYPE: ICD-10-CM

## 2025-07-19 DIAGNOSIS — Z12.31 ENCOUNTER FOR SCREENING MAMMOGRAM FOR MALIGNANT NEOPLASM OF BREAST: Primary | ICD-10-CM

## 2025-07-20 NOTE — TELEPHONE ENCOUNTER
Care Due:                  Date            Visit Type   Department     Provider  --------------------------------------------------------------------------------                                EP -                              PRIMARY      [facility]  Last Visit: 06-      CARE ([facility])   CHANELL Rodríguez                              EP -                              PRIMARY      [facility]  Next Visit: 09-      CARE ([facility])   MEDICINE       Elizabeth Rodríguez                                                            Last  Test          Frequency    Reason                     Performed    Due Date  --------------------------------------------------------------------------------    Mg Level....  12 months..  EVISTA...................  Not Found    Overdue    Phosphate...  15 months..  EVISTA...................  Not Found    Overdue    Vitamin D...  15 months..  EVISTA...................  Not Found    Overdue    [employer] Embedded Care Due Messages. Reference number: [id number].   7/19/2025 8:45:40 PM CDT

## 2025-07-21 RX ORDER — POTASSIUM CHLORIDE 750 MG/1
10 TABLET, EXTENDED RELEASE ORAL DAILY PRN
Qty: 90 TABLET | Refills: 3 | Status: SHIPPED | OUTPATIENT
Start: 2025-07-21

## 2025-07-21 RX ORDER — LOSARTAN POTASSIUM AND HYDROCHLOROTHIAZIDE 12.5; 1 MG/1; MG/1
1 TABLET ORAL DAILY
Qty: 90 TABLET | Refills: 3 | Status: SHIPPED | OUTPATIENT
Start: 2025-07-21

## 2025-07-21 NOTE — TELEPHONE ENCOUNTER
Refill Routing Note   Medication(s) are not appropriate for processing by [facility] for the following reason(s):        Outside of protocol    [facility] action(s):  Route  Approve     Requires labs : Yes      Medication Therapy Plan: prn in sig is outside refill center protocol    Extended chart review required: Yes     Appointments  past 12m or future 3m with PCP    Date Provider   Last Visit   6/11/2025 Elizabeth Rodríguez MD   Next Visit   9/25/2025 Elizabeth Rodríguez MD   ED visits in past 90 days: 0        Note composed:10:09 AM 07/21/2025

## 2025-07-31 ENCOUNTER — DOCUMENT SCAN (OUTPATIENT)
Dept: HOME HEALTH SERVICES | Facility: HOSPITAL | Age: 80
End: 2025-07-31
Payer: MEDICARE

## 2025-08-08 ENCOUNTER — PATIENT MESSAGE (OUTPATIENT)
Dept: AUDIOLOGY | Facility: CLINIC | Age: 80
End: 2025-08-08
Payer: MEDICARE

## 2025-08-17 DIAGNOSIS — M81.0 OSTEOPOROSIS, POST-MENOPAUSAL: ICD-10-CM

## 2025-08-19 RX ORDER — RALOXIFENE HYDROCHLORIDE 60 MG/1
60 TABLET, FILM COATED ORAL DAILY
Qty: 90 TABLET | Refills: 3 | Status: SHIPPED | OUTPATIENT
Start: 2025-08-19

## 2025-08-20 ENCOUNTER — TELEPHONE (OUTPATIENT)
Dept: FAMILY MEDICINE | Facility: CLINIC | Age: 80
End: 2025-08-20
Payer: MEDICARE

## 2025-08-22 ENCOUNTER — TELEPHONE (OUTPATIENT)
Dept: FAMILY MEDICINE | Facility: CLINIC | Age: 80
End: 2025-08-22
Payer: MEDICARE

## 2025-08-24 ENCOUNTER — PATIENT MESSAGE (OUTPATIENT)
Dept: FAMILY MEDICINE | Facility: CLINIC | Age: 80
End: 2025-08-24
Payer: MEDICARE

## 2025-08-24 DIAGNOSIS — E03.4 HYPOTHYROIDISM DUE TO ACQUIRED ATROPHY OF THYROID: ICD-10-CM

## 2025-08-25 RX ORDER — LEVOTHYROXINE SODIUM 112 UG/1
112 TABLET ORAL DAILY
Qty: 90 TABLET | Refills: 3 | Status: SHIPPED | OUTPATIENT
Start: 2025-08-25

## 2025-08-27 ENCOUNTER — CLINICAL SUPPORT (OUTPATIENT)
Dept: AUDIOLOGY | Facility: CLINIC | Age: 80
End: 2025-08-27
Payer: MEDICARE

## 2025-08-27 DIAGNOSIS — Z97.4 WEARS HEARING AID IN BOTH EARS: Primary | ICD-10-CM

## 2025-08-27 PROCEDURE — 99499 UNLISTED E&M SERVICE: CPT | Mod: S$GLB,,, | Performed by: AUDIOLOGIST-HEARING AID FITTER

## 2025-08-28 ENCOUNTER — TELEPHONE (OUTPATIENT)
Dept: FAMILY MEDICINE | Facility: CLINIC | Age: 80
End: 2025-08-28
Payer: MEDICARE

## 2025-09-03 ENCOUNTER — TELEPHONE (OUTPATIENT)
Dept: FAMILY MEDICINE | Facility: CLINIC | Age: 80
End: 2025-09-03
Payer: MEDICARE